# Patient Record
Sex: FEMALE | Race: WHITE | NOT HISPANIC OR LATINO | URBAN - METROPOLITAN AREA
[De-identification: names, ages, dates, MRNs, and addresses within clinical notes are randomized per-mention and may not be internally consistent; named-entity substitution may affect disease eponyms.]

---

## 2017-01-31 ENCOUNTER — EMERGENCY (EMERGENCY)
Facility: HOSPITAL | Age: 57
LOS: 1 days | Discharge: HOME | End: 2017-01-31
Attending: EMERGENCY MEDICINE | Admitting: EMERGENCY MEDICINE
Payer: MEDICAID

## 2017-01-31 VITALS
TEMPERATURE: 98 F | DIASTOLIC BLOOD PRESSURE: 78 MMHG | SYSTOLIC BLOOD PRESSURE: 107 MMHG | OXYGEN SATURATION: 99 % | RESPIRATION RATE: 18 BRPM | HEART RATE: 80 BPM

## 2017-01-31 VITALS
TEMPERATURE: 98 F | HEART RATE: 71 BPM | DIASTOLIC BLOOD PRESSURE: 80 MMHG | SYSTOLIC BLOOD PRESSURE: 130 MMHG | OXYGEN SATURATION: 98 % | RESPIRATION RATE: 12 BRPM
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
## 2017-01-31 DIAGNOSIS — T43.501A POISONING BY UNSPECIFIED ANTIPSYCHOTICS AND NEUROLEPTICS, ACCIDENTAL (UNINTENTIONAL), INITIAL ENCOUNTER: ICD-10-CM
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
## 2017-01-31 LAB
ALBUMIN SERPL ELPH-MCNC: 3.5 G/DL — SIGNIFICANT CHANGE UP (ref 3.3–5)
ALP SERPL-CCNC: 110 U/L — SIGNIFICANT CHANGE UP (ref 40–120)
ALT FLD-CCNC: 31 U/L — SIGNIFICANT CHANGE UP (ref 12–78)
ANION GAP SERPL CALC-SCNC: 7 MMOL/L — SIGNIFICANT CHANGE UP (ref 5–17)
APAP SERPL-MCNC: <2 UG/ML — LOW (ref 10–30)
AST SERPL-CCNC: 13 U/L — LOW (ref 15–37)
BASOPHILS # BLD AUTO: 0.1 K/UL — SIGNIFICANT CHANGE UP (ref 0–0.2)
BASOPHILS NFR BLD AUTO: 1.1 % — SIGNIFICANT CHANGE UP (ref 0–2)
BILIRUB SERPL-MCNC: 0.1 MG/DL — LOW (ref 0.2–1.2)
BUN SERPL-MCNC: 17 MG/DL — SIGNIFICANT CHANGE UP (ref 7–23)
CALCIUM SERPL-MCNC: 8.6 MG/DL — SIGNIFICANT CHANGE UP (ref 8.5–10.1)
CHLORIDE SERPL-SCNC: 105 MMOL/L — SIGNIFICANT CHANGE UP (ref 96–108)
CO2 SERPL-SCNC: 28 MMOL/L — SIGNIFICANT CHANGE UP (ref 22–31)
CREAT SERPL-MCNC: 0.92 MG/DL — SIGNIFICANT CHANGE UP (ref 0.5–1.3)
EOSINOPHIL # BLD AUTO: 0.3 K/UL — SIGNIFICANT CHANGE UP (ref 0–0.5)
EOSINOPHIL NFR BLD AUTO: 3.2 % — SIGNIFICANT CHANGE UP (ref 0–6)
ETHANOL SERPL-MCNC: <10 MG/DL — SIGNIFICANT CHANGE UP (ref 0–10)
GLUCOSE SERPL-MCNC: 89 MG/DL — SIGNIFICANT CHANGE UP (ref 70–99)
HCT VFR BLD CALC: 33.9 % — LOW (ref 34.5–45)
HGB BLD-MCNC: 10.9 G/DL — LOW (ref 11.5–15.5)
LYMPHOCYTES # BLD AUTO: 3.3 K/UL — SIGNIFICANT CHANGE UP (ref 1–3.3)
LYMPHOCYTES # BLD AUTO: 38.5 % — SIGNIFICANT CHANGE UP (ref 13–44)
MCHC RBC-ENTMCNC: 25.6 PG — LOW (ref 27–34)
MCHC RBC-ENTMCNC: 32.1 GM/DL — SIGNIFICANT CHANGE UP (ref 32–36)
MCV RBC AUTO: 79.8 FL — LOW (ref 80–100)
MONOCYTES # BLD AUTO: 0.5 K/UL — SIGNIFICANT CHANGE UP (ref 0–0.9)
MONOCYTES NFR BLD AUTO: 6 % — SIGNIFICANT CHANGE UP (ref 1–9)
NEUTROPHILS # BLD AUTO: 4.4 K/UL — SIGNIFICANT CHANGE UP (ref 1.8–7.4)
NEUTROPHILS NFR BLD AUTO: 51.2 % — SIGNIFICANT CHANGE UP (ref 43–77)
PCP SPEC-MCNC: SIGNIFICANT CHANGE UP
PLATELET # BLD AUTO: 257 K/UL — SIGNIFICANT CHANGE UP (ref 150–400)
POTASSIUM SERPL-MCNC: 3.7 MMOL/L — SIGNIFICANT CHANGE UP (ref 3.5–5.3)
POTASSIUM SERPL-SCNC: 3.7 MMOL/L — SIGNIFICANT CHANGE UP (ref 3.5–5.3)
PROT SERPL-MCNC: 6.9 G/DL — SIGNIFICANT CHANGE UP (ref 6–8.3)
RBC # BLD: 4.24 M/UL — SIGNIFICANT CHANGE UP (ref 3.8–5.2)
RBC # FLD: 14.2 % — SIGNIFICANT CHANGE UP (ref 10.3–14.5)
SALICYLATES SERPL-MCNC: 1.7 MG/DL — LOW (ref 2.8–20)
SODIUM SERPL-SCNC: 140 MMOL/L — SIGNIFICANT CHANGE UP (ref 135–145)
WBC # BLD: 8.5 K/UL — SIGNIFICANT CHANGE UP (ref 3.8–10.5)
WBC # FLD AUTO: 8.5 K/UL — SIGNIFICANT CHANGE UP (ref 3.8–10.5)

## 2017-01-31 PROCEDURE — 80053 COMPREHEN METABOLIC PANEL: CPT

## 2017-01-31 PROCEDURE — 70450 CT HEAD/BRAIN W/O DYE: CPT

## 2017-01-31 PROCEDURE — 85027 COMPLETE CBC AUTOMATED: CPT

## 2017-01-31 PROCEDURE — 36000 PLACE NEEDLE IN VEIN: CPT

## 2017-01-31 PROCEDURE — 99284 EMERGENCY DEPT VISIT MOD MDM: CPT

## 2017-01-31 PROCEDURE — 80307 DRUG TEST PRSMV CHEM ANLYZR: CPT

## 2017-01-31 PROCEDURE — 70450 CT HEAD/BRAIN W/O DYE: CPT | Mod: 26

## 2017-01-31 PROCEDURE — 93005 ELECTROCARDIOGRAM TRACING: CPT

## 2017-01-31 NOTE — ED PROVIDER NOTE - NOTES
recommendation: repeat EKG in 6 hours and 6 hours monitoring.  mental status at baseline then dc.  monitor for: somnolence, tachycardia, QTc prolongation

## 2017-01-31 NOTE — ED PROVIDER NOTE - PROGRESS NOTE DETAILS
spoke with Lana SORTO at [facility], pt awake ate a meal with no signs of lethargy at this time.  Will discharge back.

## 2017-01-31 NOTE — ED PROVIDER NOTE - MEDICAL DECISION MAKING DETAILS
pt from [facility] for depression with Seroquel ingestion 300mg. PCC, psych clearance, monitoring, EKG

## 2017-01-31 NOTE — ED ADULT NURSE REASSESSMENT NOTE - NS ED NURSE REASSESS COMMENT FT1
More awake ,aox4,asking for food,seen and reevaluated by Dr. Pickett and stated that shes not suicidal and only took 3 pills.

## 2017-01-31 NOTE — ED PROVIDER NOTE - PSYCHIATRIC, MLM
oriented to person, place, time/situation. normal mood and affect. no apparent risk to self or others.

## 2017-01-31 NOTE — ED PROVIDER NOTE - CHPI ED SYMPTOMS NEG
no hallucinations/no agitation/no confusion/no disorientation/no suicidal/no change in level of consciousness

## 2017-01-31 NOTE — ED PROVIDER NOTE - OBJECTIVE STATEMENT
55 y/o F from [facility] for depression sent in for lethargy/somnolensce after being found with a bottle of Seroquel.  Pt admits to taking three 100mg tablets for sleep.  Pt denies any suicidal ideations.

## 2017-02-07 ENCOUNTER — EMERGENCY (EMERGENCY)
Facility: HOSPITAL | Age: 57
LOS: 1 days | End: 2017-02-07
Payer: MEDICAID

## 2017-02-07 PROCEDURE — 99284 EMERGENCY DEPT VISIT MOD MDM: CPT

## 2017-02-07 PROCEDURE — 70450 CT HEAD/BRAIN W/O DYE: CPT | Mod: 26

## 2017-03-30 NOTE — HP
COWS





- Scale


Resting Pulse: 1= FL 


Sweatin=Flushed/Facial Moisture


Restless Observation: 3= Extraneous Movement


Pupil Size: 2= Moderately Dilated


Bone or Joint Aches: 2= Severe Diffuse Aches


Runny Nose/ Eye Tearin= Runny Nose/Eyes


GI Upset > 30mins: 3= Vomiting/Diarrhea


Tremor Observation: 2= Slight Tremor Visible


Yawning Observation: 2= >3x During Session


Anxiety or Irritability: 2=Irritable/Anxious


Goose Flesh Skin: 0=Smooth Skin


COWS Score: 21





CIWA Score





- CIWA Score


Nausea/Vomiting: 3


Muscle Tremors: 3


Anxiety: 3


Agitation: 3


Paroxysmal Sweats: 2


Orientation: 0-Oriented


Tacttile Disturbances: 2-Mild Itch/Numbness/Burn


Auditory Disturbances: 2-Mild Harshness/Frighten


Visual Disturbances: 2-Mild Sensitivity


Headache: 2-Mild


CIWA-Ar Total Score: 22





Admission ROS [facility]





- HPI


Chief Complaint: 


I NEED HELP TO STOP USING HEROIN,ALCOHOL,COCAINE,MARIJUANA


Allergies/Adverse Reactions: 


 Allergies











Allergy/AdvReac Type Severity Reaction Status Date / Time


 


Penicillins Allergy Severe Hives Verified 17 12:14











History of Present Illness: 


THIS 56 YEARS OLD FEMALE WITH HEROIN,ALCOHOL DEPENDENCE,XANAX,COCAINE MARIJUANA 

DEPENDENCE,WITHDRAWAL SYMPTOM,


LAST DETOX [facility] 2016


FELL 1 MONTH AGO


DEPRESSION,PTSD


HEPATITIS C


SEIZURE LAST 6 MONTHS


HYPERCHOLESTEROLEMIA


SCOLIOSIS


HISTORY OF DISLOCATION OF LEFT ANKLE


LONGEST PERIOD OF SOBRIETY 11 YEARS





Exam Limitations: No Limitations





- Ebola screening


Have you traveled outside of the country in the last 21 days: No


Have you had contact with anyone from an Ebola affected area: No


Have you been sick,other than usual withdrawal symptoms: No


Do you have a fever: No





- Review of Systems


Constitutional: Diaphoresis, Loss of Appetite, Malaise, Night Sweats, Changes 

in sleep, Unexplained wgt Loss


EENT: reports: Tearing, Nose Congestion


Respiratory: reports: No Symptoms reported


Cardiac: reports: Palpitations


GI: reports: Abdominal Distended, Diarrhea, Nausea, Poor Appetite, Abdominal 

cramping


: reports: No Symptoms Reported


Musculoskeletal: reports: Back Pain, Joint Pain, Muscle Pain, Joint Stiffness


Integumentary: reports: Dryness


Neuro: reports: Headache, Tremors


Endocrine: reports: No Symptoms Reported


Hematology: reports: No Symptoms Reported


Psychiatric: reports: No Sypmtoms Reported, Judgement Intact, Mood/Affect 

Appropiate, other (DEPRESSION,PTSD)





Patient History





- Patient Medical History


Hx Anemia: No


Hx Asthma: No


Hx Chronic Obstructive Pulmonary Disease (COPD): No


Hx Cancer: No


Hx Cardiac Disorders: No


Hx Hypertension: No


Hx Hypercholesterolemia: No


Hx Pacemaker: No


HX Cerebrovascular Accident: No


Hx Seizures: Yes (drug related seizures last 6 months ago.)


Hx Dementia: No


Hx Diabetes: No


Hx Gastrointestinal Disorders: No


Hx Liver Disease: No


Hx Genitourinary Disorders: No


Hx Sexually Transmitted Disorders: No


Hx Renal Disease (ESRD): No


Hx Thyroid Disease: No


Hx Human Immunodeficiency Virus (HIV): No (LAST 2016 NEGATIVE)


Hx Hepatitis C: Yes (UNDER C[facility] OF SPECIALIST)


Hx Depression: Yes


Hx Suicide Attempt: Yes (tried to overdose in )


Hx Bipolar Disorder: No


Hx Schizophrenia: No


Other Medical History: NO SUICIDAL,NO HOMICIDAL





- Patient Surgical History


Past Surgical History: Yes


Hx  Section: Yes (x2)


Other Surgical History: removal of benign R ovarian cyst 





- PPD History


Previous Implant?: Yes


Documented Results: Negative w/o proof


Implanted On Prior R Admission?: No


PPD to be Administered?: Yes





- Reproductive History


Patient is a Female of Child Bearing Age (11 -55 yrs old): Yes


Patient Pregnant: No





- Smoking Cessation


Smoking history: Current every day smoker


Have you smoked in the past 12 months: Yes


Aproximately how many cigarettes per day: 5


Hx Chewing Tobacco Use: No


Initiated information on smoking cessation: Yes


'Breaking Loose' booklet given: 17





- Substance & Tx. History


Hx Alcohol Use: Yes


Hx Substance Use: Yes


Substance Use Type: Alcohol, Cocaine, Heroin, Marijuana


Hx Substance Use Treatment: Yes ([facility] )





- Substances Abused


  ** Heroin


Route: Inhalation


Frequency: Daily


Amount used: 4-5 bags


Age of first use: 31


Date of Last Use: 17





  ** Alcohol


Route: Oral


Frequency: Daily


Amount used: 1 pint vodka


Age of first use: 16


Date of Last Use: 17





  ** Cocaine


Route: Inhalation


Frequency: 1-3 times last 30 days


Amount used: less than a gram


Age of first use: 20


Date of Last Use: 17





  ** Marijuana/Hashish


Route: Smoking


Frequency: Daily


Amount used: $5


Age of first use: 16


Date of Last Use: 17





Family Disease History





- Family Disease History


Family Disease History: Other: Father (ALCOHOL,), Mother (ALCOHOL)





Admission Physical Exam [facility]





- Vital Signs


Vital Signs: 


 Vital Signs - 24 hr











  17





  10:19


 


Temperature 96.8 F L


 


Pulse Rate 91 H


 


Respiratory 18





Rate 


 


Blood Pressure 130/89














- Physical


General Appearance: Yes: Moderate Distress, Tremorous, Irritable, Sweating, 

Anxious


HEENTM: Yes: Normal ENT Inspection, JAISON, Pharynx Normal


Respiratory: Yes: Lungs Clear, Normal Breath Sounds, No Respiratory Distress


Neck: Yes: Within Normal Limits, Supple, Trachea in good position


Breast: Yes: Breast Exam Deferred


Cardiology: Yes: Within Normal Limits, Regular Rhythm, Regular Rate, S1, S2


Abdominal: Yes: Within Normal Limits, Normal Bowel Sounds, Flat, Soft, Surgical 

Scar


Genitourinary: Yes: Within Normal Limits


Back: Yes: Muscle Spasm


Musculoskeletal: Yes: full range of Motion, Back pain, Joint Stiffness, Muscle 

Pain


Extremities: Yes: Within Normal Limits, Normal Inspection, Normal Range of 

Motion, Tremors


Neurological: Yes: CNs II-XII NML intact, Fully Oriented, Alert, Motor Strength 

5/5


Integumentary: Yes: Dry


Lymphatic: Yes: Within Normal Limits





- Diagnostic


(1) Opioid dependence with withdrawal


Current Visit: Yes   Status: Acute





(2) Alcohol dependence with uncomplicated withdrawal


Current Visit: Yes   Status: Acute





(3) Uncomplicated sedative, hypnotic or anxiolytic withdrawal


Current Visit: Yes   Status: Acute





(4) Cocaine dependence


Current Visit: Yes   Status: Acute   





(5) Cannabis dependence


Current Visit: Yes   Status: Acute





(6) Nicotine dependence


Current Visit: Yes   Status: Acute   





(7) Scoliosis


Current Visit: Yes   Status: Acute   





(8) Hypercholesterolemia


Current Visit: Yes   Status: Acute





(9) Ovarian cyst


Current Visit: Yes   Status: Acute   





(10) Dislocation of left ankle joint


Current Visit: Yes   Status: Acute   





(11) Depression


Current Visit: Yes   Status: Acute   





(12) PTSD (post-traumatic stress disorder)


Current Visit: Yes   Status: Acute








Cleared for Admission [facility]





- Detox or Rehab


[facility] Level of Care: Medically Managed


Detox Regimen/Protocol: Methadone/Valium





[facility] Breath Alcohol Content


Breath Alcohol Content: 0





Urine Pregancy Test





- Result


Urine Pregnancy Test Results: Negative- NO Line Present





Urine Drug Screen





- Results


Drug Screen Negative: No


Urine Drug Screen Results: THC-Marijuana, AUGUSTUS-Cocaine, OPI-Opiates, BAR-

Barbiturates, BZO-Benzodiazepines, MTD-Methadone, TCA-Tricyclic Antidepress, OXY

-Oxycodone

## 2017-03-31 NOTE — CONSULT
[facility] Psychiatric Consult





- Data


Date of interview: 03/31/17


Admission source: [facility]


Identifying data: First admission to [facility] for this 57 y/o  female 

seeking detox treatment for alcohol,cocaine,opioid,xanax and cannabis 

dependence.Patient is single,a mother of two,homeless,unemployed and supported 

on food stamps.


Substance Abuse History: - Smoking Cessation.  Smoking history: Current every 

day smoker.  Have you smoked in the past 12 months: Yes.  Aproximately how many 

cigarettes per day: 5.  Hx Chewing Tobacco Use: No.  Initiated information on 

smoking cessation: Yes.  'Breaking Loose' booklet given: 03/30/17.  - Substance 

& Tx. History.  Hx Alcohol Use: Yes.  Hx Substance Use: Yes.  Substance Use Type

: Alcohol, Cocaine, Heroin, Marijuana.  Hx Substance Use Treatment: Yes ([facility] 2016).  - Substances Abused.  ** Heroin.  Route: Inhalation.  Frequency: 

Daily.  Amount used: 4-5 bags.  Age of first use: 31.  Date of Last Use: 03/28/ 17.  ** Alcohol.  Route: Oral.  Frequency: Daily.  Amount used: 1 pint vodka.  

Age of first use: 16.  Date of Last Use: 03/29/17.  ** Cocaine.  Route: 

Inhalation.  Frequency: 1-3 times last 30 days.  Amount used: less than a gram.

  Age of first use: 20.  Date of Last Use: 03/29/17.  ** Marijuana/Hashish.  

Route: Smoking.  Frequency: Daily.  Amount used: $5.  Age of first use: 16.  

Date of Last Use: 03/29/17.  Confirmed by the patient.


Medical History: Scoliosis,withdrawal-related seizures in the past,hepatitis C,

hypercholesterolemia and a history of dislocation of left ankle.


Psychiatric History: Patient admits to one psychiatric hospitalization at [facility].Precipitant : recent death of son.Diagnosed with PTSD and 

MDD.Precribed prozac 40 mg/day + seroquel (dose not recalled).Ms Dejesus 

reports psychiatric follow up by a private psychiatrist in [facility].She 

aknowledges a suicide attempt via overdose " with pills " (reason for admission 

to [facility]).


Physical/Sexual Abuse/Trauma History: No reported history of sexual 

abuse.Traumatized by the death of her son.


Additional Comment: Urine Drug Screen Results: THC-Marijuana, AUGUSTUS-Cocaine, OPI-

Opiates, BAR-Barbiturates, BZO-Benzodiazepines, MTD-Methadone, TCA-Tricyclic 

Antidepressant, OXY-Oxycodone. Noted.





Mental Status Exam





- Mental Status Exam


Alert and Oriented to: Time, Place, Person


Cognitive Function: Good


Patient Appearance: Well Groomed


Mood: Sad, Withdrawn, Anxious, Apprehensive


Affect: Mood Congruent


Patient Behavior: Fatigued, Appropriate, Cooperative


Speech Pattern: Clear


Voice Loudness: Normal


Thought Process: Goal Oriented


Thought Disorder: Not Present


Hallucinations: Denies


Suicidal Ideation: Denies


Homicidal Ideation: Denies


Insight/Judgement: Poor


Sleep: Poorly (patient declines to resume seroquel due to occurrence of 

abnormal involuntary leg movements), Difficulty falling asleep


Appetite: Good


Muscle strength/Tone: Normal


Gait/Station: Normal





Psychiatric Findings





- Problem List (Axis 1, 2,3)


(1) Alcohol dependence with uncomplicated withdrawal


Current Visit: Yes   Status: Acute





(2) Cannabis dependence


Current Visit: Yes   Status: Acute





(3) Cocaine dependence


Current Visit: Yes   Status: Acute   





(4) Opioid dependence with withdrawal


Current Visit: Yes   Status: Acute





(5) Uncomplicated sedative, hypnotic or anxiolytic withdrawal


Current Visit: Yes   Status: Acute





(6) Nicotine dependence


Current Visit: Yes   Status: Acute   





(7) Substance induced mood disorder


Current Visit: Yes   Status: Acute





(8) PTSD (post-traumatic stress disorder)


Current Visit: Yes   Status: Chronic





(9) Depressive disorder


Current Visit: Yes   Status: Chronic





(10) Dislocation of left ankle joint


Current Visit: Yes   Status: Chronic   





(11) Hypercholesterolemia


Current Visit: Yes   Status: Chronic





(12) Ovarian cyst


Current Visit: Yes   Status: Chronic   





(13) Scoliosis


Current Visit: Yes   Status: Chronic   





(14) Insomnia


Current Visit: Yes   Status: Acute   








- Initial Treatment Plan


Initial Treatment Plan: Psychoeducation.Detoxification.Patient declines to 

resume prozac and seroquel in this hospital course.Not receptive to 

encouragement for reconsideration.Consented  (verbally) for zolpidem ONLY.Made 

aware of the risk of parasomnias.Observation.

## 2017-03-31 NOTE — EKG
Test Reason : 

Blood Pressure : ***/*** mmHG

Vent. Rate : 098 BPM     Atrial Rate : 098 BPM

   P-R Int : 162 ms          QRS Dur : 078 ms

    QT Int : 384 ms       P-R-T Axes : 074 068 055 degrees

   QTc Int : 490 ms

 

NORMAL SINUS RHYTHM

PROLONGED QT

ABNORMAL ECG

NO PREVIOUS ECGS AVAILABLE

Confirmed by MARIA R BOWERS MD ([id number]) on 3/31/2017 11:55:57 AM

 

Referred By:             Confirmed By:MARIA R BOWERS MD

## 2017-03-31 NOTE — PN
[facility] CIWA





- CIWA Score


Nausea/Vomitin-No Nausea/No Vomiting


Muscle Tremors: 3


Anxiety: 3


Agitation: 3


Paroxysmal Sweats: 3


Orientation: 0-Oriented


Tacttile Disturbances: 0-None


Auditory Disturbances: 0-None


Visual Disturbances: 0-None


Headache: 1-Very Mild


CIWA-Ar Total Score: 13





[facility] COWS





- Scale


Resting Pulse: 1= OK 


Sweatin=Flushed/Facial Moisture


Restless Observation: 1= Difficult to Sit Still


Pupil Size: 0= Normal to Room Light


Bone or Joint Aches: 2= Severe Diffuse Aches


Runny Nose/ Eye Tearin= Nasal Congestion


GI Upset > 30mins: 1= Stomach Cramp


Tremor Observation of Outstretched Hands: 2= Slight Tremor Visible


Yawning Observation: 2= >3x During Session


Anxiety or Irritability: 2=Irritable/Anxious


Goose Flesh Skin: 3=Piloerection


COWS Score: 17





[facility] Progress Note (SOAP)


Subjective: 


dry skin


sweats


agitation


anxiety


body aches


interrupted sleep


Objective: 





17 10:31


 Vital Signs











Temperature  97.2 F L  17 06:00


 


Pulse Rate  89   17 06:00


 


Respiratory Rate  16   17 06:00


 


Blood Pressure  96/72   17 06:00


 


O2 Sat by Pulse Oximetry (%)      








 Laboratory Tests











  17





  13:00 06:00


 


WBC   7.1


 


RBC   4.20


 


Hgb   11.2


 


Hct   34.4


 


MCV   81.9


 


MCHC   32.6


 


RDW   20.0 H


 


Plt Count   265


 


MPV   8.7


 


Urine Color  Ltyellow 


 


Urine Appearance  Clear 


 


Urine pH  6.0 


 


Ur Specific Gravity  1.018 


 


Urine Protein  Negative 


 


Urine Glucose (UA)  Negative 


 


Urine Ketones  Negative 


 


Urine Blood  Negative 


 


Urine Nitrite  Negative 


 


Urine Bilirubin  Negative 


 


Urine Urobilinogen  Negative 


 


Ur Leukocyte Esterase  3+ H 


 


Urine RBC  2 


 


Urine WBC  16 


 


Ur Epithelial Cells  Rare 


 


Urine Mucus  Rare 








labs pending


awake/alert


ambulating


no acute distress


Assessment: 





17 10:31


withdrawal sx


Plan: 


continue detox


increase fluids


lac hydrin bid


aveeno soap


motrin 800mg tid

## 2017-04-01 NOTE — PN
[facility] CIWA





- CIWA Score


Nausea/Vomiting: 3


Muscle Tremors: 3


Anxiety: 3


Agitation: 2


Paroxysmal Sweats: 1-Minimal Palms Moist


Orientation: 0-Oriented


Tacttile Disturbances: 1-Very Mild Itch/Numbness


Auditory Disturbances: 1-Very Mild


Visual Disturbances: 1-Very Mild Sensitivity


Headache: 2-Mild


CIWA-Ar Total Score: 17





[facility] COWS





- Scale


Resting Pulse: 1= OK 


Sweatin= Chills/Flushing


Restless Observation: 3= Extraneous Movement


Pupil Size: 1= Pupils >than Normal


Bone or Joint Aches: 2= Severe Diffuse Aches


Runny Nose/ Eye Tearin= Nasal Congestion


GI Upset > 30mins: 3= Vomiting/Diarrhea


Tremor Observation of Outstretched Hands: 2= Slight Tremor Visible


Yawning Observation: 1= 1-2x During Session


Anxiety or Irritability: 2=Irritable/Anxious


Goose Flesh Skin: 0=Smooth Skin


COWS Score: 17





[facility] Progress Note (SOAP)


Subjective: 


ALERT,IRRITABLE,ANXIOUS,INTERRUPTED SLEEP,TREMOR,PAIN IN THE BODY AND BACK


Objective: 





17 13:34


 Vital Signs











Temperature  96.9 F L  17 10:01


 


Pulse Rate  100 H  17 10:01


 


Respiratory Rate  16   17 10:01


 


Blood Pressure  118/72   17 10:01


 


O2 Sat by Pulse Oximetry (%)      








EKGEKG NSR


PROLONG QT


 Laboratory Last Values











WBC  7.1 K/mm3 (4.0-10.0)   17  06:00    


 


RBC  4.20 M/mm3 (3.60-5.2)   17  06:00    


 


Hgb  11.2 GM/dL (10.7-15.3)   17  06:00    


 


Hct  34.4 % (32.4-45.2)   17  06:00    


 


MCV  81.9 fl (80-96)   17  06:00    


 


MCHC  32.6 g/dl (32.0-36.0)   17  06:00    


 


RDW  20.0 % (11.6-15.6)  H  17  06:00    


 


Plt Count  265 K/MM3 (134-434)   17  06:00    


 


MPV  8.7 fl (7.5-11.1)   17  06:00    


 


Sodium  144 mmol/L (136-145)   17  06:00    


 


Potassium  3.8 mmol/L (3.5-5.1)   17  06:00    


 


Chloride  108 mmol/L ()  H  17  06:00    


 


Carbon Dioxide  25 mmol/L (21-32)   17  06:00    


 


Anion Gap  11  (8-16)   17  06:00    


 


BUN  17 mg/dL (7-18)   17  06:00    


 


Creatinine  1.0 mg/dL (0.55-1.02)   17  06:00    


 


Creat Clearance w eGFR  57.78  (>60)   17  06:00    


 


Random Glucose  114 mg/dL ()  H  17  06:00    


 


Calcium  8.3 mg/dL (8.5-10.1)  L  17  06:00    


 


Total Bilirubin  0.2 mg/dL (0.2-1.0)   17  06:00    


 


AST  41 U/L (15-37)  H  17  06:00    


 


ALT  101 U/L (12-78)  H  17  06:00    


 


Alkaline Phosphatase  180 U/L ()  H  17  06:00    


 


Total Protein  6.5 g/dl (6.4-8.2)   17  06:00    


 


Albumin  3.4 g/dl (3.4-5.0)   17  06:00    


 


Urine Color  Ltyellow   17  13:00    


 


Urine Appearance  Clear   17  13:00    


 


Urine pH  6.0  (5.0-8.0)   17  13:00    


 


Ur Specific Gravity  1.018  (1.001-1.035)   17  13:00    


 


Urine Protein  Negative  (NEGATIVE)   17  13:00    


 


Urine Glucose (UA)  Negative  (NEGATIVE)   17  13:00    


 


Urine Ketones  Negative  (NEGATIVE)   17  13:00    


 


Urine Blood  Negative  (NEGATIVE)   17  13:00    


 


Urine Nitrite  Negative  (NEGATIVE)   17  13:00    


 


Urine Bilirubin  Negative  (NEGATIVE)   17  13:00    


 


Urine Urobilinogen  Negative E.U./dl (0.2-1.0)   17  13:00    


 


Ur Leukocyte Esterase  3+  (NEGATIVE)  H  17  13:00    


 


Urine RBC  2 /hpf (0-3)   17  13:00    


 


Urine WBC  16 /hpf (3-5)   17  13:00    


 


Ur Epithelial Cells  Rare /hpf (FEW)   17  13:00    


 


Urine Mucus  Rare   17  13:00    


 


RPR Titer  Nonreactive  (NONREACTIVE)   17  06:00    











Assessment: 





17 13:35


WITHDRAWAL SYMPTOM


Plan: 


CONTINUE DETOX,D/C TYLENOL,REPEAT UA

## 2017-04-02 NOTE — PN
[facility] Progress Note


Note: 


PATIENT HAS WITHDRAWAL SYMPTOM,TREMOR,ANXIOUS,VALIUM 10 MGS PO ORDERED,

EXPLAINED NO MORE VALIUM AFTER THIS ON PRN,


CONTINUE VALIUM DETOX

## 2017-04-02 NOTE — PN
[facility] Progress Note (SOAP)


Subjective: 


ALERT,IRRITABLE,ANXIOUS,INTERRUPTED SLEEP,PAIN IN TH BODY,ANXIOUS


Objective: 





04/02/17 13:12


 Vital Signs











Temperature  99.6 F   04/02/17 10:53


 


Pulse Rate  79   04/02/17 10:53


 


Respiratory Rate  16   04/02/17 10:53


 


Blood Pressure  116/69   04/02/17 10:53


 


O2 Sat by Pulse Oximetry (%)      











Assessment: 





04/02/17 13:12


WITHDRAWAL SYMPTOM


Plan: 


CONTINUE DETOX

## 2017-04-03 NOTE — PN
[facility] Progress Note (SOAP)


Subjective: 


anxious


sweats


right hip pain


Objective: 


04/03/17 10:44


 Vital Signs











Temperature  97.7 F   04/03/17 06:00


 


Pulse Rate  84   04/03/17 06:00


 


Respiratory Rate  16   04/03/17 06:00


 


Blood Pressure  149/75   04/03/17 06:00


 


O2 Sat by Pulse Oximetry (%)      








awake/alert


ambulating


no acute distress


black/blue bruising noted around eyes


pt states it was a fall she experienced a month ago and now the black/blue 

noted periorbital area. pt states she had gone to ED and CT scan done and all 

negative. 





Assessment: 





04/03/17 10:47


withdrawal sx


Plan: 


continue detox


increase fluids


lidocaine patch


d/c in am

## 2017-04-04 NOTE — HP
[facility] MD Rehab Assess/Revision





- Admission History


Admitted to Rehab from: [address]





- Vital signs


Vital Signs: 


 Vital Signs (72 hours)











  04/04/17





  14:40


 


Temperature 97.8 F


 


Pulse Rate 98 H


 


Respiratory 16





Rate 


 


Blood Pressure 104/73




















- Findings


Detox History & Physical reviewed: Yes


Concur with findings: Yes

## 2017-04-04 NOTE — DS
[facility] Detox Discharge Summary


Admission Date: 


03/30/17





Discharge Date: 04/04/17





- History


Present History: Alcohol Dependence, Cannabis Dependence, Cocaine Dependence, 

Opioid Dependence, Sedative Dependence





- Physical Exam Results


Vital Signs: 


 Vital Signs











Temperature  98.5 F   04/04/17 06:33


 


Pulse Rate  75   04/04/17 06:33


 


Respiratory Rate  18   04/04/17 06:33


 


Blood Pressure  122/74   04/04/17 06:33


 


O2 Sat by Pulse Oximetry (%)      














- Treatment


Hospital Course: Detox Protocol Followed, Detoxed Safely, Responded well, 

Discharged Condition Good





- Medication


Discharge Medications: 


Ambulatory Orders





Fluoxetine HCl [Prozac -] 40 mg PO DAILY 03/30/17 


Zolpidem Tartrate [Ambien] 10 mg PO HS 03/30/17 











- Diagnosis


(1) Alcohol dependence with uncomplicated withdrawal


Current Visit: Yes   Status: Chronic





(2) Cannabis dependence


Current Visit: Yes   Status: Chronic





(3) Cocaine dependence


Current Visit: Yes   Status: Acute   Qualifiers: 


     Substance use status: uncomplicated        Qualified Code(s): F14.20 - 

Cocaine dependence, uncomplicated  





(4) Depression


Current Visit: Yes   Status: Chronic   Qualifiers: 


     Depression Type: unspecified        Qualified Code(s): F32.9 - Major 

depressive disorder, single episode, unspecified  





(5) Nicotine dependence


Current Visit: Yes   Status: Chronic   Qualifiers: 


     Nicotine product type: cigarettes     Substance use status: uncomplicated 

       Qualified Code(s): F17.210 - Nicotine dependence, cigarettes, 

uncomplicated  





(6) Opioid dependence with withdrawal


Current Visit: Yes   Status: Chronic





(7) Uncomplicated sedative, hypnotic or anxiolytic withdrawal


Current Visit: Yes   Status: Chronic





(8) Hypercholesterolemia


Current Visit: Yes   Status: Chronic








- AMA


Did Patient Leave Against Medical Advice: No
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
## 2020-07-26 ENCOUNTER — HOSPITAL ENCOUNTER (INPATIENT)
Dept: HOSPITAL 74 - YASAS | Age: 60
LOS: 5 days | Discharge: HOME | DRG: 773 | End: 2020-07-31
Attending: ALLERGY & IMMUNOLOGY | Admitting: ALLERGY & IMMUNOLOGY
Payer: COMMERCIAL

## 2020-07-26 VITALS — BODY MASS INDEX: 19.7 KG/M2

## 2020-07-26 DIAGNOSIS — B18.2: ICD-10-CM

## 2020-07-26 DIAGNOSIS — F33.1: ICD-10-CM

## 2020-07-26 DIAGNOSIS — Z86.69: ICD-10-CM

## 2020-07-26 DIAGNOSIS — Z91.041: ICD-10-CM

## 2020-07-26 DIAGNOSIS — F13.230: ICD-10-CM

## 2020-07-26 DIAGNOSIS — F11.23: Primary | ICD-10-CM
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
## 2020-07-26 DIAGNOSIS — F12.20: ICD-10-CM

## 2020-07-26 DIAGNOSIS — F14.20: ICD-10-CM

## 2020-07-26 DIAGNOSIS — F19.280: ICD-10-CM
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
## 2020-07-26 DIAGNOSIS — F10.230: ICD-10-CM

## 2020-07-26 DIAGNOSIS — F17.210: ICD-10-CM
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
## 2020-07-26 DIAGNOSIS — F19.24: ICD-10-CM

## 2020-07-26 DIAGNOSIS — R26.89: ICD-10-CM

## 2020-07-26 DIAGNOSIS — T23.092A: ICD-10-CM

## 2020-07-26 DIAGNOSIS — E78.5: ICD-10-CM

## 2020-07-26 DIAGNOSIS — M41.44: ICD-10-CM

## 2020-07-26 DIAGNOSIS — F43.10: ICD-10-CM

## 2020-07-26 DIAGNOSIS — F19.282: ICD-10-CM

## 2020-07-26 DIAGNOSIS — G47.00: ICD-10-CM

## 2020-07-26 DIAGNOSIS — Z88.8: ICD-10-CM

## 2020-07-26 DIAGNOSIS — T31.0: ICD-10-CM

## 2020-07-26 DIAGNOSIS — I69.393: ICD-10-CM

## 2020-07-26 DIAGNOSIS — Z88.0: ICD-10-CM

## 2020-07-26 DIAGNOSIS — Z91.5: ICD-10-CM

## 2020-07-26 PROCEDURE — U0003 INFECTIOUS AGENT DETECTION BY NUCLEIC ACID (DNA OR RNA); SEVERE ACUTE RESPIRATORY SYNDROME CORONAVIRUS 2 (SARS-COV-2) (CORONAVIRUS DISEASE [COVID-19]), AMPLIFIED PROBE TECHNIQUE, MAKING USE OF HIGH THROUGHPUT TECHNOLOGIES AS DESCRIBED BY CMS-2020-01-R: HCPCS

## 2020-07-26 PROCEDURE — HZ2ZZZZ DETOXIFICATION SERVICES FOR SUBSTANCE ABUSE TREATMENT: ICD-10-PCS | Performed by: ALLERGY & IMMUNOLOGY

## 2020-07-26 RX ADMIN — ACETAMINOPHEN PRN MG: 325 TABLET ORAL at 18:42

## 2020-07-26 RX ADMIN — Medication SCH MG: at 21:38

## 2020-07-26 RX ADMIN — QUETIAPINE FUMARATE SCH MG: 100 TABLET ORAL at 21:40

## 2020-07-26 NOTE — EKG
Test Reason : 

Blood Pressure : ***/*** mmHG

Vent. Rate : 061 BPM     Atrial Rate : 061 BPM

   P-R Int : 190 ms          QRS Dur : 094 ms

    QT Int : 458 ms       P-R-T Axes : [phone] degrees

   QTc Int : 461 ms

 

NORMAL SINUS RHYTHM

NORMAL ECG

WHEN COMPARED WITH ECG OF 30-MAR-2017 14:01,

VENT. RATE HAS DECREASED BY  37 BPM

Confirmed by MD Roro, Rad ([id number]) on 7/26/2020 5:15:51 PM

 

Referred By:             Confirmed By:Rad Read MD

## 2020-07-26 NOTE — HP
COWS





- Scale


Resting Pulse: 1= [place] 


Sweatin= Chills/Flushing


Restless Observation: 1= Difficult to Sit Still


Pupil Size: 1= Pupils >than Normal


Bone or Joint Aches: 2= Severe Diffuse Aches


Runny Nose/ Eye Tearin= Runny Nose/Eyes


GI Upset > 30mins: 1= Stomach Cramp


Tremor Observation: 2= Slight Tremor Visible


Yawning Observation: 2= >3x During Session


Anxiety or Irritability: 1=Feels Anxious/Irritable


Goose Flesh Skin: 3=Piloerection


COWS Score: 17





CIWA Score





- Admission Criteria


[facility]AS Guidelines: Admission for Medically Managed Detox: 


Requires at least one of the followin. CIWA greater than 12


2. Seizures within the past 24 hours


3. Delirium tremens within the past 24 hours


4. Hallucinations within the past 24 hours


5. Acute intervention needed for co  occurring medical disorder


6. Acute intervention needed for co  occurring psychiatric disorder


7. Severe withdrawal that cannot be handled at a lower level of care (continued


    vomiting, continued diarrhea, abnormal vital signs) requiring intravenous


    medication and/or fluids


8. Pregnancy








Admitting History and Physical





- Past Medical History


...LMP: 14





- Smoking History


Smoking history: Current every day smoker


Have you smoked in the past 12 months: Yes


Aproximately how many cigarettes per day: 5





- Alcohol/Substance Use


Hx Alcohol Use: Yes (reports started drinking heavy since 32 yo)





Admission [facility]


Chief Complaint: 





I want to get sober again and stay that way 


Allergies/Adverse Reactions: 


                                    Allergies











Allergy/AdvReac Type Severity Reaction Status Date / Time


 


Penicillins Allergy Severe Hives Verified 20 11:34


 


prochlorperazine Allergy  Hives Verified 20 11:36





[From Compazine]     


 


contrast dye Allergy  Hives Uncoded 20 11:36











History of Present Illness: 


Patient is a 59 years-old woman who presents for detox from heroin. Patient was 

on suboxaone but reports she has stopped taking same. Her last prescription is 

as below. Patient is requesting detox from xanax which does not show in her 

urine. She understands she will only be treated for heroin withdrawal. She 

reports she was sober for many years until 2 years ago. Her last detox and rehab

was in 2017 in this facility.


Patient Name: Adelina Carter Date: 1960


Address: [address] [place] [id number]: Female


Rx Written   Rx Dispensed   Drug   Quantity   Days Supply   Prescriber Name


2020   buprenorphine-naloxone 8-2 mg sl film   90   30   

Zuleyka Marie


Payment Method Insurance


Dispenser [facility]


2020   lorazepam 2 mg tablet   90   30   Deon [place] NP


Payment Method Insurance


Dispenser [facility]


2020   zolpidem tartrate 10 mg tablet   30   30   Deon [place] NP


Payment Method Insurance


Dispenser [facility]


2020   lorazepam 2 mg tablet   90   30   Deon [place] NP


Payment Method Insurance


Dispenser [facility]


2020   zolpidem tartrate 10 mg tablet   30   30   Deon [place] NP


Exam Limitations: No Limitations





- Ebola screening


Have you traveled outside of the country in the last 21 days: No


Have you had contact with anyone from an Ebola affected area: No


Have you been sick,other than usual withdrawal symptoms: No


Do you have a fever: No





- Review of Systems


Constitutional: Chills, Loss of Appetite, Malaise, Changes in sleep, Weakness, 

Unintentional Wgt. Loss


EENT: reports: Blurred Vision, Nose Congestion, Other (dentures)


Respiratory: reports: No Symptoms reported


Cardiac: reports: No Symptoms Reported


GI: reports: Constipated, Nausea, Poor Appetite, Abdominal cramping


: reports: No Symptoms Reported


Musculoskeletal: reports: Back Pain, Joint Pain, Muscle Pain, Muscle Weakness


Integumentary: reports: No Symptoms Reported


Neuro: reports: Numbness, Tingling, Tremors, Weakness


Endocrine: reports: No Symptoms Reported


Hematology: reports: Anemia


Psychiatric: reports: Anxious, Depressed


Other Systems: Reviewed and Negative





Patient History





- Patient Medical History


Hx Anemia: No


Hx Asthma: No


Hx Chronic Obstructive Pulmonary Disease (COPD): No


Hx Cancer: No


Hx Cardiac Disorders: No


Hx Hypertension: No


Hx Hypercholesterolemia: No


Hx Pacemaker: No


HX Cerebrovascular Accident: No


Hx Seizures: Yes (6 years ago)


Hx Dementia: No


Hx Diabetes: No


Hx Gastrointestinal Disorders: No


Hx Liver Disease: No


Hx Genitourinary Disorders: No


Hx Sexually Transmitted Disorders: No


Hx Renal Disease (ESRD): No


Hx Thyroid Disease: No


Hx Human Immunodeficiency Virus (HIV): No


Hx Hepatitis C: Yes


Hx Depression: Yes


Hx Suicide Attempt: Yes (7 years ago)


Hx Bipolar Disorder: No


Hx Schizophrenia: No





- Patient Surgical History


Past Surgical History: Yes


Hx  Section: Yes (x 2)


Other Surgical History: removal of benign R ovarian cyst ,,


Anesthesia Reaction: No





- PPD History


Previous Implant?: Yes


Documented Results: Negative w/o proof


Implanted On Prior [facility] Admission?: Yes


Date: 17


Results: omm


PPD to be Administered?: Yes





- Reproductive History


Patient is a Female of Child Bearing Age (11 -55 yrs old): No


Last Menstrual Period: 14





- Smoking Cessation


Smoking history: Current every day smoker


Have you smoked in the past 12 months: Yes


Aproximately how many cigarettes per day: 5


Hx Chewing Tobacco Use: No


Initiated information on smoking cessation: Yes


'Breaking Loose' booklet given: 20





- Substances abused


  ** Heroin


Substance route: Inhalation


Frequency: Daily


Amount used: 10 bags


Age of first use: 31


Date of last use: 20





Admission Physical Exam S





- Physical


General Appearance: Yes: No Apparent Distress


HEENTM: Yes: Hearing grossly Normal, Normocephalic, Normal Voice, JAISON, Other 

(full set of upper and lower dentures)


Respiratory: Yes: Chest Non-Tender, Lungs Clear, Normal Breath Sounds, No 

Respiratory Distress, No Accessory Muscle Use


Neck: Yes: No masses,lesions,Nodules, Supple


Breast: Yes: Breast Exam Deferred


Cardiology: Yes: Regular Rhythm, Regular Rate, S1, S2


Abdominal: Yes: Normal Bowel Sounds, Non Tender, Soft


Genitourinary: Yes: Hesitency


Back: Yes: Other (mild scoliosis)


Musculoskeletal: Yes: Back pain, Muscle Pain, Muscle weakness


Extremities: Yes: Tremors, Other (unsteady gait r/t previous CVA)


Neurological: Yes: Fully Oriented, Alert, Normal Mood/Affect, Normal Response, 

Numbness, Other (left leg weakness, walks with shuffling gait)


Integumentary: Yes: Cold


Lymphatic: Yes: Within Normal Limits





- Diagnostic


(1) Nicotine dependence


Current Visit: Yes   Status: Chronic   


Qualifiers: 


   Nicotine product type: cigarettes   Substance use status: uncomplicated   

Qualified Code(s): F17.210 - Nicotine dependence, cigarettes, uncomplicated   





(2) Opioid dependence with withdrawal


Current Visit: Yes   Status: Acute   





(3) Scoliosis


Current Visit: Yes   Status: Chronic   


Qualifiers: 


   Scoliosis type: neuromuscular   Spinal region: thoracic   Qualified Code(s): 

M41.44 - Neuromuscular scoliosis, thoracic region   





(4) CVA, old, ataxia


Current Visit: Yes   Status: Chronic   





(5) Shuffling gait


Current Visit: Yes   Status: Chronic   





Cleared for Admission [facility]





- Detox or Rehab


[facility] Level of Care: Medically Managed


Detox Regimen/Protocol: Methadone


Claeared for Rehab Admission: No





Inpatient Rehab Admission





- Rehab Decision to Admit


Inpatient rehab admission?: No
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
## 2020-07-27 LAB
ALBUMIN SERPL-MCNC: 2.9 G/DL (ref 3.4–5)
ALP SERPL-CCNC: 46 U/L (ref 45–117)
ALT SERPL-CCNC: 24 U/L (ref 13–61)
ANION GAP SERPL CALC-SCNC: 4 MMOL/L (ref 8–16)
AST SERPL-CCNC: 17 U/L (ref 15–37)
BILIRUB SERPL-MCNC: 0.9 MG/DL (ref 0.2–1)
BUN SERPL-MCNC: 8 MG/DL (ref 7–18)
CALCIUM SERPL-MCNC: 8.6 MG/DL (ref 8.5–10.1)
CHLORIDE SERPL-SCNC: 113 MMOL/L (ref 98–107)
CO2 SERPL-SCNC: 27 MMOL/L (ref 21–32)
CREAT SERPL-MCNC: 0.5 MG/DL (ref 0.55–1.3)
DEPRECATED RDW RBC AUTO: 15.1 % (ref 11.6–15.6)
GLUCOSE SERPL-MCNC: 82 MG/DL (ref 74–106)
HCT VFR BLD CALC: 32.4 % (ref 32.4–45.2)
HGB BLD-MCNC: 10.8 GM/DL (ref 10.7–15.3)
MCH RBC QN AUTO: 30.5 PG (ref 25.7–33.7)
MCHC RBC AUTO-ENTMCNC: 33.3 G/DL (ref 32–36)
MCV RBC: 91.6 FL (ref 80–96)
PLATELET # BLD AUTO: 182 K/MM3 (ref 134–434)
PMV BLD: 9.8 FL (ref 7.5–11.1)
POTASSIUM SERPLBLD-SCNC: 2.9 MMOL/L (ref 3.5–5.1)
PROT SERPL-MCNC: 5.2 G/DL (ref 6.4–8.2)
RBC # BLD AUTO: 3.54 M/MM3 (ref 3.6–5.2)
SODIUM SERPL-SCNC: 144 MMOL/L (ref 136–145)
WBC # BLD AUTO: 3.4 K/MM3 (ref 4–10)

## 2020-07-27 RX ADMIN — POTASSIUM CHLORIDE SCH MEQ: 20 SOLUTION ORAL at 20:59

## 2020-07-27 RX ADMIN — POTASSIUM CHLORIDE SCH MEQ: 20 SOLUTION ORAL at 17:21

## 2020-07-27 RX ADMIN — NICOTINE SCH MG: 7 PATCH TRANSDERMAL at 11:30

## 2020-07-27 RX ADMIN — POTASSIUM CHLORIDE SCH MEQ: 20 SOLUTION ORAL at 13:09

## 2020-07-27 RX ADMIN — Medication SCH MG: at 21:54

## 2020-07-27 RX ADMIN — Medication SCH TAB: at 11:29

## 2020-07-27 RX ADMIN — QUETIAPINE FUMARATE SCH MG: 100 TABLET ORAL at 21:54

## 2020-07-27 NOTE — PN
[facility]HS COWS





- Scale


Resting Pulse: 0= [place] 80 or Below


Sweatin= Chills/Flushing


Restless Observation: 1= Difficult to Sit Still


Pupil Size: 0= Normal to Room Light


Bone or Joint Aches: 2= Severe Diffuse Aches


Runny Nose/ Eye Tearin= Nasal Congestion


GI Upset > 30mins: 0= None


Tremor Observation of Outstretched Hands: 1= Tremor Felt, Not Seen


Yawning Observation: 0= None


Anxiety or Irritability: 1=Feels Anxious/Irritable


Goose Flesh Skin: 0=Smooth Skin


COWS Score: 7





BHS Progress Note (SOAP)


Subjective: 





sweats


mild shakes


interrupted sleep


Objective: 





20 11:36


                                   Vital Signs











Temperature  97.3 F L  20 08:52


 


Pulse Rate  73   20 08:52


 


Respiratory Rate  18   20 08:52


 


Blood Pressure  103/68   20 08:52


 


O2 Sat by Pulse Oximetry (%)  97   20 05:34








                                Laboratory Tests











  20





  14:15


 


COVID-19 (MAO)  Not detected











rest of labs pending


aaox3


ambulating


no acute distress





Assessment: 





20 11:36


withdrawals


Plan: 





continue detox


pending labs


ensure plus bid for lunch and dinner

## 2020-07-28 LAB
ALBUMIN SERPL-MCNC: 3.1 G/DL (ref 3.4–5)
ALP SERPL-CCNC: 52 U/L (ref 45–117)
ALT SERPL-CCNC: 25 U/L (ref 13–61)
ANION GAP SERPL CALC-SCNC: 4 MMOL/L (ref 8–16)
AST SERPL-CCNC: 17 U/L (ref 15–37)
BASOPHILS # BLD: 0.6 % (ref 0–2)
BILIRUB SERPL-MCNC: 0.6 MG/DL (ref 0.2–1)
BUN SERPL-MCNC: 7.8 MG/DL (ref 7–18)
CALCIUM SERPL-MCNC: 9 MG/DL (ref 8.5–10.1)
CHLORIDE SERPL-SCNC: 107 MMOL/L (ref 98–107)
CO2 SERPL-SCNC: 29 MMOL/L (ref 21–32)
CREAT SERPL-MCNC: 0.5 MG/DL (ref 0.55–1.3)
DEPRECATED RDW RBC AUTO: 15 % (ref 11.6–15.6)
EOSINOPHIL # BLD: 6.6 % (ref 0–4.5)
GLUCOSE SERPL-MCNC: 81 MG/DL (ref 74–106)
HCT VFR BLD CALC: 36 % (ref 32.4–45.2)
HGB BLD-MCNC: 11.9 GM/DL (ref 10.7–15.3)
LYMPHOCYTES # BLD: 34 % (ref 8–40)
MCH RBC QN AUTO: 30.4 PG (ref 25.7–33.7)
MCHC RBC AUTO-ENTMCNC: 33.1 G/DL (ref 32–36)
MCV RBC: 91.8 FL (ref 80–96)
MONOCYTES # BLD AUTO: 8.9 % (ref 3.8–10.2)
NEUTROPHILS # BLD: 49.9 % (ref 42.8–82.8)
PLATELET # BLD AUTO: 188 K/MM3 (ref 134–434)
PMV BLD: 9.2 FL (ref 7.5–11.1)
POTASSIUM SERPLBLD-SCNC: 4.2 MMOL/L (ref 3.5–5.1)
PROT SERPL-MCNC: 5.6 G/DL (ref 6.4–8.2)
RBC # BLD AUTO: 3.92 M/MM3 (ref 3.6–5.2)
SODIUM SERPL-SCNC: 140 MMOL/L (ref 136–145)
WBC # BLD AUTO: 4.3 K/MM3 (ref 4–10)

## 2020-07-28 RX ADMIN — QUETIAPINE FUMARATE SCH MG: 100 TABLET ORAL at 22:10

## 2020-07-28 RX ADMIN — Medication SCH MG: at 22:10

## 2020-07-28 RX ADMIN — ACETAMINOPHEN PRN MG: 325 TABLET ORAL at 17:19

## 2020-07-28 RX ADMIN — NICOTINE SCH: 7 PATCH TRANSDERMAL at 10:09

## 2020-07-28 RX ADMIN — ACETAMINOPHEN PRN MG: 325 TABLET ORAL at 10:11

## 2020-07-28 RX ADMIN — Medication SCH TAB: at 10:09

## 2020-07-28 NOTE — PN
BHS COWS





- Scale


Resting Pulse: 0= [place] 80 or Below


Sweatin= Chills/Flushing


Restless Observation: 1= Difficult to Sit Still


Pupil Size: 0= Normal to Room Light


Bone or Joint Aches: 2= Severe Diffuse Aches


Runny Nose/ Eye Tearin= None


GI Upset > 30mins: 0= None


Tremor Observation of Outstretched Hands: 1= Tremor Felt, Not Seen


Yawning Observation: 1= 1-2x During Session


Anxiety or Irritability: 1=Feels Anxious/Irritable


Goose Flesh Skin: 0=Smooth Skin


COWS Score: 7





BHS Progress Note (SOAP)


Subjective: 





body aches


headache


sweats


nausea


constipation


Objective: 





20 09:55


                                   Vital Signs











Temperature  97.3 F L  20 05:36


 


Pulse Rate  57 L  20 05:36


 


Respiratory Rate  18   20 05:36


 


Blood Pressure  117/57 L  20 05:36


 


O2 Sat by Pulse Oximetry (%)  97   20 05:36








                                Laboratory Tests











  20





  14:15 08:00 08:00


 


WBC   


 


RBC   


 


Hgb   


 


Hct   


 


MCV   


 


MCH   


 


MCHC   


 


RDW   


 


Plt Count   


 


MPV   


 


Sodium   


 


Potassium   


 


Chloride   


 


Carbon Dioxide   


 


Anion Gap   


 


BUN   


 


Creatinine   


 


Est GFR (CKD-EPI)AfAm   


 


Est GFR (CKD-EPI)NonAf   


 


Random Glucose   


 


Calcium   


 


Total Bilirubin   


 


AST   


 


ALT   


 


Alkaline Phosphatase   


 


Total Protein   


 


Albumin   


 


Syphilis Serology   Non-reactive 


 


COVID-19 (MAO)  Not detected  


 


HIV Ag/Ab Combo Qual    Negative














  20





  08:00 08:00


 


WBC  3.4 L 


 


RBC  3.54 L 


 


Hgb  10.8 


 


Hct  32.4 


 


MCV  91.6 


 


MCH  30.5  D 


 


MCHC  33.3 


 


RDW  15.1  D 


 


Plt Count  182  D 


 


MPV  9.8  D 


 


Sodium   144


 


Potassium   2.9 L*


 


Chloride   113 H


 


Carbon Dioxide   27


 


Anion Gap   4 L


 


BUN   8.0


 


Creatinine   0.5 L


 


Est GFR (CKD-EPI)AfAm   124.52


 


Est GFR (CKD-EPI)NonAf   107.44


 


Random Glucose   82


 


Calcium   8.6


 


Total Bilirubin   0.9


 


AST   17


 


ALT   24


 


Alkaline Phosphatase   46


 


Total Protein   5.2 L


 


Albumin   2.9 L


 


Syphilis Serology  


 


COVID-19 (MAO)  


 


HIV Ag/Ab Combo Qual  








repeated CMP pending


aaox3


ambulating


no acute distress


Assessment: 





20 09:55


withdrawals


Plan: 





continue detox


pending repeated labs


tylenol/motrin prn 


citroma prn


tigan prn

## 2020-07-29 RX ADMIN — NICOTINE SCH: 7 PATCH TRANSDERMAL at 10:24

## 2020-07-29 RX ADMIN — Medication SCH MG: at 21:41

## 2020-07-29 RX ADMIN — QUETIAPINE FUMARATE SCH MG: 100 TABLET ORAL at 21:41

## 2020-07-29 RX ADMIN — ACETAMINOPHEN PRN MG: 325 TABLET ORAL at 19:40

## 2020-07-29 RX ADMIN — Medication SCH TAB: at 10:23

## 2020-07-29 NOTE — PN
BHS COWS





- Scale


Resting Pulse: 0= [place] 80 or Below


Sweatin= No chills or Flushing


Restless Observation: 0= Sits Still


Pupil Size: 0= Normal to Room Light


Bone or Joint Aches: 2= Severe Diffuse Aches


Runny Nose/ Eye Tearin= None


GI Upset > 30mins: 0= None


Tremor Observation of Outstretched Hands: 1= Tremor Felt, Not Seen


Yawning Observation: 0= None


Anxiety or Irritability: 2=Irritable/Anxious


Goose Flesh Skin: 0=Smooth Skin


COWS Score: 5





BHS Progress Note (SOAP)


Subjective: 





body aches


sweats


Objective: 





20 10:23


                                   Vital Signs











Temperature  97.5 F L  20 08:52


 


Pulse Rate  76   20 08:52


 


Respiratory Rate  16   20 08:52


 


Blood Pressure  139/78   20 08:52


 


O2 Sat by Pulse Oximetry (%)  96   20 08:52








                                Laboratory Tests











  20





  14:15 08:00 08:00


 


WBC   


 


RBC   


 


Hgb   


 


Hct   


 


MCV   


 


MCH   


 


MCHC   


 


RDW   


 


Plt Count   


 


MPV   


 


Absolute Neuts (auto)   


 


Neutrophils %   


 


Lymphocytes %   


 


Monocytes %   


 


Eosinophils %   


 


Basophils %   


 


Nucleated RBC %   


 


Sodium   


 


Potassium   


 


Chloride   


 


Carbon Dioxide   


 


Anion Gap   


 


BUN   


 


Creatinine   


 


Est GFR (CKD-EPI)AfAm   


 


Est GFR (CKD-EPI)NonAf   


 


Random Glucose   


 


Calcium   


 


Total Bilirubin   


 


AST   


 


ALT   


 


Alkaline Phosphatase   


 


Total Protein   


 


Albumin   


 


Syphilis Serology   Non-reactive 


 


COVID-19 (MAO)  Not detected  


 


HIV Ag/Ab Combo Qual    Negative














  20





  08:00 08:00 08:00


 


WBC  3.4 L   4.3


 


RBC  3.54 L   3.92


 


Hgb  10.8   11.9


 


Hct  32.4   36.0


 


MCV  91.6   91.8


 


MCH  30.5  D   30.4


 


MCHC  33.3   33.1


 


RDW  15.1  D   15.0


 


Plt Count  182  D   188


 


MPV  9.8  D   9.2


 


Absolute Neuts (auto)    2.1


 


Neutrophils %    49.9


 


Lymphocytes %    34.0


 


Monocytes %    8.9


 


Eosinophils %    6.6 H


 


Basophils %    0.6


 


Nucleated RBC %    0


 


Sodium   144 


 


Potassium   2.9 L* 


 


Chloride   113 H 


 


Carbon Dioxide   27 


 


Anion Gap   4 L 


 


BUN   8.0 


 


Creatinine   0.5 L 


 


Est GFR (CKD-EPI)AfAm   124.52 


 


Est GFR (CKD-EPI)NonAf   107.44 


 


Random Glucose   82 


 


Calcium   8.6 


 


Total Bilirubin   0.9 


 


AST   17 


 


ALT   24 


 


Alkaline Phosphatase   46 


 


Total Protein   5.2 L 


 


Albumin   2.9 L 


 


Syphilis Serology   


 


COVID-19 (MAO)   


 


HIV Ag/Ab Combo Qual   














  20





  09:50


 


WBC 


 


RBC 


 


Hgb 


 


Hct 


 


MCV 


 


MCH 


 


MCHC 


 


RDW 


 


Plt Count 


 


MPV 


 


Absolute Neuts (auto) 


 


Neutrophils % 


 


Lymphocytes % 


 


Monocytes % 


 


Eosinophils % 


 


Basophils % 


 


Nucleated RBC % 


 


Sodium  140


 


Potassium  4.2


 


Chloride  107


 


Carbon Dioxide  29


 


Anion Gap  4 L


 


BUN  7.8


 


Creatinine  0.5 L


 


Est GFR (CKD-EPI)AfAm  124.52


 


Est GFR (CKD-EPI)NonAf  107.44


 


Random Glucose  81


 


Calcium  9.0


 


Total Bilirubin  0.6


 


AST  17


 


ALT  25


 


Alkaline Phosphatase  52


 


Total Protein  5.6 L


 


Albumin  3.1 L


 


Syphilis Serology 


 


COVID-19 (MAO) 


 


HIV Ag/Ab Combo Qual 








repeated labs show tremendous improvement


aaox3


ambulating


no acute distress


Assessment: 





20 10:23


withdrawals


Plan: 





increase fluids


continue detox

## 2020-07-30 RX ADMIN — NICOTINE SCH: 7 PATCH TRANSDERMAL at 11:15

## 2020-07-30 RX ADMIN — Medication SCH TAB: at 11:13

## 2020-07-30 RX ADMIN — Medication SCH MG: at 22:05

## 2020-07-30 RX ADMIN — QUETIAPINE FUMARATE SCH MG: 100 TABLET ORAL at 22:05

## 2020-07-30 RX ADMIN — ACETAMINOPHEN PRN MG: 325 TABLET ORAL at 06:23

## 2020-07-30 RX ADMIN — ACETAMINOPHEN PRN MG: 325 TABLET ORAL at 18:22

## 2020-07-30 NOTE — PN
BHS COWS





- Scale


Resting Pulse: 0= [place] 80 or Below


Sweatin= Chills/Flushing


Restless Observation: 1= Difficult to Sit Still


Pupil Size: 0= Normal to Room Light


Bone or Joint Aches: 1= Mild Discomfort


Runny Nose/ Eye Tearin= None


GI Upset > 30mins: 0= None


Tremor Observation of Outstretched Hands: 1= Tremor Felt, Not Seen


Yawning Observation: 0= None


Anxiety or Irritability: 1=Feels Anxious/Irritable


Goose Flesh Skin: 0=Smooth Skin


COWS Score: 5





BHS Progress Note (SOAP)


Subjective: 





sweats


little anxiety


otherwise feeling better


Objective: 





20 11:11


                                   Vital Signs











Temperature  97.2 F L  20 09:05


 


Pulse Rate  70   20 09:05


 


Respiratory Rate  19   20 09:05


 


Blood Pressure  151/63   20 09:05


 


O2 Sat by Pulse Oximetry (%)  99   20 09:05








aaox3


ambulating


no acute distress


Assessment: 





20 11:12


withdrawals


Plan: 





continue detox


d/c in am

## 2020-07-31 VITALS — TEMPERATURE: 96.8 F | HEART RATE: 79 BPM | SYSTOLIC BLOOD PRESSURE: 117 MMHG | DIASTOLIC BLOOD PRESSURE: 75 MMHG

## 2020-07-31 RX ADMIN — ACETAMINOPHEN PRN MG: 325 TABLET ORAL at 06:03
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
## 2020-07-31 NOTE — DS
[facility] Detox Discharge Summary


Admission Date: 


07/26/20





Discharge Date: 07/31/20





- History


Present History: Alcohol Dependence, Cannabis Dependence, Cocaine Dependence, 

Opioid Dependence, Sedative Dependence





- Physical Exam Results


Vital Signs: 


                                   Vital Signs











Temperature  97.1 F L  07/31/20 05:38


 


Pulse Rate  57 L  07/31/20 05:38


 


Respiratory Rate  16   07/31/20 05:38


 


Blood Pressure  132/77   07/31/20 05:38


 


O2 Sat by Pulse Oximetry (%)  100   07/31/20 05:38











Pertinent Admission Physical Exam Findings: 





                                   Vital Signs











Temperature  97.1 F L  07/31/20 05:38


 


Pulse Rate  57 L  07/31/20 05:38


 


Respiratory Rate  16   07/31/20 05:38


 


Blood Pressure  132/77   07/31/20 05:38


 


O2 Sat by Pulse Oximetry (%)  100   07/31/20 05:38








                                Laboratory Tests











  07/26/20 07/27/20 07/27/20





  14:15 08:00 08:00


 


WBC   


 


RBC   


 


Hgb   


 


Hct   


 


MCV   


 


MCH   


 


MCHC   


 


RDW   


 


Plt Count   


 


MPV   


 


Absolute Neuts (auto)   


 


Neutrophils %   


 


Lymphocytes %   


 


Monocytes %   


 


Eosinophils %   


 


Basophils %   


 


Nucleated RBC %   


 


Sodium   


 


Potassium   


 


Chloride   


 


Carbon Dioxide   


 


Anion Gap   


 


BUN   


 


Creatinine   


 


Est GFR (CKD-EPI)AfAm   


 


Est GFR (CKD-EPI)NonAf   


 


Random Glucose   


 


Calcium   


 


Total Bilirubin   


 


AST   


 


ALT   


 


Alkaline Phosphatase   


 


Total Protein   


 


Albumin   


 


Syphilis Serology   Non-reactive 


 


COVID-19 (MAO)  Not detected  


 


HIV Ag/Ab Combo Qual    Negative














  07/27/20 07/27/20 07/28/20





  08:00 08:00 08:00


 


WBC  3.4 L   4.3


 


RBC  3.54 L   3.92


 


Hgb  10.8   11.9


 


Hct  32.4   36.0


 


MCV  91.6   91.8


 


MCH  30.5  D   30.4


 


MCHC  33.3   33.1


 


RDW  15.1  D   15.0


 


Plt Count  182  D   188


 


MPV  9.8  D   9.2


 


Absolute Neuts (auto)    2.1


 


Neutrophils %    49.9


 


Lymphocytes %    34.0


 


Monocytes %    8.9


 


Eosinophils %    6.6 H


 


Basophils %    0.6


 


Nucleated RBC %    0


 


Sodium   144 


 


Potassium   2.9 L* 


 


Chloride   113 H 


 


Carbon Dioxide   27 


 


Anion Gap   4 L 


 


BUN   8.0 


 


Creatinine   0.5 L 


 


Est GFR (CKD-EPI)AfAm   124.52 


 


Est GFR (CKD-EPI)NonAf   107.44 


 


Random Glucose   82 


 


Calcium   8.6 


 


Total Bilirubin   0.9 


 


AST   17 


 


ALT   24 


 


Alkaline Phosphatase   46 


 


Total Protein   5.2 L 


 


Albumin   2.9 L 


 


Syphilis Serology   


 


COVID-19 (MAO)   


 


HIV Ag/Ab Combo Qual   














  07/28/20





  09:50


 


WBC 


 


RBC 


 


Hgb 


 


Hct 


 


MCV 


 


MCH 


 


MCHC 


 


RDW 


 


Plt Count 


 


MPV 


 


Absolute Neuts (auto) 


 


Neutrophils % 


 


Lymphocytes % 


 


Monocytes % 


 


Eosinophils % 


 


Basophils % 


 


Nucleated RBC % 


 


Sodium  140


 


Potassium  4.2


 


Chloride  107


 


Carbon Dioxide  29


 


Anion Gap  4 L


 


BUN  7.8


 


Creatinine  0.5 L


 


Est GFR (CKD-EPI)AfAm  124.52


 


Est GFR (CKD-EPI)NonAf  107.44


 


Random Glucose  81


 


Calcium  9.0


 


Total Bilirubin  0.6


 


AST  17


 


ALT  25


 


Alkaline Phosphatase  52


 


Total Protein  5.6 L


 


Albumin  3.1 L


 


Syphilis Serology 


 


COVID-19 (MAO) 


 


HIV Ag/Ab Combo Qual 








aaox3


ambulating


no acute distress


lungs CTA





- Treatment


Hospital Course: Detox Protocol Followed, Detoxed Safely, Responded well, 

Discharged Condition Good, Rehab Referral Accepted





- Medication


Discharge Medications: 


Ambulatory Orders





Zolpidem Tartrate [Ambien] 10 mg PO HS 03/30/17 


Quetiapine Fumarate [Seroquel] 200 mg PO HS 04/04/17 











- Diagnosis


(1) Opioid dependence with withdrawal


Current Visit: Yes   Status: Chronic   





(2) Substance induced mood disorder


Current Visit: Yes   Status: Acute   





(3) Substance-induced anxiety disorder


Current Visit: Yes   Status: Acute   





(4) Substance-induced sleep disorder


Current Visit: Yes   Status: Acute   





(5) CVA, old, ataxia


Current Visit: Yes   Status: Chronic   





(6) Dyslipidemia


Current Visit: Yes   Status: Chronic   





(7) Hepatitis C


Current Visit: Yes   Status: Chronic   


Qualifiers: 


   Viral hepatitis chronicity: chronic   Hepatic coma status: without hepatic 

coma   Qualified Code(s): B18.2 - Chronic viral hepatitis C   





(8) MDD (major depressive disorder), recurrent episode, moderate


Current Visit: Yes   Status: Chronic   





(9) Nicotine dependence


Current Visit: Yes   Status: Chronic   


Qualifiers: 


   Nicotine product type: cigarettes   Substance use status: uncomplicated   

Qualified Code(s): F17.210 - Nicotine dependence, cigarettes, uncomplicated   





(10) Scoliosis


Current Visit: Yes   Status: Chronic   


Qualifiers: 


   Scoliosis type: neuromuscular   Spinal region: thoracic   Qualified Code(s): 

M41.44 - Neuromuscular scoliosis, thoracic region   





(11) Shuffling gait


Current Visit: Yes   Status: Chronic   





(12) Insomnia


Current Visit: No   Status: Acute   





(13) Uncomplicated sedative, hypnotic or anxiolytic withdrawal


Current Visit: Yes   Status: Acute   





(14) Alcohol dependence with uncomplicated withdrawal


Current Visit: Yes   Status: Chronic   





(15) Cannabis dependence


Current Visit: Yes   Status: Chronic   





(16) Cocaine dependence


Current Visit: Yes   Status: Chronic   


Qualifiers: 


   Substance use status: uncomplicated 





(17) Depression


Current Visit: No   Status: Chronic   


Qualifiers: 


   Depression Type: unspecified   Qualified Code(s): F32.9 - Major depressive 

disorder, single episode, unspecified   





(18) Depressive disorder


Current Visit: No   Status: Chronic   





(19) Opioid dependence


Current Visit: Yes   Status: Chronic   


Qualifiers: 


   Substance use status: uncomplicated   Qualified Code(s): F11.20 - Opioid 

dependence, uncomplicated   





(20) PTSD (post-traumatic stress disorder)


Current Visit: No   Status: Chronic   





(21) Substance induced mood disorder


Current Visit: No   Status: Chronic   





(22) Ovarian cyst


Current Visit: No   Status: Resolved   





- AMA


Did Patient Leave Against Medical Advice: No

## 2022-12-19 VITALS
TEMPERATURE: 98 F | HEIGHT: 63 IN | OXYGEN SATURATION: 100 % | HEART RATE: 93 BPM | DIASTOLIC BLOOD PRESSURE: 71 MMHG | RESPIRATION RATE: 16 BRPM | SYSTOLIC BLOOD PRESSURE: 110 MMHG | WEIGHT: 110.01 LBS

## 2022-12-19 PROCEDURE — 99285 EMERGENCY DEPT VISIT HI MDM: CPT | Mod: 25

## 2022-12-19 PROCEDURE — 73552 X-RAY EXAM OF FEMUR 2/>: CPT | Mod: 26,RT,LT

## 2022-12-19 PROCEDURE — 73060 X-RAY EXAM OF HUMERUS: CPT | Mod: 26,RT

## 2022-12-19 PROCEDURE — 73560 X-RAY EXAM OF KNEE 1 OR 2: CPT | Mod: 26,RT,LT

## 2022-12-19 PROCEDURE — 73090 X-RAY EXAM OF FOREARM: CPT | Mod: 26,RT

## 2022-12-19 PROCEDURE — 73030 X-RAY EXAM OF SHOULDER: CPT | Mod: 26,RT

## 2022-12-19 NOTE — ED ADULT TRIAGE NOTE - CHIEF COMPLAINT QUOTE
per EMS pt was found on floor; with head pain, shoulder pain (bilateral)with hematoma to forehead , unknown LOC; denies blood thinner; no neck, back pain

## 2022-12-20 ENCOUNTER — INPATIENT (INPATIENT)
Facility: HOSPITAL | Age: 62
LOS: 21 days | Discharge: NURSING, REHAB OR LONG-TERM CARE | DRG: 23 | End: 2023-01-11
Attending: PSYCHIATRY & NEUROLOGY
Payer: COMMERCIAL

## 2022-12-20 DIAGNOSIS — I69.351 HEMIPLEGIA AND HEMIPARESIS FOLLOWING CEREBRAL INFARCTION AFFECTING RIGHT DOMINANT SIDE: ICD-10-CM

## 2022-12-20 DIAGNOSIS — I10 ESSENTIAL (PRIMARY) HYPERTENSION: ICD-10-CM

## 2022-12-20 DIAGNOSIS — F41.9 ANXIETY DISORDER, UNSPECIFIED: ICD-10-CM

## 2022-12-20 DIAGNOSIS — Z91.041 RADIOGRAPHIC DYE ALLERGY STATUS: ICD-10-CM

## 2022-12-20 DIAGNOSIS — D50.9 IRON DEFICIENCY ANEMIA, UNSPECIFIED: ICD-10-CM

## 2022-12-20 DIAGNOSIS — I65.22 OCCLUSION AND STENOSIS OF LEFT CAROTID ARTERY: ICD-10-CM
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
## 2022-12-20 DIAGNOSIS — D72.829 ELEVATED WHITE BLOOD CELL COUNT, UNSPECIFIED: ICD-10-CM

## 2022-12-20 DIAGNOSIS — R29.6 REPEATED FALLS: ICD-10-CM

## 2022-12-20 DIAGNOSIS — G40.909 EPILEPSY, UNSPECIFIED, NOT INTRACTABLE, WITHOUT STATUS EPILEPTICUS: ICD-10-CM

## 2022-12-20 DIAGNOSIS — Z91.81 HISTORY OF FALLING: ICD-10-CM

## 2022-12-20 DIAGNOSIS — G92.8 OTHER TOXIC ENCEPHALOPATHY: ICD-10-CM

## 2022-12-20 DIAGNOSIS — G43.909 MIGRAINE, UNSPECIFIED, NOT INTRACTABLE, WITHOUT STATUS MIGRAINOSUS: ICD-10-CM

## 2022-12-20 DIAGNOSIS — Z88.0 ALLERGY STATUS TO PENICILLIN: ICD-10-CM

## 2022-12-20 DIAGNOSIS — F32.A DEPRESSION, UNSPECIFIED: ICD-10-CM

## 2022-12-20 DIAGNOSIS — F13.20 SEDATIVE, HYPNOTIC OR ANXIOLYTIC DEPENDENCE, UNCOMPLICATED: ICD-10-CM

## 2022-12-20 DIAGNOSIS — Z88.8 ALLERGY STATUS TO OTHER DRUGS, MEDICAMENTS AND BIOLOGICAL SUBSTANCES: ICD-10-CM

## 2022-12-20 DIAGNOSIS — R73.03 PREDIABETES: ICD-10-CM

## 2022-12-20 DIAGNOSIS — S00.83XA CONTUSION OF OTHER PART OF HEAD, INITIAL ENCOUNTER: ICD-10-CM

## 2022-12-20 DIAGNOSIS — R29.704 NIHSS SCORE 4: ICD-10-CM

## 2022-12-20 DIAGNOSIS — Z87.898 PERSONAL HISTORY OF OTHER SPECIFIED CONDITIONS: ICD-10-CM

## 2022-12-20 DIAGNOSIS — E78.5 HYPERLIPIDEMIA, UNSPECIFIED: ICD-10-CM

## 2022-12-20 DIAGNOSIS — F41.1 GENERALIZED ANXIETY DISORDER: ICD-10-CM
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
## 2022-12-20 DIAGNOSIS — Z88.5 ALLERGY STATUS TO NARCOTIC AGENT: ICD-10-CM

## 2022-12-20 DIAGNOSIS — I63.523 CEREBRAL INFARCTION DUE TO UNSPECIFIED OCCLUSION OR STENOSIS OF BILATERAL ANTERIOR CEREBRAL ARTERIES: ICD-10-CM

## 2022-12-20 DIAGNOSIS — F31.9 BIPOLAR DISORDER, UNSPECIFIED: ICD-10-CM

## 2022-12-20 DIAGNOSIS — D63.8 ANEMIA IN OTHER CHRONIC DISEASES CLASSIFIED ELSEWHERE: ICD-10-CM

## 2022-12-20 DIAGNOSIS — R55 SYNCOPE AND COLLAPSE: ICD-10-CM

## 2022-12-20 DIAGNOSIS — F43.10 POST-TRAUMATIC STRESS DISORDER, UNSPECIFIED: ICD-10-CM

## 2022-12-20 DIAGNOSIS — T42.4X1A POISONING BY BENZODIAZEPINES, ACCIDENTAL (UNINTENTIONAL), INITIAL ENCOUNTER: ICD-10-CM
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
## 2022-12-20 DIAGNOSIS — R26.89 OTHER ABNORMALITIES OF GAIT AND MOBILITY: ICD-10-CM

## 2022-12-20 DIAGNOSIS — Z79.899 OTHER LONG TERM (CURRENT) DRUG THERAPY: ICD-10-CM

## 2022-12-20 DIAGNOSIS — F51.04 PSYCHOPHYSIOLOGIC INSOMNIA: ICD-10-CM

## 2022-12-20 DIAGNOSIS — Z86.73 PERSONAL HISTORY OF TRANSIENT ISCHEMIC ATTACK (TIA), AND CEREBRAL INFARCTION WITHOUT RESIDUAL DEFICITS: ICD-10-CM

## 2022-12-20 DIAGNOSIS — Z71.89 OTHER SPECIFIED COUNSELING: ICD-10-CM

## 2022-12-20 LAB
ALBUMIN SERPL ELPH-MCNC: 3.5 G/DL — SIGNIFICANT CHANGE UP (ref 3.3–5)
ALBUMIN SERPL ELPH-MCNC: 4.2 G/DL — SIGNIFICANT CHANGE UP (ref 3.3–5)
ALP SERPL-CCNC: 105 U/L — SIGNIFICANT CHANGE UP (ref 40–120)
ALP SERPL-CCNC: 89 U/L — SIGNIFICANT CHANGE UP (ref 40–120)
ALT FLD-CCNC: 28 U/L — SIGNIFICANT CHANGE UP (ref 10–45)
ALT FLD-CCNC: 34 U/L — SIGNIFICANT CHANGE UP (ref 10–45)
ANION GAP SERPL CALC-SCNC: 12 MMOL/L — SIGNIFICANT CHANGE UP (ref 5–17)
ANION GAP SERPL CALC-SCNC: 9 MMOL/L — SIGNIFICANT CHANGE UP (ref 5–17)
APTT BLD: 25.1 SEC — LOW (ref 27.5–35.5)
AST SERPL-CCNC: 33 U/L — SIGNIFICANT CHANGE UP (ref 10–40)
AST SERPL-CCNC: 38 U/L — SIGNIFICANT CHANGE UP (ref 10–40)
BASOPHILS # BLD AUTO: 0.04 K/UL — SIGNIFICANT CHANGE UP (ref 0–0.2)
BASOPHILS # BLD AUTO: 0.05 K/UL — SIGNIFICANT CHANGE UP (ref 0–0.2)
BASOPHILS NFR BLD AUTO: 0.3 % — SIGNIFICANT CHANGE UP (ref 0–2)
BASOPHILS NFR BLD AUTO: 0.7 % — SIGNIFICANT CHANGE UP (ref 0–2)
BILIRUB SERPL-MCNC: 0.3 MG/DL — SIGNIFICANT CHANGE UP (ref 0.2–1.2)
BILIRUB SERPL-MCNC: 0.3 MG/DL — SIGNIFICANT CHANGE UP (ref 0.2–1.2)
BLD GP AB SCN SERPL QL: NEGATIVE — SIGNIFICANT CHANGE UP
BLD GP AB SCN SERPL QL: NEGATIVE — SIGNIFICANT CHANGE UP
BUN SERPL-MCNC: 10 MG/DL — SIGNIFICANT CHANGE UP (ref 7–23)
BUN SERPL-MCNC: 12 MG/DL — SIGNIFICANT CHANGE UP (ref 7–23)
CALCIUM SERPL-MCNC: 9.3 MG/DL — SIGNIFICANT CHANGE UP (ref 8.4–10.5)
CALCIUM SERPL-MCNC: 9.6 MG/DL — SIGNIFICANT CHANGE UP (ref 8.4–10.5)
CHLORIDE SERPL-SCNC: 105 MMOL/L — SIGNIFICANT CHANGE UP (ref 96–108)
CHLORIDE SERPL-SCNC: 110 MMOL/L — HIGH (ref 96–108)
CO2 SERPL-SCNC: 21 MMOL/L — LOW (ref 22–31)
CO2 SERPL-SCNC: 27 MMOL/L — SIGNIFICANT CHANGE UP (ref 22–31)
CREAT SERPL-MCNC: 0.66 MG/DL — SIGNIFICANT CHANGE UP (ref 0.5–1.3)
CREAT SERPL-MCNC: 0.67 MG/DL — SIGNIFICANT CHANGE UP (ref 0.5–1.3)
EGFR: 100 ML/MIN/1.73M2 — SIGNIFICANT CHANGE UP
EGFR: 100 ML/MIN/1.73M2 — SIGNIFICANT CHANGE UP
EOSINOPHIL # BLD AUTO: 0.01 K/UL — SIGNIFICANT CHANGE UP (ref 0–0.5)
EOSINOPHIL # BLD AUTO: 0.06 K/UL — SIGNIFICANT CHANGE UP (ref 0–0.5)
EOSINOPHIL NFR BLD AUTO: 0.1 % — SIGNIFICANT CHANGE UP (ref 0–6)
EOSINOPHIL NFR BLD AUTO: 0.9 % — SIGNIFICANT CHANGE UP (ref 0–6)
ETHANOL SERPL-MCNC: <10 MG/DL — SIGNIFICANT CHANGE UP (ref 0–10)
FERRITIN SERPL-MCNC: 16 NG/ML — SIGNIFICANT CHANGE UP (ref 15–150)
GLUCOSE SERPL-MCNC: 101 MG/DL — HIGH (ref 70–99)
GLUCOSE SERPL-MCNC: 102 MG/DL — HIGH (ref 70–99)
HCT VFR BLD CALC: 25.4 % — LOW (ref 34.5–45)
HCT VFR BLD CALC: 27.2 % — LOW (ref 34.5–45)
HCV AB S/CO SERPL IA: 57.01 S/CO — HIGH
HCV AB SERPL-IMP: REACTIVE
HGB BLD-MCNC: 7.1 G/DL — LOW (ref 11.5–15.5)
HGB BLD-MCNC: 8 G/DL — LOW (ref 11.5–15.5)
IMM GRANULOCYTES NFR BLD AUTO: 0.3 % — SIGNIFICANT CHANGE UP (ref 0–0.9)
IMM GRANULOCYTES NFR BLD AUTO: 0.4 % — SIGNIFICANT CHANGE UP (ref 0–0.9)
INR BLD: 1.1 — SIGNIFICANT CHANGE UP (ref 0.88–1.16)
IRON SATN MFR SERPL: 10 UG/DL — LOW (ref 30–160)
IRON SATN MFR SERPL: 3 % — LOW (ref 14–50)
LYMPHOCYTES # BLD AUTO: 1.74 K/UL — SIGNIFICANT CHANGE UP (ref 1–3.3)
LYMPHOCYTES # BLD AUTO: 17.9 % — SIGNIFICANT CHANGE UP (ref 13–44)
LYMPHOCYTES # BLD AUTO: 2.31 K/UL — SIGNIFICANT CHANGE UP (ref 1–3.3)
LYMPHOCYTES # BLD AUTO: 26 % — SIGNIFICANT CHANGE UP (ref 13–44)
MAGNESIUM SERPL-MCNC: 1.8 MG/DL — SIGNIFICANT CHANGE UP (ref 1.6–2.6)
MCHC RBC-ENTMCNC: 20.2 PG — LOW (ref 27–34)
MCHC RBC-ENTMCNC: 20.3 PG — LOW (ref 27–34)
MCHC RBC-ENTMCNC: 28 GM/DL — LOW (ref 32–36)
MCHC RBC-ENTMCNC: 29.4 GM/DL — LOW (ref 32–36)
MCV RBC AUTO: 69 FL — LOW (ref 80–100)
MCV RBC AUTO: 72.4 FL — LOW (ref 80–100)
MONOCYTES # BLD AUTO: 0.43 K/UL — SIGNIFICANT CHANGE UP (ref 0–0.9)
MONOCYTES # BLD AUTO: 0.96 K/UL — HIGH (ref 0–0.9)
MONOCYTES NFR BLD AUTO: 6.4 % — SIGNIFICANT CHANGE UP (ref 2–14)
MONOCYTES NFR BLD AUTO: 7.4 % — SIGNIFICANT CHANGE UP (ref 2–14)
NEUTROPHILS # BLD AUTO: 4.39 K/UL — SIGNIFICANT CHANGE UP (ref 1.8–7.4)
NEUTROPHILS # BLD AUTO: 9.54 K/UL — HIGH (ref 1.8–7.4)
NEUTROPHILS NFR BLD AUTO: 65.7 % — SIGNIFICANT CHANGE UP (ref 43–77)
NEUTROPHILS NFR BLD AUTO: 73.9 % — SIGNIFICANT CHANGE UP (ref 43–77)
NRBC # BLD: 0 /100 WBCS — SIGNIFICANT CHANGE UP (ref 0–0)
NRBC # BLD: 0 /100 WBCS — SIGNIFICANT CHANGE UP (ref 0–0)
NT-PROBNP SERPL-SCNC: 108 PG/ML — SIGNIFICANT CHANGE UP (ref 0–300)
PHOSPHATE SERPL-MCNC: 2.8 MG/DL — SIGNIFICANT CHANGE UP (ref 2.5–4.5)
PLATELET # BLD AUTO: 504 K/UL — HIGH (ref 150–400)
PLATELET # BLD AUTO: 567 K/UL — HIGH (ref 150–400)
POTASSIUM SERPL-MCNC: 3 MMOL/L — LOW (ref 3.5–5.3)
POTASSIUM SERPL-MCNC: 5 MMOL/L — SIGNIFICANT CHANGE UP (ref 3.5–5.3)
POTASSIUM SERPL-SCNC: 3 MMOL/L — LOW (ref 3.5–5.3)
POTASSIUM SERPL-SCNC: 5 MMOL/L — SIGNIFICANT CHANGE UP (ref 3.5–5.3)
PROT SERPL-MCNC: 6.2 G/DL — SIGNIFICANT CHANGE UP (ref 6–8.3)
PROT SERPL-MCNC: 7.6 G/DL — SIGNIFICANT CHANGE UP (ref 6–8.3)
PROTHROM AB SERPL-ACNC: 13.1 SEC — SIGNIFICANT CHANGE UP (ref 10.5–13.4)
RBC # BLD: 3.51 M/UL — LOW (ref 3.8–5.2)
RBC # BLD: 3.51 M/UL — LOW (ref 3.8–5.2)
RBC # BLD: 3.94 M/UL — SIGNIFICANT CHANGE UP (ref 3.8–5.2)
RBC # FLD: 17.2 % — HIGH (ref 10.3–14.5)
RBC # FLD: 17.3 % — HIGH (ref 10.3–14.5)
RETICS #: 94.4 K/UL — SIGNIFICANT CHANGE UP (ref 25–125)
RETICS/RBC NFR: 2.7 % — HIGH (ref 0.5–2.5)
RH IG SCN BLD-IMP: POSITIVE — SIGNIFICANT CHANGE UP
RH IG SCN BLD-IMP: POSITIVE — SIGNIFICANT CHANGE UP
SARS-COV-2 RNA SPEC QL NAA+PROBE: NEGATIVE — SIGNIFICANT CHANGE UP
SODIUM SERPL-SCNC: 140 MMOL/L — SIGNIFICANT CHANGE UP (ref 135–145)
SODIUM SERPL-SCNC: 144 MMOL/L — SIGNIFICANT CHANGE UP (ref 135–145)
TIBC SERPL-MCNC: 302 UG/DL — SIGNIFICANT CHANGE UP (ref 220–430)
TRANSFERRIN SERPL-MCNC: 269 MG/DL — SIGNIFICANT CHANGE UP (ref 200–360)
TSH SERPL-MCNC: 0.8 UIU/ML — SIGNIFICANT CHANGE UP (ref 0.27–4.2)
UIBC SERPL-MCNC: 292 UG/DL — SIGNIFICANT CHANGE UP (ref 110–370)
WBC # BLD: 12.91 K/UL — HIGH (ref 3.8–10.5)
WBC # BLD: 6.69 K/UL — SIGNIFICANT CHANGE UP (ref 3.8–10.5)
WBC # FLD AUTO: 12.91 K/UL — HIGH (ref 3.8–10.5)
WBC # FLD AUTO: 6.69 K/UL — SIGNIFICANT CHANGE UP (ref 3.8–10.5)

## 2022-12-20 PROCEDURE — 73090 X-RAY EXAM OF FOREARM: CPT | Mod: 26,RT

## 2022-12-20 PROCEDURE — 99223 1ST HOSP IP/OBS HIGH 75: CPT | Mod: GC

## 2022-12-20 PROCEDURE — 73060 X-RAY EXAM OF HUMERUS: CPT | Mod: 26,RT

## 2022-12-20 PROCEDURE — 72128 CT CHEST SPINE W/O DYE: CPT | Mod: 26,MA

## 2022-12-20 PROCEDURE — 71045 X-RAY EXAM CHEST 1 VIEW: CPT | Mod: 26

## 2022-12-20 PROCEDURE — 73552 X-RAY EXAM OF FEMUR 2/>: CPT | Mod: 26,50

## 2022-12-20 PROCEDURE — 99223 1ST HOSP IP/OBS HIGH 75: CPT

## 2022-12-20 PROCEDURE — 93306 TTE W/DOPPLER COMPLETE: CPT | Mod: 26

## 2022-12-20 PROCEDURE — 73560 X-RAY EXAM OF KNEE 1 OR 2: CPT | Mod: 26,50

## 2022-12-20 PROCEDURE — 73030 X-RAY EXAM OF SHOULDER: CPT | Mod: 26,RT

## 2022-12-20 PROCEDURE — 70450 CT HEAD/BRAIN W/O DYE: CPT | Mod: 26,MA

## 2022-12-20 PROCEDURE — 72125 CT NECK SPINE W/O DYE: CPT | Mod: 26,MA

## 2022-12-20 RX ORDER — HYDROXYZINE HCL 10 MG
1 TABLET ORAL
Qty: 0 | Refills: 0 | DISCHARGE

## 2022-12-20 RX ORDER — POTASSIUM CHLORIDE 20 MEQ
40 PACKET (EA) ORAL ONCE
Refills: 0 | Status: COMPLETED | OUTPATIENT
Start: 2022-12-20 | End: 2022-12-20

## 2022-12-20 RX ORDER — ATORVASTATIN CALCIUM 80 MG/1
80 TABLET, FILM COATED ORAL AT BEDTIME
Refills: 0 | Status: DISCONTINUED | OUTPATIENT
Start: 2022-12-20 | End: 2023-01-11

## 2022-12-20 RX ORDER — DIPHENHYDRAMINE HCL 50 MG
1 CAPSULE ORAL
Qty: 0 | Refills: 0 | DISCHARGE

## 2022-12-20 RX ORDER — CHOLECALCIFEROL (VITAMIN D3) 125 MCG
3 CAPSULE ORAL
Qty: 0 | Refills: 0 | DISCHARGE

## 2022-12-20 RX ORDER — FERROUS GLUCONATE 100 %
1 POWDER (GRAM) MISCELLANEOUS
Qty: 0 | Refills: 0 | DISCHARGE

## 2022-12-20 RX ORDER — ENOXAPARIN SODIUM 100 MG/ML
40 INJECTION SUBCUTANEOUS EVERY 24 HOURS
Refills: 0 | Status: DISCONTINUED | OUTPATIENT
Start: 2022-12-20 | End: 2022-12-23

## 2022-12-20 RX ORDER — FOLIC ACID 0.8 MG
1 TABLET ORAL
Qty: 0 | Refills: 0 | DISCHARGE

## 2022-12-20 RX ORDER — FOLIC ACID 0.8 MG
1 TABLET ORAL DAILY
Refills: 0 | Status: DISCONTINUED | OUTPATIENT
Start: 2022-12-20 | End: 2023-01-11

## 2022-12-20 RX ORDER — ACETAMINOPHEN 500 MG
750 TABLET ORAL ONCE
Refills: 0 | Status: COMPLETED | OUTPATIENT
Start: 2022-12-20 | End: 2022-12-20

## 2022-12-20 RX ORDER — POTASSIUM CHLORIDE 20 MEQ
40 PACKET (EA) ORAL ONCE
Refills: 0 | Status: DISCONTINUED | OUTPATIENT
Start: 2022-12-20 | End: 2022-12-20

## 2022-12-20 RX ORDER — ASPIRIN/CALCIUM CARB/MAGNESIUM 324 MG
81 TABLET ORAL AT BEDTIME
Refills: 0 | Status: DISCONTINUED | OUTPATIENT
Start: 2022-12-20 | End: 2022-12-21

## 2022-12-20 RX ORDER — QUETIAPINE FUMARATE 200 MG/1
1 TABLET, FILM COATED ORAL
Qty: 0 | Refills: 0 | DISCHARGE

## 2022-12-20 RX ADMIN — Medication 1 CAPSULE(S): at 16:42

## 2022-12-20 RX ADMIN — Medication 81 MILLIGRAM(S): at 22:50

## 2022-12-20 RX ADMIN — Medication 40 MILLIEQUIVALENT(S): at 05:48

## 2022-12-20 RX ADMIN — Medication 40 MILLIEQUIVALENT(S): at 08:37

## 2022-12-20 RX ADMIN — Medication 1 TABLET(S): at 16:42

## 2022-12-20 RX ADMIN — ENOXAPARIN SODIUM 40 MILLIGRAM(S): 100 INJECTION SUBCUTANEOUS at 08:37

## 2022-12-20 RX ADMIN — ATORVASTATIN CALCIUM 80 MILLIGRAM(S): 80 TABLET, FILM COATED ORAL at 22:50

## 2022-12-20 RX ADMIN — Medication 1 CAPSULE(S): at 17:30

## 2022-12-20 RX ADMIN — Medication 40 MILLIEQUIVALENT(S): at 03:02

## 2022-12-20 RX ADMIN — Medication 300 MILLIGRAM(S): at 02:38

## 2022-12-20 RX ADMIN — Medication 1 MILLIGRAM(S): at 16:42

## 2022-12-20 NOTE — PATIENT PROFILE ADULT - FUNCTIONAL ASSESSMENT - BASIC MOBILITY 6.
1-calculated by average/Not able to assess (calculate score using [facility] averaging method)

## 2022-12-20 NOTE — BH CONSULTATION LIAISON ASSESSMENT NOTE - DIFFERENTIAL
Neurocognitive Disorder, likely dementia s/p CVAs  Benzodiazepine use disorder, r/o withdrawal associated delirium  Opiate Use disorder in reported remission  ?of Alcohol Use disorder  Unspecified anxiety  Unspecified depression Neurocognitive Disorder, likely dementia s/p CVAs  Benzodiazepine use disorder, r/o withdrawal associated delirium  Opiate Use disorder in reported remission  ?of Alcohol Use disorder  Unspecified anxiety  Unspecified depression, r/o MDD, r/o depression 2/2 general medical illness, r/o bipolar spectrum

## 2022-12-20 NOTE — SWALLOW BEDSIDE ASSESSMENT ADULT - SWALLOW EVAL: DIAGNOSIS
Called Pt informed  orders are in the system .
Dr Laura Egan please advise , last Px was 06/02/2017.
Labs ordered
Pt made an appt with Dr. Roxanne Da Silva on 8-17-18 and would like to get her blood work prior to appt. Please, call pt when the order is in the system.
Fnxl oropharyngeal swallow with no overt signs of aspiration on clinical exam.

## 2022-12-20 NOTE — SWALLOW BEDSIDE ASSESSMENT ADULT - SWALLOW EVAL: ORAL MUSCULATURE
Landers and FlexiSeal.    Surface: scott HILLARY MAITE Guadarrama, RN, [employer]  Certified Wound, Ostomy, Continence Nurse  office [phone]  Available via [address] [place] [address]
generally intact

## 2022-12-20 NOTE — H&P ADULT - PROBLEM SELECTOR PLAN 2
Juliann melana, hematochezia  - f/u reticulocyte count  - f/u iron studies  - c/w home MV + folic acid

## 2022-12-20 NOTE — ED PROVIDER NOTE - CLINICAL SUMMARY MEDICAL DECISION MAKING FREE TEXT BOX
frequent falls w head injury, neck and  mid back pain w R upper and lower ext pain, plan for labs, imaging, UA, reassess. Pt unable to ambulate on own, unclear assistance at home, will admit for safe dispo.

## 2022-12-20 NOTE — CONSULT NOTE ADULT - SUBJECTIVE AND OBJECTIVE BOX
Neurology Stroke Consult Note    Chief Complaint: Frequent falls  Last known well time/Time of onset of symptoms: unclear, pt poor Historian.    HPI: 62F w/ PMH HTN(not on any meds per pt), Migraine, Sz disorder, depression- anxiety, prior stroke Hx in 2018 presented to [facility] ER for frequent falls and was admitted to medicine for further work up. Per pt was in kitchen attempting to cook, lost balance, fell with head strike w/ LOC denies any other injuries, R forehead bruise +, called EMS and was brought to [facility]. Also admits to be dizzy and had B/L finger numbness immediately post fall which resolved in couple of minutes. Pt lives alone by herself, states she takes pain meds (unclear of the name), admits to taking ambien, valium (prescribed by her psychiatrist) several times a week. Per pt, has been having intermittent HA/not feeling well and unable to walk 2/2 gait instability X atleast 1.5years. Has frequent falls and admits to falling everyday. States she does not use walker/ cane to walk, she is awaiting her W/C. Also noted to be forgetful. CTH done in ER w/Subacute/chronic bilateral MICAH territory infarcts.    Regarding her Stroke Hx : per pt had 3 strokes in 2018 with residual R sided weakness, had stroke care @ [facility], unsure of mechanism of stroke, was started only on ASA which was stopped X 1year ago.  Also states she is not on any BP meds anymore as her PCP D/C'd it since there was no need for it.        PAST MEDICAL & SURGICAL HISTORY:  Seizures      Migraine      Hip pain, left      Depression      Anxiety      Eating disorder      Bipolar illness      PTSD (post-traumatic stress disorder)      S/P  section          FAMILY HISTORY:  No pertinent family history in first degree relatives        SOCIAL HISTORY:  Denies smoking, drinking, or drug use.  Lives alone with HHA X 4 hrs a day.    ROS:  Constitutional: No fever, weight loss or fatigue  Eyes: No eye pain, visual disturbances, or discharge  ENMT:  No difficulty hearing, tinnitus, vertigo;  No sinus or throat pain  Neck: No pain or stiffness  Respiratory: No cough, wheezing, chills or hemoptysis  Cardiovascular: No chest pain, palpitations, shortness of breath, dizziness or leg swelling  Gastrointestinal: No abdominal pain. No nausea, vomiting or hematemesis; No diarrhea or constipation.   Genitourinary: No dysuria, frequency, hematuria or incontinence  Neurological: As per HPI      MEDICATIONS  (STANDING):  acetaminophen 300 mG/butalbital 50 mG/ caffeine 40 mG 1 Capsule(s) Oral once  enoxaparin Injectable 40 milliGRAM(s) SubCutaneous every 24 hours  folic acid 1 milliGRAM(s) Oral daily  multivitamin 1 Tablet(s) Oral daily    MEDICATIONS  (PRN):      Allergies    Compazine (Unknown)  contrast media (iodine-based) (Unknown)  iv contrast (Unknown)  penicillin (Unknown)  penicillins (Other)  Toradol (Unknown)    Intolerances        Vital Signs Last 24 Hrs  T(C): 36.8 (20 Dec 2022 12:18), Max: 37 (20 Dec 2022 08:38)  T(F): 98.2 (20 Dec 2022 12:18), Max: 98.6 (20 Dec 2022 08:38)  HR: 87 (20 Dec 2022 12:18) (62 - 96)  BP: 135/83 (20 Dec 2022 12:18) (110/71 - 135/83)  BP(mean): --  RR: 20 (20 Dec 2022 12:18) (16 - 20)  SpO2: 100% (20 Dec 2022 12:18) (97% - 100%)    Parameters below as of 20 Dec 2022 12:18  Patient On (Oxygen Delivery Method): room air        Physical exam:  General: No acute distress, awake and alert  Cardiovascular: Regular rate and rhythm, no murmurs, rubs, or gallops. No bruits  Pulmonary: Anterior breath sounds clear bilaterally, no crackles or wheezing. No use of accessory muscles  Extremities: Radial and DP pulses +2, no edema    Neurologic:  -Mental status: Awake, alert, oriented to person, place, and time (year w/ choices). Speech is fluent with intact naming, repetition, and comprehension, no dysarthria. Follows commands. Attention/concentration intact. Fund of knowledge appropriate. Forgetful.    -Cranial nerves:   II: Visual fields are full to confrontation.  III, IV, VI: Extraocular movements are intact without nystagmus. Pupils equally round and reactive to light.  V:  Facial sensation V1-V3 equal and intact   VII: Face is symmetric with normal eye closure and smile  VIII: Hearing is bilaterally intact to finger rub  IX, X: Uvula is midline and soft palate rises symmetrically  XI: Head turning and shoulder shrug are intact.  XII: Tongue protrudes midline  Motor: Normal bulk and tone. RUE : antigravity, but drifts and hits the bed by the count of 10, RLE : Briefly antigravity, hit the bed and unable to hold for count of 5. LUE/ LLE : 5/5.   Sensation: Intact to light touch bilaterally. No neglect or extinction on double simultaneous testing.  Coordination: No dysmetria on finger-to-nose out of proportion to weakness.   Reflexes: Downgoing toes bilaterally   Gait: Deferred.    NIHSS: 4      LABS:                        8.0    12.91 )-----------( 567      ( 20 Dec 2022 01:37 )             27.2     12-20    140  |  110<H>  |  10  ----------------------------<  101<H>  5.0   |  21<L>  |  0.66    Ca    9.3      20 Dec 2022 14:00  Mg     2.0     12-20    TPro  6.2  /  Alb  3.5  /  TBili  0.3  /  DBili  x   /  AST  33  /  ALT  28  /  AlkPhos  89  12-20    PT/INR - ( 20 Dec 2022 01:37 )   PT: 13.1 sec;   INR: 1.10          PTT - ( 20 Dec 2022 01:37 )  PTT:25.1 sec      RADIOLOGY & ADDITIONAL TESTS:    CT Head No Cont (. @ 00:46)   IMPRESSION:  1.  No acute intracranial hemorrhage or calvarial fracture.  2.  Subacute/chronic bilateral MICAH territory infarcts.

## 2022-12-20 NOTE — SWALLOW BEDSIDE ASSESSMENT ADULT - CONSISTENCIES ADMINISTERED
tsp thin x1; 3oz thin via indep cup sips, 1 bite dry cracker. Pt refused add'l solids/thin liquid/regular solid

## 2022-12-20 NOTE — BH CONSULTATION LIAISON ASSESSMENT NOTE - RISK ASSESSMENT
Pt is assessed at low acute risk for suicide and violence. Pt is assessed at low acute risk for suicide and violence. While some depressive sx and thoughts about death are present, there is no active suicidality of homicidality, no intent/plan/attempts or self-harm behaviors. Pt has multiple additional protective factors, is future oriented, treatment seeking, motivated to maintain a relationship with her grandchildren. Chronic risk factors include h/o a psychiatric dx and substance abuse, advanced age with medical comorbidities and cognitive decline  Risk mitigated by treatment of acute medical issues, psychiatric evaluation with emotional support in hospital, substance counseling, referral to outpt treatment.

## 2022-12-20 NOTE — BH CONSULTATION LIAISON ASSESSMENT NOTE - SUMMARY
RECOMMENDATIONS  -no need for 1:1 observation for suicidality or indication for psychiatric admission  -CIWA monitoring for benzodiazepine withdrawal   -obtain urine toxicology  -discourage continued use of benzodiazepines or hypnotics given substance use disorder with ongoing alprazolam abuse, neurocognitive disorder, and likely contribution to fall risk  -consider mirtazapine 7.5mg po QHS PRN for insomnia  -evaluation for potential additional contributors to neurocognitive decline (check TSH, B12, folate, RPR)  -delirium precautions  -encourage communication with outpt prescribers of benzodiazepines and hypnotics re: recommendation to discontinue use  -involve next of kin (HCP if designated) in medical decision-making and discharge planning   -encourage engagement in outpt psychiatric care - pt reports weekly psychotherapy  -no psychiatric barrier to discharge. Please contact with questions 61yo woman, domiciled alone, retired actor, with a self-reported history of chronic anxiety, insomnia, new reported depression and cognitive deficits s/p CVAs (2019), benzodiazepine use disorder, ?alcohol use disorder (per prior chart), ?opiate use disorder in remission, migraines, ?childhood seizure d/o and PNES, who presents with short-term memory deficits and illogical thinking, ?confabulation, suggestive of a neurocognitive disorder likely related to CVAs and possibly exacerbated by chronic benzodiazepine and hypnotic use, as well as likely other substance use disorders with unclear recent use. Some depressive symptoms are also present but difficult to evaluate given pt's cognitive deficits and difficulty with self-report; no active suicidality, no obvious psychosis or clear hx suggestive of erich. Ongoing illicit benzodiazepine use in addition to prescribed use (as per ISTOP) is likely and pt is at risk for withdrawal, with documented past admissions for alcohol/benzodiazepine withdrawal. Pt is currently minimally receptive to substance counseling but would encourage continued education and referral to outpt dual diagnosis treatment. No acute safety concerns that would warrant inpatient level psychiatric care.    RECOMMENDATIONS  -no need for 1:1 observation for suicidality or indication for psychiatric admission  -[facility] monitoring for benzodiazepine withdrawal   -obtain urine toxicology  -discourage continued use of benzodiazepines or hypnotics given substance use disorder with ongoing alprazolam abuse, neurocognitive disorder, and likely contribution to fall risk  -consider mirtazapine 7.5mg po QHS PRN for insomnia  -consider thiamine supplementation  -evaluation for potential additional contributors to neurocognitive decline (check TSH, B12, folate, RPR)  -delirium precautions  -encourage communication with outpt prescribers of benzodiazepines and hypnotics re: recommendation to discontinue use  -could benefit from SSRI but declines use - recommend additional evaluation as outpt  -involve next of kin (HCP if designated) in medical decision-making and discharge planning   -encourage engagement in outpt psychiatric care - pt reports weekly psychotherapy  -no psychiatric barrier to discharge. Please contact with questions

## 2022-12-20 NOTE — H&P ADULT - PROBLEM SELECTOR PLAN 5
States to be "addicted to valium" utilizing 1 10mg tablet once every other day. Low risk for withdrawal given lack of history and CIWA 0 on admission.   - consider restarting valium

## 2022-12-20 NOTE — BH CONSULTATION LIAISON ASSESSMENT NOTE - PAST PSYCHOTROPIC MEDICATION
Pt recalls past use of "all" antidepressants with poor effect  Per chart, prior mention of Seroquel, Zoloft, Klonopin, Suboxone, methadone, as well as Ambien, Xanax

## 2022-12-20 NOTE — BH CONSULTATION LIAISON ASSESSMENT NOTE - NSBHCHARTREVIEWINVESTIGATE_PSY_A_CORE FT
CT HEAD 12/20/22  FINDINGS:  VENTRICLES AND SULCI: Interval increase in the size of the ventricles   bilaterally secondary to parenchymal volume loss. No hydrocephalus.  EXTRA-AXIAL: No extra-axial fluid collection is present.  INTRA-AXIAL: Loss of the gray-white differentiation in the left parietal   lobe with mild mass effect, likely subacute/chronic infarction. Focal   hyperdensity within this region (series 3 image 27 and series 6 image 40)   probably represents focal cortical laminar necrosis. Additional regions   of encephalomalacia/gliosis without mass effect involving the bilateral   frontal lobes and right parietal lobe in the MICAH distribution are most   consistent with subacute/chronic infarctions. Chronic lacunar infarction   involving the left caudate nucleus. No midline shift. All of these   findings are new since 2017.  VISUALIZED ORBITS: Within normal limits.  VISUALIZED SINUSES: No air-fluid levels.  VISUALIZED MASTOIDS: Well aerated.  CALVARIUM: No fracture.  MISCELLANEOUS:  Right supraorbital soft tissue swelling.    IMPRESSION:  1.  No acute intracranial hemorrhage or calvarial fracture.  2.  Subacute/chronic bilateral MICAH territory infarcts.

## 2022-12-20 NOTE — PATIENT PROFILE ADULT - FALL HARM RISK - HARM RISK INTERVENTIONS

## 2022-12-20 NOTE — PATIENT PROFILE ADULT - DO YOU LACK THE NECESSARY SUPPORT TO HELP YOU COPE WITH LIFE CHALLENGES?
Patient Education     Sinusitis (Antibiotic Treatment)    The sinuses are air-filled spaces within the bones of the face. They connect to the inside of the nose. Sinusitis is an inflammation of the tissue that lines the sinuses. Sinusitis can occur during a cold. It can also happen due to allergies to pollens and other particles in the air. Sinusitis can cause symptoms of sinus congestion and a feeling of fullness. A sinus infection causes fever, headache, and facial pain. There is often green or yellow fluid draining from the nose or into the back of the throat (post-nasal drip). You have been given antibiotics to treat this condition.  Home care  · Take the full course of antibiotics as instructed. Do not stop taking them, even when you feel better.  · Drink plenty of water, hot tea, and other liquids. This may help thin nasal mucus. It also may help your sinuses drain fluids.  · Heat may help soothe painful areas of your face. Use a towel soaked in hot water. Or,  the shower and direct the warm spray onto your face. Using a vaporizer along with a menthol rub at night may also help soothe symptoms.   · An expectorant with guaifenesin may help thin nasal mucus and help your sinuses drain fluids.  · You can use an over-the-counter decongestant, unless a similar medicine was prescribed to you. Nasal sprays work the fastest. Use one that contains phenylephrine or oxymetazoline. First blow your nose gently. Then use the spray. Do not use these medicines more often than directed on the label. If you do, your symptoms may get worse. You may also take pills that contain pseudoephedrine. Don’t use products that combine multiple medicines. This is because side effects may be increased. Read labels. You can also ask the pharmacist for help. (People with high blood pressure should not use decongestants. They can raise blood pressure.)  · Over-the-counter antihistamines may help if allergies contributed to your  sinusitis.    · Do not use nasal rinses or irrigation during an acute sinus infection, unless your healthcare provider tells you to. Rinsing may spread the infection to other areas in your sinuses.  · Use acetaminophen or ibuprofen to control pain, unless another pain medicine was prescribed to you. If you have chronic liver or kidney disease or ever had a stomach ulcer, talk with your healthcare provider before using these medicines. (Aspirin should never be taken by anyone under age 18 who is ill with a fever. It may cause severe liver damage.)  · Don't smoke. This can make symptoms worse.  Follow-up care  Follow up with your healthcare provider or our staff if you are better in 1 week.  When to seek medical advice  Call your healthcare provider if any of these occur:  · Facial pain or headache that gets worse  · Stiff neck  · Unusual drowsiness or confusion  · Swelling of your forehead or eyelids  · Vision problems, such as blurred or double vision  · Fever of 100.4ºF (38ºC) or higher, or as directed by your healthcare provider  · Seizure  · Breathing problems  · Symptoms don't go away in 10 days  Prevention  Here are steps you can take to help prevent an infection:  · Keep good hand washing habits.  · Don’t have close contact with people who have sore throats, colds, or other upper respiratory infections.  · Don’t smoke, and stay away from secondhand smoke.  · Stay up to date with of your vaccines.  Date Last Reviewed: 11/1/2017  © 9129-2730 [employer]. [address] [place] [address], [place] [address]. All rights reserved. This information is not intended as a substitute for professional medical care. Always follow your healthcare professional's instructions.            no

## 2022-12-20 NOTE — ED PROVIDER NOTE - OBJECTIVE STATEMENT
63 yo migraine, seizure d/o, depression-anxiety w complaints of fall, pt poor historian, does not remember how she fell. 63 yo migraine, seizure d/o, depression-anxiety w complaints of fall, pt racquel historian, does not remember how she fell.states has been falling more often, once per day, pt racquel historian. states aide is not with her all the time, this time she fell as was trying to get up from wheelchair on own since aide was not there and then fell.

## 2022-12-20 NOTE — BH CONSULTATION LIAISON ASSESSMENT NOTE - NSBHSAALC_PSY_A_CORE FT
Pt denies any current/past alcohol use. Chart review indicates past excessive alcohol use, past treatment for alcohol withdrawal in 2013, 2015

## 2022-12-20 NOTE — SWALLOW BEDSIDE ASSESSMENT ADULT - SLP PERTINENT HISTORY OF CURRENT PROBLEM
61 yo W w PMHx of CVA (intact language, although Pt is unreliable, changing hx within one conversation), migraine, seizures, adm s/p fall
4 = No assist / stand by assistance

## 2022-12-20 NOTE — SWALLOW BEDSIDE ASSESSMENT ADULT - PHARYNGEAL PHASE
Hyolaryngeal excursion palpated during swallow trigger & appears brisk & timely. No cough, throat clear or change in voice quality across trials .
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
## 2022-12-20 NOTE — BH CONSULTATION LIAISON ASSESSMENT NOTE - NSBHSACONSEQUENCE_PSY_A_CORE FT
Pt reports past rehab attendance for heroin abuse, past hospitalizations for benzodiazepine withdrawal. Pt reports past outpt psychiatric care, now discontinued.

## 2022-12-20 NOTE — PHYSICAL THERAPY INITIAL EVALUATION ADULT - ADDITIONAL COMMENTS
Pt states she's been using wheel chairs, 2 electronic both broken, now uses a manual wheel chair which she has difficulty with. Lives in an elevator building, states she has HHA's however unclear on hours

## 2022-12-20 NOTE — BH CONSULTATION LIAISON ASSESSMENT NOTE - NSBHCONSULTFOLLOWAFTERCARE_PSY_A_CORE FT
F/u with established outpatient therapist.  Encourage re-engaging in outpt psychiatric/dual diagnosis treatment- can provide referral information:  •	[facility]  o	[email or web address]  o	Comprehensive addiction treatment services, including psychopharm, psychotherapy, drug testing, as well as eating disorder treatment, and specialized treatment for LGBTQ, Restorationism Jew populations  o	Walk Ins Mon-Fri 9am-2pm.   o	Two locations  ?	[address] [address] [address] [phone]  ?	[address] [id number] [phone]  •	[facility]  o	[email or web address]  o	Comprehensive addiction treatment services, including psychopharm, psychotherapy, opioid treatment program  o	[address] [place] [address] [phone]

## 2022-12-20 NOTE — CHART NOTE - NSCHARTNOTEFT_GEN_A_CORE
Patient Name: Irena Elliott Date: 1960    Address: [address] [place] [id number]: Female    Rx Written	Rx Dispensed	Drug	Quantity	Days Supply	Prescriber Name	Prescriber Nancie #	Payment Method	Dispenser  10/26/2022	11/02/2022	lorazepam 2 mg tablet	60	30	Alie Byers	[id number]	Insurance	[facility]  09/15/2022	09/15/2022	zolpidem tartrate 10 mg tablet	30	30	Isabela Munoz NP [id number]	Insurance	[facility]  09/15/2022	09/15/2022	diazepam 10 mg tablet	60	30	Isabela Munoz NP [id number]	Insurance	[facility]  07/19/2022	07/19/2022	diazepam 10 mg tablet	60	30	Isabela Munoz NP [id number]	Insurance	[facility]  07/19/2022	07/19/2022	zolpidem tartrate 10 mg tablet	30	30	Isabela Munoz NP [id number]	Insurance	[facility]  06/14/2022	06/14/2022	zolpidem tartrate 10 mg tablet	30	30	Isabela Munoz NP	[id number]	Insurance	[facility]  06/14/2022	06/14/2022	diazepam 10 mg tablet	60	30	Isabela Munoz NP	[id number]	Insurance	[facility]  05/16/2022	05/16/2022	zolpidem tartrate 10 mg tablet	30	30	Isabela Munoz NP	[id number]	Insurance	[facility]  05/16/2022	05/16/2022	diazepam 5 mg tablet	90	30	Isabela Munoz NP	[id number]	Insurance	[facility]  04/06/2022	04/06/2022	diazepam 5 mg tablet	90	30	Isabela Munoz NP	[id number]	Insurance	[facility]  04/06/2022	04/06/2022	zolpidem tartrate 10 mg tablet	30	30	Isablea Munoz NP	[id number]	Insurance	[facility]  03/08/2022	03/08/2022	diazepam 5 mg tablet	90	30	Isabela Munoz NP	[id number]	Insurance	[facility]  03/08/2022	03/08/2022	zolpidem tartrate 10 mg tablet	30	30	Isabela Munoz NP	[id number]	Insurance	[facility]  02/04/2022	02/04/2022	zolpidem tartrate 10 mg tablet	30	30	Isabela Munoz NP	[id number]	Insurance	[facility]  02/04/2022	02/04/2022	diazepam 5 mg tablet	90	30	Isabela Munoz NP	[id number]	Insurance	[facility]  12/29/2021	12/29/2021	zolpidem tartrate 10 mg tablet	30	30	Isabela Munoz NP	[id number]	Insurance	[facility]  12/29/2021	12/29/2021	diazepam 5 mg tablet	90	30	Isabela Munoz NP	[id number]	Insurance	[facility]

## 2022-12-20 NOTE — BH CONSULTATION LIAISON ASSESSMENT NOTE - OTHER PAST PSYCHIATRIC HISTORY (INCLUDE DETAILS REGARDING ONSET, COURSE OF ILLNESS, INPATIENT/OUTPATIENT TREATMENT)
Pt reports past diagnoses of depression, anxiety, and insomnia. Pt reports past failed trials of "all" antidepressants, states she did not like how they made her feel. Pt denies any past psychiatric admissions or suicide attempts. Pt reports prior treatment with psychiatrists, last three months ago, terminated treatment because the psychiatrist was "an idiot" and "didn't get it". Reports ongoing weekly psychotherapy by phone, unable to recall name of therapist.

## 2022-12-20 NOTE — CHART NOTE - NSCHARTNOTEFT_GEN_A_CORE
At ~ Spoke to pt's mother and HCP, Hanane Dietz ([phone]), who provided collateral information. Per mother, pt's functional capacity has decreased significantly following multiple CVAs 2-3 years ago, reports that pt is unable to care for herself at home, notes that she falls almost daily and is unable to make it to the bathroom, cannot cook or clean for herself, and has great difficulty moving around her apartment; prior to these CVAs, pt was reportedly in good health, fully adherent to her medications, functionally independent, and required no assistance with her ADLs. Mother states pt refused all physical therapy following her CVAs and threw out the motorized wheelchair she was given for unknown reasons, causing her insurance to deny repeat attempts to provide her a wheelchair. Pt has HHA care 4 hours a day, which mother believes is not sufficient, as pt does not know which medications she takes or when and is unable to dispense them by herself. Pt's mother does not know pt's full medication list, but was able to report that she is supposed to be taking antihypertensives, but self-discontinued them an unknown amount of time ago and misplaced her blood pressure monitor. She is worried that pt may be misusing her prescription benzodiazepines, which were prescribed by a psychiatrist for depressive symptoms after the death of her son 7 years ago and which has worsened since her CVAs. Pt has another son, Nirmal, from whom she is estranged; mother reports he has not spoken to pt since her other son's death. Mother herself says she has not seen pt in at least 2 years as she lives in [place] and pt lives in the [place], but that she hears from pt by phone regularly and will call [facility] or [facility] to check on her if she doesn't hear from pt in some time, as pt intermittently stops communicating with her and dismisses HHA. Mother believes pt needs physical therapy and a wheelchair for mobility, but cannot guarantee pt will agree to medical recommendations. Spoke to pt's mother and HCP, Hanane Dietz ([phone]), who provided collateral information. Per mother, pt's functional capacity has decreased significantly following multiple CVAs 2-3 years ago, reports that pt is unable to care for herself at home, notes that she falls almost daily and is unable to make it to the bathroom, cannot cook or clean for herself, and has great difficulty moving around her apartment; prior to these CVAs, pt was reportedly in good health, fully adherent to her medications, functionally independent, and was able to perform all ADLs. Mother states pt refused all physical therapy following her CVAs and threw out the motorized wheelchair she was given for unknown reasons, causing her insurance to deny repeat attempts to provide her a wheelchair. Pt has HHA care 4 hours a day, which mother believes is not sufficient, as pt does not know which medications she takes or when and is unable to dispense them by herself. Pt's mother does not know pt's full medication list, but was able to report that she is supposed to be taking antihypertensives, but self-discontinued them an unknown amount of time ago and misplaced her blood pressure monitor. She is worried that pt may be misusing her prescription benzodiazepines, which were prescribed by a psychiatrist for depressive symptoms after the death of her son 7 years ago and which has worsened since her CVAs. Pt has another son, Nirmal, from whom she is estranged; mother reports he has not spoken to pt since her other son's death. Mother herself says she has not seen pt in at least 2 years as she lives in [place] and pt lives in the [place], but that she hears from pt by phone regularly and will call [facility] or [facility] to check on her if she doesn't hear from pt in some time, as pt intermittently stops communicating with her and dismisses HHA. Mother believes pt needs physical therapy and a wheelchair for mobility, but cannot guarantee pt will agree to medical recommendations.

## 2022-12-20 NOTE — H&P ADULT - PROBLEM SELECTOR PLAN 3
likely reactive in nature s/p fall. No clearly defined source as patient denies urinary symptoms, afebrile, CXR without pathology.   - f/u UA  - f/u BCx  - f/u AM CBC w/ diff  - monitor for fever

## 2022-12-20 NOTE — BH CONSULTATION LIAISON ASSESSMENT NOTE - NSBHCHARTREVIEWVS_PSY_A_CORE FT
Vital Signs Last 24 Hrs  T(C): 36.8 (20 Dec 2022 12:18), Max: 37 (20 Dec 2022 08:38)  T(F): 98.2 (20 Dec 2022 12:18), Max: 98.6 (20 Dec 2022 08:38)  HR: 87 (20 Dec 2022 12:18) (62 - 96)  BP: 135/83 (20 Dec 2022 12:18) (110/71 - 135/83)  BP(mean): --  RR: 20 (20 Dec 2022 12:18) (16 - 20)  SpO2: 100% (20 Dec 2022 12:18) (97% - 100%)    Parameters below as of 20 Dec 2022 12:18  Patient On (Oxygen Delivery Method): room air

## 2022-12-20 NOTE — H&P ADULT - PROBLEM SELECTOR PLAN 6
previously utilizing losartan 50qd, clinidine 0.1 TID, amlodipine 10qd. Reports to have self discontinued these medications as they were causing dizziness. Normotensive on admission.   - c/t monitor BP

## 2022-12-20 NOTE — H&P ADULT - ATTENDING COMMENTS
Patient was seen and examined with the resident team today.  I agree with Dr. Dempsey's assessment and plan with the following exceptions/additions:     Briefly, this is a 63yo woman with a PMH of essential HTN, CVA c/b R-sided deficits, benzodiazepine abuse, mild recurrent MDD, BERNICE, migraines and ?seizure disorder who presented with toxic encephalopathy and recurrent falls.    #Toxic encephalopathy - collateral suggest she's been abusing her benzodiazepines; please collect Utox    #Recurrent falls - likely 2/2 ingestion but also residuals from her stroke; PT eval recommending TASNEEM; please check orthostatics   #DVT PPx - Lovenox  #Dispo - TASNEEM, need to verify insurance status     Joanna Koehler  [phone]

## 2022-12-20 NOTE — H&P ADULT - PROBLEM SELECTOR PLAN 8
Likely s/p CVA. Patient does not recall medications, unclear if utilizing any other medications other than valium.

## 2022-12-20 NOTE — H&P ADULT - PROBLEM SELECTOR PLAN 9
States to have been admitted to acute rehab s/p CVA in fall of 2022  - c/w home atorvastatin    #Need for ppx:  F: none  E: K >4, Mg >2  N: regular diet  GI ppx: none  DVT ppx: lovenox

## 2022-12-20 NOTE — H&P ADULT - PROBLEM SELECTOR PLAN 7
advanced care planning discussed. Patient wishes to make mother HCP in event of inability to communicate wishes. Mother is 80 and per patient in sound mental capacity. Notes that if mother is unable to perform responsibilities as HCP would like son Nirmal to be an alternate. Reviewed with patient, wishes regarding life saving measures, prefers to be made DNR/DNI, is aware of the alternate outcome.

## 2022-12-20 NOTE — H&P ADULT - NSHPPHYSICALEXAM_GEN_ALL_CORE
.  VITAL SIGNS:  T(C): 36.7 (12-19-22 @ 22:51), Max: 36.7 (12-19-22 @ 22:51)  T(F): 98 (12-19-22 @ 22:51), Max: 98 (12-19-22 @ 22:51)  HR: 93 (12-19-22 @ 22:51) (93 - 93)  BP: 110/71 (12-19-22 @ 22:51) (110/71 - 110/71)  BP(mean): --  RR: 16 (12-19-22 @ 22:51) (16 - 16)  SpO2: 100% (12-19-22 @ 22:51) (100% - 100%)  Wt(kg): --    PHYSICAL EXAM:    Constitutional: WDWN resting comfortably in bed; NAD  Head: NC/AT  Eyes: PERRL, EOMI, anicteric sclera  ENT: no nasal discharge; uvula midline, no oropharyngeal erythema or exudates; MMM  Neck: supple; no JVD or thyromegaly  Respiratory: CTA B/L; no W/R/R, no retractions  Cardiac: +S1/S2; RRR; no M/R/G; PMI non-displaced  Gastrointestinal: abdomen soft, NT/ND; no rebound or guarding; +BSx4  Back: spine midline, no bony tenderness or step-offs; no CVAT B/L  Extremities: WWP, no clubbing or cyanosis; no peripheral edema  Musculoskeletal: NROM x4; no joint swelling, tenderness or erythema  Vascular: 2+ radial, femoral, DP/PT pulses B/L  Dermatologic: skin warm, dry and intact; no rashes, wounds, or scars  Lymphatic: no submandibular or cervical LAD  Neurologic: AAOx3; CNII-XII grossly intact; no focal deficits  Psychiatric: affect and characteristics of appearance, verbalizations, behaviors are appropriate .  VITAL SIGNS:  T(C): 36.7 (12-19-22 @ 22:51), Max: 36.7 (12-19-22 @ 22:51)  T(F): 98 (12-19-22 @ 22:51), Max: 98 (12-19-22 @ 22:51)  HR: 93 (12-19-22 @ 22:51) (93 - 93)  BP: 110/71 (12-19-22 @ 22:51) (110/71 - 110/71)  BP(mean): --  RR: 16 (12-19-22 @ 22:51) (16 - 16)  SpO2: 100% (12-19-22 @ 22:51) (100% - 100%)  Wt(kg): --    PHYSICAL EXAM:    Constitutional: WDWN resting comfortably in bed; NAD  Head: NC/AT  Eyes: PERRL, EOMI, anicteric sclera  ENT: no nasal discharge; uvula midline, no oropharyngeal erythema or exudates; MMM  Neck: supple; no JVD or thyromegaly  Respiratory: CTA B/L; no W/R/R, no retractions  Cardiac: +S1/S2; RRR; no M/R/G; PMI non-displaced  Gastrointestinal: abdomen soft, NT/ND; no rebound or guarding; +BSx4  Back: spine midline, no bony tenderness or step-offs; no CVAT B/L  Extremities: WWP, no clubbing or cyanosis; no peripheral edema  Musculoskeletal: NROM x4; no joint swelling, tenderness or erythema  Vascular: 2+ radial, femoral, DP/PT pulses B/L  Dermatologic: skin warm, dry and intact; no rashes, wounds, or scars  Lymphatic: no submandibular or cervical LAD  Neurologic: AAOx3; CNII-XII grossly intact; LLE weakness, 3/5 muscle strength testing  Psychiatric: affect and characteristics of appearance, verbalizations, behaviors are appropriate

## 2022-12-20 NOTE — BH CONSULTATION LIAISON ASSESSMENT NOTE - NSICDXPASTMEDICALHX_GEN_ALL_CORE_FT
PAST MEDICAL HISTORY:  Anxiety     Bipolar illness     Depression     Eating disorder     Hip pain, left     Migraine     PTSD (post-traumatic stress disorder)     Seizures

## 2022-12-20 NOTE — ED ADULT NURSE NOTE - OBJECTIVE STATEMENT
pt. presents with c/o "pain everywhere" s/p fall. as per report, pt. was found on the floor by ems. pt. can not provide details of the incident, not sure about LOC.  fresh abrasions noted to Rt ribs abd breast, Rt forehead, old pt. presents with c/o "pain everywhere" s/p fall. as per report, pt. was found on the floor by ems. pt. can not provide details of the incident, not sure about LOC.  fresh abrasions noted to Rt ribs abd breast, Rt forehead, old abrasions and bruises noted to legs, pt. c/o pain to Rt forearm. pt. states she uses wheelchair to move around.

## 2022-12-20 NOTE — BH CONSULTATION LIAISON ASSESSMENT NOTE - NSBHCHARTREVIEWLAB_PSY_A_CORE FT
8.0    12.91 )-----------( 567      ( 20 Dec 2022 01:37 )             27.2     12-20    144  |  105  |  12  ----------------------------<  102<H>  3.0<L>   |  27  |  0.67    Ca    9.6      20 Dec 2022 01:37  Mg     2.0     12-20    TPro  7.6  /  Alb  4.2  /  TBili  0.3  /  DBili  x   /  AST  38  /  ALT  34  /  AlkPhos  105  12-20

## 2022-12-20 NOTE — CONSULT NOTE ADULT - ASSESSMENT
Assessment and Plan  62F w/ PMH HTN(not on any meds per pt), Migraine, Sz disorder, depression- anxiety, prior stroke Hx in 2018 presented to [facility] ER for frequent falls to [facility] ER, admitted to Medicine for further work up, Stroke Neuro was consulted for frequent falls. Pt poor Vs unreliable historian. CTH w/ subacute / Chronic B/L MICAH infarcts. Unclear chronicity of R sided weakness as pt states she does not use walker and is pending Wheel chair.       Recommendations :     - Would recommend MRI Brain to better prognosticate as pt is unreliable historian, unclear chronicity of R sided weakness.  - Rest of the care per primary team.    To be d/w attending.       Assessment and Plan  62F w/ PMH HTN(not on any meds per pt), Migraine, Sz disorder, depression- anxiety, prior stroke Hx in 2018 presented to [facility] ER for frequent falls to [facility] ER, admitted to Medicine for further work up, Stroke Neuro was consulted for frequent falls. Pt poor Vs unreliable historian. CTH w/ subacute / Chronic B/L MICAH infarcts. Unclear chronicity of R sided weakness as pt states she does not use walker and is pending Wheel chair.       Recommendations :     - Would recommend MRI Brain to better prognosticate since pt is unreliable historian, unclear chronicity of R sided weakness.  - Utox as the symptoms may be related to polypharmacy (pt noted to be on ? pain med, ambien, valium).  - Rest of the care per primary team.    To be d/w attending.       Assessment and Plan  62F w/ PMH HTN(not on any meds per pt), Migraine, Sz disorder, depression- anxiety, prior stroke Hx in 2018 presented to [facility] for frequent falls to [facility], admitted to Medicine for further work up, Stroke Neuro was consulted for frequent falls. Pt poor Vs unreliable historian. CTH w/ subacute / Chronic B/L MICAH infarcts. Unclear chronicity of R sided weakness as pt states she does not use walker and is pending Wheel chair.       Recommendations :     - Would recommend MRA H/N non con to better prognosticate since pt is unreliable historian, unclear chronicity of R sided weakness.  - Utox as the symptoms may be related to polypharmacy (pt noted to be on ? pain med, ambien, valium).  - Would recommend ASA 81mg po daily and Atorvastatin 80mg PO daily given Hx of prior strokes  - Would recommend to obtain TSH, Lipid panel, HbA1C with AM labs.  - Rest of the care per primary team.    The above mentioned plan was D/W Dr. Mccartney.       Assessment and Plan  62F w/ PMH HTN(not on any meds per pt), Migraine, Sz disorder, depression- anxiety, prior stroke Hx in 2018 presented to [facility] ER for frequent falls to [facility] ER, admitted to Medicine for further work up, Stroke Neuro was consulted for frequent falls. Pt poor Vs unreliable historian. CTH w/ subacute / Chronic B/L MICAH infarcts. Unclear chronicity of R sided weakness as pt states she does not use walker and is pending Wheel chair.       Recommendations :     - Would recommend MRA H/N non con to better prognosticate since pt is unreliable historian, unclear chronicity of R sided weakness.  - Utox as the symptoms may be related to polypharmacy (pt noted to be on ? pain med, ambien, valium).  - Would recommend ASA 81mg po daily and Atorvastatin 80mg PO daily given Hx of prior strokes  - Would recommend to obtain TSH, Lipid panel, HbA1C with AM labs.  - Would recommend S&S eval as pt failed bedside dysphagia screen.  - Rest of the care per primary team.    The above mentioned plan was D/W Dr. Mccartney.       Assessment and Plan  62F w/ PMH HTN(not on any meds per pt), Migraine, Sz disorder, depression- anxiety, prior stroke Hx in 2018 presented to [facility] ER for frequent falls to [facility], admitted to Medicine for further work up, Stroke Neuro was consulted for frequent falls. Pt poor Vs unreliable historian. CTH w/ subacute / Chronic B/L MICAH infarcts. Unclear chronicity of R sided weakness as pt states she does not use walker and is pending Wheel chair.       Recommendations :   - Will need more collateral from her PCP on her Meds.  - Would recommend MRA H/N non con to better prognosticate since pt is unreliable historian, unclear chronicity of R sided weakness.  - Utox as the symptoms may be related to polypharmacy (pt noted to be on ? pain med, ambien, valium).  - Would recommend ASA 81mg po daily and Atorvastatin 80mg PO daily given Hx of prior strokes  - Would recommend to obtain TSH, Lipid panel, HbA1C with AM labs.  - Would recommend S&S eval as pt failed bedside dysphagia screen.  - Rest of the care per primary team.    The above mentioned plan was D/W Dr. Mccartney.

## 2022-12-20 NOTE — BH CONSULTATION LIAISON ASSESSMENT NOTE - NSBHSABEN_PSY_A_CORE FT
Pt reports current alprazolam abuse (obtains on street) and lorazepam use (prescribed), denies use of prescribed diazepam (as seen on [facility]). Pt reports past withdrawal sx including seizures from alprazolam abuse.

## 2022-12-20 NOTE — BH CONSULTATION LIAISON ASSESSMENT NOTE - DETAILS
Pt reports emotionally numbing with ?SSRIs reports thoughts about death as alternative to current way of life. denies ever experience of any active thoughts of suicide/intent/plan/attempts. Cites love of life, grandchildren as main protective factors fatigue, hunger

## 2022-12-20 NOTE — H&P ADULT - PROBLEM SELECTOR PLAN 1
states that fall occurred while home, when syncopized, with LOC with head trauma. Pt reports no alcohol use, however utilizes valium regularly. Notes dizziness with changes in head position. Possibly 2/2 deconditioning v seizure s/p CVA (less likely given lack of post ictal state) v cardiogenic (unclear as EKG pending), v orthostatic in nature. CTH negative for hemorrhage, remaining imaging negative.   - f/u orthostatics  - f/u TSH  - f/u TTE  - f/u EKG  - f/u orthostatics  - f/u vEEG i/s/o questionable seizure history and likely medication nonadherence  - f/u PT recs  - start meclizine given likely peripheral etiology of dizziness (occurs with changes in head position)

## 2022-12-20 NOTE — SWALLOW BEDSIDE ASSESSMENT ADULT - COMMENTS
Pt was seen this a.m. in [facility] by me and cleared for regular diet (she did have one episode of coughing after the first cup sip of water, then drank 3oz thin with no add'l signs of aspiration), then swallow consult was d/c'd. Pt then transferred to regional floor and swallow consult was reordered.

## 2022-12-20 NOTE — PHYSICAL THERAPY INITIAL EVALUATION ADULT - PERTINENT HX OF CURRENT PROBLEM, REHAB EVAL
62F w/ PMH HTN, migraine, seizure d/o, depression-anxiety, per patient multiiple CTA in September w/ complaints of fall. Patient states that fall occurred while home, when syncopized, with LOC with head trauma. Pt reports no alcohol use, never smoker, received HHA 4 hours daily (every day). Has two adult children (one of whome is  and one living parent). Patient is a poor historian, does not recall exact events leading to fall. Patient also notes the need to ambulate via wheelchair in home. States to have been admitted to acute rehab s/p CVA in fall of . States to be "addicted to valium" utilizing 1 10mg tablet once every other day. States health aide is not with her all the time. Patient notes no continued need for antihypertensives (on multiple previously) as they caused dizziness. Notes that dizziness has not resolved and notes room spinning with changes in head position.

## 2022-12-20 NOTE — H&P ADULT - NSHPLABSRESULTS_GEN_ALL_CORE
8.0    12.91 )-----------( 567      ( 20 Dec 2022 01:37 )             27.2       12-20    144  |  105  |  12  ----------------------------<  102<H>  3.0<L>   |  27  |  0.67    Ca    9.6      20 Dec 2022 01:37  Mg     2.0     12-20    TPro  7.6  /  Alb  4.2  /  TBili  0.3  /  DBili  x   /  AST  38  /  ALT  34  /  AlkPhos  105  12-20                  PT/INR - ( 20 Dec 2022 01:37 )   PT: 13.1 sec;   INR: 1.10          PTT - ( 20 Dec 2022 01:37 )  PTT:25.1 sec    Lactate Trend            CAPILLARY BLOOD GLUCOSE

## 2022-12-20 NOTE — BH CONSULTATION LIAISON ASSESSMENT NOTE - CURRENT MEDICATION
MEDICATIONS  (STANDING):  enoxaparin Injectable 40 milliGRAM(s) SubCutaneous every 24 hours  folic acid 1 milliGRAM(s) Oral daily  multivitamin 1 Tablet(s) Oral daily    MEDICATIONS  (PRN):

## 2022-12-20 NOTE — H&P ADULT - HISTORY OF PRESENT ILLNESS
62F w/ PMH HTN, migraine, seizure d/o, depression-anxiety, per patient multiiple CTA in September w/ complaints of fall. Patient states that fall occurred while home, when syncopized, with LOC with head trauma. Pt reports no alcohol use, never smoker, received HHA 4 hours daily (every day). Has two adult children (one of whome is  and one living parent). Patient is a poor historian, does not recall exact events leading to fall. Patient also notes the need to ambulate via wheelchair in home. States to have been admitted to acute rehab s/p CVA in fall of . States to be "addicted to valium" utilizing 1 10mg tablet once every other day. States health aide is not with her all the time. Patient notes no continued need for anithypertensives (on multiple previously) as they caused dizziness. Notes that dizziness has not resolved and notes room spinning with changes in head position.     Initial vital signs: T: 98 F, HR: 93, BP: 110/71, R: 16, SpO2: 100% on RA    Labs: significant for WBC 12.91, Hgb 8, MCV 69, Plt 567, aPTT 25.1, K 3 s/p 40 meq PO in ED, Cr 0.67, remainder of CMP WNL, CECILIA <10,     CXR: No acute pulmonary pathology. Dextroscholiosis noted.     CT C-spine: Ankylosis of the C4 and C5 vertebrae is noted. There is moderate multilevel degenerative disc disease. No acute osseous abnormality of the cervical spine.    CTH: No acute intracranial hemorrhage or calvarial fracture. Subacute/chronic bilateral MICAH territory infarcts.    CT T-spine: No acute fracture. Patchy bilateral pulmonary nodular opacities, likely infectious/inflammatory. Moderate hiatal hernia.    EKG:     Medications: tylenol 750mg PO x1, KCl 40meq x1    Consults: none  62F w/ PMH HTN, migraine, seizure d/o, depression-anxiety, per patient multiiple CTA in September w/ complaints of fall. Patient states that fall occurred while home, when syncopized, with LOC with head trauma. Pt reports no alcohol use, never smoker, received HHA 4 hours daily (every day). Has two adult children (one of whome is  and one living parent). Patient is a poor historian, does not recall exact events leading to fall. Patient also notes the need to ambulate via wheelchair in home. States to have been admitted to acute rehab s/p CVA in fall of . States to be "addicted to valium" utilizing 1 10mg tablet once every other day. States health aide is not with her all the time. Patient notes no continued need for antihypertensives (on multiple previously) as they caused dizziness. Notes that dizziness has not resolved and notes room spinning with changes in head position.     Initial vital signs: T: 98 F, HR: 93, BP: 110/71, R: 16, SpO2: 100% on RA    Labs: significant for WBC 12.91, Hgb 8, MCV 69, Plt 567, aPTT 25.1, K 3 s/p 40 meq PO in ED, Cr 0.67, remainder of CMP WNL, CECILIA <10,     CXR: No acute pulmonary pathology. Dextroscholiosis noted.     CT C-spine: Ankylosis of the C4 and C5 vertebrae is noted. There is moderate multilevel degenerative disc disease. No acute osseous abnormality of the cervical spine.    CTH: No acute intracranial hemorrhage or calvarial fracture. Subacute/chronic bilateral MICAH territory infarcts.    CT T-spine: No acute fracture. Patchy bilateral pulmonary nodular opacities, likely infectious/inflammatory. Moderate hiatal hernia.    EKG:     Medications: tylenol 750mg PO x1, KCl 40meq x1    Consults: none

## 2022-12-20 NOTE — ED ADULT NURSE NOTE - BREATHING
[address] [place] [address]  [address] [address]  Phone: [phone]  Fax: [address]    Pt Name: Brett Zhao  MRN: [id number]  Alfreditogf[employer] 1969  Date of evaluation: 11/25/2020  Provider: Maxim Del Angel PA-C     CHIEF COMPLAINT       Chief Complaint   Patient presents with    Headache    Shortness of Breath     symptoms started 11/10/2020           HISTORY OF PRESENT ILLNESS  (Location/Symptom, Timing/Onset, Context/Setting, Quality, Duration, Modifying Factors, Severity.)   Brett Zhao is a 54 y.o. White [1] female who presents to the office for evaluation of      Headache    This is a new problem. The current episode started 1 to 4 weeks ago. The problem has been gradually improving. The quality of the pain is described as aching. The pain is mild. Associated symptoms include coughing. Associated symptoms comments: Sob  . Shortness of Breath   Associated symptoms include headaches. Nursing Notes were reviewed. REVIEW OF SYSTEMS    (2-9 systems for level 4, 10 or more for level 5)     Review of Systems   Constitutional: Negative. HENT: Positive for postnasal drip. Respiratory: Positive for cough and shortness of breath. Gastrointestinal: Negative. Genitourinary: Negative. Musculoskeletal: Negative. Skin: Negative. Neurological: Positive for headaches. Except as noted above the remainder of the review of systems was reviewed andnegative. PAST MEDICAL HISTORY   History reviewed. Past Medical History:   Diagnosis Date    Lumbar back pain with radiculopathy affecting left lower extremity 1/14/2019         SURGICAL HISTORY     History reviewed. Past Surgical History:   Procedure Laterality Date    MOUTH SURGERY      WISDOM TOOTH EXTRACTION           CURRENT MEDICATIONS       No current outpatient medications on file. No current facility-administered medications for this visit. ALLERGIES     Patient has no known allergies. FAMILY HISTORY     No family history on file. No family status information on file. SOCIAL HISTORY      reports that she has never smoked. She has never used smokeless tobacco. She reports current alcohol use. She reports that she does not use drugs. PHYSICAL EXAM    (up to 7 for level 4, 8 or more for level 5)     Vitals:    11/25/20 0813   BP: 101/65   Pulse: 65   Resp: 16   Temp: 98.3 °F (36.8 °C)   TempSrc: Temporal   SpO2: 98%   Weight: 165 lb (74.8 kg)   Height: 5' 7\" (1.702 m)         Physical Exam  Vitals signs and nursing note reviewed. Constitutional:       Appearance: Normal appearance. HENT:      Head: Normocephalic and atraumatic. Right Ear: External ear normal.      Left Ear: External ear normal.      Nose: Nose normal.      Mouth/Throat:      Mouth: Mucous membranes are moist.   Eyes:      Extraocular Movements: Extraocular movements intact. Conjunctiva/sclera: Conjunctivae normal.      Pupils: Pupils are equal, round, and reactive to light. Cardiovascular:      Rate and Rhythm: Normal rate and regular rhythm. Pulmonary:      Effort: Pulmonary effort is normal.   Abdominal:      Palpations: Abdomen is soft. Skin:     General: Skin is warm and dry. Neurological:      Mental Status: She is alert and oriented to person, place, and time. DIFFERENTIAL DIAGNOSIS:         Stevie Cleveland reviewed the disposition diagnosis with the patient and or their family/guardian. I have answered their questions and given discharge instructions. They voiced understanding of these instructions and did not have anyfurther questions or complaints.       PROCEDURES:  Orders Placed This Encounter   Procedures    COVID-19 Ambulatory     Standing Status:   Future     Standing Expiration Date:   11/25/2021     Scheduling Instructions:      Saline media preferred given current shortage of viral transport media but both acceptable Order Specific Question:   Is this test for diagnosis or screening? Answer:   Diagnosis of ill patient     Order Specific Question:   Symptomatic for COVID-19 as defined by CDC? Answer:   Yes     Order Specific Question:   Date of Symptom Onset     Answer:   11/23/2020     Order Specific Question:   Hospitalized for COVID-19? Answer:   No     Order Specific Question:   Admitted to ICU for COVID-19? Answer:   No     Order Specific Question:   Employed in healthcare setting? Answer:   Unknown     Order Specific Question:   Resident in a congregate (group) care setting? Answer:   Unknown     Order Specific Question:   Pregnant? Answer:   Unknown     Order Specific Question:   Previously tested for COVID-19? Answer:   Unknown       No results found for this visit on 11/25/20. FINALIMPRESSION      Visit Diagnoses and Associated Orders     Nonintractable headache, unspecified chronicity pattern, unspecified headache type    -  Primary    COVID-19 Ambulatory [[id number] Custom]   - Future Order         SOB (shortness of breath)                     PLAN     Return if symptoms worsen or fail to improve. DISCHARGEMEDICATIONS:  No orders of the defined types were placed in this encounter. Plan:  Specimen sent for a culture. Possible treatment alteration based on the results. Steps to help prevent the spread of COVID-19 if you are sick  SOURCE - https://shah-[place].info/. html     Stay home except to get medical care   ; Stay home: People who are mildly ill with COVID-19 are able to isolate at home during their illness.  You should restrict activities outside your home, except for getting medical care.   ; Avoid public areas: Do not go to work, school, or public areas.   ; Avoid public transportation: Avoid using public transportation, ride-sharing, or taxis.  ; Separate yourself from other people and animals in your home   ; Stay away from others: As much as possible, you should stay in a specific room and away from other people in your home. Also, you should use a separate bathroom, if available.   ; Limit contact with pets & animals: You should restrict contact with pets and other animals while you are sick with COVID-19, just like you would around other people. Although there have not been reports of pets or other animals becoming sick with COVID-19, it is still recommended that people sick with COVID-19 limit contact with animals until more information is known about the virus. ; When possible, have another member of your household care for your animals while you are sick. If you are sick with COVID-19, avoid contact with your pet, including petting, snuggling, being kissed or licked, and sharing food. If you must care for your pet or be around animals while you are sick, wash your hands before and after you interact with pets and wear a facemask. See COVID-19 and Animals for more information. Other considerations   The ill person should eat/be fed in their room if possible. Non-disposable  items used should be handled with gloves and washed with hot water or in a . Clean hands after handling used  items.  If possible, dedicate a lined trash can for the ill person. Use gloves when removing garbage bags, handling, and disposing of trash. Wash hands after handling or disposing of trash.  Consider consulting with your local health department about trash disposal guidance if available. Information for Household Members and Caregivers of Someone who is Sick   Call ahead before visiting your doctor   Call ahead: If you have a medical appointment, call the healthcare provider and tell them that you have or may have COVID-19. This will help the healthcare provider's office take steps to keep other people from getting infected or exposed. Wear a facemask if you are sick   ;  If you are sick: You should wear a facemask when you are around other people (e.g., sharing a room or vehicle) or pets and before you enter a healthcare provider's office. ; If you are caring for others: If the person who is sick is not able to wear a facemask (for example, because it causes trouble breathing), then people who live with the person who is sick should not stay in the same room with them, or they should wear a facemask if they enter a room with the person who is sick. Cover your coughs and sneezes   ; Cover: Cover your mouth and nose with a tissue when you cough or sneeze.   ; Dispose: Throw used tissues in a lined trash can.   ; Wash hands: Immediately wash your hands with soap and water for at least 20 seconds or, if soap and water are not available, clean your hands with an alcohol-based hand  that contains at least 60% alcohol. Clean your hands often   ; Wash hands: Wash your hands often with soap and water for at least 20 seconds, especially after blowing your nose, coughing, or sneezing; going to the bathroom; and before eating or preparing food.   ; Hand : If soap and water are not readily available, use an alcohol-based hand  with at least 60% alcohol, covering all surfaces of your hands and rubbing them together until they feel dry.   ; Soap and water: Soap and water are the best option if hands are visibly dirty.   ; Avoid touching: Avoid touching your eyes, nose, and mouth with unwashed hands. Handwashing Tips   ; Wet your hands with clean, running water (warm or cold), turn off the tap, and apply soap.  ; Lather your hands by rubbing them together with the soap. Lather the backs of your hands, between your fingers, and under your nails. ; Scrub your hands for at least 20 seconds. Need a timer? Hum the Floyd from beginning to end twice.  ; Rinse your hands well under clean, running water.  ; Dry your hands using a clean towel or air dry them.   Avoid sharing personal household items   ; Do not share: You should not share dishes, drinking glasses, cups, eating utensils, towels, or bedding with other people or pets in your home.   ; Wash thoroughly after use: After using these items, they should be washed thoroughly with soap and water. Clean all high-touch surfaces everyday   ; Clean and disinfect: Practice routine cleaning of high touch surfaces.  ; High touch surfaces include counters, tabletops, doorknobs, bathroom fixtures, toilets, phones, keyboards, tablets, and bedside tables.  ; Disinfect areas with bodily fluids: Also, clean any surfaces that may have blood, stool, or body fluids on them.   ; Household : Use a household cleaning spray or wipe, according to the label instructions. Labels contain instructions for safe and effective use of the cleaning product including precautions you should take when applying the product, such as wearing gloves and making sure you have good ventilation during use of the product. Monitor your symptoms   Seek medical attention: Seek prompt medical attention if your illness is worsening     (e.g., difficulty breathing).   ; Call your doctor: Before seeking care, call your healthcare provider and tell them that you have, or are being evaluated for, COVID-19.   ; Wear a facemask when sick: Put on a facemask before you enter the facility. These steps will help the healthcare provider's office to keep other people in the office or waiting room from getting infected or exposed. ; Alert health department: Ask your healthcare provider to call the local or state health department.  Persons who are placed under active monitoring or facilitated self-monitoring should follow instructions provided by their local health department or occupational health professionals, as appropriate.  ; Call 911 if you have a medical emergency: If you have a medical emergency and need to call 911, notify the dispatch personnel that you have, or are being evaluated for COVID-19. If possible, put on a facemask before emergency medical services arrive. Patient instructed to return to the office if symptoms worsen, return, or have any other concerns. Patient understands and is agreeable.          Kathy Flores PA-C 11/25/2020 8:31 AM spontaneous

## 2022-12-20 NOTE — BH CONSULTATION LIAISON ASSESSMENT NOTE - DESCRIPTION
Pt reports to live alone in the [place], retired, previously worked as an actor (retired in  per pt, declined to disclose any particulars of work history). Has adult child living in [place] with two grandchildren (ages 3 and 8) and mother living in [place]. Has a  son (declined to share any details).

## 2022-12-20 NOTE — BH CONSULTATION LIAISON ASSESSMENT NOTE - OTHER
stable posture sitting up in bed impaired re: substance use (benzodiazepines and hypnotics) and potential role in recent falls thin impaired, delayed recall 1/3 items in five minutes, notably inconsistent report of recent events and personal history coherent for brief periods, often becoming illogical/inconsistent in history, with impaired reasoning. reactively labile, tearful when discussing stressors impaired grossly reality based, some confused content, no voiced delusions or paranoia, no SI or HI

## 2022-12-21 ENCOUNTER — TRANSCRIPTION ENCOUNTER (OUTPATIENT)
Age: 62
End: 2022-12-21

## 2022-12-21 DIAGNOSIS — G47.00 INSOMNIA, UNSPECIFIED: ICD-10-CM

## 2022-12-21 DIAGNOSIS — R41.9 UNSPECIFIED SYMPTOMS AND SIGNS INVOLVING COGNITIVE FUNCTIONS AND AWARENESS: ICD-10-CM

## 2022-12-21 DIAGNOSIS — Z29.9 ENCOUNTER FOR PROPHYLACTIC MEASURES, UNSPECIFIED: ICD-10-CM

## 2022-12-21 LAB
A1C WITH ESTIMATED AVERAGE GLUCOSE RESULT: 5.8 % — HIGH (ref 4–5.6)
ALBUMIN SERPL ELPH-MCNC: 3.3 G/DL — SIGNIFICANT CHANGE UP (ref 3.3–5)
ALBUMIN SERPL ELPH-MCNC: 3.5 G/DL — SIGNIFICANT CHANGE UP (ref 3.3–5)
ALP SERPL-CCNC: 86 U/L — SIGNIFICANT CHANGE UP (ref 40–120)
ALP SERPL-CCNC: 97 U/L — SIGNIFICANT CHANGE UP (ref 40–120)
ALT FLD-CCNC: 23 U/L — SIGNIFICANT CHANGE UP (ref 10–45)
ALT FLD-CCNC: 24 U/L — SIGNIFICANT CHANGE UP (ref 10–45)
AMPHET UR-MCNC: NEGATIVE — SIGNIFICANT CHANGE UP
ANION GAP SERPL CALC-SCNC: 8 MMOL/L — SIGNIFICANT CHANGE UP (ref 5–17)
APPEARANCE UR: CLEAR — SIGNIFICANT CHANGE UP
AST SERPL-CCNC: 18 U/L — SIGNIFICANT CHANGE UP (ref 10–40)
AST SERPL-CCNC: 20 U/L — SIGNIFICANT CHANGE UP (ref 10–40)
BARBITURATES UR SCN-MCNC: POSITIVE
BENZODIAZ UR-MCNC: POSITIVE
BILIRUB DIRECT SERPL-MCNC: <0.2 MG/DL — SIGNIFICANT CHANGE UP (ref 0–0.3)
BILIRUB INDIRECT FLD-MCNC: SIGNIFICANT CHANGE UP MG/DL (ref 0.2–1)
BILIRUB SERPL-MCNC: 0.2 MG/DL — SIGNIFICANT CHANGE UP (ref 0.2–1.2)
BILIRUB SERPL-MCNC: 0.2 MG/DL — SIGNIFICANT CHANGE UP (ref 0.2–1.2)
BILIRUB UR-MCNC: NEGATIVE — SIGNIFICANT CHANGE UP
BLD GP AB SCN SERPL QL: NEGATIVE — SIGNIFICANT CHANGE UP
BUN SERPL-MCNC: 8 MG/DL — SIGNIFICANT CHANGE UP (ref 7–23)
CALCIUM SERPL-MCNC: 8.8 MG/DL — SIGNIFICANT CHANGE UP (ref 8.4–10.5)
CHLORIDE SERPL-SCNC: 108 MMOL/L — SIGNIFICANT CHANGE UP (ref 96–108)
CHOLEST SERPL-MCNC: 181 MG/DL — SIGNIFICANT CHANGE UP
CO2 SERPL-SCNC: 23 MMOL/L — SIGNIFICANT CHANGE UP (ref 22–31)
COCAINE METAB.OTHER UR-MCNC: NEGATIVE — SIGNIFICANT CHANGE UP
COLOR SPEC: YELLOW — SIGNIFICANT CHANGE UP
CREAT SERPL-MCNC: 0.62 MG/DL — SIGNIFICANT CHANGE UP (ref 0.5–1.3)
DIFF PNL FLD: NEGATIVE — SIGNIFICANT CHANGE UP
EGFR: 102 ML/MIN/1.73M2 — SIGNIFICANT CHANGE UP
ESTIMATED AVERAGE GLUCOSE: 120 MG/DL — HIGH (ref 68–114)
GLUCOSE SERPL-MCNC: 113 MG/DL — HIGH (ref 70–99)
GLUCOSE UR QL: NEGATIVE — SIGNIFICANT CHANGE UP
HCT VFR BLD CALC: 24.6 % — LOW (ref 34.5–45)
HCT VFR BLD CALC: 26.6 % — LOW (ref 34.5–45)
HDLC SERPL-MCNC: 30 MG/DL — LOW
HGB BLD-MCNC: 6.9 G/DL — CRITICAL LOW (ref 11.5–15.5)
HGB BLD-MCNC: 7.6 G/DL — LOW (ref 11.5–15.5)
KETONES UR-MCNC: NEGATIVE — SIGNIFICANT CHANGE UP
LEUKOCYTE ESTERASE UR-ACNC: NEGATIVE — SIGNIFICANT CHANGE UP
LIPID PNL WITH DIRECT LDL SERPL: 126 MG/DL — HIGH
MAGNESIUM SERPL-MCNC: 1.7 MG/DL — SIGNIFICANT CHANGE UP (ref 1.6–2.6)
MCHC RBC-ENTMCNC: 20 PG — LOW (ref 27–34)
MCHC RBC-ENTMCNC: 20.4 PG — LOW (ref 27–34)
MCHC RBC-ENTMCNC: 28 GM/DL — LOW (ref 32–36)
MCHC RBC-ENTMCNC: 28.6 GM/DL — LOW (ref 32–36)
MCV RBC AUTO: 71.3 FL — LOW (ref 80–100)
MCV RBC AUTO: 71.5 FL — LOW (ref 80–100)
METHADONE UR-MCNC: NEGATIVE — SIGNIFICANT CHANGE UP
NITRITE UR-MCNC: NEGATIVE — SIGNIFICANT CHANGE UP
NON HDL CHOLESTEROL: 151 MG/DL — HIGH
NRBC # BLD: 0 /100 WBCS — SIGNIFICANT CHANGE UP (ref 0–0)
NRBC # BLD: 0 /100 WBCS — SIGNIFICANT CHANGE UP (ref 0–0)
OPIATES UR-MCNC: NEGATIVE — SIGNIFICANT CHANGE UP
PCP SPEC-MCNC: SIGNIFICANT CHANGE UP
PCP UR-MCNC: NEGATIVE — SIGNIFICANT CHANGE UP
PH UR: 6 — SIGNIFICANT CHANGE UP (ref 5–8)
PHOSPHATE SERPL-MCNC: 3.2 MG/DL — SIGNIFICANT CHANGE UP (ref 2.5–4.5)
PLATELET # BLD AUTO: 540 K/UL — HIGH (ref 150–400)
PLATELET # BLD AUTO: 545 K/UL — HIGH (ref 150–400)
POTASSIUM SERPL-MCNC: 4 MMOL/L — SIGNIFICANT CHANGE UP (ref 3.5–5.3)
POTASSIUM SERPL-SCNC: 4 MMOL/L — SIGNIFICANT CHANGE UP (ref 3.5–5.3)
PROT SERPL-MCNC: 6.2 G/DL — SIGNIFICANT CHANGE UP (ref 6–8.3)
PROT SERPL-MCNC: 6.5 G/DL — SIGNIFICANT CHANGE UP (ref 6–8.3)
PROT UR-MCNC: NEGATIVE MG/DL — SIGNIFICANT CHANGE UP
RBC # BLD: 3.45 M/UL — LOW (ref 3.8–5.2)
RBC # BLD: 3.72 M/UL — LOW (ref 3.8–5.2)
RBC # FLD: 17.3 % — HIGH (ref 10.3–14.5)
RBC # FLD: 17.7 % — HIGH (ref 10.3–14.5)
RH IG SCN BLD-IMP: POSITIVE — SIGNIFICANT CHANGE UP
SODIUM SERPL-SCNC: 139 MMOL/L — SIGNIFICANT CHANGE UP (ref 135–145)
SP GR SPEC: 1.01 — SIGNIFICANT CHANGE UP (ref 1–1.03)
THC UR QL: NEGATIVE — SIGNIFICANT CHANGE UP
TRIGL SERPL-MCNC: 127 MG/DL — SIGNIFICANT CHANGE UP
TSH SERPL-MCNC: 0.64 UIU/ML — SIGNIFICANT CHANGE UP (ref 0.27–4.2)
UROBILINOGEN FLD QL: 0.2 E.U./DL — SIGNIFICANT CHANGE UP
WBC # BLD: 10.03 K/UL — SIGNIFICANT CHANGE UP (ref 3.8–10.5)
WBC # BLD: 11.08 K/UL — HIGH (ref 3.8–10.5)
WBC # FLD AUTO: 10.03 K/UL — SIGNIFICANT CHANGE UP (ref 3.8–10.5)
WBC # FLD AUTO: 11.08 K/UL — HIGH (ref 3.8–10.5)

## 2022-12-21 PROCEDURE — 99221 1ST HOSP IP/OBS SF/LOW 40: CPT

## 2022-12-21 PROCEDURE — 70544 MR ANGIOGRAPHY HEAD W/O DYE: CPT | Mod: 26

## 2022-12-21 PROCEDURE — 99233 SBSQ HOSP IP/OBS HIGH 50: CPT | Mod: GC

## 2022-12-21 PROCEDURE — 99232 SBSQ HOSP IP/OBS MODERATE 35: CPT

## 2022-12-21 PROCEDURE — 70547 MR ANGIOGRAPHY NECK W/O DYE: CPT | Mod: 26

## 2022-12-21 RX ORDER — ACETAMINOPHEN 500 MG
750 TABLET ORAL ONCE
Refills: 0 | Status: COMPLETED | OUTPATIENT
Start: 2022-12-21 | End: 2022-12-21

## 2022-12-21 RX ORDER — ACETAMINOPHEN 500 MG
750 TABLET ORAL ONCE
Refills: 0 | Status: DISCONTINUED | OUTPATIENT
Start: 2022-12-21 | End: 2022-12-21

## 2022-12-21 RX ORDER — MIRTAZAPINE 45 MG/1
7.5 TABLET, ORALLY DISINTEGRATING ORAL ONCE
Refills: 0 | Status: COMPLETED | OUTPATIENT
Start: 2022-12-21 | End: 2022-12-21

## 2022-12-21 RX ORDER — THIAMINE MONONITRATE (VIT B1) 100 MG
500 TABLET ORAL EVERY 24 HOURS
Refills: 0 | Status: COMPLETED | OUTPATIENT
Start: 2022-12-21 | End: 2022-12-25

## 2022-12-21 RX ORDER — IRON SUCROSE 20 MG/ML
300 INJECTION, SOLUTION INTRAVENOUS EVERY 24 HOURS
Refills: 0 | Status: DISCONTINUED | OUTPATIENT
Start: 2022-12-21 | End: 2022-12-28

## 2022-12-21 RX ORDER — SODIUM CHLORIDE 9 MG/ML
1000 INJECTION INTRAMUSCULAR; INTRAVENOUS; SUBCUTANEOUS
Refills: 0 | Status: DISCONTINUED | OUTPATIENT
Start: 2022-12-21 | End: 2022-12-22

## 2022-12-21 RX ORDER — LANOLIN ALCOHOL/MO/W.PET/CERES
5 CREAM (GRAM) TOPICAL AT BEDTIME
Refills: 0 | Status: DISCONTINUED | OUTPATIENT
Start: 2022-12-21 | End: 2023-01-11

## 2022-12-21 RX ORDER — MAGNESIUM SULFATE 500 MG/ML
2 VIAL (ML) INJECTION ONCE
Refills: 0 | Status: COMPLETED | OUTPATIENT
Start: 2022-12-21 | End: 2022-12-21

## 2022-12-21 RX ADMIN — Medication 25 GRAM(S): at 09:36

## 2022-12-21 RX ADMIN — ENOXAPARIN SODIUM 40 MILLIGRAM(S): 100 INJECTION SUBCUTANEOUS at 09:36

## 2022-12-21 RX ADMIN — MIRTAZAPINE 7.5 MILLIGRAM(S): 45 TABLET, ORALLY DISINTEGRATING ORAL at 04:24

## 2022-12-21 RX ADMIN — Medication 5 MILLIGRAM(S): at 01:51

## 2022-12-21 RX ADMIN — Medication 105 MILLIGRAM(S): at 13:10

## 2022-12-21 RX ADMIN — ATORVASTATIN CALCIUM 80 MILLIGRAM(S): 80 TABLET, FILM COATED ORAL at 21:55

## 2022-12-21 RX ADMIN — IRON SUCROSE 176.67 MILLIGRAM(S): 20 INJECTION, SOLUTION INTRAVENOUS at 16:57

## 2022-12-21 RX ADMIN — Medication 1 TABLET(S): at 13:10

## 2022-12-21 RX ADMIN — Medication 5 MILLIGRAM(S): at 21:55

## 2022-12-21 RX ADMIN — Medication 1 MILLIGRAM(S): at 13:10

## 2022-12-21 NOTE — PROGRESS NOTE ADULT - ASSESSMENT
62F w/ PMH HTN, migraine, seizure d/o, depression-anxiety, per patient multiiple CTA in September w/ complaints of fall. 61 y/o F with h/o HTN, migraines, PNES, anxiety/depression, polysubstance abuse, CVA x3 with residual R-sided weakness and likely neurocognitive dysfunction presents s/p fall at home. Admitted for decreased functional status at home and currently pending [facility] placement.

## 2022-12-21 NOTE — DISCHARGE NOTE PROVIDER - PROVIDER TOKENS
PROVIDER:[TOKEN:[[id number]],FOLLOWUP:[1 week]] PROVIDER:[TOKEN:[[id number]],SCHEDULEDAPPT:[05/08/2023],SCHEDULEDAPPTTIME:[04:00 PM]] PROVIDER:[TOKEN:[[id number]],SCHEDULEDAPPT:[05/08/2023],SCHEDULEDAPPTTIME:[04:00 PM]],PROVIDER:[TOKEN:[[id number]]] PROVIDER:[TOKEN:[[id number]],SCHEDULEDAPPT:[05/08/2023],SCHEDULEDAPPTTIME:[04:00 PM]],PROVIDER:[TOKEN:[[id number]]],FREE:[LAST:[[place]],FIRST:[Edward],PHONE:[phone]],FAX:[(   )    -],ADDRESS:[[address] [place] [address]],FOLLOWUP:[2 weeks],ESTABLISHEDPATIENT:[T]]

## 2022-12-21 NOTE — PROGRESS NOTE ADULT - SUBJECTIVE AND OBJECTIVE BOX
CC: Patient is a 62y old  Female who presents with a chief complaint of mechanical fall (20 Dec 2022 16:04)      INTERVAL EVENTS: SYED    SUBJECTIVE / INTERVAL HPI: Patient seen and examined at bedside.     ROS: negative unless otherwise stated above.    VITAL SIGNS:  Vital Signs Last 24 Hrs  T(C): 36.8 (21 Dec 2022 04:52), Max: 37 (20 Dec 2022 08:38)  T(F): 98.3 (21 Dec 2022 04:52), Max: 98.6 (20 Dec 2022 08:38)  HR: 88 (21 Dec 2022 04:52) (62 - 90)  BP: 151/66 (21 Dec 2022 04:52) (119/76 - 151/66)  BP(mean): --  RR: 18 (21 Dec 2022 04:52) (17 - 20)  SpO2: 99% (21 Dec 2022 04:52) (97% - 100%)    Parameters below as of 20 Dec 2022 21:31  Patient On (Oxygen Delivery Method): room air            PHYSICAL EXAM:    General: NAD  HEENT: MMM  Neck: supple  Cardiovascular: +S1/S2; RRR  Respiratory: CTA B/L; no W/R/R  Gastrointestinal: soft, NT/ND  Extremities: WWP; no edema, clubbing or cyanosis  Vascular: 2+ radial, DP/PT pulses B/L  Neurological: AAOx3; no focal deficits    MEDICATIONS:  MEDICATIONS  (STANDING):  aspirin enteric coated 81 milliGRAM(s) Oral at bedtime  atorvastatin 80 milliGRAM(s) Oral at bedtime  enoxaparin Injectable 40 milliGRAM(s) SubCutaneous every 24 hours  folic acid 1 milliGRAM(s) Oral daily  melatonin 5 milliGRAM(s) Oral at bedtime  multivitamin 1 Tablet(s) Oral daily    MEDICATIONS  (PRN):      ALLERGIES:  Allergies    Compazine (Unknown)  contrast media (iodine-based) (Unknown)  iv contrast (Unknown)  penicillin (Unknown)  penicillins (Other)  Toradol (Unknown)    Intolerances        LABS:                        7.1    6.69  )-----------( 504      ( 20 Dec 2022 16:28 )             25.4     12-20    140  |  110<H>  |  10  ----------------------------<  101<H>  5.0   |  21<L>  |  0.66    Ca    9.3      20 Dec 2022 14:00  Phos  2.8     12-20  Mg     1.8     12-20    TPro  6.2  /  Alb  3.5  /  TBili  0.3  /  DBili  x   /  AST  33  /  ALT  28  /  AlkPhos  89  12-20    PT/INR - ( 20 Dec 2022 01:37 )   PT: 13.1 sec;   INR: 1.10          PTT - ( 20 Dec 2022 01:37 )  PTT:25.1 sec    CAPILLARY BLOOD GLUCOSE          RADIOLOGY & ADDITIONAL TESTS: Reviewed. CC: Patient is a 62y old  Female who presents with a chief complaint of mechanical fall (20 Dec 2022 16:04)    INTERVAL EVENTS: See event note. Around 0300 this morning, pt complaining of severe acute R knee pain, no findings on exam. Refused Tylenol for analgesia, requested narcotics, asking for "something strong like Dilaudid." Pt instead offered dose of Remeron for insomnia, which she accepted.     SUBJECTIVE / INTERVAL HPI: Patient seen and examined at bedside. Currently somnolent, but with no acute complaints other than mild headache and being tired. Unable to elicit further details 2/2 somnolence.      ROS: Negative unless otherwise stated above.    VITAL SIGNS:  Vital Signs Last 24 Hrs  T(C): 36.8 (21 Dec 2022 04:52), Max: 37 (20 Dec 2022 08:38)  T(F): 98.3 (21 Dec 2022 04:52), Max: 98.6 (20 Dec 2022 08:38)  HR: 88 (21 Dec 2022 04:52) (62 - 90)  BP: 151/66 (21 Dec 2022 04:52) (119/76 - 151/66)  BP(mean): --  RR: 18 (21 Dec 2022 04:52) (17 - 20)  SpO2: 99% (21 Dec 2022 04:52) (97% - 100%)    Parameters below as of 20 Dec 2022 21:31  Patient On (Oxygen Delivery Method): room air    PHYSICAL EXAM:  General: NAD, well appearing  HEENT: NCAT, EOMI, PERRLA, sclerae anicteric, MMM  Neck: supple, no JVD  Cardiovascular: RRR, normal S1/S2, no murmurs, rubs, or gallops  Pulmonary: CTAB, no wheezes, rales, or rhonchi  Abdominal: soft, nontender, nondistended, normoactive bowel sounds  Extremities: no peripheral edema, no calf tenderness  Skin: warm and dry, good color, no rashes  Neuro: A&Ox3, CN II-XII intact, strength 5/5 to LUE/LLE, strength 3/5 to RUE/RLE, sensation intact to light touch, finger-to-nose and heel-to-shin intact on L side, dysmetria appreciated on R side, unable to assess gait 2/2 weakness  Psych: normal mood and affect     MEDICATIONS:  MEDICATIONS  (STANDING):  aspirin enteric coated 81 milliGRAM(s) Oral at bedtime  atorvastatin 80 milliGRAM(s) Oral at bedtime  enoxaparin Injectable 40 milliGRAM(s) SubCutaneous every 24 hours  folic acid 1 milliGRAM(s) Oral daily  melatonin 5 milliGRAM(s) Oral at bedtime  multivitamin 1 Tablet(s) Oral daily    ALLERGIES:  Compazine (Unknown)  contrast media (iodine-based) (Unknown)  iv contrast (Unknown)  penicillin (Unknown)  penicillins (Other)  Toradol (Unknown)    LABS:             7.1    6.69  )-----------( 504      ( 20 Dec 2022 16:28 )             25.4     12-20  140  |  110<H>  |  10  ----------------------------<  101<H>  5.0   |  21<L>  |  0.66    Ca    9.3      20 Dec 2022 14:00  Phos  2.8     12-20  Mg     1.8     12-20    TPro  6.2  /  Alb  3.5  /  TBili  0.3  /  DBili  x   /  AST  33  /  ALT  28  /  AlkPhos  89  12-20    PT/INR - ( 20 Dec 2022 01:37 )   PT: 13.1 sec;   INR: 1.10     PTT - ( 20 Dec 2022 01:37 )  PTT:25.1 sec    RADIOLOGY & ADDITIONAL TESTS: Reviewed. CC: Patient is a 62y old  Female who presents with a chief complaint of mechanical fall (20 Dec 2022 16:04)    INTERVAL EVENTS: See event note. Around 0330 this morning, pt complaining of severe acute R knee pain, no findings on exam. Refused Tylenol for analgesia, requested narcotics, asking for "something strong like Dilaudid." Pt instead offered dose of Remeron for insomnia, which she accepted.     SUBJECTIVE / INTERVAL HPI: Patient seen and examined at bedside. Currently somnolent, but with no acute complaints other than mild headache and being tired. Unable to elicit further details 2/2 somnolence.      ROS: Negative unless otherwise stated above.    VITAL SIGNS:  Vital Signs Last 24 Hrs  T(C): 36.8 (21 Dec 2022 04:52), Max: 37 (20 Dec 2022 08:38)  T(F): 98.3 (21 Dec 2022 04:52), Max: 98.6 (20 Dec 2022 08:38)  HR: 88 (21 Dec 2022 04:52) (62 - 90)  BP: 151/66 (21 Dec 2022 04:52) (119/76 - 151/66)  BP(mean): --  RR: 18 (21 Dec 2022 04:52) (17 - 20)  SpO2: 99% (21 Dec 2022 04:52) (97% - 100%)    Parameters below as of 20 Dec 2022 21:31  Patient On (Oxygen Delivery Method): room air    PHYSICAL EXAM:  General: NAD, well appearing  HEENT: NCAT, EOMI, PERRLA, sclerae anicteric, MMM  Neck: supple, no JVD  Cardiovascular: RRR, normal S1/S2, no murmurs, rubs, or gallops  Pulmonary: CTAB, no wheezes, rales, or rhonchi  Abdominal: soft, nontender, nondistended, normoactive bowel sounds  Extremities: no peripheral edema, no calf tenderness  Skin: warm and dry, good color, no rashes  Neuro: A&Ox3, CN II-XII intact, strength 5/5 to LUE/LLE, strength 3/5 to RUE/RLE, sensation intact to light touch, finger-to-nose and heel-to-shin intact on L side, dysmetria appreciated on R side, unable to assess gait 2/2 weakness  Psych: normal mood and affect     MEDICATIONS:  MEDICATIONS  (STANDING):  aspirin enteric coated 81 milliGRAM(s) Oral at bedtime  atorvastatin 80 milliGRAM(s) Oral at bedtime  enoxaparin Injectable 40 milliGRAM(s) SubCutaneous every 24 hours  folic acid 1 milliGRAM(s) Oral daily  melatonin 5 milliGRAM(s) Oral at bedtime  multivitamin 1 Tablet(s) Oral daily    ALLERGIES:  Compazine (Unknown)  contrast media (iodine-based) (Unknown)  iv contrast (Unknown)  penicillin (Unknown)  penicillins (Other)  Toradol (Unknown)    LABS:             7.1    6.69  )-----------( 504      ( 20 Dec 2022 16:28 )             25.4     12-20  140  |  110<H>  |  10  ----------------------------<  101<H>  5.0   |  21<L>  |  0.66    Ca    9.3      20 Dec 2022 14:00  Phos  2.8     12-20  Mg     1.8     12-20    TPro  6.2  /  Alb  3.5  /  TBili  0.3  /  DBili  x   /  AST  33  /  ALT  28  /  AlkPhos  89  12-20    PT/INR - ( 20 Dec 2022 01:37 )   PT: 13.1 sec;   INR: 1.10     PTT - ( 20 Dec 2022 01:37 )  PTT:25.1 sec    RADIOLOGY & ADDITIONAL TESTS: Reviewed.

## 2022-12-21 NOTE — DISCHARGE NOTE PROVIDER - HOSPITAL COURSE
#Discharge: do not delete    63 y/o F with h/o HTN, migraines, PNES, anxiety/depression, polysubstance abuse, CVA x3 with residual R-sided weakness and likely neurocognitive dysfunction presents s/p fall at home. Admitted for decreased functional status at home and currently pending [facility] placement.    Hospital course (by problem):   Problem/Plan - 1:  ·  Problem: Syncope.   ·  Plan: Syncope vs. mechanical fall likely i/s/o deconditioning and reduced functional status s/p CVA x3. Pt denies EtOH use, but endorses chronic use of benzodiazepines, u-tox positive for benzodiazepines and barbiturates (although barbiturates likely 2/2 Fioricet). EKG and TTE without evidence of arrhythmia or structural heart disease that could cause cardiogenic syncope. Orthostatic vitals negative. Seen by PT, recommend discharge to [facility].   -Consult SW for [facility] placement.     Problem/Plan - 2:  ·  Problem: Neurocognitive disorder.   ·  Plan: Pt with reduced functional capacity and inability to perform ADLs, onset after CVAs 3 years ago per collateral obtained from mother. Likely poststroke neurocognitive dysfunction exacerbated by chronic benzodiazepine/hypnotic use as pt reportedly receives Xanax from someone in her apartment building and receives prescriptions for Valium, Ativan, and Ambien from a family medicine NP in the [place]. TSH wnl, will evaluate for other reversible causes of neurocognitive dysfunction.  -Follow up folate, B12, RPR  -Follow up with PCP regarding benzo discontinuation  -Monitor CIWA score  -Will give thiamine 500 mg qd per psych recommendations  -Remeron 7.5 mg qhs prn for insomnia.     Problem/Plan - 3:  ·  Problem: Microcytic anemia.   ·  Plan: Pt with microcytic anemia, Hgb 7, MCV 71.3. Iron studies notable for Fe 10 and 3% saturation, TIBC and ferritin wnl. Anemia likely i/s/o iron deficiency.  -Begin iron infusion  -Trend CBC.     Problem/Plan - 4:  ·  Problem: Leukocytosis.   ·  Plan: Likely reactive in nature s/p fall. No clearly defined source as patient denies urinary symptoms, afebrile, CXR without pathology. UA negative, BCx no growth to date.  -Trend CBC  -Monitor for fever.     Problem/Plan - 5:  ·  Problem: Migraine.   ·  Plan: Pt reports using Fioricet 300/50/40 mg at home.  -Tylenol prn for pain.     Problem/Plan - 6:  ·  Problem: Anxiety.   ·  Plan: Reports significant history of anxiety/depression, per psych note has been trialed on multiple SSRIs and antipsychotics including Zoloft and Seroquel, but has self-discontinued them as she "did not like the way they made her feel." Pt not amenable to restarting SSRI at this time. Reports she receives weekly psychotherapy over the phone.  -Per psych, no psychiatric barriers to discharge at this time  -Encourage continued outpatient psychotherapy.     Problem/Plan - 7:  ·  Problem: HTN (hypertension).   ·  Plan: Previously utilizing losartan 50 mg BID and clonidine 0.1 mg TID. Reports to have self discontinued these medications as they were causing dizziness. BP elevated to 151/66 today.  -Restart losartan 25 mg BID  -Monitor BP.     Problem/Plan - 8:  ·  Problem: Goals of care, counseling/discussion.   ·  Plan: Advanced care planning discussed. Patient wishes to make mother HCP in event of inability to communicate wishes. Mother is 80 and per patient in sound mental capacity. Notes that if mother is unable to perform responsibilities as HCP, would like son Nirmal to be an alternate. Reviewed with patient, wishes regarding life saving measures, prefers to be made DNR/DNI, is aware of the alternate outcome.     Problem/Plan - 9:  ·  Problem: History of seizures.   ·  Plan: Pt with documented history of PNES, as well as seizures i/s/o EtOH and benzodiazepine withdrawal. Prescribed Trileptal 150 mg BID in the past and has used Librium for EtOH withdrawal.  -Monitor CIWA score.     Problem/Plan - 10:  ·  Problem: Severe benzodiazepine use disorder.   ·  Plan; Pt with h/o polysubstance abuse, remote h/o heroin abuse formerly on methadone, currently with benzodiazepine/hypnotic misuse. U-tox positive for benzodiazepines. Pt with prescriptions for Ativan, Ambien, and Valium, also reportedly receives Xanax from someone in her apartment building.  -Monitor CIWA score  -Coordinate benzodiazepine discontinuation with pt's PCP.      Patient was discharged to: (home/[facility]/acute rehab/hospice, etc, and with what services – home health PT/RN? Home O2?)    New medications:   Changes to old medications:  Medications that were stopped:    Items to follow up as outpatient:    Physical exam at the time of discharge:  General: NAD, well appearing  HEENT: NCAT, EOMI, PERRLA, sclerae anicteric, MMM  Neck: supple, no JVD  Cardiovascular: RRR, normal S1/S2, no murmurs, rubs, or gallops  Pulmonary: CTAB, no wheezes, rales, or rhonchi  Abdominal: soft, nontender, nondistended, normoactive bowel sounds  Extremities: no peripheral edema, no calf tenderness  Skin: warm and dry, good color, no rashes  Neuro: A&Ox3, CN II-XII intact, strength 5/5 to LUE/LLE, strength 3/5 to RUE/RLE, sensation intact to light touch, finger-to-nose and heel-to-shin intact on L side, dysmetria appreciated on R side, unable to assess gait 2/2 weakness  Psych: normal mood and affect        #Discharge: do not delete    61 y/o F with h/o HTN, migraines, PNES, anxiety/depression, polysubstance abuse, CVA x3 with residual R-sided weakness and likely neurocognitive dysfunction presents s/p fall at home. Admitted for decreased functional status at home and currently pending [facility] placement.    Hospital course (by problem):   Problem/Plan - 1:  ·  Problem: Syncope.   ·  Plan: Syncope vs. mechanical fall likely i/s/o deconditioning and reduced functional status s/p CVA x3. Pt denies EtOH use, but endorses chronic use of benzodiazepines, u-tox positive for benzodiazepines and barbiturates (although barbiturates likely 2/2 Fioricet). EKG and TTE without evidence of arrhythmia or structural heart disease that could cause cardiogenic syncope. Orthostatic vitals negative. Seen by PT, recommend discharge to [facility].   Plan:  - Follow up with [facility]      Problem/Plan - 2:  ·  Problem: Neurocognitive disorder.   ·  Plan: Pt with reduced functional capacity and inability to perform ADLs, onset after CVAs 3 years ago per collateral obtained from mother. Likely poststroke neurocognitive dysfunction exacerbated by chronic benzodiazepine/hypnotic use as pt reportedly receives Xanax from someone in her apartment building and receives prescriptions for Valium, Ativan, and Ambien from a family medicine NP in the [place]. TSH wnl, will evaluate for other reversible causes of neurocognitive dysfunction.  -Follow up with PCP regarding benzo discontinuation  -Remeron 7.5 mg qhs prn for insomnia.     Problem/Plan - 3:  ·  Problem: Microcytic anemia.   ·  Plan: Pt with microcytic anemia, Hgb 7, MCV 71.3. Iron studies notable for Fe 10 and 3% saturation, TIBC and ferritin wnl. Anemia likely i/s/o iron deficiency.  - Continue with iron as outpatient      Problem/Plan - 4:  ·  Problem: Leukocytosis.   ·  Plan: Likely reactive in nature s/p fall. No clearly defined source as patient denies urinary symptoms, afebrile, CXR without pathology. UA negative, BCx no growth to date.  -Trend CBC  -Monitor for fever.     Problem/Plan - 5:  ·  Problem: Migraine.   ·  Plan: Pt reports using Fioricet 300/50/40 mg at home.  -Tylenol prn for pain.     Problem/Plan - 6:  ·  Problem: Anxiety.   ·  Plan: Reports significant history of anxiety/depression, per psych note has been trialed on multiple SSRIs and antipsychotics including Zoloft and Seroquel, but has self-discontinued them as she "did not like the way they made her feel." Pt not amenable to restarting SSRI at this time. Reports she receives weekly psychotherapy over the phone.  -Per psych, no psychiatric barriers to discharge at this time  -Encourage continued outpatient psychotherapy.     Problem/Plan - 7:  ·  Problem: HTN (hypertension).   ·  Plan: Previously utilizing losartan 50 mg BID and clonidine 0.1 mg TID. Reports to have self discontinued these medications as they were causing dizziness. BP elevated to 151/66 today.  -Restart losartan 25 mg BID  -Monitor BP.     Problem/Plan - 8:  ·  Problem: Goals of care, counseling/discussion.   ·  Plan: Advanced care planning discussed. Patient wishes to make mother HCP in event of inability to communicate wishes. Mother is 80 and per patient in sound mental capacity. Notes that if mother is unable to perform responsibilities as HCP, would like son Nirmal to be an alternate. Reviewed with patient, wishes regarding life saving measures, prefers to be made DNR/DNI, is aware of the alternate outcome.     Problem/Plan - 9:  ·  Problem: History of seizures.   ·  Plan: Pt with documented history of PNES, as well as seizures i/s/o EtOH and benzodiazepine withdrawal. Prescribed Trileptal 150 mg BID in the past and has used Librium for EtOH withdrawal.  -Monitor CIWA score.     Problem/Plan - 10:  ·  Problem: Severe benzodiazepine use disorder.   ·  Plan; Pt with h/o polysubstance abuse, remote h/o heroin abuse formerly on methadone, currently with benzodiazepine/hypnotic misuse. U-tox positive for benzodiazepines. Pt with prescriptions for Ativan, Ambien, and Valium, also reportedly receives Xanax from someone in her apartment building.  -Monitor CIWA score  -Coordinate benzodiazepine discontinuation with pt's PCP.      Patient was discharged to: [facility]     New medications:   Changes to old medications:  Medications that were stopped:    Items to follow up as outpatient:    Physical exam at the time of discharge:  General: NAD, well appearing  HEENT: NCAT, EOMI, PERRLA, sclerae anicteric, MMM  Neck: supple, no JVD  Cardiovascular: RRR, normal S1/S2, no murmurs, rubs, or gallops  Pulmonary: CTAB, no wheezes, rales, or rhonchi  Abdominal: soft, nontender, nondistended, normoactive bowel sounds  Extremities: no peripheral edema, no calf tenderness  Skin: warm and dry, good color, no rashes  Neuro: A&Ox3, CN II-XII intact, strength 5/5 to LUE/LLE, strength 3/5 to RUE/RLE, sensation intact to light touch, finger-to-nose and heel-to-shin intact on L side, dysmetria appreciated on R side, unable to assess gait 2/2 weakness  Psych: normal mood and affect        #Discharge: do not delete    Pt is a 63 yo F w/ PMH HTN, migraines, seizure disorder, depression/anxiety, prior stroke in 2018, who presented to ED on 12/20 for frequent falls. CTH done in ED showed subacute/chronic bilateral MICAH territory infarcts. Pt has R sided weakness which she states is her baseline since her prior strokes, and noted worsening weakness for at least 1 year. MRA head/neck also showed acute/subacute infarction in L MICAH territory and L parietal region with mild local mass effect, and bilateral A2 HGS/occlusion and L ICA focal lack of flow-related signal within the proximal L ICA 2/2 HGS vs occlusion.       Problem List/Main Diagnoses (system-based):  1. CVA:   - MRA H/N: Acute/subacute infarction L MICAH territory and L parieto-temporal region w/ local mass effect. B/L A2 segment HGS/occlusion and L proximal ICA HGS vs occlusion.   - CT head results: Subacute/chronic b/l MICAH territory infarcts   - B/L carotid US significant for 50-69% stenosis mid L ICA per NASCET peak systolic velocity measurements due to noncalcified atheromatous plaque.   - MRA neck w/ contrast:   - Pt started on ASA 81 mg daily, Plavix 75 mg daily, atorvastatin 80 mg daily    2. Anxiety and depression: Self-reported history of chronic anxiety, insomnia, new-reported depression, benzodiazepine use disorder, ?alcohol use disorder (per prior chart), ?opiate use disorder in remission, impaired ability to logically reason. Demonstrates very poor insight into the reason for her ongoing hospitalization, with no understanding of diagnosis of new stroke, recommended additional testing, and possible interventions, and no appreciation of the reported significant risks of leaving the hospital AMA. Additionally, pt remained highly focused on receiving benzodiazepines for vague anxiety symptoms and insomnia, with poor insight into her benzodiazepine use disorder (with illicit in addition to prescribed use asn an outpatient).  - Per psych, no acute safety concerns that would warrant inpatient level psychiatric care   - Pt lacked capacity to leave hospital AMA or refuse any acutely indicated testing or treatment   - Received mirtazapine 7.5 mg PO QHS PRN for insomnia, hydroxyzine 25 mg PRN for acute anxiety, and ativan 1 mg TID PRN for anxiety.    3. Iron deficiency anemia: Seen by GI for downtrending H/H with no overt signs of GIB, plan to follow-up as outpatient, deemed anemia is likely from non-compliance of home iron for HANH. Also s/p 1u PRBC this admission. Continued on ferrous sulfate 325 mg M/W/F.    4. HTN: Not on any home medications for BP. Remained normotensive while inpatient.    5. HLD: . Started on atorvastatin 80 mg QHS.    Inpatient treatment course:         Patient was discharged to: (home/[facility]/acute rehab/hospice, etc, and with what services – home health PT/RN? Home O2?)         New medications:    Changes to old medications:    Medications that were stopped:         Items to follow up as outpatient:         Physical exam at the time of discharge: #Discharge: do not delete    Pt is a 61 yo F w/ PMH HTN, migraines, seizure disorder, depression/anxiety, prior stroke in 2018, who presented to ED on 12/20 for frequent falls. CTH done in ED showed subacute/chronic bilateral MICAH territory infarcts. Pt has R sided weakness which she states is her baseline since her prior strokes, and noted worsening weakness for at least 1 year. MRA head/neck also showed acute/subacute infarction in L MICAH territory and L parietal region with mild local mass effect, and bilateral A2 HGS/occlusion and L ICA focal lack of flow-related signal within the proximal L ICA 2/2 HGS vs occlusion. Started on DAPT and high intensity statin after Hgb stable. MRA neck w/ contrast to further characterize ICA for any surgical intervention. S/p angiogram and ICA stent w. NSGY, stepped down from NSICU to F.  On RMF, LE numbness and tingling improved.      Problem List/Main Diagnoses (system-based):  #CVA (cerebrovascular accident).   MRA H/N: Acute/ Subacute infarction L MICAH Territory and L parieto temporal region w/local mass effect. B/L A2 segment HGS/ Occlusion and L Proximal ICA HGS Vs Occlusion. HCT Results: Subacute/ chronic B/L MICAH territory infarcts. B/l carotid ultrasound significant for 50-69% stenosis mid LEFT internal carotid artery per NASCET peak systolic velocity measurements due to noncalcified atheromatous plaque. MRA neck w/ contrast showed focal severe stenosis of the proximal L ICA. S/p angiogram and ICA stent w/ NSGY    - continue Asprin 81mg and Plavix 75mg daily, atorvastatin 80mg daily   - continue Mirtazapine 7.5mg daily, Trazadone 100mg daily  - continue iron supplement/folic acid/multivitamin   - PPI   - bowel regimen.    #Anxiety and depression.   Self-reported history of chronic anxiety, insomnia, new reported depression, benzodiazepine use disorder, ?alcohol use disorder (per prior chart), ?opiate use disorder in remission, impaired ability to logically reason. Demonstrates very poor insight into the reason for her ongoing hospitalization, with no understanding of diagnosis of new stroke, recommended additional testing and possible interventions, and no appreciation of the reported significant risks of leaving the hospital against medical advice. Additionally, pt remains highly focused on receiving benzodiazepines for vague anxiety sx and insomnia, with poor insight into her benzodiazepine use disorder (with illicit in addition to prescribed use as outpt).   - per psych, no acute safety concerns that would warrant inpatient level psychiatric care.  - pt currently LACKS capacity to leave the hospital AMA or refuse any acutely indicated testing or treatment.  - mirtazapine 7.5mg po QHS PRN for insomnia   - additionally offering trazodone for insomnia and anxiety  - hydroxyzine PRN for acute anxiety - pt cites adverse reaction to gabapentin    #Iron deficiency anemia.   Hgb stable 10.5, s/p 1u pRBC. Also s/p 3 days iron sucrose. Denies history of heavy bleeding, poor wound healing, easy bruising, hemarthrosis.  - c/w ferrous sulfate 325mg M/W/F  - outpatient colonoscopy with GI   - outpatient f/up with heme/onc (Dr. Maribeth Reddy) 1-2 weeks from discharge     #HTN (hypertension).   Not on any home BP meds per pt  - TTE : No wall motion abnormalities/ No valvular Dz.  - EKG Results: Isolated V5 AYANA upon admission, with no reciprocal changes, no c/o CP, no c/f ischemia no c/o cp/sob.    #HLD (hyperlipidemia).     - c/w atorvastatin 80mg qhs.    Patient was discharged to: acute rehab      New medications: aspirin, plavix, atorvastatin, mirtazapine, trazadone, hydroxyzine     Changes to old medications: Ativan 1 mg q8hrs PRN    Medications that were stopped: None    Items to follow up as outpatient: PCP, Psychiatry, Neurology       Physical exam at the time of discharge:    General: No acute distress, awake and alert  Eyes: Anicteric sclerae, moist conjunctivae, see below for CNs  Neck: trachea midline, FROM, supple, no thyromegaly or lymphadenopathy  Cardiovascular: Regular rate and rhythm, no murmurs, rubs, or gallops. No carotid bruits.   Pulmonary: Anterior breath sounds clear bilaterally, no crackles or wheezing. No use of accessory muscles  GI: Abdomen soft, non-distended, non-tender  Extremities: Radial and DP pulses +2, no edema #Discharge: do not delete    Pt is a 61 yo F w/ PMH HTN, migraines, seizure disorder, depression/anxiety, prior stroke in 2018, who presented to ED on 12/20 for frequent falls. CTH done in ED showed subacute/chronic bilateral MICAH territory infarcts. Pt has R sided weakness which she states is her baseline since her prior strokes, and noted worsening weakness for at least 1 year. MRA head/neck also showed acute/subacute infarction in L MICAH territory and L parietal region with mild local mass effect, and bilateral A2 HGS/occlusion and L ICA focal lack of flow-related signal within the proximal L ICA 2/2 HGS vs occlusion. Started on DAPT and high intensity statin after Hgb stable. MRA neck w/ contrast to further characterize ICA for any surgical intervention. S/p angiogram and ICA stent w. NSGY, stepped down from NSICU to F.  On RMF, LE numbness and tingling improved.      Problem List/Main Diagnoses (system-based):    #CVA (cerebrovascular accident).   MRA H/N: Acute/ Subacute infarction L MICAH Territory and L parieto temporal region w/local mass effect. B/L A2 segment HGS/ Occlusion and L Proximal ICA HGS Vs Occlusion. HCT Results: Subacute/ chronic B/L MICAH territory infarcts. B/l carotid ultrasound significant for 50-69% stenosis mid LEFT internal carotid artery per NASCET peak systolic velocity measurements due to noncalcified atheromatous plaque. MRA neck w/ contrast showed focal severe stenosis of the proximal L ICA. S/p angiogram and ICA stent w/ NSGY    - continue Asprin 81mg and Plavix 75mg daily, atorvastatin 80mg daily   - continue Mirtazapine 7.5mg daily, Trazadone 100mg daily  - continue iron supplement/folic acid/multivitamin   - PPI   - bowel regimen.    #Anxiety and depression.   Self-reported history of chronic anxiety, insomnia, new reported depression, benzodiazepine use disorder, ?alcohol use disorder (per prior chart), ?opiate use disorder in remission, impaired ability to logically reason. Demonstrates very poor insight into the reason for her ongoing hospitalization, with no understanding of diagnosis of new stroke, recommended additional testing and possible interventions, and no appreciation of the reported significant risks of leaving the hospital against medical advice. Additionally, pt remains highly focused on receiving benzodiazepines for vague anxiety sx and insomnia, with poor insight into her benzodiazepine use disorder (with illicit in addition to prescribed use as outpt).   - per psych, no acute safety concerns that would warrant inpatient level psychiatric care.  - pt currently LACKS capacity to leave the hospital AMA or refuse any acutely indicated testing or treatment.  - mirtazapine 7.5mg po QHS PRN for insomnia   - additionally offering trazodone for insomnia and anxiety  - hydroxyzine PRN for acute anxiety - pt cites adverse reaction to gabapentin    #Iron deficiency anemia.   Hgb stable 10.5, s/p 1u pRBC. Also s/p 3 days iron sucrose. Denies history of heavy bleeding, poor wound healing, easy bruising, hemarthrosis.  - c/w ferrous sulfate 325mg M/W/F  - outpatient colonoscopy with GI   - outpatient f/up with heme/onc (Dr. Maribeth Reddy) 1-2 weeks from discharge     #HTN (hypertension).   Not on any home BP meds per pt  - TTE : No wall motion abnormalities/ No valvular Dz.  - EKG Results: Isolated V5 AYANA upon admission, with no reciprocal changes, no c/o CP, no c/f ischemia no c/o cp/sob.    #HLD (hyperlipidemia).     - c/w atorvastatin 80mg qhs.    Patient was discharged to: acute rehab      New medications: aspirin, plavix, atorvastatin, mirtazapine, trazadone, hydroxyzine     Changes to old medications: Ativan 1 mg q8hrs PRN    Medications that were stopped: None    Items to follow up as outpatient: PCP, Psychiatry, Neurology       Physical exam at the time of discharge:    General: No acute distress, awake and alert  Eyes: Anicteric sclerae, moist conjunctivae, see below for CNs  Neck: trachea midline, FROM, supple, no thyromegaly or lymphadenopathy  Cardiovascular: Regular rate and rhythm, no murmurs, rubs, or gallops. No carotid bruits.   Pulmonary: Anterior breath sounds clear bilaterally, no crackles or wheezing. No use of accessory muscles  GI: Abdomen soft, non-distended, non-tender  Extremities: Radial and DP pulses +2, no edema #Discharge: do not delete    Pt is a 63 yo F w/ PMH HTN, migraines, seizure disorder, depression/anxiety, prior stroke in 2018, who presented to ED on 12/20 for frequent falls. CTH done in ED showed subacute/chronic bilateral MICAH territory infarcts. Pt has R sided weakness which she states is her baseline since her prior strokes, and noted worsening weakness for at least 1 year. MRA head/neck also showed acute/subacute infarction in L MICAH territory and L parietal region with mild local mass effect, and bilateral A2 HGS/occlusion and L ICA focal lack of flow-related signal within the proximal L ICA 2/2 HGS vs occlusion. Started on DAPT and high intensity statin after Hgb stable. MRA neck w/ contrast to further characterize ICA for any surgical intervention. S/p angiogram and ICA stent w. NSGY, stepped down from NSICU to [facility].  On [facility], LE numbness and tingling improved on baclofen.     Problem List/Main Diagnoses (system-based):    #CVA (cerebrovascular accident).   MRA H/N: Acute/ Subacute infarction L MICAH Territory and L parieto temporal region w/local mass effect. B/L A2 segment HGS/ Occlusion and L Proximal ICA HGS Vs Occlusion. HCT Results: Subacute/ chronic B/L MICAH territory infarcts. B/l carotid ultrasound significant for 50-69% stenosis mid LEFT internal carotid artery per NASCET peak systolic velocity measurements due to noncalcified atheromatous plaque. MRA neck w/ contrast showed focal severe stenosis of the proximal L ICA. S/p angiogram and ICA stent w/ NSGY    - continue Asprin 81mg and Plavix 75mg daily, atorvastatin 80mg daily   - continue Mirtazapine 7.5mg daily, Trazadone 100mg daily  - continue iron supplement/folic acid/multivitamin   - PPI   - bowel regimen.    #Anxiety and depression.   Self-reported history of chronic anxiety, insomnia, new reported depression, benzodiazepine use disorder, ?alcohol use disorder (per prior chart), ?opiate use disorder in remission, impaired ability to logically reason. Demonstrates very poor insight into the reason for her ongoing hospitalization, with no understanding of diagnosis of new stroke, recommended additional testing and possible interventions, and no appreciation of the reported significant risks of leaving the hospital against medical advice. Additionally, pt remains highly focused on receiving benzodiazepines for vague anxiety sx and insomnia, with poor insight into her benzodiazepine use disorder (with illicit in addition to prescribed use as outpt).   - per psych, no acute safety concerns that would warrant inpatient level psychiatric care.  - pt currently LACKS capacity to leave the hospital AMA or refuse any acutely indicated testing or treatment.  - mirtazapine 7.5mg po QHS PRN for insomnia   - additionally offering trazodone for insomnia and anxiety  - hydroxyzine PRN for acute anxiety - pt cites adverse reaction to gabapentin    #Iron deficiency anemia.   Hgb stable 10.5, s/p 1u pRBC. Also s/p 3 days iron sucrose. Denies history of heavy bleeding, poor wound healing, easy bruising, hemarthrosis.  - c/w ferrous sulfate 325mg M/W/F  - outpatient colonoscopy with GI   - outpatient f/up with heme/onc (Dr. Maribeth Reddy) 1-2 weeks from discharge     #HTN (hypertension).   Not on any home BP meds per pt  - TTE : No wall motion abnormalities/ No valvular Dz.  - EKG Results: Isolated V5 AYANA upon admission, with no reciprocal changes, no c/o CP, no c/f ischemia no c/o cp/sob.    #HLD (hyperlipidemia).     - c/w atorvastatin 80mg qhs.    Patient was discharged to: acute rehab      New medications: aspirin, plavix, atorvastatin, mirtazapine, trazadone, hydroxyzine     Changes to old medications: Ativan 1 mg q8hrs PRN    Medications that were stopped: None    Items to follow up as outpatient: PCP, Psychiatry, Neurology       Physical exam at the time of discharge:    Neurologic:  -Mental status: Awake, alert, oriented to person, place, and time (year w/ choices). Speech is fluent with intact naming, repetition, and comprehension, no dysarthria. Follows commands. Attention/concentration intact. Forgetful.  -Cranial nerves:   II: Visual fields are full to confrontation.  III, IV, VI: Extraocular movements are intact without nystagmus. Pupils equally round and reactive to light.  V:  Facial sensation V1-V3 equal and intact   VII: Face is symmetric with normal eye closure and smile  VIII: Hearing is bilaterally intact   XII: Tongue protrudes midline  Motor: Normal bulk and tone. RUE 5/5. RLE 3/5. Unable to voluntarily extend right leg. LUE/ LLE : 5/5.   Sensation: Intact to light touch bilaterally. No neglect or extinction on double simultaneous testing.  Coordination: No dysmetria on finger-to-nose out of proportion to weakness.   Gait: Deferred.    NIH 2

## 2022-12-21 NOTE — PROGRESS NOTE ADULT - PROBLEM SELECTOR PLAN 3
likely reactive in nature s/p fall. No clearly defined source as patient denies urinary symptoms, afebrile, CXR without pathology.   - f/u UA  - f/u BCx  - f/u AM CBC w/ diff  - monitor for fever. Pt with microcytic anemia, Hgb 7, MCV 71.3. Iron studies notable for Fe 10 and 3% saturation, TIBC and ferritin wnl. Anemia likely i/s/o iron deficiency.  -Will give iron sucrose 300 mg qd  -Trend CBC Pt with microcytic anemia, Hgb 7, MCV 71.3. Iron studies notable for Fe 10 and 3% saturation, TIBC and ferritin wnl. Anemia likely i/s/o iron deficiency.  -Begin iron infusion  -Trend CBC

## 2022-12-21 NOTE — PROGRESS NOTE ADULT - SUBJECTIVE AND OBJECTIVE BOX
Neurology Stroke Progress Note    INTERVAL HPI/OVERNIGHT EVENTS:  Patient seen and examined @ bedside. sleeping, yet easily arousable, follows commands, states she is not feeling well, No new complaints / deficits. Pending MRA H/N when stable. Noted to be with downtrending Hb, ordered for 1unit PRBC Tx per primary team. ASA held for now i/s/o downtrending Hb.    MEDICATIONS  (STANDING):  atorvastatin 80 milliGRAM(s) Oral at bedtime  enoxaparin Injectable 40 milliGRAM(s) SubCutaneous every 24 hours  folic acid 1 milliGRAM(s) Oral daily  iron sucrose IVPB 300 milliGRAM(s) IV Intermittent every 24 hours  melatonin 5 milliGRAM(s) Oral at bedtime  multivitamin 1 Tablet(s) Oral daily  sodium chloride 0.9%. 1000 milliLiter(s) (100 mL/Hr) IV Continuous <Continuous>  thiamine IVPB 500 milliGRAM(s) IV Intermittent every 24 hours    MEDICATIONS  (PRN):      Allergies    Compazine (Unknown)  contrast media (iodine-based) (Unknown)  iv contrast (Unknown)  penicillin (Unknown)  penicillins (Other)  Toradol (Unknown)    Intolerances        Vital Signs Last 24 Hrs  T(C): 36.8 (21 Dec 2022 11:14), Max: 36.9 (20 Dec 2022 15:35)  T(F): 98.2 (21 Dec 2022 11:14), Max: 98.4 (20 Dec 2022 15:35)  HR: 81 (21 Dec 2022 11:14) (81 - 90)  BP: 146/88 (21 Dec 2022 11:14) (119/76 - 151/66)  BP(mean): --  RR: 18 (21 Dec 2022 11:14) (18 - 18)  SpO2: 96% (21 Dec 2022 11:14) (96% - 99%)    Parameters below as of 21 Dec 2022 11:14  Patient On (Oxygen Delivery Method): room air    Physical exam:  General: No acute distress, awake and alert  Cardiovascular: Regular rate and rhythm, no murmurs, rubs, or gallops. No bruits  Pulmonary: Anterior breath sounds clear bilaterally, no crackles or wheezing. No use of accessory muscles  Extremities: Radial and DP pulses +2, no edema    Neurologic:  -Mental status: Awake, alert, oriented to person, place, and time (year w/ choices). Speech is fluent with intact naming, repetition, and comprehension, no dysarthria. Follows commands. Attention/concentration intact. Fund of knowledge appropriate. Forgetful.    -Cranial nerves:   II: Visual fields are full to confrontation.  III, IV, VI: Extraocular movements are intact without nystagmus. Pupils equally round and reactive to light.  V:  Facial sensation V1-V3 equal and intact   VII: Face is symmetric with normal eye closure and smile  VIII: Hearing is bilaterally intact to finger rub  IX, X: Uvula is midline and soft palate rises symmetrically  XI: Head turning and shoulder shrug are intact.  XII: Tongue protrudes midline  Motor: Normal bulk and tone. RUE : antigravity, but drifts and hits the bed by the count of 10, RLE : Briefly antigravity, hit the bed and unable to hold for count of 5. LUE/ LLE : 5/5.   Sensation: Intact to light touch bilaterally. No neglect or extinction on double simultaneous testing.  Coordination: No dysmetria on finger-to-nose out of proportion to weakness.   Reflexes: Downgoing toes bilaterally   Gait: Deferred.            LABS:                        7.6    10.03 )-----------( 545      ( 21 Dec 2022 10:00 )             26.6     12-    139  |  108  |  8   ----------------------------<  113<H>  4.0   |  23  |  0.62    Ca    8.8      21 Dec 2022 07:12  Phos  3.2     12-  Mg     1.7     12-    TPro  6.5  /  Alb  3.5  /  TBili  0.2  /  DBili  <0.2  /  AST  18  /  ALT  23  /  AlkPhos  97  12-21    PT/INR - ( 20 Dec 2022 01:37 )   PT: 13.1 sec;   INR: 1.10          PTT - ( 20 Dec 2022 01:37 )  PTT:25.1 sec  Urinalysis Basic - ( 21 Dec 2022 07:46 )    Color: Yellow / Appearance: Clear / S.010 / pH: x  Gluc: x / Ketone: NEGATIVE  / Bili: Negative / Urobili: 0.2 E.U./dL   Blood: x / Protein: NEGATIVE mg/dL / Nitrite: NEGATIVE   Leuk Esterase: NEGATIVE / RBC: x / WBC x   Sq Epi: x / Non Sq Epi: x / Bacteria: x        RADIOLOGY & ADDITIONAL TESTS:

## 2022-12-21 NOTE — PROGRESS NOTE ADULT - PROBLEM SELECTOR PLAN 5
States to be "addicted to valium" utilizing 1 10mg tablet once every other day. Low risk for withdrawal given lack of history and CIWA 0 on admission.   - consider restarting valium. Pt reports using Fioricet 300/50/40 mg at home.  -Tylenol prn for pain

## 2022-12-21 NOTE — CHART NOTE - NSCHARTNOTEFT_GEN_A_CORE
Called to beside as patient was complaining of pain of R knee. Refused to move it due to pain. Knee non erythematous, non swollen, subjectively tender to palpation of patella. No visible wounds. Patient claimed this pain started acutely. Offered patient Tylenol. Patient said that "doesn't work for me". Asked patient  if she had tried it. Patient endorsed she did. Asked patient what worked for her. Patient said "narcotics". Asked patient what usually worked for her. Patient said "I don't know, a strong one. Like dilaudid".     Reviewed patient's chart, patient has had hospitalizations related to alcohol and benzodiazepine withdrawal. In 2015, pt was admitted to [facility] for alcohol and benzodiazepine withdrawal, with documented alcohol use of 1 pint/day and Xanax 5-7 2mg pills/day; also documented to be on methadone at that time.     Per  consultation, offered patient Mirtazapine to help her sleep. Patient accepted.

## 2022-12-21 NOTE — DISCHARGE NOTE PROVIDER - NSDCCPCAREPLAN_GEN_ALL_CORE_FT
PRINCIPAL DISCHARGE DIAGNOSIS  Diagnosis: Frequent falls  Assessment and Plan of Treatment: You have frequent falls. This is likely multifactorial. Your history of strokes, low blood levels, and usage of medications known to make patients fall asleep (sedating medications) such as benzodiazepines. Please follow up with your primary care physician for further management of your symptoms and follow the instructions given to you at short acute rehab.      SECONDARY DISCHARGE DIAGNOSES  Diagnosis: Syncope  Assessment and Plan of Treatment: Syncope is when you pass out or faint for a short time. It is caused by a sudden decrease in blood flow to the brain. This can happen for many reasons. It can sometimes happen when seeing blood, getting a shot (injection), or having pain or strong emotions. Most causes of fainting are not dangerous, but in some cases it can be a sign of a serious medical problem. Follow these instructions at home:  Watch for any changes in your symptoms. Take these actions to stay safe and help with your symptoms. Knowing when you may be about to faint which include signs such as feeling dizzy or light-headed. It may feel like the room is spinning. Feeling weak, or feeling like you may vomit (nauseous). Seeing spots or seeing all white or all black, having cold, clammy skin, and feeling warm and sweaty. If you start to feel like you might faint, sit or lie down right away. If sitting, lower your head down between your legs. If lying down, raise (elevate) your feet above the level of your heart. Breathe deeply and steadily. Wait until all of the symptoms are gone, and have someone stay with you until you feel better. Take over-the-counter and prescription medicines only as told by your doctor.     PRINCIPAL DISCHARGE DIAGNOSIS  Diagnosis: CVA (cerebrovascular accident)  Assessment and Plan of Treatment: You were found to have a blockage in one of the blood vessels in your brain that required intervention by our neurosrgery team. You were evaluated with a study that checks the flow of blood in your brain, and a stent was placed in the blocked blood vessel. You will need to remain on medications that prevent blood clots from forming in your stent. You will also need to follow up with our neurology team as an outpatient.      SECONDARY DISCHARGE DIAGNOSES  Diagnosis: Frequent falls  Assessment and Plan of Treatment: You have frequent falls. This is likely multifactorial. Your history of strokes, low blood levels, and usage of medications known to make patients fall asleep (sedating medications) such as benzodiazepines. Please follow up with your primary care physician for further management of your symptoms and follow the instructions given to you at short acute rehab.    Diagnosis: Syncope  Assessment and Plan of Treatment: Syncope is when you pass out or faint for a short time. It is caused by a sudden decrease in blood flow to the brain. This can happen for many reasons. It can sometimes happen when seeing blood, getting a shot (injection), or having pain or strong emotions. Most causes of fainting are not dangerous, but in some cases it can be a sign of a serious medical problem. Follow these instructions at home:  Watch for any changes in your symptoms. Take these actions to stay safe and help with your symptoms. Knowing when you may be about to faint which include signs such as feeling dizzy or light-headed. It may feel like the room is spinning. Feeling weak, or feeling like you may vomit (nauseous). Seeing spots or seeing all white or all black, having cold, clammy skin, and feeling warm and sweaty. If you start to feel like you might faint, sit or lie down right away. If sitting, lower your head down between your legs. If lying down, raise (elevate) your feet above the level of your heart. Breathe deeply and steadily. Wait until all of the symptoms are gone, and have someone stay with you until you feel better. Take over-the-counter and prescription medicines only as told by your doctor.     PRINCIPAL DISCHARGE DIAGNOSIS  Diagnosis: CVA (cerebrovascular accident)  Assessment and Plan of Treatment: During this hospital admission, you had an ischemic stroke. During an ischemic stroke, blood stops flowing to part of your brain because of a blockage in the blood vessel. This can damage areas in the brain that control other parts of the body.  Please take your aspirin and plavix for blood thinning and Atorvastatin for cholesterol medication/blood vessel protection as prescribed to prevent further strokes. Do not skip doses and do not run low on your medication. If you run low on your medication, please contact your doctor.  You will follow up outpatient with the stroke Nurse Practitioner as scheduled below.  Doing your regular tasks may be difficult after you've had a stroke, but you can learn new ways to manage your daily activities. In fact, doing daily activities may help you to regain muscle strength. Be patient, give yourself time to adjust, and appreciate the progress you make. For example, when showering or bathing, test the water temperature with a hand or foot that was not affected by the stroke, use grab bars, a shower seat, a hand-held showerhead, etc. It is normal to feel fatigue after a stroke, while some days may be worse than others, you will continue to improve.  Call 911 right away if you have any of the following symptoms of another stroke:  B: Balance: Sudden: Dizziness, loss of balance, or a sense of falling, difficulty with coordinating movement  E: Eyes: Sudden double vision or trouble seeing in one or both eyes  F: Face: Sudden uneven face  A: Arms (Legs): Sudden weakness, tingling, or loss of feeling on one side of your face or body  S: Speech: Sudden trouble talking or slurred speech, sudden difficulty understanding others  T: Time: Please call 911 right away and go to the emergency room  •Sudden, severe headache  •Blackouts or seizures        SECONDARY DISCHARGE DIAGNOSES  Diagnosis: Syncope  Assessment and Plan of Treatment: Syncope is when you pass out or faint for a short time. It is caused by a sudden decrease in blood flow to the brain. This can happen for many reasons. It can sometimes happen when seeing blood, getting a shot (injection), or having pain or strong emotions. Most causes of fainting are not dangerous, but in some cases it can be a sign of a serious medical problem. Follow these instructions at home:  Watch for any changes in your symptoms. Take these actions to stay safe and help with your symptoms. Knowing when you may be about to faint which include signs such as feeling dizzy or light-headed. It may feel like the room is spinning. Feeling weak, or feeling like you may vomit (nauseous). Seeing spots or seeing all white or all black, having cold, clammy skin, and feeling warm and sweaty. If you start to feel like you might faint, sit or lie down right away. If sitting, lower your head down between your legs. If lying down, raise (elevate) your feet above the level of your heart. Breathe deeply and steadily. Wait until all of the symptoms are gone, and have someone stay with you until you feel better. Take over-the-counter and prescription medicines only as told by your doctor.    Diagnosis: Frequent falls  Assessment and Plan of Treatment: You have frequent falls. This is likely multifactorial. Your history of strokes, low blood levels, and usage of medications known to make patients fall asleep (sedating medications) such as benzodiazepines. Please follow up with your primary care physician for further management of your symptoms and follow the instructions given to you at short acute rehab.

## 2022-12-21 NOTE — PROGRESS NOTE ADULT - PROBLEM SELECTOR PLAN 1
states that fall occurred while home, when syncopized, with LOC with head trauma. Pt reports no alcohol use, however utilizes valium regularly. Notes dizziness with changes in head position. Possibly 2/2 deconditioning v seizure s/p CVA (less likely given lack of post ictal state) v cardiogenic (unclear as EKG pending), v orthostatic in nature. CTH negative for hemorrhage, remaining imaging negative.   - f/u orthostatics  - f/u TSH  - f/u TTE  - f/u EKG  - f/u orthostatics  - f/u vEEG i/s/o questionable seizure history and likely medication nonadherence  - f/u PT recs  - start meclizine given likely peripheral etiology of dizziness (occurs with changes in head Syncope vs. mechanical fall likely i/s/o deconditioning and reduced functional status s/p CVA x3. Pt denies EtOH use, but endorses chronic use of benzodiazepines, u-tox positive for benzodiazepines and barbiturates (although barbiturates likely 2/2 Fioricet). EKG and TTE without evidence of arrhythmia or structural heart disease that could cause cardiogenic syncope. Orthostatic vitals negative. Seen by PT, recommend discharge to [facility].   -Consult SW for TASNEEM placement

## 2022-12-21 NOTE — DISCHARGE NOTE PROVIDER - CARE PROVIDER_API CALL
Bonifacio Bland)  Neurology; Vascular Neurology  [address], [address] [place] [address]  Phone: [phone]  Fax: [phone]  Follow Up Time: 1 week   Bonifacio Bland)  Neurology; Vascular Neurology  [address], [address] [place] [address]  Phone: [phone]  Fax: [phone]  Scheduled Appointment: 05/08/2023 04:00 PM   Bonifacio Bland)  Neurology; Vascular Neurology  [address], [address] [place] [address]  Phone: [phone]  Fax: [phone]  Scheduled Appointment: 05/08/2023 04:00 PM    Joe Morrison; MPH; MS)  Neurosurgery Surgery  [address] [place] [address], [address]  [place] [address]  Phone: [phone]  Fax: [phone]  Follow Up Time:    Bonifacio Bland)  Neurology; Vascular Neurology  [address], [address] [place] [address]  Phone: [phone]  Fax: [phone]  Scheduled Appointment: 05/08/2023 04:00 PM    Joe Morrison; MPH; MS)  Neurosurgery Surgery  [address] [place] [address], [address]  [place] [address]  Phone: [phone]  Fax: [phone]  Follow Up Time:     Guanakito Faria  [address] [place] [address]  Phone: [phone]  Fax: (   )    -  Established Patient  Follow Up Time: 2 weeks

## 2022-12-21 NOTE — DISCHARGE NOTE PROVIDER - NSDCMRMEDTOKEN_GEN_ALL_CORE_FT
Benadryl 25 mg oral capsule: 1 cap(s) orally once a day, As Needed  cloNIDine 0.1 mg oral tablet: 1 tab(s) orally once a day  diazePAM 10 mg oral tablet: 1 tab(s) orally 2 times a day  ferrous gluconate 324 mg (38 mg elemental iron) oral tablet: 1 tab(s) orally once a day  folic acid 1 mg oral tablet: 1 tab(s) orally once a day  hydrOXYzine pamoate 25 mg oral capsule: 1 tab(s) orally once a day  LORazepam 2 mg oral tablet: 1 tab(s) orally 2 times a day  losartan 50 mg oral tablet: 1 tab(s) orally once a day  OXcarbazepine 150 mg oral tablet: 1 tab(s) orally 2 times a day  SEROquel 100 mg oral tablet: 1 tab(s) orally once a day  Vitamin D3 250 mcg (10,000 intl units) oral capsule: 3 cap(s) orally once a week   aspirin 81 mg oral tablet, chewable: 1 tab(s) orally once a day  atorvastatin 80 mg oral tablet: 1 tab(s) orally once a day (at bedtime)  Benadryl 25 mg oral capsule: 1 cap(s) orally once a day, As Needed  cloNIDine 0.1 mg oral tablet: 1 tab(s) orally once a day  clopidogrel 75 mg oral tablet: 1 tab(s) orally once a day  ferrous gluconate 324 mg (38 mg elemental iron) oral tablet: 1 tab(s) orally once a day  folic acid 1 mg oral tablet: 1 tab(s) orally once a day  hydrOXYzine pamoate 25 mg oral capsule: 1 tab(s) orally once a day  LORazepam 1 mg oral tablet: 1 tab(s) orally 3 times a day, As Needed  losartan 50 mg oral tablet: 1 tab(s) orally once a day  mirtazapine 7.5 mg oral tablet: 1 tab(s) orally once a day (at bedtime)  Multiple Vitamins oral tablet: 1 tab(s) orally once a day  pantoprazole 40 mg oral delayed release tablet: 1 tab(s) orally once a day (before a meal)  polyethylene glycol 3350 oral powder for reconstitution: 17 gram(s) orally 2 times a day  senna leaf extract oral tablet: 2 tab(s) orally once a day (at bedtime)  SEROquel 100 mg oral tablet: 1 tab(s) orally once a day  traZODone 100 mg oral tablet: 1 tab(s) orally once a day (at bedtime)  traZODone 50 mg oral tablet: 1 tab(s) orally every 24 hours  Vitamin D3 250 mcg (10,000 intl units) oral capsule: 3 cap(s) orally once a week   aspirin 81 mg oral tablet, chewable: 1 tab(s) orally once a day  atorvastatin 80 mg oral tablet: 1 tab(s) orally once a day (at bedtime)  baclofen 5 mg oral tablet: 1 tab(s) orally every 8 hours  Benadryl 25 mg oral capsule: 1 cap(s) orally once a day, As Needed  cloNIDine 0.1 mg oral tablet: 1 tab(s) orally once a day  clopidogrel 75 mg oral tablet: 1 tab(s) orally once a day  ferrous gluconate 324 mg (38 mg elemental iron) oral tablet: 1 tab(s) orally once a day  folic acid 1 mg oral tablet: 1 tab(s) orally once a day  hydrOXYzine pamoate 25 mg oral capsule: 1 tab(s) orally once a day  LORazepam 1 mg oral tablet: 1 tab(s) orally 3 times a day, As Needed  losartan 50 mg oral tablet: 1 tab(s) orally once a day  mirtazapine 7.5 mg oral tablet: 1 tab(s) orally once a day (at bedtime)  Multiple Vitamins oral tablet: 1 tab(s) orally once a day  pantoprazole 40 mg oral delayed release tablet: 1 tab(s) orally once a day (before a meal)  polyethylene glycol 3350 oral powder for reconstitution: 17 gram(s) orally 2 times a day  senna leaf extract oral tablet: 2 tab(s) orally once a day (at bedtime)  SEROquel 100 mg oral tablet: 1 tab(s) orally once a day  traZODone 100 mg oral tablet: 1 tab(s) orally once a day (at bedtime)  traZODone 50 mg oral tablet: 1 tab(s) orally every 24 hours  Vitamin D3 250 mcg (10,000 intl units) oral capsule: 3 cap(s) orally once a week   aspirin 81 mg oral tablet, chewable: 1 tab(s) orally once a day  atorvastatin 80 mg oral tablet: 1 tab(s) orally once a day (at bedtime)  baclofen 5 mg oral tablet: 1 tab(s) orally every 8 hours  Benadryl 25 mg oral capsule: 1 cap(s) orally once a day, As Needed  clopidogrel 75 mg oral tablet: 1 tab(s) orally once a day  ferrous gluconate 324 mg (38 mg elemental iron) oral tablet: 1 tab(s) orally once a day  folic acid 1 mg oral tablet: 1 tab(s) orally once a day  hydrOXYzine pamoate 25 mg oral capsule: 1 tab(s) orally once a day  LORazepam 1 mg oral tablet: 1 tab(s) orally 3 times a day, As Needed  mirtazapine 7.5 mg oral tablet: 1 tab(s) orally once a day (at bedtime)  Multiple Vitamins oral tablet: 1 tab(s) orally once a day  pantoprazole 40 mg oral delayed release tablet: 1 tab(s) orally once a day (before a meal)  polyethylene glycol 3350 oral powder for reconstitution: 17 gram(s) orally 2 times a day  senna leaf extract oral tablet: 2 tab(s) orally once a day (at bedtime)  SEROquel 100 mg oral tablet: 1 tab(s) orally once a day  traZODone 100 mg oral tablet: 1 tab(s) orally once a day (at bedtime)  traZODone 50 mg oral tablet: 1 tab(s) orally every 24 hours  Vitamin D3 250 mcg (10,000 intl units) oral capsule: 3 cap(s) orally once a week

## 2022-12-21 NOTE — PROGRESS NOTE ADULT - PROBLEM SELECTOR PLAN 11
F: Tolerating PO, no IVF  E: Replete K < 4, Mg < 2  N: DASH diet  VTE PPx: Lovenox 40 mg SQ qd  GI: Not needed  C: Not discussed F: 1 L NS  E: Replete K < 4, Mg < 2  N: DASH diet  VTE PPx: Lovenox 40 mg SQ qd  GI: Not needed  C: Not discussed F: 1 L NS  E: Replete K < 4, Mg < 2  N: DASH diet  VTE PPx: Lovenox 40 mg SQ qd  GI: Not needed  C: DNR/DNI

## 2022-12-21 NOTE — DISCHARGE NOTE PROVIDER - CARE PROVIDERS DIRECT ADDRESSES
,[email or web address],DirectAddress_Unknown ,[email or web address],DirectAddress_Unknown,DirectAddress_Unknown

## 2022-12-21 NOTE — PROGRESS NOTE ADULT - PROBLEM SELECTOR PLAN 8
Likely s/p CVA. Patient does not recall medications, unclear if utilizing any other medications other than valium. Advanced care planning discussed. Patient wishes to make mother HCP in event of inability to communicate wishes. Mother is 80 and per patient in sound mental capacity. Notes that if mother is unable to perform responsibilities as HCP, would like son Nirmal to be an alternate. Reviewed with patient, wishes regarding life saving measures, prefers to be made DNR/DNI, is aware of the alternate outcome.

## 2022-12-21 NOTE — BH CONSULTATION LIAISON PROGRESS NOTE - OTHER
stable posture lying back in bed thin no abnormal movements observed while asleep asleep no psychomotor agitation

## 2022-12-21 NOTE — PROGRESS NOTE ADULT - PROBLEM SELECTOR PLAN 4
patient reports using fioricet at home, unknown dose, per surescripts utilizes [facility]. Likely reactive in nature s/p fall. No clearly defined source as patient denies urinary symptoms, afebrile, CXR without pathology. UA negative, BCx no growth to date.  -Trend CBC  -Monitor for fever

## 2022-12-21 NOTE — BH CONSULTATION LIAISON PROGRESS NOTE - NSBHASSESSMENTFT_PSY_ALL_CORE
61yo woman, domiciled alone, retired actor, with a self-reported history of chronic anxiety, insomnia, new reported depression and cognitive deficits s/p CVAs (2019), benzodiazepine use disorder, ?alcohol use disorder (per prior chart), ?opiate use disorder in remission, migraines, ?childhood seizure d/o and PNES, who presents with lethargy this morning possibly related to mirtazapine administration for insomnia overnight, unable to meaningfully participate in psychiatric assessment. No interval concern for benzodiazepine/alcohol withdrawal. On initial assessment, pt presented with short-term memory deficits and illogical thinking, ?confabulation, suggestive of a neurocognitive disorder likely related to CVAs and possibly exacerbated by chronic benzodiazepine and hypnotic use, as well as likely other substance use disorders with unclear recent use. Some depressive symptoms were also present but difficult to evaluate given pt's cognitive deficits and difficulty with self-report; no active suicidality, no obvious psychosis or clear hx suggestive of erich. Ongoing illicit benzodiazepine use in addition to prescribed use (as per ISTOP) is likely and pt remains at risk for withdrawal, with documented past admissions for alcohol/benzodiazepine withdrawal. Pt may also be using medications other than as prescribed at home, noting lengthy med list in EMS report. Pt has been minimally receptive to substance counseling but would encourage continued education and referral to outpt dual diagnosis treatment. No acute safety concerns that would warrant inpatient level psychiatric care.    RECOMMENDATIONS  -no need for 1:1 observation for suicidality or indication for psychiatric admission  -CIWA monitoring for benzodiazepine withdrawal   -discourage continued use of benzodiazepines or hypnotics given substance use disorder with ongoing alprazolam abuse, neurocognitive disorder, and likely contribution to fall risk  -consider mirtazapine 7.5mg po QHS PRN for insomnia - would administered earlier in night if able due to sedation this morning  -consider gabapentin 100mg TID PRN for acute anxiety  -consider thiamine supplementation  -evaluation for potential additional contributors to neurocognitive decline (TSH WNL, check  B12, folate, RPR). Consider HIV testing (EMS reports indicates PMH)  -delirium precautions  -encourage communication with outpt prescribers of benzodiazepines and hypnotics re: recommendation to discontinue use  -could benefit from SSRI but declines use - recommend additional evaluation as outpt  -involve next of kin (HCP if designated) in medical decision-making and discharge planning, with evaluation for additional assistance at home due to concern for mis-use of medications.  -encourage engagement in outpt psychiatric care - pt reports weekly psychotherapy  -no psychiatric barrier to discharge. Please contact with questions

## 2022-12-21 NOTE — PROGRESS NOTE ADULT - PROBLEM SELECTOR PLAN 7
advanced care planning discussed. Patient wishes to make mother HCP in event of inability to communicate wishes. Mother is 80 and per patient in sound mental capacity. Notes that if mother is unable to perform responsibilities as HCP would like son Nirmal to be an alternate. Reviewed with patient, wishes regarding life saving measures, prefers to be made DNR/DNI, is aware of the alternate outcome. Previously utilizing losartan 50 mg BID and clonidine 0.1 mg TID. Reports to have self discontinued these medications as they were causing dizziness. BP elevated to 151/66 today.  -Restart losartan 25 mg BID  -Monitor BP

## 2022-12-21 NOTE — PROGRESS NOTE ADULT - PROBLEM SELECTOR PLAN 9
F: none  E: K >4, Mg >2  N: regular diet  GI ppx: none  DVT ppx: lovenox. Pt with documented history of PNES, as well as seizures i/s/o EtOH and benzodiazepine withdrawal. Prescribed Trileptal 150 mg BID in the past and has used Librium for EtOH withdrawal.  -Monitor CIWA score

## 2022-12-21 NOTE — PROGRESS NOTE ADULT - PROBLEM SELECTOR PLAN 6
previously utilizing losartan 50qd, clinidine 0.1 TID, amlodipine 10qd. Reports to have self discontinued these medications as they were causing dizziness. Normotensive on admission.   - c/t monitor BP. Reports significant history of anxiety/depression, per psych note Reports significant history of anxiety/depression, per psych note has been trialed on multiple SSRIs and antipsychotics including Zoloft and Seroquel, but has self-discontinued them as she "did not like the way they made her feel." Pt not amenable to restarting SSRI at this time. Reports she receives weekly psychotherapy over the phone.  -Per psych, no psychiatric barriers to discharge at this time  -Encourage continued outpatient psychotherapy

## 2022-12-21 NOTE — DISCHARGE NOTE PROVIDER - NSDCFUADDAPPT_GEN_ALL_CORE_FT
Please bring your Insurance card, Photo ID and Discharge paperwork to the following appointment:    (1) Please follow up your Neurology Provider, Dr. Bonifacio Bland at [address] [place] [address], 8th [place] [address] on 05/08/2023 at 4:00pm.    Please note that the office of Dr. Bland will contact you for an earlier appointment once available.    Appointment was scheduled by Ms. SHASHA Healy, Referral Coordinator.   Please bring your Insurance card, Photo ID and Discharge paperwork to the following appointment:    (1) Please follow up your Neurology Provider, Dr. Bonifacio Bland at [address] [place] [address], 8th [place] [address] on 05/08/2023 at 4:00pm.    Please note that the office of Dr. Bland will contact you for an earlier appointment once available.    Appointment was scheduled by Ms. SHASHA Healy, Referral Coordinator.    Please see your PCP within 1-2 weeks of discharge

## 2022-12-21 NOTE — PROGRESS NOTE ADULT - ASSESSMENT
Assessment and Recommendation :     Assessment   62F w/ PMH HTN(not on any meds per pt), Migraine, Sz disorder, depression- anxiety, prior stroke Hx in 2018 presented to [facility] ER for frequent falls to [facility] ER, admitted to Medicine for further work up, Stroke Neuro was consulted for frequent falls. Pt poor Vs unreliable historian. CTH w/ subacute / Chronic B/L MICAH infarcts. Unclear chronicity of R sided weakness as pt states she does not use walker and is pending Wheel chair. Stroke team consulted for frequent falls.       Recommendations :     - Its ok to hold ASA i/s/o downtrending Hb and Bld transfusion, can restart when able to when it is safe and Hb stabilises.  - Pending MRA H/N to R/O any acute strokes i/s/o unreliable history, unclear chronicity and severity of R sided weakness, frequent falls.  - Will need more collateral from her PCP on her Meds.  - Utox + for BDZ and barbiturates.  - LABS :     HbA1C :  5.8    TSH : 0.638    LDL : 126  - Rest of the care per primary team pending MRA H/N.

## 2022-12-21 NOTE — PROGRESS NOTE ADULT - ATTENDING COMMENTS
Patient was seen and examined with the resident team today.  I agree with Dr. Dempsey's assessment and plan with the following exceptions/additions:     Briefly, this is a 61yo woman with a PMH of essential HTN, CVA c/b R-sided deficits, benzodiazepine abuse, mild recurrent MDD, BERNICE, migraines and PNES who presented with toxic encephalopathy and recurrent falls.  Collateral from her mother, as well as Psych, suggests that she has an extensive hx of substance abuse along with psychiatric co-morbidities.     #Toxic encephalopathy - collateral suggest she's been abusing her benzodiazepines, confirmed with Utox; improving today, started on thiamine   #Recurrent falls - likely 2/2 ingestion but also residuals from her stroke; PT eval recommending TASNEEM  #Hx of CVA - on statin and ASA; can try to obtain MRA as an inpatient but should not hold up discharge as we have an alternative etiology to her recurrent falls    #HANH - can start IV iron while waiting for placement   #HCV Ab+ - f/u HCV RNA   #DVT PPx - Lovenox  #Dispo - TASNE[facility], need to verify insurance status     Joanna Koehler  [phone]

## 2022-12-21 NOTE — PROGRESS NOTE ADULT - PROBLEM SELECTOR PLAN 2
Graemeies melana, hematochezia  - f/u reticulocyte count  - f/u iron studies  - c/w home MV + folic acid. Pt with reduced functional capacity and inability to perform ADLs, onset after CVAs 3 years ago per collateral obtained from mother. Likely poststroke neurocognitive dysfunction exacerbated by chronic benzodiazepine/hypnotic use as pt reportedly receives Xanax from someone in her apartment building and receives prescriptions for Valium, Ativan, and Ambien from a family medicine NP in the [place]. TSH wnl, will evaluate for other reversible causes of neurocognitive dysfunction.  -Follow up folate, B12, RPR  -Follow up with PCP regarding benzo discontinuation  -Monitor CIWA score  -Will give thiamine 500 mg qd per psych recommendations  -Remeron 7.5 mg qhs prn for insomnia

## 2022-12-22 PROCEDURE — 99233 SBSQ HOSP IP/OBS HIGH 50: CPT

## 2022-12-22 PROCEDURE — 99233 SBSQ HOSP IP/OBS HIGH 50: CPT | Mod: GC

## 2022-12-22 PROCEDURE — 99221 1ST HOSP IP/OBS SF/LOW 40: CPT

## 2022-12-22 RX ORDER — ACETAMINOPHEN 500 MG
1000 TABLET ORAL ONCE
Refills: 0 | Status: DISCONTINUED | OUTPATIENT
Start: 2022-12-22 | End: 2022-12-22

## 2022-12-22 RX ORDER — LANOLIN ALCOHOL/MO/W.PET/CERES
5 CREAM (GRAM) TOPICAL ONCE
Refills: 0 | Status: COMPLETED | OUTPATIENT
Start: 2022-12-22 | End: 2022-12-22

## 2022-12-22 RX ORDER — ASPIRIN/CALCIUM CARB/MAGNESIUM 324 MG
81 TABLET ORAL DAILY
Refills: 0 | Status: DISCONTINUED | OUTPATIENT
Start: 2022-12-22 | End: 2023-01-11

## 2022-12-22 RX ORDER — CLONAZEPAM 1 MG
0.5 TABLET ORAL ONCE
Refills: 0 | Status: DISCONTINUED | OUTPATIENT
Start: 2022-12-22 | End: 2022-12-22

## 2022-12-22 RX ORDER — MIRTAZAPINE 45 MG/1
7.5 TABLET, ORALLY DISINTEGRATING ORAL ONCE
Refills: 0 | Status: COMPLETED | OUTPATIENT
Start: 2022-12-22 | End: 2022-12-22

## 2022-12-22 RX ORDER — ACETAMINOPHEN 500 MG
750 TABLET ORAL ONCE
Refills: 0 | Status: DISCONTINUED | OUTPATIENT
Start: 2022-12-22 | End: 2022-12-23

## 2022-12-22 RX ORDER — SODIUM CHLORIDE 9 MG/ML
1000 INJECTION INTRAMUSCULAR; INTRAVENOUS; SUBCUTANEOUS ONCE
Refills: 0 | Status: COMPLETED | OUTPATIENT
Start: 2022-12-22 | End: 2022-12-22

## 2022-12-22 RX ADMIN — Medication 300 MILLIGRAM(S): at 02:04

## 2022-12-22 RX ADMIN — ATORVASTATIN CALCIUM 80 MILLIGRAM(S): 80 TABLET, FILM COATED ORAL at 23:00

## 2022-12-22 RX ADMIN — Medication 750 MILLIGRAM(S): at 02:19

## 2022-12-22 RX ADMIN — IRON SUCROSE 176.67 MILLIGRAM(S): 20 INJECTION, SOLUTION INTRAVENOUS at 13:43

## 2022-12-22 RX ADMIN — Medication 1 TABLET(S): at 13:43

## 2022-12-22 RX ADMIN — SODIUM CHLORIDE 250 MILLILITER(S): 9 INJECTION INTRAMUSCULAR; INTRAVENOUS; SUBCUTANEOUS at 22:30

## 2022-12-22 RX ADMIN — Medication 105 MILLIGRAM(S): at 13:43

## 2022-12-22 RX ADMIN — Medication 0.5 MILLIGRAM(S): at 23:00

## 2022-12-22 RX ADMIN — ENOXAPARIN SODIUM 40 MILLIGRAM(S): 100 INJECTION SUBCUTANEOUS at 06:32

## 2022-12-22 RX ADMIN — Medication 1 MILLIGRAM(S): at 13:43

## 2022-12-22 RX ADMIN — Medication 81 MILLIGRAM(S): at 13:42

## 2022-12-22 RX ADMIN — Medication 5 MILLIGRAM(S): at 23:00

## 2022-12-22 NOTE — PROGRESS NOTE ADULT - PROBLEM SELECTOR PLAN 10
Pt with h/o polysubstance abuse, remote h/o heroin abuse formerly on methadone, currently with benzodiazepine/hypnotic misuse. U-tox positive for benzodiazepines. Pt with prescriptions for Ativan, Ambien, and Valium, also reportedly receives Xanax from someone in her apartment building.  -Monitor CIWA score  -Coordinate benzodiazepine discontinuation with pt's PCP
Pt with h/o polysubstance abuse, remote h/o heroin abuse formerly on methadone, currently with benzodiazepine/hypnotic misuse. U-tox positive for benzodiazepines. Pt with prescriptions for Ativan, Ambien, and Valium, also reportedly receives Xanax from someone in her apartment building.  -Monitor CIWA score  -Coordinate benzodiazepine discontinuation with pt's PCP

## 2022-12-22 NOTE — CONSULT NOTE ADULT - SUBJECTIVE AND OBJECTIVE BOX
HISTORY OF PRESENT ILLNESS: *taken per primary team*   62F w/ PMH HTN(not on any meds per pt), Migraine, Sz disorder, depression- anxiety, prior stroke Hx in 2018 presented to [facility] ER for frequent falls and was admitted to medicine for further work up. Per pt was in kitchen attempting to cook, lost balance, fell with head strike w/ LOC denies any other injuries, R forehead bruise +, called EMS and was brought to [facility]. Also admits to be dizzy and had B/L finger numbness immediately post fall which resolved in couple of minutes. Pt lives alone by herself, states she takes pain meds (unclear of the name), admits to taking ambien, valium (prescribed by her psychiatrist) several times a week. Per pt, has been having intermittent HA/not feeling well and unable to walk 2/2 gait instability X atleast 1.5years. Has frequent falls and admits to falling everyday. States she does not use walker/ cane to walk, she is awaiting her W/C. Also noted to be forgetful. CTH done in ER w/Subacute/chronic bilateral MICAH territory infarcts.  Regarding her Stroke Hx : per pt had 3 strokes in 2018 with residual R sided weakness, had stroke care @ [facility], unsure of mechanism of stroke, was started only on ASA which was stopped X 1year ago. Also states she is not on any BP meds anymore as her PCP D/C'd it since there was no need for it.    MRA w/ findings of bilateral A2 stenosis/occlusion and left A1 stenosis versus hypoplasia, left proximal ICA w/ stenosis versus occlusion.     PAST MEDICAL & SURGICAL HISTORY:  Seizures      Migraine      Hip pain, left      Depression      Anxiety      Eating disorder      Bipolar illness      PTSD (post-traumatic stress disorder)      S/P  section        FAMILY HISTORY:  No pertinent family history in first degree relatives        SOCIAL HISTORY:  Tobacco Use:  EtOH use:   Substance:    Allergies    Compazine (Unknown)  contrast media (iodine-based) (Unknown)  iv contrast (Unknown)  penicillin (Unknown)  penicillins (Other)  Toradol (Unknown)    Intolerances        REVIEW OF SYSTEMS:  General:	no recent illnesses, no recent wt gain/loss, no chills  Skin/Breast:  no rash, lumps, new moles, erythema, tenderness  Ophthalmologic:  no change in vision, diplopia, pain, redness, tearing, dry eyes	  ENMT:	no hearing loss, tinnitus, ear pain, vertigo, nasal congestion, epistaxis, sore throat  Respiratory and Thorax: no coughing, wheezing, recent URI, shortness of breath	  Cardiovascular: no chest pain, DIMAS, leg swelling, irregular rhythm   Gastrointestinal:	no abd pain, nausea, vomiting, diarrhea, constipation, bloody stool, heartburn  Genitourinary: no frequency, dysuria, hematuria  Musculoskeletal:	no joint pain, no joint swelling, no tenderness  Neurological:	 see HPI  Psychiatric:	no confusion, no anxiousness, no depression   Hematology/Lymphatics:	no brusing, easy bleeding, LAD  Endocrine:  	no excess urination/thirst, heat/cold intolerance  Allergic/Immunologic:  no urticaria, sneezing, recurrent infections      MEDICATIONS:  Antibiotics:    Neuro:  acetaminophen   IVPB .. 1000 milliGRAM(s) IV Intermittent once PRN  melatonin 5 milliGRAM(s) Oral at bedtime    Anticoagulation:  aspirin  chewable 81 milliGRAM(s) Oral daily  enoxaparin Injectable 40 milliGRAM(s) SubCutaneous every 24 hours    OTHER:  atorvastatin 80 milliGRAM(s) Oral at bedtime    IVF:  folic acid 1 milliGRAM(s) Oral daily  iron sucrose IVPB 300 milliGRAM(s) IV Intermittent every 24 hours  multivitamin 1 Tablet(s) Oral daily  sodium chloride 0.9%. 1000 milliLiter(s) IV Continuous <Continuous>  thiamine IVPB 500 milliGRAM(s) IV Intermittent every 24 hours      Vital Signs Last 24 Hrs  T(C): 36.6 (22 Dec 2022 09:57), Max: 36.9 (21 Dec 2022 16:05)  T(F): 97.9 (22 Dec 2022 09:57), Max: 98.5 (21 Dec 2022 21:10)  HR: 96 (22 Dec 2022 09:57) (86 - 105)  BP: 124/74 (22 Dec 2022 09:57) (107/68 - 144/77)  BP(mean): --  RR: 18 (22 Dec 2022 09:57) (17 - 18)  SpO2: 98% (22 Dec 2022 09:57) (97% - 98%)    Parameters below as of 22 Dec 2022 09:57  Patient On (Oxygen Delivery Method): room air        PHYSICAL EXAM:  GEN: laying in bed, appears well, NAD  NEURO: AOx3. FC, OE spont, speech intact, face symmetric. CNII-XII intact. PERRL, EOMI. RUE pronator drift. RLE 0/5, RUE 4/5 otherwise 5/5 strength throughout.  CV: RRR +S1/S2  PULM: CTAB  GI: NT/ND, +BS  EXT: ext warm, dry, nontender    LABS:                        7.6    10.03 )-----------( 545      ( 21 Dec 2022 10:00 )             26.6         139  |  108  |  8   ----------------------------<  113<H>  4.0   |  23  |  0.62    Ca    8.8      21 Dec 2022 07:12  Phos  3.2       Mg     1.7         TPro  6.5  /  Alb  3.5  /  TBili  0.2  /  DBili  <0.2  /  AST  18  /  ALT  23  /  AlkPhos  97        Urinalysis Basic - ( 21 Dec 2022 07:46 )    Color: Yellow / Appearance: Clear / S.010 / pH: x  Gluc: x / Ketone: NEGATIVE  / Bili: Negative / Urobili: 0.2 E.U./dL   Blood: x / Protein: NEGATIVE mg/dL / Nitrite: NEGATIVE   Leuk Esterase: NEGATIVE / RBC: x / WBC x   Sq Epi: x / Non Sq Epi: x / Bacteria: x      CULTURES:  Culture Results:   No growth at 1 day. ( @ 09:00)  Culture Results:   No growth at 1 day. ( @ 08:55)      RADIOLOGY & ADDITIONAL STUDIES:  < from: MR Angio Neck No Cont (22 @ 21:31) >    IMPRESSION:  1.  Acute/subacute infarction in the left MICAH territory as well as the   left parietal temporal region. Mild local mass effect without herniation.    2.  MRA brain -lack of flow related signal within the mid to distal   bilateral A2 segments consistent with high-grade stenosis/occlusion.   Attenuated flow-related signal in the left A1 segment which may be due to   stenosis versus hypoplasia.    3.  MRA neck: Focal lack of flow related signal within the proximal left   internal carotid artery likely due to high-grade stenosis versus   occlusion. This can be further evaluated with dedicated CTA head and neck.    There is a change from the preliminary interpretation which was discussed   by Dr. Berg with Dr. Dempsey on 2022 8:39 AM with read back.    --- End of Report ---          < end of copied text >  < from: MR Angio Head No Cont (22 @ 21:30) >  IMPRESSION:  1.  Acute/subacute infarction in the left MICAH territory as well as the   left parietal temporal region. Mild local mass effect without herniation.    2.  MRA brain -lack of flow related signal within the mid to distal   bilateral A2 segments consistent with high-grade stenosis/occlusion.   Attenuated flow-related signal in the left A1 segment which may be due to   stenosis versus hypoplasia.    3.  MRA neck: Focal lack of flow related signal within the proximal left   internal carotid artery likely due to high-grade stenosis versus   occlusion. This can be further evaluated with dedicated CTA head and neck.    There is a change from the preliminary interpretation which was discussed   by Dr. Berg with Dr. Dempsey on 2022 8:39 AM with read back.    --- End of Report ---            < end of copied text >    Assessment:  62F w/ PMH HTN(not on any meds per pt), Migraine, Sz disorder, depression- anxiety, prior stroke Hx in 2018 presented to [facility] ER for frequent falls [facility]. Pt poor Vs unreliable historian. Neurosurgery consulted for MRA w/ findings of bilateral A2 stenosis/occlusion and left A1 stenosis versus hypoplasia, left proximal ICA w/ stenosis versus occlusion.     Plan:  - Rec obtaining CTA head and neck w/ and w/o   - Rec obtaining MRA [facility]  - Pain management per primary team   - Neurosurgery will continue to follow, please call [phone] to reach consult team     D/w Dr. Mesa

## 2022-12-22 NOTE — PROGRESS NOTE ADULT - PROBLEM SELECTOR PLAN 3
Pt with microcytic anemia, Hgb 7, MCV 71.3. Iron studies notable for Fe 10 and 3% saturation, TIBC and ferritin wnl. Anemia likely i/s/o iron deficiency.  -Begin iron infusion  -Trend CBC

## 2022-12-22 NOTE — PROGRESS NOTE ADULT - PROBLEM SELECTOR PLAN 11
F: 1 L NS  E: Replete K < 4, Mg < 2  N: DASH diet  VTE PPx: Lovenox 40 mg SQ qd  GI: Not needed  C: DNR/DNI

## 2022-12-22 NOTE — PROGRESS NOTE ADULT - PROBLEM SELECTOR PLAN 1
Syncope vs. mechanical fall likely i/s/o deconditioning and reduced functional status s/p CVA x3. Pt denies EtOH use, but endorses chronic use of benzodiazepines, u-tox positive for benzodiazepines and barbiturates (although barbiturates likely 2/2 Fioricet). EKG and TTE without evidence of arrhythmia or structural heart disease that could cause cardiogenic syncope. Orthostatic vitals negative. Seen by PT, recommend discharge to [facility].   -Consult SW for TASNEEM placement Syncope vs. mechanical fall likely i/s/o deconditioning and reduced functional status s/p CVA x3. Pt denies EtOH use, but endorses chronic use of benzodiazepines, u-tox positive for benzodiazepines and barbiturates (although barbiturates likely 2/2 Fioricet). EKG and TTE without evidence of arrhythmia or structural heart disease that could cause cardiogenic syncope. Orthostatic vitals negative. Seen by PT, recommend discharge to [facility]. No infectious etiology determined (neg UA, blood cxs NGTD, afebrile, normal CXR).   Plan:   -Consult SW for TASNEEM placement

## 2022-12-22 NOTE — PROGRESS NOTE ADULT - ASSESSMENT
Assessment and Recommendation :     Assessment   62F w/ PMH HTN(not on any meds per pt), Migraine, Sz disorder, depression- anxiety, prior stroke Hx in 2018 presented to [facility] ER for frequent falls to [facility] ER, admitted to Medicine for further work up, Stroke Neuro was consulted for frequent falls. Pt poor Vs unreliable historian. CTH w/ subacute / Chronic B/L MICAH infarcts. Unclear chronicity of R sided weakness as pt states she does not use walker and is pending Wheel chair. Stroke team consulted for frequent falls.       Recommendations :     - Its ok to hold ASA i/s/o downtrending Hb and Bld transfusion, can restart when able to when it is safe and Hb stabilises.  - Pending MRA H/N to R/O any acute strokes i/s/o unreliable history, unclear chronicity and severity of R sided weakness, frequent falls.  - Will need more collateral from her PCP on her Meds.  - Utox + for BDZ and barbiturates.  - LABS :     HbA1C :  5.8    TSH : 0.638    LDL : 126  - Rest of the care per primary team pending MRA H/N.   Assessment and Recommendation :     Assessment   62F w/ PMH HTN(not on any meds per pt), Migraine, Sz disorder, depression- anxiety, prior stroke Hx in 2018 presented to [facility] ER for frequent falls to [facility] ER, admitted to Medicine for further work up, Stroke Neuro was consulted for frequent falls. Pt poor Vs unreliable historian. CTH w/ subacute / Chronic B/L MICAH infarcts. Unclear chronicity of R sided weakness as pt states she does not use walker and is pending Wheel chair. Stroke team consulted for frequent falls, given unclear Hx and chronicity of the deficits, recommended MRA H/N which showed acute/ sub acute infarcts in L MICAH Territory and L parieto temporal region with mass effect without herniation i/s/o B/L A2 HGS/occlusion and Prox L ICA HGS Vs occlusion. Pt transferred to Stroke tele for further work up and close neuro monitoring.      Neuro  #CVA workup  - continue aspirin 81mg daily.  - continue atorvastatin 80mg daily  - q4hr stroke neuro checks and vitals  - obtain MRI Brain without contrast  - Stroke Code HCT Results:   - Stroke Code CTA Results:  - Stroke education    Cards  #HTN  - permissive hypertension, Goal -180  - hold home blood pressure medication for now  - obtain TTE with bubble  - Stroke Code EKG Results:    #HLD  - high dose statin as above in CVA  - LDL results:     Pulm  - call provider if SPO2 < 94%    GI  #Nutrition/Fluids/Electrolytes   - replete K<4 and Mg <2  - Diet:  - IVF:     Renal  -     Infectious Disease  - Stroke Code CXR results:     Endocrine  #DM  - A1C results:   - ISS    - TSH results:    DVT Prophylaxis  - lovenox sq for DVT prophylaxis   - SCDs for DVT prophylaxis       IDR Goals: Goals reviewed at interdisciplinary rounds with case management, social work, physical therapy, occupational therapy, and speech language pathology.   Please see specific therapy  notes for in depth goals.  Dispo: **********(Acute rehab - can tolerate 3 hours of therapy)     Discussed daily hospital plans and goals with patient and family at bedside. (Called and updated family.)    Discussed with Neurology Attending   Assessment and Recommendation :     Assessment   62F w/ PMH HTN(not on any meds per pt), Migraine, Sz disorder, depression- anxiety, prior stroke Hx in 2018 presented to [facility] ER for frequent falls to [facility] ER, admitted to Medicine for further work up, Stroke Neuro was consulted for frequent falls. Pt poor Vs unreliable historian. CTH w/ subacute / Chronic B/L MICAH infarcts. Unclear chronicity of R sided weakness as pt states she does not use walker and is pending Wheel chair. Stroke team consulted for frequent falls, given unclear Hx and chronicity of the deficits, recommended MRA H/N which showed acute/ sub acute infarcts in L MICAH Territory and L parieto temporal region with mass effect without herniation i/s/o B/L A2 HGS/occlusion and Prox L ICA HGS Vs occlusion. Pt transferred to Stroke tele for further work up and close neuro monitoring.      Neuro  #CVA workup  - continue aspirin 81mg daily, will closely monitor for drop in Hb, if stable will consider adding Plavix to the regimen.  - continue atorvastatin 80mg daily  - q4hr stroke neuro checks and vitals  - MRA H/N :  Acute/ Subacute infarction L MICAH Territory and L parieto temporal region w/local mass effect. B/L A2 segment HGS/ Occlusion and L Proximal ICA HGS Vs Occlusion.  -  HCT Results: Subacute/ chronic B/L MICAH territory infarcts.  - NSGY Cx called for L ICA HGS Vs occlusion- Reccs to obtain CTA H/N and MR Hess.  - Stroke education    Cards  #HTN  - permissive hypertension, Goal -180  - Not on any  home BP meds per pt  - Please avoid Hypotension i/s/o L ICA and B/L A2  HGS.  - TTE : No wall motion abnormalities/ No valvular Dz.  - EKG Results: Isolated V5 AYANA upon admission, with no reciprocal changes, no c/o CP, no c/f ischemia no c/o cp/sob.    #HLD  - high dose statin as above in CVA  - LDL results: 126    Pulm  - call provider if SPO2 < 94%    GI  #Nutrition/Fluids/Electrolytes   - replete K<4 and Mg <2  - Diet: DASH/ TLC  - IVF: None for now.    Renal  - C/T Trend Bun/Crt.    Infectious Disease  - C/T trend Bun/Crt    Endocrine  - A1C results: 5.8    - TSH results: 0.638    DVT Prophylaxis  - lovenox sq for DVT prophylaxis   - SCDs for DVT prophylaxis       IDR Goals: Goals reviewed at interdisciplinary rounds with case management, social work, physical therapy, occupational therapy, and speech language pathology.   Please see specific therapy  notes for in depth goals.  Dispo: pending PT/ OT eval.     Discussed daily hospital plans and goals with patient.    Discussed with Neurology Attending Dr. Mccartney.

## 2022-12-22 NOTE — PROGRESS NOTE ADULT - SUBJECTIVE AND OBJECTIVE BOX
*******************************************TRANSFER ACCEPTANCE NOTES*********************************************              Neurology Stroke Progress Note    INTERVAL HPI/OVERNIGHT EVENTS:  Patient seen and examined @ bedside. sleeping, yet easily arousable, follows commands, states she is not feeling well, No new complaints / deficits. Pending MRA H/N when stable. Noted to be with downtrending Hb, ordered for 1unit PRBC Tx per primary team. ASA held for now i/s/o downtrending Hb.    MEDICATIONS  (STANDING):  atorvastatin 80 milliGRAM(s) Oral at bedtime  enoxaparin Injectable 40 milliGRAM(s) SubCutaneous every 24 hours  folic acid 1 milliGRAM(s) Oral daily  iron sucrose IVPB 300 milliGRAM(s) IV Intermittent every 24 hours  melatonin 5 milliGRAM(s) Oral at bedtime  multivitamin 1 Tablet(s) Oral daily  sodium chloride 0.9%. 1000 milliLiter(s) (100 mL/Hr) IV Continuous <Continuous>  thiamine IVPB 500 milliGRAM(s) IV Intermittent every 24 hours    MEDICATIONS  (PRN):      Allergies    Compazine (Unknown)  contrast media (iodine-based) (Unknown)  iv contrast (Unknown)  penicillin (Unknown)  penicillins (Other)  Toradol (Unknown)    Intolerances        Vital Signs Last 24 Hrs  T(C): 36.8 (21 Dec 2022 11:14), Max: 36.9 (20 Dec 2022 15:35)  T(F): 98.2 (21 Dec 2022 11:14), Max: 98.4 (20 Dec 2022 15:35)  HR: 81 (21 Dec 2022 11:14) (81 - 90)  BP: 146/88 (21 Dec 2022 11:14) (119/76 - 151/66)  BP(mean): --  RR: 18 (21 Dec 2022 11:14) (18 - 18)  SpO2: 96% (21 Dec 2022 11:14) (96% - 99%)    Parameters below as of 21 Dec 2022 11:14  Patient On (Oxygen Delivery Method): room air    Physical exam:  General: No acute distress, awake and alert  Cardiovascular: Regular rate and rhythm, no murmurs, rubs, or gallops. No bruits  Pulmonary: Anterior breath sounds clear bilaterally, no crackles or wheezing. No use of accessory muscles  Extremities: Radial and DP pulses +2, no edema    Neurologic:  -Mental status: Awake, alert, oriented to person, place, and time (year w/ choices). Speech is fluent with intact naming, repetition, and comprehension, no dysarthria. Follows commands. Attention/concentration intact. Fund of knowledge appropriate. Forgetful.    -Cranial nerves:   II: Visual fields are full to confrontation.  III, IV, VI: Extraocular movements are intact without nystagmus. Pupils equally round and reactive to light.  V:  Facial sensation V1-V3 equal and intact   VII: Face is symmetric with normal eye closure and smile  VIII: Hearing is bilaterally intact to finger rub  IX, X: Uvula is midline and soft palate rises symmetrically  XI: Head turning and shoulder shrug are intact.  XII: Tongue protrudes midline  Motor: Normal bulk and tone. RUE : antigravity, but drifts and hits the bed by the count of 10, RLE : Briefly antigravity, hit the bed and unable to hold for count of 5. LUE/ LLE : 5/5.   Sensation: Intact to light touch bilaterally. No neglect or extinction on double simultaneous testing.  Coordination: No dysmetria on finger-to-nose out of proportion to weakness.   Reflexes: Downgoing toes bilaterally   Gait: Deferred.            LABS:                        7.6    10.03 )-----------( 545      ( 21 Dec 2022 10:00 )             26.6     12-    139  |  108  |  8   ----------------------------<  113<H>  4.0   |  23  |  0.62    Ca    8.8      21 Dec 2022 07:12  Phos  3.2     -  Mg     1.7         TPro  6.5  /  Alb  3.5  /  TBili  0.2  /  DBili  <0.2  /  AST  18  /  ALT  23  /  AlkPhos  97  -    PT/INR - ( 20 Dec 2022 01:37 )   PT: 13.1 sec;   INR: 1.10          PTT - ( 20 Dec 2022 01:37 )  PTT:25.1 sec  Urinalysis Basic - ( 21 Dec 2022 07:46 )    Color: Yellow / Appearance: Clear / S.010 / pH: x  Gluc: x / Ketone: NEGATIVE  / Bili: Negative / Urobili: 0.2 E.U./dL   Blood: x / Protein: NEGATIVE mg/dL / Nitrite: NEGATIVE   Leuk Esterase: NEGATIVE / RBC: x / WBC x   Sq Epi: x / Non Sq Epi: x / Bacteria: x        RADIOLOGY & ADDITIONAL TESTS:     *******************************************TRANSFER ACCEPTANCE NOTES*********************************************  Hospital Course :     62F w/ PMH HTN(not on any meds per pt), Migraine, Sz disorder, depression- anxiety, prior stroke Hx in 2018 presented to [facility] ER for frequent falls and was admitted to medicine for further work up. Per pt was in kitchen attempting to cook, lost balance, fell with head strike w/ LOC denies any other injuries, R forehead bruise +, called EMS and was brought to [facility]. Also admits to be dizzy and had B/L finger numbness immediately post fall which resolved in couple of minutes. Pt lives alone by herself, states she takes pain meds (unclear of the name), admits to taking ambien, valium (prescribed by her psychiatrist) several times a week. Per pt, has been having intermittent HA/not feeling well and unable to walk 2/2 gait instability X atleast 1.5years. Has frequent falls and admits to falling everyday. States she does not use walker/ cane to walk, she is awaiting her W/C. Also noted to be forgetful. CTH done in ER w/Subacute/chronic bilateral MICAH territory infarcts. Pt with R sided weakness, states thats her baseline since her prior strokes and noted worsening weakness X atleast 1year. Recommended MRA H/N which showed acute/ subacute infarction in L MICAH territory and L parietal region, with mild local mass effect. B/L A2 HGS/ Occlusion and L ICA focal lack of flow related signal within the proximal L ICA 2/2 HGS VS Occlusion. PT accepted to Stroke telemetry for further work up. Pt is on ASA/ statin at this point, 2/2 downtrending H/H, but no GIB. No BM since this hospitalisation per pt. Has HANH, likely non complaint with her home iron regimen. Iron started by medicine team. Pt c/o generalised body aches and is requesting pain meds, states she takes Benzos @ home for her pain. NeuroSx consulted for ICAD/ L ICA HGS Vs occlusion. Reccs to obtain CTA H/N and MR Hess. Pt allergic to IV contrast/ Iodine based dye. Will clarify with pt about allergy and order CTA H/N. Will watch her on ASA and consider addition of plavix to the regimen. Will hold off on TTE/ KAY until NSGY eval and CTA as her recent infarcts are likely due to her ICAD.     Pt with waxing/ waning mental status and short term memory loss. EKG with isolated V5 AYANA on admission with no reciprocal changes, No CP/SOB.     Regarding her Stroke Hx : per pt had 3 strokes in 2018 with residual R sided weakness, had stroke care @ [facility], unsure of mechanism of stroke, was started only on ASA which was stopped X 1year ago.  Also states she is not on any BP meds anymore as her PCP D/C'd it since there was no need for it.    ****************************************************************************************************************************************************************************    Neurology Stroke Progress Note    INTERVAL HPI/OVERNIGHT EVENTS:  Patient seen and examined @ bedside. sleeping, yet easily arousable, follows commands, states she is not feeling well, No new complaints / deficits. . Noted to be with downtrending Hb, ordered for 1unit PRBC Tx per primary team yesterday but pt refused to sign Blood tx consent. No GIB/ No BM, restarted ASA at this point for acute strokes in MRI which probably is due to her ICAD.  ASA held for now i/s/o downtrending Hb. Pt noted to be sitting in the chair today during rounds, much more alert and co operative.    MEDICATIONS  (STANDING):  aspirin  chewable 81 milliGRAM(s) Oral daily  atorvastatin 80 milliGRAM(s) Oral at bedtime  enoxaparin Injectable 40 milliGRAM(s) SubCutaneous every 24 hours  folic acid 1 milliGRAM(s) Oral daily  iron sucrose IVPB 300 milliGRAM(s) IV Intermittent every 24 hours  melatonin 5 milliGRAM(s) Oral at bedtime  multivitamin 1 Tablet(s) Oral daily  sodium chloride 0.9%. 1000 milliLiter(s) (100 mL/Hr) IV Continuous <Continuous>  thiamine IVPB 500 milliGRAM(s) IV Intermittent every 24 hours    MEDICATIONS  (PRN):      MEDICATIONS  (PRN):      Allergies    Compazine (Unknown)  contrast media (iodine-based) (Unknown)  iv contrast (Unknown)  penicillin (Unknown)  penicillins (Other)  Toradol (Unknown)    Intolerances        Vital Signs Last 24 Hrs  T(C): 36.6 (12-22-22 @ 09:57), Max: 36.9 (12-21-22 @ 16:05)  T(F): 97.9 (12-22-22 @ 09:57), Max: 98.5 (12-21-22 @ 21:10)  HR: 96 (12-22-22 @ 09:57) (86 - 105)  BP: 124/74 (12-22-22 @ 09:57) (107/68 - 144/77)  BP(mean): --  RR: 18 (12-22-22 @ 09:57) (17 - 18)  SpO2: 98% (12-22-22 @ 09:57) (97% - 98%)      Parameters below as of 21 Dec 2022 11:14  Patient On (Oxygen Delivery Method): room air    Physical exam:  General: No acute distress, awake and alert, noted to be sitting in chair today.  Cardiovascular: Regular rate and rhythm.  Pulmonary: Anterior breath sounds clear bilaterally, no crackles or wheezing. No use of accessory muscles  Extremities: Radial and DP pulses +2, no edema    Neurologic:  -Mental status: Awake, alert, oriented to person, place, and time (year w/ choices). Speech is fluent with intact naming, repetition, and comprehension, no dysarthria. Follows commands. Attention/concentration intact. Fund of knowledge appropriate. Forgetful.    -Cranial nerves:   II: Visual fields are full to confrontation.  III, IV, VI: Extraocular movements are intact without nystagmus. Pupils equally round and reactive to light.  V:  Facial sensation V1-V3 equal and intact   VII: Face is symmetric with normal eye closure and smile  VIII: Hearing is bilaterally intact to finger rub  IX, X: Uvula is midline and soft palate rises symmetrically  XI: Head turning and shoulder shrug are intact.  XII: Tongue protrudes midline  Motor: Normal bulk and tone. RUE : antigravity, Pronator drift +, but able to hold it up for 10seconds. RLE : Briefly antigravity, hit the bed and unable to hold for count of 5. LUE/ LLE : 5/5.   Sensation: Intact to light touch bilaterally. No neglect or extinction on double simultaneous testing.  Coordination: No dysmetria on finger-to-nose out of proportion to weakness.   Reflexes: Downgoing toes bilaterally   Gait: Deferred.            LABS:                                   7.6    10.03 )-----------( 545      ( 21 Dec 2022 10:00 )             26.6     12-21    139  |  108  |  8   ----------------------------<  113<H>  4.0   |  23  |  0.62    Ca    8.8      21 Dec 2022 07:12  Phos  3.2     12-21  Mg     1.7     12-21    TPro  6.5  /  Alb  3.5  /  TBili  0.2  /  DBili  <0.2  /  AST  18  /  ALT  23  /  AlkPhos  97  12-21        RADIOLOGY & ADDITIONAL TESTS:    CT Head No Cont (12.20.22 @ 00:46)   IMPRESSION:  1.  No acute intracranial hemorrhage or calvarial fracture.  2.  Subacute/chronic bilateral MICAH territory infarcts.    MR Angio Neck No Cont (12.21.22 @ 21:31)   IMPRESSION:  1.  Acute/subacute infarction in the left MICAH territory as well as the   left parietal temporal region. Mild local mass effect without herniation.    2.  MRA brain -lack of flow related signal within the mid to distal   bilateral A2 segments consistent with high-grade stenosis/occlusion.   Attenuated flow-related signal in the left A1 segment which may be due to   stenosis versus hypoplasia.    3.  MRA neck: Focal lack of flow related signal within the proximal left   internal carotid artery likely due to high-grade stenosis versus   occlusion. This can be further evaluated with dedicated CTA head and neck.

## 2022-12-22 NOTE — PROGRESS NOTE ADULT - PROBLEM SELECTOR PLAN 4
Likely reactive in nature s/p fall. No clearly defined source as patient denies urinary symptoms, afebrile, CXR without pathology. UA negative, BCx no growth to date.  -Trend CBC  -Monitor for fever

## 2022-12-22 NOTE — PROGRESS NOTE ADULT - ATTENDING COMMENTS
Patient was seen and examined with the resident team today.  I agree with Dr. Dempsey's assessment and plan with the following exceptions/additions:     Briefly, this is a 61yo woman with a PMH of essential HTN, CVA c/b R-sided deficits, benzodiazepine abuse, mild recurrent MDD, BERNICE, migraines and PNES who presented with toxic encephalopathy and recurrent falls.  Collateral from her mother, as well as Psych, suggests that she has an extensive hx of substance abuse along with psychiatric co-morbidities.  MRA overnight showing acute/subacute MICAH stroke.  To be transferred to the Stroke Serivce.     #Toxic encephalopathy - collateral suggest she's been abusing her benzodiazepines, confirmed with Utox; improving today, started on thiamine   #Recurrent falls - likely 2/2 ingestion but also from her stroke  #Acute/subacute CVA, hx of CVA's - on statin, can restart ASA; transfer to Stroke Service   #HANH - c/w IV iron x3 day (12/21-12/23)   #HCV Ab+ - f/u HCV RNA   #DVT PPx - Lovenox  #Dispo - [facility], needs auth    Joanna Koehler  [phone]

## 2022-12-22 NOTE — PROGRESS NOTE ADULT - PROBLEM SELECTOR PLAN 2
Pt with reduced functional capacity and inability to perform ADLs, onset after CVAs 3 years ago per collateral obtained from mother. 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 TSH wnl, will evaluate for other reversible causes of neurocognitive dysfunction.  -Follow up folate, B12, RPR  -Follow up with PCP regarding benzo discontinuation  -Monitor CIWA score  -Will give thiamine 500 mg qd per psych recommendations  -Remeron 7.5 mg qhs prn for insomnia

## 2022-12-22 NOTE — PROGRESS NOTE ADULT - PROBLEM SELECTOR PLAN 8
Advanced care planning discussed. Patient wishes to make mother HCP in event of inability to communicate wishes. Mother is 80 and per patient in sound mental capacity. Notes that if mother is unable to perform responsibilities as HCP, would like son Nirmal to be an alternate. Reviewed with patient, wishes regarding life saving measures, prefers to be made DNR/DNI, is aware of the alternate outcome.

## 2022-12-22 NOTE — PROGRESS NOTE ADULT - PROBLEM SELECTOR PLAN 6
Reports significant history of anxiety/depression, per psych note has been trialed on multiple SSRIs and antipsychotics including Zoloft and Seroquel, but has self-discontinued them as she "did not like the way they made her feel." Pt not amenable to restarting SSRI at this time. Reports she receives weekly psychotherapy over the phone.  -Per psych, no psychiatric barriers to discharge at this time  -Encourage continued outpatient psychotherapy

## 2022-12-22 NOTE — PROGRESS NOTE ADULT - ASSESSMENT
63 y/o F with h/o HTN, migraines, PNES, anxiety/depression, polysubstance abuse, CVA x3 with residual R-sided weakness and likely neurocognitive dysfunction presents s/p fall at home. Admitted for decreased functional status at home and currently pending [facility] placement.

## 2022-12-22 NOTE — PROGRESS NOTE ADULT - PROBLEM SELECTOR PLAN 9
Pt with documented history of PNES, as well as seizures i/s/o EtOH and benzodiazepine withdrawal. Prescribed Trileptal 150 mg BID in the past and has used Librium for EtOH withdrawal.  -Monitor CIWA score

## 2022-12-22 NOTE — PROGRESS NOTE ADULT - SUBJECTIVE AND OBJECTIVE BOX
CC: Patient is a 62y old  Female who presents with a chief complaint of mechanical fall (22 Dec 2022 10:57)      INTERVAL EVENTS: SYED    SUBJECTIVE / INTERVAL HPI: Patient seen and examined at bedside.     ROS: negative unless otherwise stated above.    VITAL SIGNS:  Vital Signs Last 24 Hrs  T(C): 36.6 (22 Dec 2022 09:57), Max: 36.9 (21 Dec 2022 16:05)  T(F): 97.9 (22 Dec 2022 09:57), Max: 98.5 (21 Dec 2022 21:10)  HR: 96 (22 Dec 2022 09:57) (86 - 105)  BP: 124/74 (22 Dec 2022 09:57) (107/68 - 144/77)  BP(mean): --  RR: 18 (22 Dec 2022 09:57) (17 - 18)  SpO2: 98% (22 Dec 2022 09:57) (97% - 98%)    Parameters below as of 22 Dec 2022 09:57  Patient On (Oxygen Delivery Method): room air            PHYSICAL EXAM:    General: NAD  HEENT: MMM  Neck: supple  Cardiovascular: +S1/S2; RRR  Respiratory: CTA B/L; no W/R/R  Gastrointestinal: soft, NT/ND  Extremities: WWP; no edema, clubbing or cyanosis  Vascular: 2+ radial, DP/PT pulses B/L  Neurological: AAOx3; no focal deficits    MEDICATIONS:  MEDICATIONS  (STANDING):  aspirin  chewable 81 milliGRAM(s) Oral daily  atorvastatin 80 milliGRAM(s) Oral at bedtime  enoxaparin Injectable 40 milliGRAM(s) SubCutaneous every 24 hours  folic acid 1 milliGRAM(s) Oral daily  iron sucrose IVPB 300 milliGRAM(s) IV Intermittent every 24 hours  melatonin 5 milliGRAM(s) Oral at bedtime  multivitamin 1 Tablet(s) Oral daily  sodium chloride 0.9%. 1000 milliLiter(s) (100 mL/Hr) IV Continuous <Continuous>  thiamine IVPB 500 milliGRAM(s) IV Intermittent every 24 hours    MEDICATIONS  (PRN):      ALLERGIES:  Allergies    Compazine (Unknown)  contrast media (iodine-based) (Unknown)  iv contrast (Unknown)  penicillin (Unknown)  penicillins (Other)  Toradol (Unknown)    Intolerances        LABS:                        7.6    10.03 )-----------( 545      ( 21 Dec 2022 10:00 )             26.6     12-    139  |  108  |  8   ----------------------------<  113<H>  4.0   |  23  |  0.62    Ca    8.8      21 Dec 2022 07:12  Phos  3.2       Mg     1.7         TPro  6.5  /  Alb  3.5  /  TBili  0.2  /  DBili  <0.2  /  AST  18  /  ALT  23  /  AlkPhos  97        Urinalysis Basic - ( 21 Dec 2022 07:46 )    Color: Yellow / Appearance: Clear / S.010 / pH: x  Gluc: x / Ketone: NEGATIVE  / Bili: Negative / Urobili: 0.2 E.U./dL   Blood: x / Protein: NEGATIVE mg/dL / Nitrite: NEGATIVE   Leuk Esterase: NEGATIVE / RBC: x / WBC x   Sq Epi: x / Non Sq Epi: x / Bacteria: x      CAPILLARY BLOOD GLUCOSE          RADIOLOGY & ADDITIONAL TESTS: Reviewed. CC: Patient is a 62y old  Female who presents with a chief complaint of mechanical fall     Transfer Note:   Ms. Weber is a 61 y/o F with h/o HTN, migraines, PNES, anxiety/depression, polysubstance abuse, CVA x3 with residual R-sided weakness and likely neurocognitive dysfunction who presented with a fall at home. Pt was noted to have several, oftentimes conflicting explanations of her fall. Pt was evaluated for syncope 2/2 polypharmacy, new CVA, cardiogenic arrythmia, and/or infection. Pt found to have normal TTE study findings, no evidence of ICH on initial head CT, no source of infection (NGTD on blood cxs, neg UA, neg CXR), and positive UTox for Benzodiazepines. Stroke team consulted for neuro deficits on exam, notably R sided weakness i/s/o unknown baseline. Pt sent for MRI of head/neck showing acute/subacute infarction predominantly within the left MICAH territory as well watershed territory. Additionally, chronic right MICAH territorial infarction and a chronic left frontal lobe infarction noted with no acute hydrocephalus. Aspirin/Statin started, pt to be transferred to Neurotelemetry for further monitoring.        INTERVAL EVENTS: SYED. Pt with recent MRI findings of acute vs subacute MICAH infarction. Stroke team assessed. Will likely need telemetry i/s/o MRI findings. ASA restarted.   SUBJECTIVE / INTERVAL HPI: Patient seen and examined at bedside. Pt requesting "tranquilizer" to put her to sleep. When asked to elaborate, pt says she has not been able to sleep for several nights and would like Ativan to help her sleep. When told ativan would worsen her dizziness, imbalance, pt became acutely frustrated. Empathized w/ pt's concerns, assessed CIWA for benzodiazepine withdrawal, score 1 for anxiety.   ROS: negative unless otherwise stated above.    VITAL SIGNS:  Vital Signs Last 24 Hrs  T(C): 36.6 (22 Dec 2022 09:57), Max: 36.9 (21 Dec 2022 16:05)  T(F): 97.9 (22 Dec 2022 09:57), Max: 98.5 (21 Dec 2022 21:10)  HR: 96 (22 Dec 2022 09:57) (86 - 105)  BP: 124/74 (22 Dec 2022 09:57) (107/68 - 144/77)  BP(mean): --  RR: 18 (22 Dec 2022 09:57) (17 - 18)  SpO2: 98% (22 Dec 2022 09:57) (97% - 98%)    Parameters below as of 22 Dec 2022 09:57  Patient On (Oxygen Delivery Method): room air            PHYSICAL EXAM:    General: NAD  HEENT: MMM  Neck: supple  Cardiovascular: +S1/S2; RRR  Respiratory: CTA B/L; no W/R/R  Gastrointestinal: soft, NT/ND  Extremities: WWP; no edema, clubbing or cyanosis  Vascular: 2+ radial, DP/PT pulses B/L  Neurological: AAOx3; no focal deficits    MEDICATIONS:  MEDICATIONS  (STANDING):  aspirin  chewable 81 milliGRAM(s) Oral daily  atorvastatin 80 milliGRAM(s) Oral at bedtime  enoxaparin Injectable 40 milliGRAM(s) SubCutaneous every 24 hours  folic acid 1 milliGRAM(s) Oral daily  iron sucrose IVPB 300 milliGRAM(s) IV Intermittent every 24 hours  melatonin 5 milliGRAM(s) Oral at bedtime  multivitamin 1 Tablet(s) Oral daily  sodium chloride 0.9%. 1000 milliLiter(s) (100 mL/Hr) IV Continuous <Continuous>  thiamine IVPB 500 milliGRAM(s) IV Intermittent every 24 hours    MEDICATIONS  (PRN):      ALLERGIES:  Allergies    Compazine (Unknown)  contrast media (iodine-based) (Unknown)  iv contrast (Unknown)  penicillin (Unknown)  penicillins (Other)  Toradol (Unknown)    Intolerances        LABS:                        7.6    10.03 )-----------( 545      ( 21 Dec 2022 10:00 )             26.6     12-    139  |  108  |  8   ----------------------------<  113<H>  4.0   |  23  |  0.62    Ca    8.8      21 Dec 2022 07:12  Phos  3.2     12-  Mg     1.7         TPro  6.5  /  Alb  3.5  /  TBili  0.2  /  DBili  <0.2  /  AST  18  /  ALT  23  /  AlkPhos  97  -      Urinalysis Basic - ( 21 Dec 2022 07:46 )    Color: Yellow / Appearance: Clear / S.010 / pH: x  Gluc: x / Ketone: NEGATIVE  / Bili: Negative / Urobili: 0.2 E.U./dL   Blood: x / Protein: NEGATIVE mg/dL / Nitrite: NEGATIVE   Leuk Esterase: NEGATIVE / RBC: x / WBC x   Sq Epi: x / Non Sq Epi: x / Bacteria: x      CAPILLARY BLOOD GLUCOSE          RADIOLOGY & ADDITIONAL TESTS: Reviewed.

## 2022-12-22 NOTE — PROGRESS NOTE ADULT - PROBLEM SELECTOR PLAN 7
Previously utilizing losartan 50 mg BID and clonidine 0.1 mg TID. Reports to have self discontinued these medications as they were causing dizziness. BP elevated to 151/66 today.  -Restart losartan 25 mg BID  -Monitor BP

## 2022-12-23 DIAGNOSIS — D64.9 ANEMIA, UNSPECIFIED: ICD-10-CM

## 2022-12-23 DIAGNOSIS — R76.8 OTHER SPECIFIED ABNORMAL IMMUNOLOGICAL FINDINGS IN SERUM: ICD-10-CM

## 2022-12-23 DIAGNOSIS — I63.9 CEREBRAL INFARCTION, UNSPECIFIED: ICD-10-CM

## 2022-12-23 DIAGNOSIS — R73.03 PREDIABETES: ICD-10-CM

## 2022-12-23 DIAGNOSIS — F41.9 ANXIETY DISORDER, UNSPECIFIED: ICD-10-CM

## 2022-12-23 DIAGNOSIS — R29.6 REPEATED FALLS: ICD-10-CM

## 2022-12-23 LAB
ANION GAP SERPL CALC-SCNC: 8 MMOL/L — SIGNIFICANT CHANGE UP (ref 5–17)
BUN SERPL-MCNC: 8 MG/DL — SIGNIFICANT CHANGE UP (ref 7–23)
CALCIUM SERPL-MCNC: 8.7 MG/DL — SIGNIFICANT CHANGE UP (ref 8.4–10.5)
CHLORIDE SERPL-SCNC: 109 MMOL/L — HIGH (ref 96–108)
CO2 SERPL-SCNC: 26 MMOL/L — SIGNIFICANT CHANGE UP (ref 22–31)
CREAT SERPL-MCNC: 0.67 MG/DL — SIGNIFICANT CHANGE UP (ref 0.5–1.3)
EGFR: 100 ML/MIN/1.73M2 — SIGNIFICANT CHANGE UP
GLUCOSE SERPL-MCNC: 100 MG/DL — HIGH (ref 70–99)
HAPTOGLOB SERPL-MCNC: 231 MG/DL — HIGH (ref 34–200)
HCT VFR BLD CALC: 24.8 % — LOW (ref 34.5–45)
HGB BLD-MCNC: 6.9 G/DL — CRITICAL LOW (ref 11.5–15.5)
LDH SERPL L TO P-CCNC: 303 U/L — HIGH (ref 50–242)
MAGNESIUM SERPL-MCNC: 1.6 MG/DL — SIGNIFICANT CHANGE UP (ref 1.6–2.6)
MCHC RBC-ENTMCNC: 20.1 PG — LOW (ref 27–34)
MCHC RBC-ENTMCNC: 27.8 GM/DL — LOW (ref 32–36)
MCV RBC AUTO: 72.1 FL — LOW (ref 80–100)
NRBC # BLD: 0 /100 WBCS — SIGNIFICANT CHANGE UP (ref 0–0)
PHOSPHATE SERPL-MCNC: 3.6 MG/DL — SIGNIFICANT CHANGE UP (ref 2.5–4.5)
PLATELET # BLD AUTO: 517 K/UL — HIGH (ref 150–400)
POTASSIUM SERPL-MCNC: 3.6 MMOL/L — SIGNIFICANT CHANGE UP (ref 3.5–5.3)
POTASSIUM SERPL-SCNC: 3.6 MMOL/L — SIGNIFICANT CHANGE UP (ref 3.5–5.3)
RBC # BLD: 3.44 M/UL — LOW (ref 3.8–5.2)
RBC # FLD: 17.6 % — HIGH (ref 10.3–14.5)
RETICS #: 110.9 K/UL — SIGNIFICANT CHANGE UP (ref 25–125)
RETICS/RBC NFR: 3.3 % — HIGH (ref 0.5–2.5)
SODIUM SERPL-SCNC: 143 MMOL/L — SIGNIFICANT CHANGE UP (ref 135–145)
WBC # BLD: 8.06 K/UL — SIGNIFICANT CHANGE UP (ref 3.8–10.5)
WBC # FLD AUTO: 8.06 K/UL — SIGNIFICANT CHANGE UP (ref 3.8–10.5)

## 2022-12-23 PROCEDURE — 99221 1ST HOSP IP/OBS SF/LOW 40: CPT

## 2022-12-23 PROCEDURE — 99233 SBSQ HOSP IP/OBS HIGH 50: CPT

## 2022-12-23 PROCEDURE — 76937 US GUIDE VASCULAR ACCESS: CPT | Mod: 26

## 2022-12-23 PROCEDURE — 36000 PLACE NEEDLE IN VEIN: CPT

## 2022-12-23 RX ORDER — HYDROXYZINE HCL 10 MG
25 TABLET ORAL THREE TIMES A DAY
Refills: 0 | Status: DISCONTINUED | OUTPATIENT
Start: 2022-12-23 | End: 2023-01-11

## 2022-12-23 RX ORDER — HEPARIN SODIUM 5000 [USP'U]/ML
5000 INJECTION INTRAVENOUS; SUBCUTANEOUS EVERY 8 HOURS
Refills: 0 | Status: DISCONTINUED | OUTPATIENT
Start: 2022-12-24 | End: 2022-12-27

## 2022-12-23 RX ORDER — ACETAMINOPHEN 500 MG
750 TABLET ORAL ONCE
Refills: 0 | Status: COMPLETED | OUTPATIENT
Start: 2022-12-23 | End: 2022-12-23

## 2022-12-23 RX ORDER — MIRTAZAPINE 45 MG/1
7.5 TABLET, ORALLY DISINTEGRATING ORAL AT BEDTIME
Refills: 0 | Status: DISCONTINUED | OUTPATIENT
Start: 2022-12-23 | End: 2022-12-24

## 2022-12-23 RX ORDER — PANTOPRAZOLE SODIUM 20 MG/1
40 TABLET, DELAYED RELEASE ORAL
Refills: 0 | Status: DISCONTINUED | OUTPATIENT
Start: 2022-12-23 | End: 2023-01-11

## 2022-12-23 RX ADMIN — ENOXAPARIN SODIUM 40 MILLIGRAM(S): 100 INJECTION SUBCUTANEOUS at 06:41

## 2022-12-23 RX ADMIN — Medication 105 MILLIGRAM(S): at 10:32

## 2022-12-23 RX ADMIN — MIRTAZAPINE 7.5 MILLIGRAM(S): 45 TABLET, ORALLY DISINTEGRATING ORAL at 21:27

## 2022-12-23 RX ADMIN — Medication 300 MILLIGRAM(S): at 21:23

## 2022-12-23 RX ADMIN — Medication 81 MILLIGRAM(S): at 13:22

## 2022-12-23 RX ADMIN — ATORVASTATIN CALCIUM 80 MILLIGRAM(S): 80 TABLET, FILM COATED ORAL at 21:28

## 2022-12-23 RX ADMIN — Medication 750 MILLIGRAM(S): at 21:45

## 2022-12-23 RX ADMIN — Medication 25 MILLIGRAM(S): at 19:23

## 2022-12-23 RX ADMIN — Medication 5 MILLIGRAM(S): at 21:29

## 2022-12-23 NOTE — PROCEDURE NOTE - NSICDXPROCEDURE_GEN_ALL_CORE_FT
PROCEDURES:  Insertion of intravenous catheter with ultrasound guidance 23-Dec-2022 10:06:59  Bob Pinzon

## 2022-12-23 NOTE — PROGRESS NOTE ADULT - PROBLEM SELECTOR PLAN 2
iron deficiency (ferritin 16, 3% sat); unclear etiology; Pt. reports occasional "dark" BMs, but not melena or BRBPR; no e/o hemolysis; VSS; monitor CBC, goal Hb > 7, transfuse if needed; cont. PPI, IV iron; f/u GI recs, may need Heme input as well

## 2022-12-23 NOTE — BH CONSULTATION LIAISON PROGRESS NOTE - NSBHCONSULTRECOMMENDOTHER_PSY_A_CORE FT
additional counseling about benzodiazepine use when better able to participate
additional counseling about benzodiazepine use when better able to participate

## 2022-12-23 NOTE — CONSULT NOTE ADULT - ASSESSMENT
63 yo F, PMHx HTN, migraine, seizure d/o, MDD/BERNICE, polysubstance use disorder (benzo/etoh in setting of son passing away)   pw falls, found to have CVA's ;    GI consulted for anemia    #Microcytic Anemia, likely mixed HANH and AoCD / BM suppression in s/o EtOH use  #CVA's, with goal for permissive htn    Recommendations:  in absence of overt GI bleeding, and newly dx'd CVA with goal of permissive HTN, defer on inpatient endoscopy  Patient needs EGD / Colonoscopy for Anemia  f/u Hep C PCR , outpatient Hepatology f/u if positive  maintain active type and screen, trend HgB, transfusions per primary  Send B12 / Folate level    Alexx Stewart M.D.  Gastroenterology Fellow  Weekday Pager: [phone]  Weeknights/Weekend Coverage: Please Call the  for contact info

## 2022-12-23 NOTE — CONSULT NOTE ADULT - SUBJECTIVE AND OBJECTIVE BOX
GASTROENTEROLOGY CONSULT NOTE  HPI:  61 yo F, PMHx HTN, migraine, seizure d/o, MDD/BERNICE, polysubstance use disorder (benzo/etoh in setting of son passing away)   pw falls, found to have CVA's ;  Patient is a suboptimal historian ;   Patient denies having prior endoscopy / colonoscopy  Does not see BRBPR, Melena, Hematochezia, or CGE ;     GI consulted for anemia requiring pRBC transfusion    CTH: Subacute/chronic bilateral MICAH territory infarcts.    Allergies    Compazine (Unknown)  contrast media (iodine-based) (Unknown)  iv contrast (Unknown)  penicillin (Unknown)  penicillins (Other)  Toradol (Unknown)    Intolerances      Home Medications:  Benadryl 25 mg oral capsule: 1 cap(s) orally once a day, As Needed (20 Dec 2022 11:38)  cloNIDine 0.1 mg oral tablet: 1 tab(s) orally once a day (20 Dec 2022 11:37)  diazePAM 10 mg oral tablet: 1 tab(s) orally 2 times a day (20 Dec 2022 11:39)  ferrous gluconate 324 mg (38 mg elemental iron) oral tablet: 1 tab(s) orally once a day (20 Dec 2022 11:37)  folic acid 1 mg oral tablet: 1 tab(s) orally once a day (20 Dec 2022 11:35)  hydrOXYzine pamoate 25 mg oral capsule: 1 tab(s) orally once a day (20 Dec 2022 11:38)  LORazepam 2 mg oral tablet: 1 tab(s) orally 2 times a day (20 Dec 2022 11:40)  losartan 50 mg oral tablet: 1 tab(s) orally once a day (20 Dec 2022 11:35)  OXcarbazepine 150 mg oral tablet: 1 tab(s) orally 2 times a day (20 Dec 2022 11:39)  SEROquel 100 mg oral tablet: 1 tab(s) orally once a day (20 Dec 2022 11:37)  Vitamin D3 250 mcg (10,000 intl units) oral capsule: 3 cap(s) orally once a week (20 Dec 2022 11:36)    MEDICATIONS:  MEDICATIONS  (STANDING):  acetaminophen   IVPB .. 750 milliGRAM(s) IV Intermittent once  aspirin  chewable 81 milliGRAM(s) Oral daily  atorvastatin 80 milliGRAM(s) Oral at bedtime  enoxaparin Injectable 40 milliGRAM(s) SubCutaneous every 24 hours  folic acid 1 milliGRAM(s) Oral daily  iron sucrose IVPB 300 milliGRAM(s) IV Intermittent every 24 hours  melatonin 5 milliGRAM(s) Oral at bedtime  multivitamin 1 Tablet(s) Oral daily  thiamine IVPB 500 milliGRAM(s) IV Intermittent every 24 hours    MEDICATIONS  (PRN):    PAST MEDICAL & SURGICAL HISTORY:  Seizures      Migraine      Hip pain, left      Depression      Anxiety      Eating disorder      Bipolar illness      PTSD (post-traumatic stress disorder)      S/P  section        FAMILY HISTORY: htn      SOCIAL HISTORY:  benzo and etoh use     REVIEW OF SYSTEMS:  All other 10 review of systems is negative unless indicated above.    Vital Signs Last 24 Hrs  T(C): 36.9 (23 Dec 2022 09:48), Max: 37.1 (23 Dec 2022 06:45)  T(F): 98.4 (23 Dec 2022 09:48), Max: 98.7 (23 Dec 2022 06:45)  HR: 104 (23 Dec 2022 08:51) (86 - 104)  BP: 123/69 (23 Dec 2022 08:51) (116/57 - 127/67)  BP(mean): 90 (23 Dec 2022 08:51) (79 - 91)  RR: 16 (23 Dec 2022 08:51) (16 - 18)  SpO2: 98% (23 Dec 2022 08:51) (94% - 98%)    Parameters below as of 23 Dec 2022 08:51  Patient On (Oxygen Delivery Method): room air        12- @ 07:01  -  12 @ 07:00  --------------------------------------------------------  IN: 600 mL / OUT: 1300 mL / NET: -700 mL        PHYSICAL EXAM:    General: lying in bed, in no acute distress  HEENT: Neck supple, mmm, no jvd  Lungs: Normal respiratory effort, no intercostal retractions  Cardiovascular: regular rate  Abdomen: Soft, non-tender non-distended; No rebound or guarding  Extremities: wwp, no cce  Skin: Warm and dry. No obvious rash  Rectal: Normal tone, brown stool, no external hemorrhoids    LABS:                        6.9    8.06  )-----------( 517      ( 23 Dec 2022 06:09 )             24.8     12-23    143  |  109<H>  |  8   ----------------------------<  100<H>  3.6   |  26  |  0.67    Ca    8.7      23 Dec 2022 06:09  Phos  3.6       Mg     1.6                   RADIOLOGY & ADDITIONAL STUDIES:     Reviewed

## 2022-12-23 NOTE — PROGRESS NOTE ADULT - ASSESSMENT
Assessment and Recommendation :     Assessment   62F w/ PMH HTN(not on any meds per pt), Migraine, Sz disorder, depression- anxiety, prior stroke Hx in 2018 presented to [facility] ER for frequent falls to [facility] ER, admitted to Medicine for further work up, Stroke Neuro was consulted for frequent falls. Pt poor Vs unreliable historian. CTH w/ subacute / Chronic B/L MICAH infarcts. Unclear chronicity of R sided weakness as pt states she does not use walker and is pending Wheel chair. Stroke team consulted for frequent falls, given unclear Hx and chronicity of the deficits, recommended MRA H/N which showed acute/ sub acute infarcts in L MICAH Territory and L parieto temporal region with mass effect without herniation i/s/o B/L A2 HGS/occlusion and Prox L ICA HGS Vs occlusion. Pt transferred to Stroke tele for further work up and close neuro monitoring.      Neuro  #CVA workup  - continue aspirin 81mg daily, will closely monitor for drop in Hb, if stable will consider adding Plavix to the regimen.  - continue atorvastatin 80mg daily  - q4hr stroke neuro checks and vitals  - MRA H/N :  Acute/ Subacute infarction L MICAH Territory and L parieto temporal region w/local mass effect. B/L A2 segment HGS/ Occlusion and L Proximal ICA HGS Vs Occlusion.  -  HCT Results: Subacute/ chronic B/L MICAH territory infarcts.  - NSGY Cx called for L ICA HGS Vs occlusion- Reccs to obtain CTA H/N and MR Hess, both pending  - Stroke education    Cards  #HTN  - permissive hypertension, Goal -180  - Not on any  home BP meds per pt  - Please avoid Hypotension i/s/o L ICA and B/L A2  HGS.  - TTE : No wall motion abnormalities/ No valvular Dz.  - EKG Results: Isolated V5 AYANA upon admission, with no reciprocal changes, no c/o CP, no c/f ischemia no c/o cp/sob.    #HLD  - high dose statin as above in CVA  - LDL results: 126    Heme  #HANH  MCV and iron labs demonstrate severe iron deficiency likely source is GI  - retic count elevated, hemolysis labs (LDH and haptoglobin) negative  - c/w iron 300mg x3days  - patient never had a colonoscopy, endorses only brown stool   - GI consulted, rec EGD/colonoscopy outpatient. Hold off for now i/s/o recent stroke  - Heme consulted, f/u recs  - active T&S, transfuse for Hb<7    Pulm  - call provider if SPO2 < 94%    GI  #Nutrition/Fluids/Electrolytes   - replete K<4 and Mg <2  - Diet: DASH/ TLC  - IVF: None for now.    Renal  - C/T Trend Bun/Crt.    Infectious Disease  - C/T trend Bun/Crt    Endocrine  - A1C results: 5.8    - TSH results: 0.638    DVT Prophylaxis  - lovenox sq for DVT prophylaxis   - SCDs for DVT prophylaxis       IDR Goals: Goals reviewed at interdisciplinary rounds with case management, social work, physical therapy, occupational therapy, and speech language pathology.   Please see specific therapy  notes for in depth goals.  Dispo: pending PT/ OT eval.     Discussed daily hospital plans and goals with patient.    Discussed with Neurology Attending Dr. Mccartney.

## 2022-12-23 NOTE — PROGRESS NOTE ADULT - PROBLEM SELECTOR PLAN 3
Psych following; I-STOP revewied; avoiding benzos, in setting of abuse hx, per Psych recs; can trial hydroxyzine PRN for acute anxiety (Pt. reports adverse reaction to gabapentin); cont. supportive care

## 2022-12-23 NOTE — BH CONSULTATION LIAISON PROGRESS NOTE - NSBHADMITCOUNSELOTHER_PSY_A_CORE FT
discussion of recommended treatment by primary team for CVA, r/b/a to treatment, risks of leaving AMA  discussion of benzodiazepine use and alternative for management of anxiety, side effects of use

## 2022-12-23 NOTE — CONSULT NOTE ADULT - ATTENDING COMMENTS
GI consulted for anemia    #Microcytic Anemia, likely mixed HANH and AoCD / BM suppression in s/o EtOH use  #CVA's, with goal for permissive htn    Recommendations:  in absence of overt GI bleeding, and newly dx'd CVA with goal of permissive HTN, defer on inpatient endoscopy  Patient needs EGD / Colonoscopy for Anemia  f/u Hep C PCR , outpatient Hepatology f/u if positive  maintain active type and screen, trend HgB, transfusions per primary  Send B12 / Folate level
HEMATOLOGY ATTENDING NOTE    In brief, patient is a 62 year old woman with h/o HTN, migraine, seizure d/o, depression-anxiety, reports fall, admitted for a CVA. Hematology consulted for microcytic anemia in setting of iron deficiency and thrombocytosis. Labs w/o evidence of hemolysis, iron studies and ferritin c/w iron deficiency and thrombocytosis likely reactive to the low iron. Recommend supportive transfusions to keep hgb >7, GI for luminal evaluation to assess for a source of blood loss. Replete iron stores with IV iron sucrose.   Hematology to follow up.

## 2022-12-23 NOTE — CONSULT NOTE ADULT - ASSESSMENT
62F w/ PMH HTN, migraine, seizure d/o, depression-anxiety, per patient multiiple CTA in September w/ complaints of fall, admitted for a CVA. Hematology consulted for anemia.    1.) Anemia, microcytic, 2/2 iron-deficiency  H/H 6.9/24.8 Reticulocyte count 3.3  haptoglobin 231, , T.Bili 0.2  Iron studies: serum iron 10, %saturation 3, ferritin 6  Based on the above labs, patient is severely iron deficient. No signs of hemolysis.  -recommend 300mg x 3days iron sucrose  -recommend GI workup to rule out occult malignancy/malabsorption/occult bleeding  --will review peripheral slide    to be discussed with Dr. Reddy 62F w/ PMH HTN, migraine, seizure d/o, depression-anxiety, per patient multiiple CTA in September w/ complaints of fall, admitted for a CVA. Hematology consulted for anemia.    1.) Anemia, microcytic, 2/2 iron-deficiency  H/H 6.9/24.8 Reticulocyte count 3.3  haptoglobin 231, , T.Bili 0.2  Iron studies: serum iron 10, %saturation 3, ferritin 6  Based on the above labs, patient is severely iron deficient. No signs of hemolysis.  -recommend 300mg x 3days iron sucrose  -Pt has never had a colonoscopy. Would recommend GI workup to rule out occult malignancy/malabsorption/occult bleeding, but this can be done outpatient.  --will review peripheral slide    to be discussed with Dr. Reddy 62F w/ PMH HTN, migraine, seizure d/o, depression-anxiety, per patient multiiple CTA in September w/ complaints of fall, admitted for a CVA. Hematology consulted for anemia.    1.) Anemia, microcytic, 2/2 iron-deficiency  H/H 6.9/24.8 Reticulocyte count 3.3  haptoglobin 231, , T.Bili 0.2  Iron studies: serum iron 10, %saturation 3, ferritin 6  Peripheral blood smear showing anisocytosis, many acanthocytes, rare target cells, very rare schistocytes 0-1/hpf  Based on the above labs, patient is severely iron deficient. No signs of hemolysis.  -recommend 300mg x 3days iron sucrose  -Pt has never had a colonoscopy. Would recommend GI workup to rule out occult malignancy/malabsorption/occult bleeding, but this can be done outpatient.  -Recommend Hematology follow up on discharge.    to be discussed with Dr. Reddy 62F w/ PMH HTN, migraine, seizure d/o, depression-anxiety, per patient multiple CTA in September w/ complaints of fall, admitted for a CVA. Hematology consulted for anemia.    1.) Anemia, microcytic, 2/2 iron-deficiency  H/H 6.9/24.8 Reticulocyte count 3.3  haptoglobin 231, , T.Bili 0.2  platelets slightly elevated, coags wnl  Iron studies: serum iron 10, %saturation 3, ferritin 6  Peripheral blood smear showing anisocytosis, many acanthocytes, rare target cells, very rare schistocytes 0-1/hpf  Based on the above labs, patient is severely iron deficient. No signs of hemolysis.  Denies history of heavy bleeding, poor wound healing, easy bruising, hemarthrosis.  -recommend 300mg x 3days iron sucrose  -Pt has never had a colonoscopy. Would recommend GI workup to rule out occult malignancy/malabsorption/occult bleeding, but this can be done outpatient.  -Recommend Hematology follow up on discharge.  -Maintain active type and screen  -Transfuse for Hb <7    to be discussed with Dr. Reddy 62F w/ PMH HTN, migraine, seizure d/o, depression-anxiety, per patient multiple CTA in September w/ complaints of fall, admitted for a CVA. Hematology consulted for anemia.    1.) Anemia, microcytic, 2/2 iron-deficiency  H/H 6.9/24.8 Reticulocyte count 3.3  haptoglobin 231, , T.Bili 0.2  platelets slightly elevated, coags wnl  Iron studies: serum iron 10, %saturation 3, ferritin 6  Peripheral blood smear showing anisocytosis, many acanthocytes, rare target cells, very rare schistocytes 0-1/hpf  Based on the above labs, patient is severely iron deficient. No signs of hemolysis.  Denies history of heavy bleeding, poor wound healing, easy bruising, hemarthrosis.  -continue 300mg x 3days iron sucrose  -Pt has never had a colonoscopy. Would recommend GI workup to rule out occult malignancy/malabsorption/occult bleeding, but this can be done outpatient.  -Recommend Hematology follow up on discharge.  -Maintain active type and screen  -Transfuse for Hb <7    to be discussed with Dr. Reddy

## 2022-12-23 NOTE — BH CONSULTATION LIAISON PROGRESS NOTE - NSBHCHARTREVIEWLAB_PSY_A_CORE FT
6.9    8.06  )-----------( 517      ( 23 Dec 2022 06:09 )             24.8     12-23    143  |  109<H>  |  8   ----------------------------<  100<H>  3.6   |  26  |  0.67    Ca    8.7      23 Dec 2022 06:09  Phos  3.6     12-23  Mg     1.6     12-23    
                      6.9    11.08 )-----------( 540      ( 21 Dec 2022 07:12 )             24.6     12-21    139  |  108  |  8   ----------------------------<  113<H>  4.0   |  23  |  0.62    Ca    8.8      21 Dec 2022 07:12  Phos  3.2     12-21  Mg     1.7     12-21    TPro  6.2  /  Alb  3.3  /  TBili  0.2  /  DBili  x   /  AST  20  /  ALT  24  /  AlkPhos  86  12-21    TSH WNL  iron level 10    utox +benzodiazepines and barbituates

## 2022-12-23 NOTE — BH CONSULTATION LIAISON PROGRESS NOTE - OTHER
occasionally lability lying diagonally across bed no impulsivity during interview confused content about reason for hospitalization, frequently inconsistent and contradictory in provided hx. vague anxiety, sadness about son's death. no voiced delusions or paranoia. No SI/HI somewhat more coherent than prior, still inconsistent and illogical

## 2022-12-23 NOTE — CONSULT NOTE ADULT - SUBJECTIVE AND OBJECTIVE BOX
Patient is a 62y old  Female who presents with a chief complaint of mechanical fall (22 Dec 2022 12:00)        HPI:  62F w/ PMH HTN, migraine, seizure d/o, depression-anxiety, per patient multiiple CTA in September w/ complaints of fall. Patient states that fall occurred while home, when syncopized, with LOC with head trauma. Pt reports no alcohol use, never smoker, received HHA 4 hours daily (every day). Has two adult children (one of whome is  and one living parent). Patient is a poor historian, does not recall exact events leading to fall. Patient also notes the need to ambulate via wheelchair in home. States to have been admitted to acute rehab s/p CVA in fall of . States to be "addicted to valium" utilizing 1 10mg tablet once every other day. States health aide is not with her all the time. Patient notes no continued need for antihypertensives (on multiple previously) as they caused dizziness. Notes that dizziness has not resolved and notes room spinning with changes in head position.     Initial vital signs: T: 98 F, HR: 93, BP: 110/71, R: 16, SpO2: 100% on RA    Labs: significant for WBC 12.91, Hgb 8, MCV 69, Plt 567, aPTT 25.1, K 3 s/p 40 meq PO in ED, Cr 0.67, remainder of CMP WNL, CECILIA <10,     CXR: No acute pulmonary pathology. Dextroscholiosis noted.     CT C-spine: Ankylosis of the C4 and C5 vertebrae is noted. There is moderate multilevel degenerative disc disease. No acute osseous abnormality of the cervical spine.    CTH: No acute intracranial hemorrhage or calvarial fracture. Subacute/chronic bilateral MICAH territory infarcts.    CT T-spine: No acute fracture. Patchy bilateral pulmonary nodular opacities, likely infectious/inflammatory. Moderate hiatal hernia.    EKG:     Medications: tylenol 750mg PO x1, KCl 40meq x1    Consults: none  (20 Dec 2022 04:52)      PAST MEDICAL & SURGICAL HISTORY:  Seizures      Migraine      Hip pain, left      Depression      Anxiety      Eating disorder      Bipolar illness      PTSD (post-traumatic stress disorder)      S/P  section          MEDICATIONS  (STANDING):  acetaminophen   IVPB .. 750 milliGRAM(s) IV Intermittent once  aspirin  chewable 81 milliGRAM(s) Oral daily  atorvastatin 80 milliGRAM(s) Oral at bedtime  enoxaparin Injectable 40 milliGRAM(s) SubCutaneous every 24 hours  folic acid 1 milliGRAM(s) Oral daily  iron sucrose IVPB 300 milliGRAM(s) IV Intermittent every 24 hours  melatonin 5 milliGRAM(s) Oral at bedtime  multivitamin 1 Tablet(s) Oral daily  thiamine IVPB 500 milliGRAM(s) IV Intermittent every 24 hours    MEDICATIONS  (PRN):        Social History:                   -  Home Living Status :  lives alone in an elevator accessible apartment building            -  Prior Home Care Services :  4 hrs x 7 days     Baseline Functional Level Prior to Admission :             - ADL's/ IADL's :  partial assist            - Ambulatory status prior to admission :   walked with a walker , h/o falls       FAMILY HISTORY:  No pertinent family history in first degree relatives      CBC Full  -  ( 23 Dec 2022 06:09 )  WBC Count : 8.06 K/uL  RBC Count : 3.44 M/uL  Hemoglobin : 6.9 g/dL  Hematocrit : 24.8 %  Platelet Count - Automated : 517 K/uL  Mean Cell Volume : 72.1 fl  Mean Cell Hemoglobin : 20.1 pg  Mean Cell Hemoglobin Concentration : 27.8 gm/dL  Auto Neutrophil # : x  Auto Lymphocyte # : x  Auto Monocyte # : x  Auto Eosinophil # : x  Auto Basophil # : x  Auto Neutrophil % : x  Auto Lymphocyte % : x  Auto Monocyte % : x  Auto Eosinophil % : x  Auto Basophil % : x          143  |  109<H>  |  8   ----------------------------<  100<H>  3.6   |  26  |  0.67    Ca    8.7      23 Dec 2022 06:09  Phos  3.6       Mg     1.6         TPro  6.5  /  Alb  3.5  /  TBili  0.2  /  DBili  <0.2  /  AST  18  /  ALT  23  /  AlkPhos  97              Radiology :     < from: CT Head No Cont (22 @ 00:46) >  ACC: [id number] EXAM:  CT BRAIN                          PROCEDURE DATE:  2022          INTERPRETATION:  PROCEDURE: CT head without intravenous contrast    INDICATION: Trauma    TECHNIQUE: Serial axial images were obtained from the skull base to the   vertex without the use of intravenous contrast. Coronal and sagittal   reconstructions were created.    PRIOR STUDIES: CT head dated 2017    FINDINGS:  VENTRICLES AND SULCI: Interval increase in the size of the ventricles   bilaterally secondary to parenchymal volume loss. No hydrocephalus.  EXTRA-AXIAL: No extra-axial fluid collection is present.  INTRA-AXIAL: Loss of the gray-white differentiation in the left parietal   lobe with mild mass effect, likely subacute/chronic infarction.Focal   hyperdensity within this region (series 3 image 27 and series 6 image 40)   probably represents focal cortical laminar necrosis. Additional regions   of encephalomalacia/gliosis without mass effect involving the bilateral   frontal lobes and right parietal lobe in the MICAH distribution are most   consistent with subacute/chronic infarctions. Chronic lacunar infarction   involving the left caudate nucleus. No midline shift. All of these   findings are new since 2017.  VISUALIZED ORBITS: Within normal limits.  VISUALIZED SINUSES: No air-fluid levels.  VISUALIZED MASTOIDS: Well aerated.  CALVARIUM: No fracture.  MISCELLANEOUS:  Right supraorbital soft tissue swelling.    IMPRESSION:  1.  No acute intracranial hemorrhage or calvarial fracture.  2.  Subacute/chronic bilateral MICAH territory infarcts.        < from: CT Cervical Spine No Cont (22 @ 00:50) >  ACC: [id number] EXAM:  CT CERVICAL SPINE                          PROCEDURE DATE:  2022          INTERPRETATION:  CERVICAL SPINE CT WITHOUT CONTRAST dated 2022   12:50 AM    CLINICAL STATEMENT: Trauma Code.    TECHNIQUE: Contiguous noncontrast transaxial CT images were obtained   through the cervical spine. Reconstructions were then created in the   axial, sagittal, and coronal planes.    COMPARISON: None.    FINDINGS:    Stranding of the normal cervical lordosis is present. No acute fracture   or spondylolisthesis is identified. Ankylosis of the C4 and C5 vertebrae   is noted. There is moderate multilevel degenerative disc disease. No   prevertebral soft tissue swelling is demonstrated.    No large disc herniation or significant central canal stenosis is seen.   There is mild multilevel bony neural foraminal narrowing related to   uncovertebral and facet hypertrophy.    IMPRESSION:    No acute osseous abnormality of the cervical spine.          Vital Signs Last 24 Hrs  T(C): 37.1 (23 Dec 2022 06:45), Max: 37.1 (23 Dec 2022 06:45)  T(F): 98.7 (23 Dec 2022 06:45), Max: 98.7 (23 Dec 2022 06:45)  HR: 86 (23 Dec 2022 06:30) (86 - 100)  BP: 127/65 (23 Dec 2022 06:30) (116/57 - 127/67)  BP(mean): 88 (23 Dec 2022 06:30) (79 - 91)  RR: 17 (23 Dec 2022 06:30) (17 - 18)  SpO2: 98% (23 Dec 2022 06:30) (94% - 98%)    Parameters below as of 23 Dec 2022 06:30  Patient On (Oxygen Delivery Method): room air            REVIEW OF SYSTEMS:  per hpi         Physical Exam:  frail 62 y o woman lying comfortably in semi Skinner's position , awake , alert , no current complaints     Head : normocephalic , atraumatic    Eyes : PERRLA , EOMI , no nystagmus , sclera anicteric    ENT : nasal discharge , uvula midline , no oropharyngeal erythema / exudate    Neck : supple , negative JVD , negative carotid bruits , no thyromegaly    Chest : CTA bilaterally , neg wheeze / rhonchi / rales / crackles / egophany    Cardiovascular: regular rate and rhythm , neg murmurs / rubs / gallops    Abdomen : soft , non distended , non tender to palpation in all 4 quadrants , negative rebound / guarding , normal bowel sounds    Extremities : WWP , neg cyanosis /clubbing / edema      Neurologic Exam:    Alert and oriented to person , place , date/year, speech fluent w/o dysarthria , follows commands , recent and remote memory intact , repetition intact , comprehension intact ,  attention/concentration intact , fund of knowledge appropriate    Cranial Nerves:     II :                         pupils equal , round and reactive to light , visual fields intact   III/ IV/VI :              extraocular movements intact , neg nystagmus , neg ptosis  V :                        facial sensation intact , V1-3 normal  VII :                      face symmetric , no droop , normal eye closure and smile  VIII :                     hearing intact to finger rub bilaterally  IX and X :             no hoarseness , gag intact , palate/ uvula rise symmetrically  XI :                       SCM / trapezius strength intact bilateral  XII :                      no tongue deviation    Motor Exam:          Right UE:               no focal weakness ,  > 3+/5 throughout  , R drift                                Left UE:                 no focal weakness,  > 3+/5 throughout  , no drift         Right LE:                no focal weakness,  > 3+/5 throughout        Left LE:                  no focal weakness,  > 3+/5 throughout            Sensation:         intact to light touch x 4 extremities                         no neglect or extinction on double simultaneous testing                       DTR :                     biceps/brachioradialis : equal                                              patella/ankle : equal                                                                               neg Babinski       Coordination :      Finger to Nose :  neg dysmetria bilaterally     Gait :  not tested        PM&R Impression : s/p mechanical fall , no acute findings on CT brain and MSK XR     1) deconditioned    2) no focal weakness      Recommendations / Plan :     1) Physical / Occupational therapy focusing on therapeutic exercises , equipment evaluation , bed mobility/transfer out of bed evaluation , progressive ambulation with assistive devices prn .    2) Current disposition plan recommendation  :    subacute rehab placement

## 2022-12-23 NOTE — PROGRESS NOTE ADULT - SUBJECTIVE AND OBJECTIVE BOX
Patient is a 62y old  Female who presents with a chief complaint of mechanical fall (23 Dec 2022 13:57)    HPI/Hospital Course:  62F w/ PMH HTN(not on any meds per pt), Migraine, Sz disorder, depression- anxiety, prior stroke Hx in  presented to [facility] ER for frequent falls and was admitted to medicine for further work up. Per pt was in kitchen attempting to cook, lost balance, fell with head strike w/ LOC denies any other injuries, R forehead bruise +, called EMS and was brought to [facility]. Also admits to be dizzy and had B/L finger numbness immediately post fall which resolved in couple of minutes. Pt lives alone by herself, states she takes pain meds (unclear of the name), admits to taking ambien, valium (prescribed by her psychiatrist) several times a week. Per pt, has been having intermittent HA/not feeling well and unable to walk 2/2 gait instability X atleast 1.5years. Has frequent falls and admits to falling everyday. States she does not use walker/ cane to walk, she is awaiting her W/C. Also noted to be forgetful. CTH done in ER w/Subacute/chronic bilateral MICHA territory infarcts. Pt with R sided weakness, states thats her baseline since her prior strokes and noted worsening weakness X atleast 1year. Recommended MRA H/N which showed acute/ subacute infarction in L MICAH territory and L parietal region, with mild local mass effect. B/L A2 HGS/ Occlusion and L ICA focal lack of flow related signal within the proximal L ICA 2/2 HGS VS Occlusion. PT accepted to Stroke telemetry for further work up. Pt is on ASA/ statin at this point, 2/2 downtrending H/H, but no GIB. No BM since this hospitalisation per pt. Has HANH, likely non complaint with her home iron regimen. Iron started by medicine team. Pt c/o generalised body aches and is requesting pain meds, states she takes Benzos @ home for her pain. NeuroSx consulted for ICAD/ L ICA HGS Vs occlusion. Reccs to obtain CTA H/N and MR Hess. Pt allergic to IV contrast/ Iodine based dye. Will clarify with pt about allergy and order CTA H/N. Will watch her on ASA and consider addition of plavix to the regimen. Will hold off on TTE/ KAY until NSGY eval and CTA as her recent infarcts are likely due to her ICAD.     Pt with waxing/ waning mental status and short term memory loss. EKG with isolated V5 AYANA on admission with no reciprocal changes, No CP/SOB.     Regarding her Stroke Hx : per pt had 3 strokes in 2018 with residual R sided weakness, had stroke care @ [facility], unsure of mechanism of stroke, was started only on ASA which was stopped X 1year ago.  Also states she is not on any BP meds anymore as her PCP D/C'd it since there was no need for it.    Review of Systems: 12 point review of systems otherwise negative    PAST MEDICAL & SURGICAL HISTORY:  Seizures      Migraine      Hip pain, left      Depression      Anxiety      Eating disorder      Bipolar illness      PTSD (post-traumatic stress disorder)      S/P  section    Social History:  non-smoker    FAMILY HISTORY:  No h/o CVA in first degree relatives      MEDICATIONS  (STANDING):  acetaminophen   IVPB .. 750 milliGRAM(s) IV Intermittent once  aspirin  chewable 81 milliGRAM(s) Oral daily  atorvastatin 80 milliGRAM(s) Oral at bedtime  folic acid 1 milliGRAM(s) Oral daily  iron sucrose IVPB 300 milliGRAM(s) IV Intermittent every 24 hours  melatonin 5 milliGRAM(s) Oral at bedtime  multivitamin 1 Tablet(s) Oral daily  pantoprazole    Tablet 40 milliGRAM(s) Oral before breakfast  thiamine IVPB 500 milliGRAM(s) IV Intermittent every 24 hours    MEDICATIONS  (PRN):      Allergies    Compazine (Unknown)  contrast media (iodine-based) (Unknown)  iv contrast (Unknown)  penicillin (Unknown)  penicillins (Other)  Toradol (Unknown)    Intolerances          Vital Signs Last 24 Hrs  T(C): 37 (23 Dec 2022 14:07), Max: 37.1 (23 Dec 2022 06:45)  T(F): 98.6 (23 Dec 2022 14:07), Max: 98.7 (23 Dec 2022 06:45)  HR: 96 (23 Dec 2022 12:12) (86 - 104)  BP: 138/70 (23 Dec 2022 12:12) (116/57 - 138/70)  BP(mean): 96 (23 Dec 2022 12:12) (79 - 96)  RR: 17 (23 Dec 2022 12:12) (16 - 18)  SpO2: 95% (23 Dec 2022 12:12) (94% - 98%)    Parameters below as of 23 Dec 2022 12:12  Patient On (Oxygen Delivery Method): room air      CAPILLARY BLOOD GLUCOSE           @ 07:01  -   @ 07:00  --------------------------------------------------------  IN: 600 mL / OUT: 1300 mL / NET: -700 mL        Physical Exam:  (earlier today)  Daily     Daily   General:  comfortable-appearing in NAD, calm and cooperative  HEENT:  MMM  CV:  RRR  Lungs:  CTA B/L  Abdomen:  soft NT ND  Extremities:  no edema B/L LE  Skin:  WWP, no visible sweat  Neuro:  AAOx3, no dysarthria, no tremor    LABS:                        6.9    8.06  )-----------( 517      ( 23 Dec 2022 06:09 )             24.8         143  |  109<H>  |  8   ----------------------------<  100<H>  3.6   |  26  |  0.67    Ca    8.7      23 Dec 2022 06:09  Phos  3.6       Mg     1.6

## 2022-12-23 NOTE — BH CONSULTATION LIAISON PROGRESS NOTE - NSBHCHARTREVIEWINVESTIGATE_PSY_A_CORE FT
MRA Head/Neck 12/21  IMPRESSION:  1.  Acute/subacute infarction in the left MICAH territory as well as the   left parietal temporal region. Mild local mass effect without herniation.    2.  MRA brain -lack of flow related signal within the mid to distal   bilateral A2 segments consistent with high-grade stenosis/occlusion.   Attenuated flow-related signal in the left A1 segment which may be due to   stenosis versus hypoplasia.    3.  MRA neck: Focal lack of flow related signal within the proximal left   internal carotid artery likely due to high-grade stenosis versus   occlusion. This can be further evaluated with dedicated CTA head and neck.  
no ekg seen in emr

## 2022-12-23 NOTE — PROGRESS NOTE ADULT - ATTENDING COMMENTS
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  MRI showed chronic R MICAH stroke as well as acute/subacute on chronic L MICAH and acute/subacute L parietotempraol strokes in the setting of L ICA severe stenosis/occlusion and MICAH severe stenosis/occlusion.  The patient is unable to give details regarding her prior stroke history.  Appreciate neurosurgery input- plan for CTA head/neck, NOVA. Given mechanism is likely related to large vessel disease, will defer KAY. Given prior stroke at young age, will obtain hypercoag studies.     Patient reports longstanding h/o iron-deficiency anemia of unclear source. Will consult GI to r/o GI bleed given her need for repeat transfusions this admission. If unremarkable, will consider hematology workup. Will cautiously continue Aspirin w PPI. If she requires transfusion despite PPI, will need to consider stopping it. Continue Fe infusions. Ideally, patient would also be on Plavix, but given down-trending hemoglobin, will hold for now. Continue statin ().     Will avoid narcotics, benzos as able- CIWA protocol. Will consult [facility] to assess capacity given patient’s multiple threats to leave AMA and to address h/o anxiety/depression, substance use. 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  MRI showed chronic R MICAH stroke as well as acute/subacute on chronic L MICAH and acute/subacute L parietotempraol strokes in the setting of L ICA severe stenosis/occlusion and MICAH severe stenosis/occlusion.  The patient is unable to give details regarding her prior stroke history.  Appreciate neurosurgery input- plan for CTA head/neck, NOVA. Given mechanism is likely related to large vessel disease, will defer KAY. Given prior stroke at young age, will obtain hypercoag studies.     Patient reports longstanding h/o iron-deficiency anemia of unclear source. Will consult GI to r/o GI bleed given her need for repeat transfusions this admission. If unremarkable, will consider hematology workup. Will cautiously continue Aspirin w PPI. If she requires transfusion despite PPI, will need to consider stopping it. Continue Fe infusions. Ideally, patient would also be on Plavix, but given down-trending hemoglobin, will hold for now. Continue statin (). -180 (clinically stable at 's)    Will avoid narcotics, benzos as able- CIWA protocol. Will consult [facility] to assess capacity given patient’s multiple threats to leave AMA and to address h/o anxiety/depression, substance use.

## 2022-12-23 NOTE — PROGRESS NOTE ADULT - SUBJECTIVE AND OBJECTIVE BOX
OVERNIGHT EVENTS: SYED    SUBJECTIVE / INTERVAL HPI: Patient seen and examined at bedside. No new complaints, states that she continues to feel "crappy". Requesting benzos. Tired with ongoing weakness     VITAL SIGNS:  Vital Signs Last 24 Hrs  T(C): 37 (23 Dec 2022 14:07), Max: 37.1 (23 Dec 2022 06:45)  T(F): 98.6 (23 Dec 2022 14:07), Max: 98.7 (23 Dec 2022 06:45)  HR: 88 (23 Dec 2022 15:40) (86 - 104)  BP: 140/74 (23 Dec 2022 15:40) (123/69 - 140/74)  BP(mean): 100 (23 Dec 2022 15:40) (88 - 100)  RR: 14 (23 Dec 2022 15:40) (14 - 17)  SpO2: 98% (23 Dec 2022 15:40) (95% - 98%)    Parameters below as of 23 Dec 2022 15:40  Patient On (Oxygen Delivery Method): room air      I&O's Summary    22 Dec 2022 07:01  -  23 Dec 2022 07:00  --------------------------------------------------------  IN: 600 mL / OUT: 1300 mL / NET: -700 mL        PHYSICAL EXAM:    General: WDWN  HEENT:; PERRL, anicteric sclera; MMM  Cardiovascular: +S1/S2; RRR  Respiratory: CTA B/L; no W/R/R  Gastrointestinal: soft, NT/ND; +BSx4  Extremities: WWP; no edema, clubbing or cyanosis  Vascular: 2+ radial, DP/PT pulses B/L  Neurologic:  -Mental status: Awake, alert, oriented to person, place, and time (year w/ choices). Speech is fluent with intact naming, repetition, and comprehension, no dysarthria. Follows commands. Attention/concentration intact. Fund of knowledge appropriate. Forgetful.    -Cranial nerves:   II: Visual fields are full to confrontation.  III, IV, VI: Extraocular movements are intact without nystagmus. Pupils equally round and reactive to light.  V:  Facial sensation V1-V3 equal and intact   VII: Face is symmetric with normal eye closure and smile  VIII: Hearing is bilaterally intact to finger rub  IX, X: Uvula is midline and soft palate rises symmetrically  XI: Head turning and shoulder shrug are intact.  XII: Tongue protrudes midline  Motor: Normal bulk and tone. RUE : antigravity, Pronator drift +, but able to hold it up for 10seconds. RLE : Briefly antigravity, hit the bed and unable to hold for count of 5. LUE/ LLE : 5/5.   Sensation: Intact to light touch bilaterally. No neglect or extinction on double simultaneous testing.  Coordination: No dysmetria on finger-to-nose out of proportion to weakness.   Reflexes: Downgoing toes bilaterally   Gait: Deferred.    MEDICATIONS:  MEDICATIONS  (STANDING):  acetaminophen   IVPB .. 750 milliGRAM(s) IV Intermittent once  aspirin  chewable 81 milliGRAM(s) Oral daily  atorvastatin 80 milliGRAM(s) Oral at bedtime  folic acid 1 milliGRAM(s) Oral daily  iron sucrose IVPB 300 milliGRAM(s) IV Intermittent every 24 hours  melatonin 5 milliGRAM(s) Oral at bedtime  mirtazapine 7.5 milliGRAM(s) Oral at bedtime  multivitamin 1 Tablet(s) Oral daily  pantoprazole    Tablet 40 milliGRAM(s) Oral before breakfast  thiamine IVPB 500 milliGRAM(s) IV Intermittent every 24 hours    MEDICATIONS  (PRN):  hydrOXYzine hydrochloride 25 milliGRAM(s) Oral three times a day PRN Anxiety      ALLERGIES:  Allergies    Compazine (Unknown)  contrast media (iodine-based) (Unknown)  iv contrast (Unknown)  penicillin (Unknown)  penicillins (Other)  Toradol (Unknown)    Intolerances        LABS:                        6.9    8.06  )-----------( 517      ( 23 Dec 2022 06:09 )             24.8     12-23    143  |  109<H>  |  8   ----------------------------<  100<H>  3.6   |  26  |  0.67    Ca    8.7      23 Dec 2022 06:09  Phos  3.6     12-23  Mg     1.6     12-23          CAPILLARY BLOOD GLUCOSE          RADIOLOGY & ADDITIONAL TESTS: Reviewed.

## 2022-12-23 NOTE — BH CONSULTATION LIAISON PROGRESS NOTE - NSBHASSESSMENTFT_PSY_ALL_CORE
63yo woman, domiciled alone, retired actor, with a self-reported history of chronic anxiety, insomnia, new reported depression and cognitive deficits s/p CVAs (?2018), benzodiazepine use disorder, ?alcohol use disorder (per prior chart), ?opiate use disorder in remission, migraines, ?childhood seizure d/o and PNES, who presents with ongoing neurocognitive deficits, particularly short-term memory deficits and ability to logically reason, in setting of hospitalization for evaluation s/p fall, now found with new CVA. Pt demonstrates very poor insight into the reason for her ongoing hospitalization, with no understanding of diagnosis of new stroke, recommended additional testing and possible interventions, and no appreciation of the reported significant risks of leaving the hospital against medical advice.     Additionally, pt remains highly focused on receiving benzodiazepines for vague anxiety sx and insomnia, with poor insight into her benzodiazepine use disorder (with illicit in addition to prescribed use as outpt) and considerable risks of continued use, including fall risk, risk of additional cognitive impairment, risk in combination with many other medications listed for outpt use. Some depressive sx may also be present, likely chronic related to son's death (pt not forthcoming about this loss or particular sx), without evidence of a major depressive episode, erich, or psychosis. Post-stroke mood and cognitive changes are also likely. Would encourage continued education about benzodiazepine use, coordination with outpt prescribers (family med NP per ISTOP), and referral to outpt dual diagnosis treatment. Pt declines non-benzodiazepines for mood/anxiety sx. No acute safety concerns that would warrant inpatient level psychiatric care.    RECOMMENDATIONS  -pt currently LACKS capacity to leave the hospital AMA or refuse any acutely indicated testing or treatment. encourage involvement of surrogate decision maker - pt cites her mother as desired HCP (unknown if formally designated)  -no need for 1:1 observation for suicidality or indication for psychiatric admission. Supervised room for elopement risk as per primary team discretion  -CIWA monitoring for benzodiazepine withdrawal   -discourage continued use of benzodiazepines or hypnotics given substance use disorder with ongoing alprazolam abuse, neurocognitive disorder, and likely contribution to fall risk  -consider mirtazapine 7.5mg po QHS PRN for insomnia - would administered earlier in night if able due to sedation this morning  -consider hydroxyzine PRN for acute anxiety - pt cites adverse reaction to gabapentin  -agree with thiamine supplementation  -evaluation for potential additional contributors to neurocognitive decline (TSH WNL, check  B12, folate, RPR). Consider HIV testing (EMS reports indicates PMH)  -delirium precautions  -encourage communication with outpt prescribers of benzodiazepines and hypnotics re: recommendation to discontinue use  -could benefit from SSRI but declines use - recommend additional evaluation as outpt  -encourage engagement in outpt psychiatric care - pt reports weekly psychotherapy  -no psychiatric barrier to discharge when medically ready. 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 Pt demonstrates very poor insight into the reason for her ongoing hospitalization, with no understanding of diagnosis of new stroke, recommended additional testing and possible interventions, and no appreciation of the reported significant risks of leaving the hospital against medical advice.     Additionally, pt remains highly focused on receiving benzodiazepines for vague anxiety sx and insomnia, with poor insight into her benzodiazepine use disorder (with illicit in addition to prescribed use as outpt) and considerable risks of continued use, including fall risk, risk of additional cognitive impairment, risk in combination with many other medications listed for outpt use. Some depressive sx may also be present, likely chronic related to son's death (pt not forthcoming about this loss or particular sx), without evidence of a major depressive episode, erich, or psychosis. Post-stroke mood and cognitive changes are also likely. Would encourage continued education about benzodiazepine use, coordination with outpt prescribers (family med NP per ISTOP), and referral to outpt dual diagnosis treatment. Pt declines non-benzodiazepines for mood/anxiety sx. No acute safety concerns that would warrant inpatient level psychiatric care.    RECOMMENDATIONS  -pt currently LACKS capacity to leave the hospital AMA or refuse any acutely indicated testing or treatment. encourage involvement of surrogate decision maker - pt cites her mother as desired HCP (unknown if formally designated)  -no need for 1:1 observation for suicidality or indication for psychiatric admission. Supervised room for elopement risk as per primary team discretion  -CIWA monitoring for benzodiazepine withdrawal   -discourage continued use of benzodiazepines or hypnotics given substance use disorder with ongoing alprazolam abuse, neurocognitive disorder, and likely contribution to fall risk  -consider mirtazapine 7.5mg po QHS PRN for insomnia - would administered earlier in night if able due to sedation this morning  -consider hydroxyzine PRN for acute anxiety - pt cites adverse reaction to gabapentin  -agree with thiamine supplementation  -evaluation for potential additional contributors to neurocognitive decline (TSH WNL, check  B12, folate, RPR). Consider HIV testing (EMS reports indicates PMH)  -delirium precautions  -encourage communication with outpt prescribers of benzodiazepines and hypnotics re: recommendation to discontinue use  -could benefit from SSRI but declines use - recommend additional evaluation as outpt  -encourage engagement in outpt psychiatric care - pt reports weekly psychotherapy  -no psychiatric barrier to discharge when medically ready. Please contact with questions

## 2022-12-23 NOTE — CONSULT NOTE ADULT - SUBJECTIVE AND OBJECTIVE BOX
Hematology Consult Note    HPI:  62F w/ PMH HTN, migraine, seizure d/o, depression-anxiety, per patient multiiple CTA in September w/ complaints of fall. Patient states that fall occurred while home, when syncopized, with LOC with head trauma. Pt reports no alcohol use, never smoker, received HHA 4 hours daily (every day). Has two adult children (one of whome is  and one living parent). Patient is a poor historian, does not recall exact events leading to fall. Patient also notes the need to ambulate via wheelchair in home. States to have been admitted to acute rehab s/p CVA in fall of . States to be "addicted to valium" utilizing 1 10mg tablet once every other day. States health aide is not with her all the time. Patient notes no continued need for antihypertensives (on multiple previously) as they caused dizziness. Notes that dizziness has not resolved and notes room spinning with changes in head position.     Initial vital signs: T: 98 F, HR: 93, BP: 110/71, R: 16, SpO2: 100% on RA    Labs: significant for WBC 12.91, Hgb 8, MCV 69, Plt 567, aPTT 25.1, K 3 s/p 40 meq PO in ED, Cr 0.67, remainder of CMP WNL, CECILIA <10,     CXR: No acute pulmonary pathology. Dextroscholiosis noted.     CT C-spine: Ankylosis of the C4 and C5 vertebrae is noted. There is moderate multilevel degenerative disc disease. No acute osseous abnormality of the cervical spine.    CTH: No acute intracranial hemorrhage or calvarial fracture. Subacute/chronic bilateral MICAH territory infarcts.    CT T-spine: No acute fracture. Patchy bilateral pulmonary nodular opacities, likely infectious/inflammatory. Moderate hiatal hernia.    EKG:     Medications: tylenol 750mg PO x1, KCl 40meq x1    Consults: none  (20 Dec 2022 04:52)      Allergies    Compazine (Unknown)  contrast media (iodine-based) (Unknown)  iv contrast (Unknown)  penicillin (Unknown)  penicillins (Other)  Toradol (Unknown)    Intolerances        MEDICATIONS  (STANDING):  acetaminophen   IVPB .. 750 milliGRAM(s) IV Intermittent once  aspirin  chewable 81 milliGRAM(s) Oral daily  atorvastatin 80 milliGRAM(s) Oral at bedtime  folic acid 1 milliGRAM(s) Oral daily  iron sucrose IVPB 300 milliGRAM(s) IV Intermittent every 24 hours  melatonin 5 milliGRAM(s) Oral at bedtime  mirtazapine 7.5 milliGRAM(s) Oral at bedtime  multivitamin 1 Tablet(s) Oral daily  pantoprazole    Tablet 40 milliGRAM(s) Oral before breakfast  thiamine IVPB 500 milliGRAM(s) IV Intermittent every 24 hours    MEDICATIONS  (PRN):  hydrOXYzine hydrochloride 25 milliGRAM(s) Oral three times a day PRN Anxiety      PAST MEDICAL & SURGICAL HISTORY:  Seizures  Migraine  Hip pain, left  Depression  Anxiety  Eating disorder  Bipolar illness  PTSD (post-traumatic stress disorder)  S/P  section      FAMILY HISTORY:  No pertinent family history in first degree relatives        SOCIAL HISTORY: No EtOH, no tobacco    REVIEW OF SYSTEMS:    CONSTITUTIONAL: No weakness, fevers or chills  EYES/ENT: No visual changes;  No vertigo or throat pain   NECK: No pain or stiffness  RESPIRATORY: No cough, wheezing, hemoptysis; No shortness of breath  CARDIOVASCULAR: No chest pain or palpitations  GASTROINTESTINAL: No abdominal or epigastric pain. No nausea, vomiting, or hematemesis; No diarrhea or constipation. No melena or hematochezia.  GENITOURINARY: No dysuria, frequency or hematuria  NEUROLOGICAL: No numbness or weakness  SKIN: No itching, burning, rashes, or lesions   All other review of systems is negative unless indicated above.        T(F): 98.6 (22 @ 14:07), Max: 98.7 (22 @ 06:45)  HR: 88 (22 @ 15:40)  BP: 140/74 (22 @ 15:40)  RR: 14 (22 @ 15:40)  SpO2: 98% (22 @ 15:40)  Wt(kg): --    GENERAL: NAD  HEAD:  Atraumatic, Normocephalic  EYES: EOMI, PERRLA, conjunctiva and sclera clear  NECK: Supple, No JVD  CHEST/LUNG: Clear to auscultation bilaterally; No wheeze  HEART: Regular rate and rhythm; No murmurs, rubs, or gallops  ABDOMEN: Soft, Nontender, Nondistended; Bowel sounds present  EXTREMITIES:  2+ Peripheral Pulses, No clubbing, cyanosis, or edema  NEUROLOGY: non-focal  SKIN: No rashes or lesions                          6.9    8.06  )-----------( 517      ( 23 Dec 2022 06:09 )             24.8           143  |  109<H>  |  8   ----------------------------<  100<H>  3.6   |  26  |  0.67    Ca    8.7      23 Dec 2022 06:09  Phos  3.6       Mg     1.6             Magnesium, Serum: 1.6 mg/dL ( @ 06:09)  Phosphorus Level, Serum: 3.6 mg/dL ( @ 06:09)  Haptoglobin, Serum: 231 mg/dL ( @ 06:09)  Lactate Dehydrogenase, Serum: 303 U/L ( @ 06:09)       Hematology Consult Note    HPI:  62F w/ PMH HTN, migraine, seizure d/o, depression-anxiety, per patient multiiple CTA in September w/ complaints of fall. Patient states that fall occurred while home, when syncopized, with LOC with head trauma. Pt reports no alcohol use, never smoker, received HHA 4 hours daily (every day). Has two adult children (one of whome is  and one living parent). Patient is a poor historian, does not recall exact events leading to fall. Patient also notes the need to ambulate via wheelchair in home. States to have been admitted to acute rehab s/p CVA in fall of . States to be "addicted to valium" utilizing 1 10mg tablet once every other day. States health aide is not with her all the time. Patient notes no continued need for antihypertensives (on multiple previously) as they caused dizziness. Notes that dizziness has not resolved and notes room spinning with changes in head position.     Initial vital signs: T: 98 F, HR: 93, BP: 110/71, R: 16, SpO2: 100% on RA    Labs: significant for WBC 12.91, Hgb 8, MCV 69, Plt 567, aPTT 25.1, K 3 s/p 40 meq PO in ED, Cr 0.67, remainder of CMP WNL, CECILIA <10,     CXR: No acute pulmonary pathology. Dextroscholiosis noted.     CT C-spine: Ankylosis of the C4 and C5 vertebrae is noted. There is moderate multilevel degenerative disc disease. No acute osseous abnormality of the cervical spine.    CTH: No acute intracranial hemorrhage or calvarial fracture. Subacute/chronic bilateral MICAH territory infarcts.    CT T-spine: No acute fracture. Patchy bilateral pulmonary nodular opacities, likely infectious/inflammatory. Moderate hiatal hernia.    EKG:     Medications: tylenol 750mg PO x1, KCl 40meq x1    Consults: none  (20 Dec 2022 04:52)      Allergies    Compazine (Unknown)  contrast media (iodine-based) (Unknown)  iv contrast (Unknown)  penicillin (Unknown)  penicillins (Other)  Toradol (Unknown)    Intolerances        MEDICATIONS  (STANDING):  acetaminophen   IVPB .. 750 milliGRAM(s) IV Intermittent once  aspirin  chewable 81 milliGRAM(s) Oral daily  atorvastatin 80 milliGRAM(s) Oral at bedtime  folic acid 1 milliGRAM(s) Oral daily  iron sucrose IVPB 300 milliGRAM(s) IV Intermittent every 24 hours  melatonin 5 milliGRAM(s) Oral at bedtime  mirtazapine 7.5 milliGRAM(s) Oral at bedtime  multivitamin 1 Tablet(s) Oral daily  pantoprazole    Tablet 40 milliGRAM(s) Oral before breakfast  thiamine IVPB 500 milliGRAM(s) IV Intermittent every 24 hours    MEDICATIONS  (PRN):  hydrOXYzine hydrochloride 25 milliGRAM(s) Oral three times a day PRN Anxiety      PAST MEDICAL & SURGICAL HISTORY:  Seizures  Migraine  Hip pain, left  Depression  Anxiety  Eating disorder  Bipolar illness  PTSD (post-traumatic stress disorder)  S/P  section      FAMILY HISTORY:  No pertinent family history in first degree relatives        SOCIAL HISTORY: No EtOH, no tobacco    REVIEW OF SYSTEMS:    CONSTITUTIONAL: No weakness, fevers or chills  EYES/ENT: No visual changes;  No vertigo or throat pain   NECK: No pain or stiffness  RESPIRATORY: No cough, wheezing, hemoptysis; No shortness of breath  CARDIOVASCULAR: No chest pain or palpitations  GASTROINTESTINAL: No abdominal or epigastric pain. No nausea, vomiting, or hematemesis; No diarrhea or constipation. No melena or hematochezia.  GENITOURINARY: No dysuria, frequency or hematuria  NEUROLOGICAL: No numbness or weakness  SKIN: No itching, burning, rashes, or lesions   All other review of systems is negative unless indicated above.        T(F): 98.6 (22 @ 14:07), Max: 98.7 (22 @ 06:45)  HR: 88 (22 @ 15:40)  BP: 140/74 (22 @ 15:40)  RR: 14 (22 @ 15:40)  SpO2: 98% (22 @ 15:40)  Wt(kg): --    GENERAL: NAD however with anxious affect  HEAD:  Atraumatic, Normocephalic  EYES: EOMI, PERRLA, conjunctiva and sclera clear  NECK: Supple, No JVD  CHEST/LUNG: Clear to auscultation bilaterally; No wheeze  HEART: Regular rate and rhythm; No murmurs, rubs, or gallops  ABDOMEN: Soft, Nontender, Nondistended; Bowel sounds present  EXTREMITIES:  2+ Peripheral Pulses, No clubbing, cyanosis, or edema  NEUROLOGY: AAOx3;  CN grossly intact, strength L > R  SKIN: No rashes or lesions                          6.9    8.06  )-----------( 517      ( 23 Dec 2022 06:09 )             24.8           143  |  109<H>  |  8   ----------------------------<  100<H>  3.6   |  26  |  0.67    Ca    8.7      23 Dec 2022 06:09  Phos  3.6       Mg     1.6             Magnesium, Serum: 1.6 mg/dL ( @ 06:09)  Phosphorus Level, Serum: 3.6 mg/dL ( @ 06:09)  Haptoglobin, Serum: 231 mg/dL ( @ 06:09)  Lactate Dehydrogenase, Serum: 303 U/L ( @ 06:09)

## 2022-12-23 NOTE — PROGRESS NOTE ADULT - PROBLEM SELECTOR PLAN 1
MRI shows "acute/subacute infarction in the left MICAH territory as well as the left parietal temporal region; mild local mass effect without herniation;" also found to have L ICA severe stenosis/occlusion and MICAH evere stenosis/occlusion on imaging; cont. work-up and mgmt per Neuro and Neurosurgery; PT/OT; on ASA + statin; avoiding Plavix for now, in setting of anemia; plan for CTA head and neck, MR NOVA

## 2022-12-23 NOTE — CONSULT NOTE ADULT - ASSESSMENT
per Neurology    62 y o F w/ PMH HTN(not on any meds per pt), Migraine, Sz disorder, depression- anxiety, prior stroke Hx in 2018 presented to [facility] for frequent falls to [facility], admitted to Medicine for further work up, Stroke Neuro was consulted for frequent falls. Pt poor Vs unreliable historian. CTH w/ subacute / Chronic B/L MICAH infarcts. Unclear chronicity of R sided weakness as pt states she does not use walker and is pending Wheel chair. Stroke team consulted for frequent falls, given unclear Hx and chronicity of the deficits, recommended MRA H/N which showed acute/ sub acute infarcts in L MICAH Territory and L parieto temporal region with mass effect without herniation i/s/o B/L A2 HGS/occlusion and Prox L ICA HGS Vs occlusion. Pt transferred to Stroke tele for further work up and close neuro monitoring.      Neuro  #CVA workup  - continue aspirin 81mg daily, will closely monitor for drop in Hb, if stable will consider adding Plavix to the regimen.  - continue atorvastatin 80mg daily  - q4hr stroke neuro checks and vitals  - MRA H/N :  Acute/ Subacute infarction L MICAH Territory and L parieto temporal region w/local mass effect. B/L A2 segment HGS/ Occlusion and L Proximal ICA HGS Vs Occlusion.  -  HCT Results: Subacute/ chronic B/L MICAH territory infarcts.  - NSGY Cx called for L ICA HGS Vs occlusion- Reccs to obtain CTA H/N and MR Hess.  - Stroke education    Cards  #HTN  - permissive hypertension, Goal -180  - Not on any  home BP meds per pt  - Please avoid Hypotension i/s/o L ICA and B/L A2  HGS.  - TTE : No wall motion abnormalities/ No valvular Dz.  - EKG Results: Isolated V5 AYANA upon admission, with no reciprocal changes, no c/o CP, no c/f ischemia no c/o cp/sob.    #HLD  - high dose statin as above in CVA  - LDL results: 126    Pulm  - call provider if SPO2 < 94%    GI  #Nutrition/Fluids/Electrolytes   - replete K<4 and Mg <2  - Diet: DASH/ TLC  - IVF: None for now.    Renal  - C/T Trend Bun/Crt.    Infectious Disease  - C/T trend Bun/Crt    Endocrine  - A1C results: 5.8    - TSH results: 0.638    DVT Prophylaxis  - lovenox sq for DVT prophylaxis   - SCDs for DVT prophylaxis

## 2022-12-24 LAB
HCV RNA FLD QL NAA+PROBE: SIGNIFICANT CHANGE UP
HCV RNA SPEC QL PROBE+SIG AMP: SIGNIFICANT CHANGE UP

## 2022-12-24 PROCEDURE — 99232 SBSQ HOSP IP/OBS MODERATE 35: CPT

## 2022-12-24 RX ORDER — MIRTAZAPINE 45 MG/1
7.5 TABLET, ORALLY DISINTEGRATING ORAL AT BEDTIME
Refills: 0 | Status: DISCONTINUED | OUTPATIENT
Start: 2022-12-24 | End: 2023-01-11

## 2022-12-24 RX ORDER — ACETAMINOPHEN 500 MG
650 TABLET ORAL EVERY 6 HOURS
Refills: 0 | Status: DISCONTINUED | OUTPATIENT
Start: 2022-12-24 | End: 2023-01-11

## 2022-12-24 RX ADMIN — HEPARIN SODIUM 5000 UNIT(S): 5000 INJECTION INTRAVENOUS; SUBCUTANEOUS at 15:56

## 2022-12-24 RX ADMIN — Medication 105 MILLIGRAM(S): at 09:02

## 2022-12-24 RX ADMIN — Medication 25 MILLIGRAM(S): at 09:02

## 2022-12-24 RX ADMIN — Medication 81 MILLIGRAM(S): at 15:57

## 2022-12-24 RX ADMIN — Medication 1 TABLET(S): at 15:56

## 2022-12-24 RX ADMIN — MIRTAZAPINE 7.5 MILLIGRAM(S): 45 TABLET, ORALLY DISINTEGRATING ORAL at 22:19

## 2022-12-24 RX ADMIN — ATORVASTATIN CALCIUM 80 MILLIGRAM(S): 80 TABLET, FILM COATED ORAL at 22:19

## 2022-12-24 RX ADMIN — Medication 650 MILLIGRAM(S): at 20:02

## 2022-12-24 RX ADMIN — Medication 650 MILLIGRAM(S): at 09:01

## 2022-12-24 RX ADMIN — Medication 25 MILLIGRAM(S): at 22:19

## 2022-12-24 RX ADMIN — Medication 5 MILLIGRAM(S): at 22:19

## 2022-12-24 RX ADMIN — Medication 1 MILLIGRAM(S): at 15:56

## 2022-12-24 NOTE — PROGRESS NOTE ADULT - PROBLEM SELECTOR PLAN 2
iron deficiency (ferritin 16, 3% sat); unclear etiology; Pt. reports occasional "dark" BMs, but not melena or BRBPR; no e/o hemolysis; VSS; monitor CBC, goal Hb > 7, transfuse if needed; cont. PPI, IV iron; f/u GI recs, may need Heme input as well.  We do not have labs from this morning, as patient has been refusing iron deficiency (ferritin 16, 3% sat); unclear etiology; Pt. reports occasional "dark" BMs, but not melena or BRBPR; no e/o hemolysis; VSS; monitor CBC, goal Hb > 7, transfuse if needed; cont. PPI, IV iron; f/u GI recs, may need Heme input as well.

## 2022-12-24 NOTE — PROGRESS NOTE ADULT - SUBJECTIVE AND OBJECTIVE BOX
She is asking to leave, getting a little agitated.      ***Note in progress***    OVERNIGHT EVENTS: NAEO    SUBJECTIVE / INTERVAL HPI: Patient seen and examined at bedside. Patient denying chest pain, SOB, palpitations, cough. Patient denies fever, chills, HA, Dizziness, change in vision/hearing, N/V, abdominal pain, diarrhea, constipation, hematochezia/melena, dysuria, hematuria, new onset weakness/numbness, LE pain and/or swelling.    Remaining ROS negative       PHYSICAL EXAM:    General: WDWN  HEENT: NC/AT; PERRL, anicteric sclera; MMM  Neck: supple  Cardiovascular: +S1/S2, RRR  Respiratory: CTA B/L; no W/R/R  Gastrointestinal: soft, NT/ND; +BSx4  Extremities: WWP; no edema, clubbing or cyanosis  Vascular: 2+ radial, DP/PT pulses B/L  Neurological: AAOx3; no focal deficits  Psychiatric: pleasant mood and affect  Dermatologic: no appreciable wounds or damage to the skin    VITAL SIGNS:  Vital Signs Last 24 Hrs  T(C): 36.6 (24 Dec 2022 13:52), Max: 37 (23 Dec 2022 18:00)  T(F): 97.8 (24 Dec 2022 13:52), Max: 98.6 (23 Dec 2022 18:00)  HR: 118 (24 Dec 2022 05:15) (78 - 118)  BP: 151/79 (24 Dec 2022 08:44) (128/70 - 151/79)  BP(mean): 108 (24 Dec 2022 08:44) (92 - 108)  RR: 16 (24 Dec 2022 04:03) (14 - 16)  SpO2: 93% (24 Dec 2022 04:03) (93% - 98%)    Parameters below as of 24 Dec 2022 04:03  Patient On (Oxygen Delivery Method): room air          MEDICATIONS:  MEDICATIONS  (STANDING):  aspirin  chewable 81 milliGRAM(s) Oral daily  atorvastatin 80 milliGRAM(s) Oral at bedtime  folic acid 1 milliGRAM(s) Oral daily  heparin   Injectable 5000 Unit(s) SubCutaneous every 8 hours  iron sucrose IVPB 300 milliGRAM(s) IV Intermittent every 24 hours  melatonin 5 milliGRAM(s) Oral at bedtime  mirtazapine 7.5 milliGRAM(s) Oral at bedtime  multivitamin 1 Tablet(s) Oral daily  pantoprazole    Tablet 40 milliGRAM(s) Oral before breakfast  thiamine IVPB 500 milliGRAM(s) IV Intermittent every 24 hours    MEDICATIONS  (PRN):  acetaminophen     Tablet .. 650 milliGRAM(s) Oral every 6 hours PRN Mild Pain (1 - 3)  hydrOXYzine hydrochloride 25 milliGRAM(s) Oral three times a day PRN Anxiety      ALLERGIES:  Allergies    Compazine (Unknown)  contrast media (iodine-based) (Unknown)  iv contrast (Unknown)  penicillin (Unknown)  penicillins (Other)  Toradol (Unknown)    Intolerances        LABS:                        6.9    8.06  )-----------( 517      ( 23 Dec 2022 06:09 )             24.8     12-23    143  |  109<H>  |  8   ----------------------------<  100<H>  3.6   |  26  |  0.67    Ca    8.7      23 Dec 2022 06:09  Phos  3.6     12-23  Mg     1.6     12-23          CAPILLARY BLOOD GLUCOSE          RADIOLOGY & ADDITIONAL TESTS: Reviewed. She is asking to leave, getting a little agitated.      Remaining ROS negative       PHYSICAL EXAM:    General: lying in bed, no acute distress  HEENT: NC/AT; anicteric sclera; MMM  Cardiovascular: +S1/S2, RRR, no mrg  Respiratory: CTA B/L; no W/R/R  Gastrointestinal: soft, NT/ND; +BSx4  Extremities: WWP; no edema  Neurological:  persistent R sided weakness, most prominent in RLE, speech is fluent, no facial asymmetry  Psychiatric: pleasant mood and affect  Dermatologic: no appreciable wounds or damage to the skin    VITAL SIGNS:  Vital Signs Last 24 Hrs  T(C): 36.6 (24 Dec 2022 13:52), Max: 37 (23 Dec 2022 18:00)  T(F): 97.8 (24 Dec 2022 13:52), Max: 98.6 (23 Dec 2022 18:00)  HR: 118 (24 Dec 2022 05:15) (78 - 118)  BP: 151/79 (24 Dec 2022 08:44) (128/70 - 151/79)  BP(mean): 108 (24 Dec 2022 08:44) (92 - 108)  RR: 16 (24 Dec 2022 04:03) (14 - 16)  SpO2: 93% (24 Dec 2022 04:03) (93% - 98%)    Parameters below as of 24 Dec 2022 04:03  Patient On (Oxygen Delivery Method): room air          MEDICATIONS:  MEDICATIONS  (STANDING):  aspirin  chewable 81 milliGRAM(s) Oral daily  atorvastatin 80 milliGRAM(s) Oral at bedtime  folic acid 1 milliGRAM(s) Oral daily  heparin   Injectable 5000 Unit(s) SubCutaneous every 8 hours  iron sucrose IVPB 300 milliGRAM(s) IV Intermittent every 24 hours  melatonin 5 milliGRAM(s) Oral at bedtime  mirtazapine 7.5 milliGRAM(s) Oral at bedtime  multivitamin 1 Tablet(s) Oral daily  pantoprazole    Tablet 40 milliGRAM(s) Oral before breakfast  thiamine IVPB 500 milliGRAM(s) IV Intermittent every 24 hours    MEDICATIONS  (PRN):  acetaminophen     Tablet .. 650 milliGRAM(s) Oral every 6 hours PRN Mild Pain (1 - 3)  hydrOXYzine hydrochloride 25 milliGRAM(s) Oral three times a day PRN Anxiety      ALLERGIES:  Allergies    Compazine (Unknown)  contrast media (iodine-based) (Unknown)  iv contrast (Unknown)  penicillin (Unknown)  penicillins (Other)  Toradol (Unknown)    Intolerances        LABS:                        6.9    8.06  )-----------( 517      ( 23 Dec 2022 06:09 )             24.8     12-23    143  |  109<H>  |  8   ----------------------------<  100<H>  3.6   |  26  |  0.67    Ca    8.7      23 Dec 2022 06:09  Phos  3.6     12-23  Mg     1.6     12-23          CAPILLARY BLOOD GLUCOSE          RADIOLOGY & ADDITIONAL TESTS: Reviewed.

## 2022-12-24 NOTE — PROGRESS NOTE ADULT - PROBLEM SELECTOR PLAN 1
MRI shows "acute/subacute infarction in the left MICAH territory as well as the left parietal temporal region; mild local mass effect without herniation;" also found to have L ICA severe stenosis/occlusion and MICAH evere stenosis/occlusion on imaging; cont. work-up and mgmt per Neuro and Neurosurgery; PT/OT; on ASA + statin; avoiding Plavix for now, in setting of anemia; plan for CTA head and neck, MR NOVA MRI shows "acute/subacute infarction in the left MICAH territory as well as the left parietal temporal region; mild local mass effect without herniation;" also found to have L ICA severe stenosis/occlusion and MICAH evere stenosis/occlusion on imaging; cont. work-up and mgmt per Neuro and Neurosurgery; PT/OT; on ASA + statin; avoiding Plavix for now, in setting of anemia;    CTA head and neck, MR NOVA still pending.

## 2022-12-24 NOTE — PROGRESS NOTE ADULT - ASSESSMENT
62F w/ PMH HTN(not on any meds per pt), Migraine, Sz disorder, depression- anxiety, prior stroke Hx in 2018 presented to [facility] ER for frequent falls to [facility] ER, admitted to Medicine for further work up, Stroke Neuro was consulted for frequent falls. Pt poor Vs unreliable historian. CTH w/ subacute / Chronic B/L MICAH infarcts. Unclear chronicity of R sided weakness as pt states she does not use walker and is pending Wheel chair. Stroke team consulted for frequent falls, given unclear Hx and chronicity of the deficits, recommended MRA H/N which showed acute/ sub acute infarcts in L MICAH Territory and L parieto temporal region with mass effect without herniation i/s/o B/L A2 HGS/occlusion and Prox L ICA HGS Vs occlusion. Pt transferred to Stroke tele for further work up and close neuro monitoring.      Neuro  #CVA workup  - continue aspirin 81mg daily, will closely monitor for drop in Hb, if stable will consider adding Plavix to the regimen.  - continue atorvastatin 80mg daily  - q4hr stroke neuro checks and vitals  - MRA H/N :  Acute/ Subacute infarction L MICAH Territory and L parieto temporal region w/local mass effect. B/L A2 segment HGS/ Occlusion and L Proximal ICA HGS Vs Occlusion.  -  HCT Results: Subacute/ chronic B/L MICAH territory infarcts.  - NSGY Cx called for L ICA HGS Vs occlusion- Reccs to obtain CTA H/N and MR Hess, both pending  - Stroke education    Cards  #HTN  - permissive hypertension, Goal -180  - Not on any  home BP meds per pt  - Please avoid Hypotension i/s/o L ICA and B/L A2  HGS.  - TTE : No wall motion abnormalities/ No valvular Dz.  - EKG Results: Isolated V5 AYANA upon admission, with no reciprocal changes, no c/o CP, no c/f ischemia no c/o cp/sob.    #HLD  - high dose statin as above in CVA  - LDL results: 126    Heme  #HANH  MCV and iron labs demonstrate severe iron deficiency likely source is GI  - retic count elevated, hemolysis labs (LDH and haptoglobin) negative  - c/w iron 300mg x3days  - patient never had a colonoscopy, endorses only brown stool   - GI consulted, rec EGD/colonoscopy outpatient. Hold off for now i/s/o recent stroke  - Heme consulted, f/u recs  - active T&S, transfuse for Hb<7    Pulm  - call provider if SPO2 < 94%    GI  #Nutrition/Fluids/Electrolytes   - replete K<4 and Mg <2  - Diet: DASH/ TLC  - IVF: None for now.    Renal  - C/T Trend Bun/Cr.    Infectious Disease  - C/T trend Bun/Cr    Endocrine  - A1C results: 5.8    - TSH results: 0.638    DVT Prophylaxis  - lovenox sq for DVT prophylaxis   - SCDs for DVT prophylaxis       Dispo: pending PT/ OT eval.     Discussed with attending. All recommendations are considered final after attending attestation.       62F w/ PMH HTN(not on any meds per pt), Migraine, Sz disorder, depression- anxiety, prior stroke Hx in 2018 presented to [facility] ER for frequent falls to [facility] ER, admitted to Medicine for further work up, Stroke Neuro was consulted for frequent falls. Pt poor Vs unreliable historian. CTH w/ subacute / Chronic B/L MICAH infarcts. Unclear chronicity of R sided weakness as pt states she does not use walker and is pending Wheel chair. Stroke team consulted for frequent falls, given unclear Hx and chronicity of the deficits, recommended MRA H/N which showed acute/ sub acute infarcts in L MICAH Territory and L parieto temporal region with mass effect without herniation i/s/o B/L A2 HGS/occlusion and Prox L ICA HGS Vs occlusion. Pt transferred to Stroke tele for further work up and close neuro monitoring.      Neuro  #CVA workup  - continue aspirin 81mg daily, will closely monitor for drop in Hb, if stable will consider adding Plavix to the regimen. However, patient refusing labs  - continue atorvastatin 80mg daily  - q4hr stroke neuro checks and vitals  - MRA H/N :  Acute/ Subacute infarction L MICAH Territory and L parieto temporal region w/local mass effect. B/L A2 segment HGS/ Occlusion and L Proximal ICA HGS Vs Occlusion.  -  HCT Results: Subacute/ chronic B/L MICAH territory infarcts.  - NSGY Cx called for L ICA HGS Vs occlusion- Reccs to obtain CTA H/N and MR Hess, both pending  - Stroke education    Cards  #HTN  - permissive hypertension, Goal -180  - Not on any  home BP meds per pt  - Please avoid Hypotension i/s/o L ICA and B/L A2  HGS.  - TTE : No wall motion abnormalities/ No valvular Dz.  - EKG Results: Isolated V5 AYANA upon admission, with no reciprocal changes, no c/o CP, no c/f ischemia no c/o cp/sob.    #HLD  - high dose statin as above in CVA  - LDL results: 126    Heme  #HANH  MCV and iron labs demonstrate severe iron deficiency likely source is GI  - retic count elevated, hemolysis labs (LDH and haptoglobin) negative  - c/w iron 300mg x3days  - patient never had a colonoscopy, endorses only brown stool   - GI consulted, rec EGD/colonoscopy outpatient. Hold off for now i/s/o recent stroke  - Heme consulted, f/u recs  - active T&S, transfuse for Hb<7    Pulm  - call provider if SPO2 < 94%    GI  #Nutrition/Fluids/Electrolytes   - replete K<4 and Mg <2  - Diet: DASH/ TLC  - IVF: None for now.    Renal  - C/T Trend Bun/Cr.    Infectious Disease  - C/T trend Bun/Cr    Endocrine  - A1C results: 5.8    - TSH results: 0.638    DVT Prophylaxis  - lovenox sq for DVT prophylaxis   - SCDs for DVT prophylaxis       Dispo: pending PT/ OT eval.     Discussed with attending. All recommendations are considered final after attending attestation.

## 2022-12-24 NOTE — PROGRESS NOTE ADULT - SUBJECTIVE AND OBJECTIVE BOX
***INCOMPLETE***      SUBJECTIVE:        OBJECTIVE:  Vital Signs Last 24 Hrs  T(C): 36.8 (24 Dec 2022 05:00), Max: 37 (23 Dec 2022 14:07)  T(F): 98.3 (24 Dec 2022 05:00), Max: 98.6 (23 Dec 2022 14:07)  HR: 118 (24 Dec 2022 05:15) (78 - 118)  BP: 141/68 (24 Dec 2022 04:03) (123/69 - 147/70)  BP(mean): 96 (24 Dec 2022 04:03) (90 - 100)  RR: 16 (24 Dec 2022 04:03) (14 - 17)  SpO2: 93% (24 Dec 2022 04:03) (93% - 98%)    Parameters below as of 24 Dec 2022 04:03  Patient On (Oxygen Delivery Method): room air        Physical Exam:  General: WDWN  HEENT:; PERRL, anicteric sclera; MMM  Cardiovascular: +S1/S2; RRR  Respiratory: CTA B/L; no W/R/R  Gastrointestinal: soft, NT/ND; +BSx4  Extremities: WWP; no edema, clubbing or cyanosis  Vascular: 2+ radial, DP/PT pulses B/L  Neurologic:  -Mental status: Awake, alert, oriented to person, place, and time (year w/ choices). Speech is fluent with intact naming, repetition, and comprehension, no dysarthria. Follows commands. Attention/concentration intact. Fund of knowledge appropriate. Forgetful.    -Cranial nerves:   II: Visual fields are full to confrontation.  III, IV, VI: Extraocular movements are intact without nystagmus. Pupils equally round and reactive to light.  V:  Facial sensation V1-V3 equal and intact   VII: Face is symmetric with normal eye closure and smile  VIII: Hearing is bilaterally intact to finger rub  IX, X: Uvula is midline and soft palate rises symmetrically  XI: Head turning and shoulder shrug are intact.  XII: Tongue protrudes midline  Motor: Normal bulk and tone. RUE : antigravity, Pronator drift +, but able to hold it up for 10seconds. RLE : Briefly antigravity, hit the bed and unable to hold for count of 5. LUE/ LLE : 5/5.   Sensation: Intact to light touch bilaterally. No neglect or extinction on double simultaneous testing.  Coordination: No dysmetria on finger-to-nose out of proportion to weakness.   Reflexes: Downgoing toes bilaterally   Gait: Deferred.    Labs:                        6.9    8.06  )-----------( 517      ( 23 Dec 2022 06:09 )             24.8     12-23    143  |  109<H>  |  8   ----------------------------<  100<H>  3.6   |  26  |  0.67    Ca    8.7      23 Dec 2022 06:09  Phos  3.6     12-23  Mg     1.6     12-23        Fingerstick  glucose:     MEDICATIONS  (STANDING):  aspirin  chewable 81 milliGRAM(s) Oral daily  atorvastatin 80 milliGRAM(s) Oral at bedtime  folic acid 1 milliGRAM(s) Oral daily  heparin   Injectable 5000 Unit(s) SubCutaneous every 8 hours  iron sucrose IVPB 300 milliGRAM(s) IV Intermittent every 24 hours  melatonin 5 milliGRAM(s) Oral at bedtime  mirtazapine 7.5 milliGRAM(s) Oral at bedtime  multivitamin 1 Tablet(s) Oral daily  pantoprazole    Tablet 40 milliGRAM(s) Oral before breakfast  thiamine IVPB 500 milliGRAM(s) IV Intermittent every 24 hours    MEDICATIONS  (PRN):  hydrOXYzine hydrochloride 25 milliGRAM(s) Oral three times a day PRN Anxiety      Allergies    Compazine (Unknown)  contrast media (iodine-based) (Unknown)  iv contrast (Unknown)  penicillin (Unknown)  penicillins (Other)  Toradol (Unknown)    Intolerances        RADIOLOGY & ADDITIONAL TESTS: Reviewed.                   SUBJECTIVE:    Patient seen and examined at bedside. No acute complaints. Requesting benzos and demanding to go home. Refusing labs. Denies fever, headache, chest pain, sob, abdominal pain, n/v/d     OBJECTIVE:  Vital Signs Last 24 Hrs  T(C): 36.8 (24 Dec 2022 05:00), Max: 37 (23 Dec 2022 14:07)  T(F): 98.3 (24 Dec 2022 05:00), Max: 98.6 (23 Dec 2022 14:07)  HR: 118 (24 Dec 2022 05:15) (78 - 118)  BP: 141/68 (24 Dec 2022 04:03) (123/69 - 147/70)  BP(mean): 96 (24 Dec 2022 04:03) (90 - 100)  RR: 16 (24 Dec 2022 04:03) (14 - 17)  SpO2: 93% (24 Dec 2022 04:03) (93% - 98%)    Parameters below as of 24 Dec 2022 04:03  Patient On (Oxygen Delivery Method): room air        Physical Exam:  General: WDWN  HEENT:; PERRL, anicteric sclera; MMM  Cardiovascular: +S1/S2; RRR  Respiratory: CTA B/L; no W/R/R  Gastrointestinal: soft, NT/ND; +BSx4  Extremities: WWP; no edema, clubbing or cyanosis  Vascular: 2+ radial, DP/PT pulses B/L  Neurologic:  -Mental status: Awake, alert, oriented to person, place, and time (year w/ choices). Speech is fluent with intact naming, repetition, and comprehension, no dysarthria. Follows commands. Attention/concentration intact. Fund of knowledge appropriate. Forgetful.    -Cranial nerves:   II: Visual fields are full to confrontation.  III, IV, VI: Extraocular movements are intact without nystagmus. Pupils equally round and reactive to light.  V:  Facial sensation V1-V3 equal and intact   VII: Face is symmetric with normal eye closure and smile  VIII: Hearing is bilaterally intact to finger rub  IX, X: Uvula is midline and soft palate rises symmetrically  XI: Head turning and shoulder shrug are intact.  XII: Tongue protrudes midline  Motor: Normal bulk and tone. RUE : antigravity, Pronator drift +, but able to hold it up for 10seconds. RLE : Briefly antigravity, hit the bed and unable to hold for count of 5. LUE/ LLE : 5/5.   Sensation: Intact to light touch bilaterally. No neglect or extinction on double simultaneous testing.  Coordination: No dysmetria on finger-to-nose out of proportion to weakness.   Reflexes: Downgoing toes bilaterally   Gait: Deferred.    Labs:                        6.9    8.06  )-----------( 517      ( 23 Dec 2022 06:09 )             24.8     12-23    143  |  109<H>  |  8   ----------------------------<  100<H>  3.6   |  26  |  0.67    Ca    8.7      23 Dec 2022 06:09  Phos  3.6     12-23  Mg     1.6     12-23        Fingerstick  glucose:     MEDICATIONS  (STANDING):  aspirin  chewable 81 milliGRAM(s) Oral daily  atorvastatin 80 milliGRAM(s) Oral at bedtime  folic acid 1 milliGRAM(s) Oral daily  heparin   Injectable 5000 Unit(s) SubCutaneous every 8 hours  iron sucrose IVPB 300 milliGRAM(s) IV Intermittent every 24 hours  melatonin 5 milliGRAM(s) Oral at bedtime  mirtazapine 7.5 milliGRAM(s) Oral at bedtime  multivitamin 1 Tablet(s) Oral daily  pantoprazole    Tablet 40 milliGRAM(s) Oral before breakfast  thiamine IVPB 500 milliGRAM(s) IV Intermittent every 24 hours    MEDICATIONS  (PRN):  hydrOXYzine hydrochloride 25 milliGRAM(s) Oral three times a day PRN Anxiety      Allergies    Compazine (Unknown)  contrast media (iodine-based) (Unknown)  iv contrast (Unknown)  penicillin (Unknown)  penicillins (Other)  Toradol (Unknown)    Intolerances        RADIOLOGY & ADDITIONAL TESTS: Reviewed.

## 2022-12-24 NOTE — PROGRESS NOTE ADULT - PROBLEM SELECTOR PLAN 4
likely due to CVA, anemia, and benzo use; cont. mgmt as above, PT/OT, trial of IV thiamine PT/OT, trial of IV thiamine
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
## 2022-12-25 LAB
CULTURE RESULTS: SIGNIFICANT CHANGE UP
CULTURE RESULTS: SIGNIFICANT CHANGE UP
SPECIMEN SOURCE: SIGNIFICANT CHANGE UP
SPECIMEN SOURCE: SIGNIFICANT CHANGE UP

## 2022-12-25 PROCEDURE — 99232 SBSQ HOSP IP/OBS MODERATE 35: CPT

## 2022-12-25 PROCEDURE — 99221 1ST HOSP IP/OBS SF/LOW 40: CPT

## 2022-12-25 RX ORDER — OLANZAPINE 15 MG/1
2.5 TABLET, FILM COATED ORAL ONCE
Refills: 0 | Status: COMPLETED | OUTPATIENT
Start: 2022-12-25 | End: 2022-12-25

## 2022-12-25 RX ADMIN — Medication 0.5 MILLIGRAM(S): at 12:03

## 2022-12-25 RX ADMIN — Medication 81 MILLIGRAM(S): at 11:29

## 2022-12-25 RX ADMIN — Medication 1 MILLIGRAM(S): at 11:29

## 2022-12-25 RX ADMIN — Medication 25 MILLIGRAM(S): at 15:34

## 2022-12-25 RX ADMIN — Medication 650 MILLIGRAM(S): at 09:45

## 2022-12-25 RX ADMIN — Medication 650 MILLIGRAM(S): at 15:34

## 2022-12-25 RX ADMIN — OLANZAPINE 2.5 MILLIGRAM(S): 15 TABLET, FILM COATED ORAL at 00:36

## 2022-12-25 RX ADMIN — Medication 0.5 MILLIGRAM(S): at 20:32

## 2022-12-25 RX ADMIN — Medication 105 MILLIGRAM(S): at 08:51

## 2022-12-25 RX ADMIN — Medication 1 TABLET(S): at 11:30

## 2022-12-25 RX ADMIN — Medication 650 MILLIGRAM(S): at 08:55

## 2022-12-25 RX ADMIN — Medication 0.5 MILLIGRAM(S): at 02:42

## 2022-12-25 RX ADMIN — ATORVASTATIN CALCIUM 80 MILLIGRAM(S): 80 TABLET, FILM COATED ORAL at 22:24

## 2022-12-25 RX ADMIN — HEPARIN SODIUM 5000 UNIT(S): 5000 INJECTION INTRAVENOUS; SUBCUTANEOUS at 11:30

## 2022-12-25 RX ADMIN — Medication 5 MILLIGRAM(S): at 22:24

## 2022-12-25 RX ADMIN — MIRTAZAPINE 7.5 MILLIGRAM(S): 45 TABLET, ORALLY DISINTEGRATING ORAL at 22:24

## 2022-12-25 NOTE — OCCUPATIONAL THERAPY INITIAL EVALUATION ADULT - MANUAL MUSCLE TESTING RESULTS, REHAB EVAL
LUE shoulder flexion/extension 4/5, LUE elbow flexion/extension 4/5, LUE  strength 4/5. RUE shoulder flexion/extension 3-/5, RUE elbow flexion/extension 3+/5, R  strength 3+/5. LLE grossly 4+/5 throughout. RLE hip flexion grossly 3/5, RLE knee flexion/extension 3-/5, RLE ankle DF/PF 3-/5.

## 2022-12-25 NOTE — OCCUPATIONAL THERAPY INITIAL EVALUATION ADULT - MODIFIED CLINICAL TEST OF SENSORY INTEGRATION IN BALANCE TEST
Pt performed functional mobility ~10ft x 2 with RW (with 1 3 mins seated rest break) and mod Ax 2 (pt requiring increased assist 2/2 poor motor control, BLE ataxia, decreased postural control with posterior lean, and impulsivity).

## 2022-12-25 NOTE — PROGRESS NOTE ADULT - PROBLEM SELECTOR PLAN 2
iron deficiency (ferritin 16, 3% sat); unclear etiology; Pt. reports occasional "dark" BMs, but not melena or BRBPR; no e/o hemolysis; VSS; monitor CBC, goal Hb > 7, transfuse if needed; cont. PPI, IV iron; f/u GI recs, may need Heme input as well.  We do not have labs from this morning, as patient has been refusing

## 2022-12-25 NOTE — OCCUPATIONAL THERAPY INITIAL EVALUATION ADULT - LEVEL OF INDEPENDENCE: STAND/SIT, REHAB EVAL
required mod-max verbal cues for safety awareness 2/2 impulsivity/moderate assist (50% patients effort)

## 2022-12-25 NOTE — OCCUPATIONAL THERAPY INITIAL EVALUATION ADULT - GENERAL OBSERVATIONS, REHAB EVAL
OT IE Completed. MRS 4. Pt's covering ROSALEE [place] cleared pt for therapy. Pt received semisupine in bed, +primafit, +tele, +heplock, room air, +1:1 observation, NAD, agreeable to OT. PT Adis present. Pt tolerated session fairly. Pt left seated in bedside chair, with 1;1 observation present, all lines in tact, ROSALEE Davenport aware.

## 2022-12-25 NOTE — PROGRESS NOTE ADULT - ASSESSMENT
62F w/ PMH HTN(not on any meds per pt), Migraine, Sz disorder, depression- anxiety, prior stroke Hx in 2018 presented to [facility] ER for frequent falls to [facility] ER, admitted to Medicine for further work up, Stroke Neuro was consulted for frequent falls. Pt poor Vs unreliable historian. CTH w/ subacute / Chronic B/L MICAH infarcts. Unclear chronicity of R sided weakness as pt states she does not use walker and is pending Wheel chair. Stroke team consulted for frequent falls, given unclear Hx and chronicity of the deficits, recommended MRA H/N which showed acute/ sub acute infarcts in L MICAH Territory and L parieto temporal region with mass effect without herniation i/s/o B/L A2 HGS/occlusion and Prox L ICA HGS Vs occlusion. Pt transferred to Stroke tele for further work up and close neuro monitoring.      Neuro  #CVA workup  - continue aspirin 81mg daily, will closely monitor for drop in Hb, if stable will consider adding Plavix to the regimen. However patient continues to refuse labs  - continue atorvastatin 80mg daily  - q4hr stroke neuro checks and vitals  - MRA H/N :  Acute/ Subacute infarction L MICAH Territory and L parieto temporal region w/local mass effect. B/L A2 segment HGS/ Occlusion and L Proximal ICA HGS Vs Occlusion.  -  HCT Results: Subacute/ chronic B/L MICAH territory infarcts.  - NSGY Cx called for L ICA HGS Vs occlusion- Reccs to obtain CTA H/N and MR Hess, both pending  - Stroke education    Cards  #HTN  - permissive hypertension, Goal -180  - Not on any  home BP meds per pt  - Please avoid Hypotension i/s/o L ICA and B/L A2  HGS.  - TTE : No wall motion abnormalities/ No valvular Dz.  - EKG Results: Isolated V5 AYANA upon admission, with no reciprocal changes, no c/o CP, no c/f ischemia no c/o cp/sob.    #HLD  - high dose statin as above in CVA  - LDL results: 126    Heme  #HANH  MCV and iron labs demonstrate severe iron deficiency likely source is GI  - retic count elevated, hemolysis labs (LDH and haptoglobin) negative  - c/w iron 300mg x3days  - patient never had a colonoscopy, endorses only brown stool   - GI consulted, rec EGD/colonoscopy outpatient. Hold off for now i/s/o recent stroke  - Heme consulted, f/u recs  - active T&S, transfuse for Hb<7    Pulm  - call provider if SPO2 < 94%    GI  #Nutrition/Fluids/Electrolytes   - replete K<4 and Mg <2  - Diet: DASH/ TLC  - IVF: None for now.    Renal  - C/T Trend Bun/Cr.    Infectious Disease  - C/T trend Bun/Cr    Endocrine  - A1C results: 5.8    - TSH results: 0.638    DVT Prophylaxis  - lovenox sq for DVT prophylaxis   - SCDs for DVT prophylaxis       Dispo: pending PT/ OT eval.     Discussed with attending. All recommendations are considered final after attending attestation.

## 2022-12-25 NOTE — CHART NOTE - NSCHARTNOTEFT_GEN_A_CORE
Patient ISTOP below:    Confidential Drug Utilization Report  Search Terms: Irena Weber, 1960Search Date: 12/25/2022 11:29:47 AM  The Drug Utilization Report below displays all of the controlled substance prescriptions, if any, that your patient has filled in the last twelve months. The information displayed on this report is compiled from pharmacy submissions to the Department, and accurately reflects the information as submitted by the pharmacies.    This report was requested by: Lisha Alba | Reference #: [id number]    You have not added a NANCIE number. Keeping your NANCIE number(s) up to date on the My NANCIE # page will enable the separation of your prescriptions from others in the search results.    Others' Prescriptions  Patient Name: Irena Elliott Date: 1960  Address: [address] [place] [address] [address] [place], [place] [address]Pqm: Female  Rx Written	Rx Dispensed	Drug	Quantity	Days Supply	Prescriber Name	Prescriber Nancie #	Payment Method	Dispenser  10/26/2022	11/02/2022	lorazepam 2 mg tablet	60	30	Alie Byers	[id number]	Insurance	[facility]  09/15/2022	09/15/2022	zolpidem tartrate 10 mg tablet	30	30	Isabela Munoz NP	[id number]	Insurance	[facility]  09/15/2022	09/15/2022	diazepam 10 mg tablet	60	30	Isabela Munoz NP	[id number]	Insurance	[facility]  07/19/2022	07/19/2022	diazepam 10 mg tablet	60	30	Isabela Munoz NP	[id number]	Insurance	[facility]  07/19/2022	07/19/2022	zolpidem tartrate 10 mg tablet	30	30	Isabela Munoz NP	[id number]	Insurance	[facility]  06/14/2022	06/14/2022	zolpidem tartrate 10 mg tablet	30	30	Isabela Munoz NP	[id number]	Insurance	[facility]  06/14/2022	06/14/2022	diazepam 10 mg tablet	60	30	Isabela Munoz NP	[id number]	Insurance	[facility]  05/16/2022	05/16/2022	zolpidem tartrate 10 mg tablet	30	30	Isabela Munoz NP	[id number]	Insurance	[facility]  05/16/2022	05/16/2022	diazepam 5 mg tablet	90	30	Isabela Munoz NP	[id number]	Insurance	[facility]  04/06/2022	04/06/2022	diazepam 5 mg tablet	90	30	Isabela Munoz NP	[id number]	Insurance	[facility]  04/06/2022	04/06/2022	zolpidem tartrate 10 mg tablet	30	30	Isabela Munoz NP	[id number]	Insurance	[facility]  03/08/2022	03/08/2022	diazepam 5 mg tablet	90	30	Isabela Munoz NP	[id number]	Insurance	[facility]  03/08/2022	03/08/2022	zolpidem tartrate 10 mg tablet	30	30	Isabela Munoz NP	[id number]	Insurance	[facility]  02/04/2022	02/04/2022	zolpidem tartrate 10 mg tablet	30	30	Isabela Munoz NP	[id number]	Insurance	[facility]  02/04/2022	02/04/2022	diazepam 5 mg tablet	90	30	Isabela Munoz NP	[id number]	Insurance	[facility]  12/29/2021	12/29/2021	zolpidem tartrate 10 mg tablet	30	30	Isabela Munoz NP	[id number]	Insurance	[facility]  12/29/2021	12/29/2021	diazepam 5 mg tablet	90	30	Isabela Munoz NP	[id number]	Insurance	[facility]  * - Drugs marked with an asterisk are compound drugs. If the compound drug is made up of more than one controlled substance, then each controlled substance will be a separate row in the table.

## 2022-12-25 NOTE — OCCUPATIONAL THERAPY INITIAL EVALUATION ADULT - NS ASR FOLLOW COMMAND OT EVAL
100% of the time/able to follow single-step instructions follows one step commands 90% of the time. Pt frequently requires repetition of one step commands in order to follow as pt is impulsive with decreased safety awareness./able to follow single-step instructions

## 2022-12-25 NOTE — PROGRESS NOTE ADULT - SUBJECTIVE AND OBJECTIVE BOX
Frustrated, wants to leave.  Agreeable to having new IV put in.     ***Note in progress***    OVERNIGHT EVENTS: NAEO    SUBJECTIVE / INTERVAL HPI: Patient seen and examined at bedside. Patient denying chest pain, SOB, palpitations, cough. Patient denies fever, chills, HA, Dizziness, change in vision/hearing, N/V, abdominal pain, diarrhea, constipation, hematochezia/melena, dysuria, hematuria, new onset weakness/numbness, LE pain and/or swelling.    Remaining ROS negative       PHYSICAL EXAM:    General: WDWN  HEENT: NC/AT; PERRL, anicteric sclera; MMM  Neck: supple  Cardiovascular: +S1/S2, RRR  Respiratory: CTA B/L; no W/R/R  Gastrointestinal: soft, NT/ND; +BSx4  Extremities: WWP; no edema, clubbing or cyanosis  Vascular: 2+ radial, DP/PT pulses B/L  Neurological: AAOx3; no focal deficits  Psychiatric: pleasant mood and affect  Dermatologic: no appreciable wounds or damage to the skin    VITAL SIGNS:  Vital Signs Last 24 Hrs  T(C): 36.7 (25 Dec 2022 10:00), Max: 36.8 (24 Dec 2022 17:59)  T(F): 98 (25 Dec 2022 10:00), Max: 98.3 (24 Dec 2022 17:59)  HR: 90 (25 Dec 2022 08:34) (90 - 90)  BP: 131/76 (25 Dec 2022 08:34) (131/76 - 145/71)  BP(mean): 97 (25 Dec 2022 08:34) (91 - 100)  RR: --  SpO2: --          MEDICATIONS:  MEDICATIONS  (STANDING):  aspirin  chewable 81 milliGRAM(s) Oral daily  atorvastatin 80 milliGRAM(s) Oral at bedtime  folic acid 1 milliGRAM(s) Oral daily  heparin   Injectable 5000 Unit(s) SubCutaneous every 8 hours  iron sucrose IVPB 300 milliGRAM(s) IV Intermittent every 24 hours  melatonin 5 milliGRAM(s) Oral at bedtime  mirtazapine 7.5 milliGRAM(s) Oral at bedtime  multivitamin 1 Tablet(s) Oral daily  pantoprazole    Tablet 40 milliGRAM(s) Oral before breakfast    MEDICATIONS  (PRN):  acetaminophen     Tablet .. 650 milliGRAM(s) Oral every 6 hours PRN Mild Pain (1 - 3)  hydrOXYzine hydrochloride 25 milliGRAM(s) Oral three times a day PRN Anxiety  LORazepam     Tablet 0.5 milliGRAM(s) Oral three times a day PRN Anxiety      ALLERGIES:  Allergies    Compazine (Unknown)  contrast media (iodine-based) (Unknown)  iv contrast (Unknown)  penicillin (Unknown)  penicillins (Other)  Toradol (Unknown)    Intolerances        LABS:              CAPILLARY BLOOD GLUCOSE          RADIOLOGY & ADDITIONAL TESTS: Reviewed. Frustrated, wants to leave.  Agreeable to having new IV put in.       Remaining ROS negative       PHYSICAL EXAM:    General: sitting up in bed, no acute distress  HEENT: NC/AT, anicteric sclera; MMM  Cardiovascular: +S1/S2, RRR, no mrg  Respiratory: CTA B/L; no W/R/R  Gastrointestinal: soft, NT/ND; +BSx4  Extremities: WWP; no edema  Neurological: R sided lower extremity weakness, speech is fluent  Psychiatric: anxious affect  Dermatologic: no appreciable wounds or damage to the skin    VITAL SIGNS:  Vital Signs Last 24 Hrs  T(C): 36.7 (25 Dec 2022 10:00), Max: 36.8 (24 Dec 2022 17:59)  T(F): 98 (25 Dec 2022 10:00), Max: 98.3 (24 Dec 2022 17:59)  HR: 90 (25 Dec 2022 08:34) (90 - 90)  BP: 131/76 (25 Dec 2022 08:34) (131/76 - 145/71)  BP(mean): 97 (25 Dec 2022 08:34) (91 - 100)  RR: --  SpO2: --          MEDICATIONS:  MEDICATIONS  (STANDING):  aspirin  chewable 81 milliGRAM(s) Oral daily  atorvastatin 80 milliGRAM(s) Oral at bedtime  folic acid 1 milliGRAM(s) Oral daily  heparin   Injectable 5000 Unit(s) SubCutaneous every 8 hours  iron sucrose IVPB 300 milliGRAM(s) IV Intermittent every 24 hours  melatonin 5 milliGRAM(s) Oral at bedtime  mirtazapine 7.5 milliGRAM(s) Oral at bedtime  multivitamin 1 Tablet(s) Oral daily  pantoprazole    Tablet 40 milliGRAM(s) Oral before breakfast    MEDICATIONS  (PRN):  acetaminophen     Tablet .. 650 milliGRAM(s) Oral every 6 hours PRN Mild Pain (1 - 3)  hydrOXYzine hydrochloride 25 milliGRAM(s) Oral three times a day PRN Anxiety  LORazepam     Tablet 0.5 milliGRAM(s) Oral three times a day PRN Anxiety      ALLERGIES:  Allergies    Compazine (Unknown)  contrast media (iodine-based) (Unknown)  iv contrast (Unknown)  penicillin (Unknown)  penicillins (Other)  Toradol (Unknown)    Intolerances        LABS:              CAPILLARY BLOOD GLUCOSE          RADIOLOGY & ADDITIONAL TESTS: Reviewed.

## 2022-12-25 NOTE — OCCUPATIONAL THERAPY INITIAL EVALUATION ADULT - ADDITIONAL COMMENTS
Pt lives alone in an elevator access apartment [address]. Pt required assistance for bathing, dressing, and IADLs from HHA. Pt has HHA 5-6 days/wk, 4hrs/day. Pt has a bathtub shower with shower chair. Pt ambulates with a manual w/c (reports she is independent in w/c transfers and self-propelling). Pt reports her RW is broken. Pt is R hand dominant and wears glasses for reading. Pt lives alone in an elevator access [facility]. Pt required assistance for bathing, dressing, and IADLs from HHA. Pt has HHA 5-6 days/wk, 4hrs/day. Pt has a bathtub shower with shower chair. Pt ambulates with a manual w/c (reports she is independent in w/c transfers and self-propelling). Pt reports her RW is broken. Pt is R hand dominant and wears glasses for reading. Pt reports frequent falls at home.

## 2022-12-25 NOTE — OCCUPATIONAL THERAPY INITIAL EVALUATION ADULT - PERTINENT HX OF CURRENT PROBLEM, REHAB EVAL
62F w/ PMH HTN(not on any meds per pt), Migraine, Sz disorder, depression- anxiety, prior stroke Hx in 2018 presented to [facility] ER for frequent falls to [facility] ER, admitted to Medicine for further work up, Stroke Neuro was consulted for frequent falls. Pt poor Vs unreliable historian. CTH w/ subacute / Chronic B/L MICAH infarcts. Unclear chronicity of R sided weakness as pt states she does not use walker and is pending Wheel chair. Stroke team consulted for frequent falls, given unclear Hx and chronicity of the deficits, recommended MRA H/N which showed acute/ sub acute infarcts in [place] and L parieto temporal region with mass effect without herniation i/s/o B/L A2 HGS/occlusion and Prox L ICA HGS Vs occlusion. Pt transferred to Stroke tele for further work up and close neuro monitoring.

## 2022-12-25 NOTE — OCCUPATIONAL THERAPY INITIAL EVALUATION ADULT - RANGE OF MOTION EXAMINATION, UPPER EXTREMITY
RUE shoulder flexion grossly 0-110 degrees/Left UE Active ROM was WNL (within normal limits)/Left UE Passive ROM was WNL (within normal limits)

## 2022-12-25 NOTE — OCCUPATIONAL THERAPY INITIAL EVALUATION ADULT - PERSONAL SAFETY AND JUDGMENT, REHAB EVAL
pt impulsive during session requiring repetition of directions in order to follow one step commands/impaired

## 2022-12-25 NOTE — OCCUPATIONAL THERAPY INITIAL EVALUATION ADULT - RANGE OF MOTION EXAMINATION, LOWER EXTREMITY
RLE with decreased knee flexion/extension AROM and ankle DF/PF AROM 2/2 weakness/Left LE Active ROM was WNL  (within normal limits)/Left LE Passive ROM was WNL (within normal limits)

## 2022-12-25 NOTE — PROGRESS NOTE ADULT - SUBJECTIVE AND OBJECTIVE BOX
SUBJECTIVE:    Patient seen and examined at bedside. Continues to request benzos and demanding to go home. Refusing labs. Denies fever, headache, chest pain, sob, abdominal pain, n/v/d     OBJECTIVE:  Vital Signs Last 24 Hrs  T(C): 36.8 (24 Dec 2022 22:22), Max: 36.8 (24 Dec 2022 10:49)  T(F): 98.2 (24 Dec 2022 22:22), Max: 98.3 (24 Dec 2022 17:59)  HR: --  BP: 145/71 (25 Dec 2022 05:00) (132/77 - 151/79)  BP(mean): 98 (25 Dec 2022 05:00) (91 - 108)  RR: --  SpO2: --        Physical Exam:  Gen: NAD, laying in bed, well appearing, alert, interactive  HEENT: PERRL, anicteric sclera, no JVD, no thyromegaly  Cardio: +S1/S2, RRR, no murmurs  Resp: CTA b/l, no w/r/r  GI: +BS x4, NT/ND  Ext: no peripheral edema, NROM x4  Vasc: 3+ peripheral pulses  Skin: warm, dry, and intact. no rashes, wounds or scars  Neurologic:  -Mental status: Awake, alert, oriented to person, place, and time (year w/ choices). Speech is fluent with intact naming, repetition, and comprehension, no dysarthria. Follows commands. Attention/concentration intact. Fund of knowledge appropriate. Forgetful.  -Cranial nerves:   II: Visual fields are full to confrontation.  III, IV, VI: Extraocular movements are intact without nystagmus. Pupils equally round and reactive to light.  V:  Facial sensation V1-V3 equal and intact   VII: Face is symmetric with normal eye closure and smile  VIII: Hearing is bilaterally intact to finger rub  IX, X: Uvula is midline and soft palate rises symmetrically  XI: Head turning and shoulder shrug are intact.  XII: Tongue protrudes midline  Motor: Normal bulk and tone. RUE : antigravity, Pronator drift +, but able to hold it up for 10seconds. RLE : Briefly antigravity, hit the bed and unable to hold for count of 5. LUE/ LLE : 5/5.   Sensation: Intact to light touch bilaterally. No neglect or extinction on double simultaneous testing.  Coordination: No dysmetria on finger-to-nose out of proportion to weakness.   Reflexes: Downgoing toes bilaterally   Gait: Deferred.    Labs:            Fingerstick  glucose:     MEDICATIONS  (STANDING):  aspirin  chewable 81 milliGRAM(s) Oral daily  atorvastatin 80 milliGRAM(s) Oral at bedtime  folic acid 1 milliGRAM(s) Oral daily  heparin   Injectable 5000 Unit(s) SubCutaneous every 8 hours  iron sucrose IVPB 300 milliGRAM(s) IV Intermittent every 24 hours  melatonin 5 milliGRAM(s) Oral at bedtime  mirtazapine 7.5 milliGRAM(s) Oral at bedtime  multivitamin 1 Tablet(s) Oral daily  pantoprazole    Tablet 40 milliGRAM(s) Oral before breakfast  thiamine IVPB 500 milliGRAM(s) IV Intermittent every 24 hours    MEDICATIONS  (PRN):  acetaminophen     Tablet .. 650 milliGRAM(s) Oral every 6 hours PRN Mild Pain (1 - 3)  hydrOXYzine hydrochloride 25 milliGRAM(s) Oral three times a day PRN Anxiety      Allergies    Compazine (Unknown)  contrast media (iodine-based) (Unknown)  iv contrast (Unknown)  penicillin (Unknown)  penicillins (Other)  Toradol (Unknown)    Intolerances        RADIOLOGY & ADDITIONAL TESTS: Reviewed.
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
## 2022-12-25 NOTE — OCCUPATIONAL THERAPY INITIAL EVALUATION ADULT - NSOTDISCHREC_GEN_A_CORE
Acute Rehab Acute Rehab. Pt remains an excellent candidate as she is very motivated and can tolerate 3 hours of therapy a day.

## 2022-12-25 NOTE — OCCUPATIONAL THERAPY INITIAL EVALUATION ADULT - DIAGNOSIS, OT EVAL
Pt presents with decreased RUE/RLE strength, balance, motor control, coordination, postural control, and functional endurance impacting her ability to independently complete ADLs, functional transfers, and functional mobility.

## 2022-12-25 NOTE — OCCUPATIONAL THERAPY INITIAL EVALUATION ADULT - SENSORY TESTS
CN Testing: V1-V3 sensation in tact; b/l frontalis intact; b/l buccinator intact; smile symmetrical; tongue protrusion at midline w/ b/l lateralization in tact; b/l eyes open/close intact; b/l shoulder elevation intact
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
## 2022-12-26 LAB
ANION GAP SERPL CALC-SCNC: 10 MMOL/L — SIGNIFICANT CHANGE UP (ref 5–17)
BUN SERPL-MCNC: 13 MG/DL — SIGNIFICANT CHANGE UP (ref 7–23)
CALCIUM SERPL-MCNC: 9.2 MG/DL — SIGNIFICANT CHANGE UP (ref 8.4–10.5)
CHLORIDE SERPL-SCNC: 105 MMOL/L — SIGNIFICANT CHANGE UP (ref 96–108)
CO2 SERPL-SCNC: 23 MMOL/L — SIGNIFICANT CHANGE UP (ref 22–31)
CREAT SERPL-MCNC: 0.61 MG/DL — SIGNIFICANT CHANGE UP (ref 0.5–1.3)
EGFR: 102 ML/MIN/1.73M2 — SIGNIFICANT CHANGE UP
GLUCOSE SERPL-MCNC: 110 MG/DL — HIGH (ref 70–99)
HCT VFR BLD CALC: 34.5 % — SIGNIFICANT CHANGE UP (ref 34.5–45)
HGB BLD-MCNC: 10 G/DL — LOW (ref 11.5–15.5)
MAGNESIUM SERPL-MCNC: 1.9 MG/DL — SIGNIFICANT CHANGE UP (ref 1.6–2.6)
MCHC RBC-ENTMCNC: 23 PG — LOW (ref 27–34)
MCHC RBC-ENTMCNC: 29 GM/DL — LOW (ref 32–36)
MCV RBC AUTO: 79.5 FL — LOW (ref 80–100)
NRBC # BLD: 0 /100 WBCS — SIGNIFICANT CHANGE UP (ref 0–0)
PHOSPHATE SERPL-MCNC: 4.1 MG/DL — SIGNIFICANT CHANGE UP (ref 2.5–4.5)
PLATELET # BLD AUTO: 496 K/UL — HIGH (ref 150–400)
POTASSIUM SERPL-MCNC: 3.7 MMOL/L — SIGNIFICANT CHANGE UP (ref 3.5–5.3)
POTASSIUM SERPL-SCNC: 3.7 MMOL/L — SIGNIFICANT CHANGE UP (ref 3.5–5.3)
RBC # BLD: 4.34 M/UL — SIGNIFICANT CHANGE UP (ref 3.8–5.2)
RBC # FLD: 23.2 % — HIGH (ref 10.3–14.5)
SODIUM SERPL-SCNC: 138 MMOL/L — SIGNIFICANT CHANGE UP (ref 135–145)
WBC # BLD: 10.3 K/UL — SIGNIFICANT CHANGE UP (ref 3.8–10.5)
WBC # FLD AUTO: 10.3 K/UL — SIGNIFICANT CHANGE UP (ref 3.8–10.5)

## 2022-12-26 PROCEDURE — 99233 SBSQ HOSP IP/OBS HIGH 50: CPT

## 2022-12-26 PROCEDURE — 99232 SBSQ HOSP IP/OBS MODERATE 35: CPT

## 2022-12-26 RX ORDER — ACETAMINOPHEN 500 MG
750 TABLET ORAL ONCE
Refills: 0 | Status: COMPLETED | OUTPATIENT
Start: 2022-12-26 | End: 2022-12-26

## 2022-12-26 RX ORDER — POLYETHYLENE GLYCOL 3350 17 G/17G
17 POWDER, FOR SOLUTION ORAL ONCE
Refills: 0 | Status: COMPLETED | OUTPATIENT
Start: 2022-12-26 | End: 2022-12-26

## 2022-12-26 RX ORDER — FERROUS SULFATE 325(65) MG
325 TABLET ORAL
Refills: 0 | Status: DISCONTINUED | OUTPATIENT
Start: 2022-12-26 | End: 2023-01-11

## 2022-12-26 RX ADMIN — Medication 5 MILLIGRAM(S): at 22:10

## 2022-12-26 RX ADMIN — Medication 1 MILLIGRAM(S): at 11:52

## 2022-12-26 RX ADMIN — HEPARIN SODIUM 5000 UNIT(S): 5000 INJECTION INTRAVENOUS; SUBCUTANEOUS at 14:00

## 2022-12-26 RX ADMIN — POLYETHYLENE GLYCOL 3350 17 GRAM(S): 17 POWDER, FOR SOLUTION ORAL at 17:35

## 2022-12-26 RX ADMIN — Medication 650 MILLIGRAM(S): at 12:22

## 2022-12-26 RX ADMIN — Medication 81 MILLIGRAM(S): at 11:52

## 2022-12-26 RX ADMIN — PANTOPRAZOLE SODIUM 40 MILLIGRAM(S): 20 TABLET, DELAYED RELEASE ORAL at 06:45

## 2022-12-26 RX ADMIN — Medication 650 MILLIGRAM(S): at 18:13

## 2022-12-26 RX ADMIN — Medication 25 MILLIGRAM(S): at 11:52

## 2022-12-26 RX ADMIN — Medication 325 MILLIGRAM(S): at 17:30

## 2022-12-26 RX ADMIN — Medication 650 MILLIGRAM(S): at 18:43

## 2022-12-26 RX ADMIN — ATORVASTATIN CALCIUM 80 MILLIGRAM(S): 80 TABLET, FILM COATED ORAL at 22:02

## 2022-12-26 RX ADMIN — Medication 1 TABLET(S): at 11:53

## 2022-12-26 RX ADMIN — Medication 650 MILLIGRAM(S): at 11:52

## 2022-12-26 RX ADMIN — Medication 25 MILLIGRAM(S): at 22:01

## 2022-12-26 RX ADMIN — Medication 25 MILLIGRAM(S): at 00:02

## 2022-12-26 RX ADMIN — Medication 1 MILLIGRAM(S): at 14:00

## 2022-12-26 RX ADMIN — Medication 0.5 MILLIGRAM(S): at 06:45

## 2022-12-26 NOTE — PROGRESS NOTE ADULT - ASSESSMENT
62F w/ PMH HTN, migraine, seizure d/o, depression-anxiety, per patient multiple CTA in September w/ complaints of fall, admitted for a CVA. Hematology consulted for anemia.    1.) Anemia, microcytic, 2/2 iron-deficiency  H/H 6.9/24.8 Reticulocyte count 3.3  haptoglobin 231, , T.Bili 0.2  platelets slightly elevated, coags wnl  Iron studies: serum iron 10, %saturation 3, ferritin 6  Peripheral blood smear showing anisocytosis, many acanthocytes, rare target cells, very rare schistocytes 0-1/hpf  Based on the above labs, patient is severely iron deficient. No signs of hemolysis.  Denies history of heavy bleeding, poor wound healing, easy bruising, hemarthrosis.  -continue 300mg x 3days iron sucrose  -Pt has never had a colonoscopy. Would recommend GI workup to rule out occult malignancy/malabsorption/occult bleeding, but this can be done outpatient.  -Recommend Hematology follow up on discharge. If she wishes to follow up with Vivi, please set up an outpatient appointment for her to see Dr. Maribeth Reddy 1-2 weeks from discharge  -Maintain active type and screen  -Transfuse for Hb <7    We will sign off. Please call us back if you have additional questions or concerns.  Patient discussed with Dr. Stone.

## 2022-12-26 NOTE — PROGRESS NOTE ADULT - SUBJECTIVE AND OBJECTIVE BOX
Physical Medicine and Rehabilitation Progress Note :       Patient is a 62y old  Female who presents with a chief complaint of mechanical fall (25 Dec 2022 13:11)      HPI:  62F w/ PMH HTN, migraine, seizure d/o, depression-anxiety, per patient multiiple CTA in September w/ complaints of fall. Patient states that fall occurred while home, when syncopized, with LOC with head trauma. Pt reports no alcohol use, never smoker, received HHA 4 hours daily (every day). Has two adult children (one of whome is  and one living parent). Patient is a poor historian, does not recall exact events leading to fall. Patient also notes the need to ambulate via wheelchair in home. States to have been admitted to acute rehab s/p CVA in fall of . States to be "addicted to valium" utilizing 1 10mg tablet once every other day. States health aide is not with her all the time. Patient notes no continued need for antihypertensives (on multiple previously) as they caused dizziness. Notes that dizziness has not resolved and notes room spinning with changes in head position.     Initial vital signs: T: 98 F, HR: 93, BP: 110/71, R: 16, SpO2: 100% on RA    Labs: significant for WBC 12.91, Hgb 8, MCV 69, Plt 567, aPTT 25.1, K 3 s/p 40 meq PO in ED, Cr 0.67, remainder of CMP WNL, CECILIA <10,     CXR: No acute pulmonary pathology. Dextroscholiosis noted.     CT C-spine: Ankylosis of the C4 and C5 vertebrae is noted. There is moderate multilevel degenerative disc disease. No acute osseous abnormality of the cervical spine.    CTH: No acute intracranial hemorrhage or calvarial fracture. Subacute/chronic bilateral MICAH territory infarcts.    CT T-spine: No acute fracture. Patchy bilateral pulmonary nodular opacities, likely infectious/inflammatory. Moderate hiatal hernia.    EKG:     Medications: tylenol 750mg PO x1, KCl 40meq x1    Consults: none  (20 Dec 2022 04:52)                            10.0   10.30 )-----------( 496      ( 26 Dec 2022 06:12 )             34.5       12-    138  |  105  |  13  ----------------------------<  110<H>  3.7   |  23  |  0.61    Ca    9.2      26 Dec 2022 06:12  Phos  4.1       Mg     1.9           Vital Signs Last 24 Hrs  T(C): 36.4 (26 Dec 2022 10:39), Max: 36.8 (26 Dec 2022 01:17)  T(F): 97.5 (26 Dec 2022 10:39), Max: 98.3 (26 Dec 2022 01:17)  HR: 94 (25 Dec 2022 12:15) (94 - 94)  BP: 126/65 (26 Dec 2022 08:27) (116/73 - 144/84)  BP(mean): 90 (26 Dec 2022 08:27) (88 - 107)  RR: 16 (26 Dec 2022 08:27) (16 - 16)  SpO2: --        MEDICATIONS  (STANDING):  aspirin  chewable 81 milliGRAM(s) Oral daily  atorvastatin 80 milliGRAM(s) Oral at bedtime  folic acid 1 milliGRAM(s) Oral daily  heparin   Injectable 5000 Unit(s) SubCutaneous every 8 hours  iron sucrose IVPB 300 milliGRAM(s) IV Intermittent every 24 hours  melatonin 5 milliGRAM(s) Oral at bedtime  mirtazapine 7.5 milliGRAM(s) Oral at bedtime  multivitamin 1 Tablet(s) Oral daily  pantoprazole    Tablet 40 milliGRAM(s) Oral before breakfast    MEDICATIONS  (PRN):  acetaminophen     Tablet .. 650 milliGRAM(s) Oral every 6 hours PRN Mild Pain (1 - 3)  hydrOXYzine hydrochloride 25 milliGRAM(s) Oral three times a day PRN Anxiety  LORazepam     Tablet 0.5 milliGRAM(s) Oral every 8 hours PRN Anxiety         Physical / Occupational Therapy Functional Status Assessment :   2022     Pain Assessment/Number Scale (0-10) Adult  Presence of Pain: complains of pain/discomfort  Body Location: headache   Pain Rating (0-10): Rest: 2   Pain Rating (0-10): Activity: 0     Cognitive/Neuro      Cognitive/Neuro/Behavioral  Level of Consciousness: alert  Arousal Level: opens eyes spontaneously  Orientation: oriented x 4  Speech: clear;  spontaneous  Mood/Behavior: restless;  impulsive     Language Assistance  Preferred Language to Address Healthcare Preferred Language to Address Healthcare: English    Therapeutic Interventions      Bed Mobility  Bed Mobility Training Rolling/Turning: moderate assist (50% patient effort);  1 person assist;  verbal cues  Bed Mobility Training Scooting: moderate assist (50% patient effort);  verbal cues;  1 person assist  Bed Mobility Training Supine-to-Sit: moderate assist (50% patient effort);  verbal cues;  1 person assist  Bed Mobility Training Limitations: impaired balance;  decreased strength;  impaired coordination;  pain;  cognitive, decreased safety awareness    Sit-Stand Transfer Training  Transfer Training Sit-to-Stand Transfer: moderate assist (50% patient effort);  2 person assist;  full weight-bearing   rolling walker  Transfer Training Stand-to-Sit Transfer: minimum assist (75% patient effort);  2 person assist;  full weight-bearing   rolling walker  Sit-to-Stand Transfer Training Transfer Safety Analysis: impaired balance;  decreased strength;  pain;  cognitive, decreased safety awareness;  impaired coordination    Gait Training  Gait Training: moderate assist (50% patient effort);  2 person assist;  full weight-bearing   rolling walker;  10 feet;  x2  Gait Analysis: 3-point gait   decreased ruth;  increased time in double stance;  decreased hip/knee flexion;  decreased velocity of limb motion;  decreased step length;  decreased stride length;  decreased swing-to-stance ratio;  decreased toe clearance;  ataxic ;  decreased strength;  impaired balance;  pain;  cognitive, decreased safety awareness;  impaired coordination;  impaired motor control    Therapeutic Exercise  Therapeutic Exercise Detail: LUE shoulder flexion/extension 4/5, LUE elbow flexion/extension 4/5, LUE  strength 4/5. RUE shoulder flexion/extension 3-/5, RUE elbow flexion/extension 3+/5, R  strength 3+/5. LLE grossly 4+/5 throughout.RLE hip flexion grossly 3/5, RLE knee flexion/extension 3-/5, RLE ankle DF/PF 3-/5.       Visual Assessment:   · Visual Acuity	Quadrant Testing WNL  · Visual Tracking	normal  · Visual Neglect	none  · Visual Convergence	normal    Fine Motor Coordination:     Grossly Intact:   · Grossly Intact	pt is R hand dominant. Pt reports her RUE weakness is worse in comparison to her previous CVA.      Fine Motor Coordination:   · Left Hand, Finger to Nose	normal performance  · Right Hand, Finger to Nose	mild impairment  increased time and effort to perform, slight dysmetria noted  · Left Hand Thumb/Finger Opposition Skills	normal performance  · Right Hand Thumb/Finger Opposition Skills	normal performance    Upper Body Dressing Training:     · Level of [place]	minimum assist (75% patients effort)  · Physical Assist/Nonphysical Assist	1 person assist; nonverbal cues (demo/gestures); verbal cues; caridad gown around back seated EOB    Lower Body Dressing Training:     · Level of [place]	contact guard  · Physical Assist/Nonphysical Assist	verbal cues; nonverbal cues (demo/gestures); 1 person assist; caridad socks long sitting in bed            PM&R Impression : as above    Current Disposition Plan Recommendations :   acute rehab placement
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
## 2022-12-26 NOTE — PROGRESS NOTE ADULT - SUBJECTIVE AND OBJECTIVE BOX
Neurology Stroke Progress Note    INTERVAL HPI/OVERNIGHT EVENTS:  Patient seen and examined. Reports feeling anxious and wanting to leave hospital. As per psych, patient does not have capacity to leave AMA.    MEDICATIONS  (STANDING):  aspirin  chewable 81 milliGRAM(s) Oral daily  atorvastatin 80 milliGRAM(s) Oral at bedtime  ferrous    sulfate 325 milliGRAM(s) Oral <User Schedule>  folic acid 1 milliGRAM(s) Oral daily  heparin   Injectable 5000 Unit(s) SubCutaneous every 8 hours  iron sucrose IVPB 300 milliGRAM(s) IV Intermittent every 24 hours  melatonin 5 milliGRAM(s) Oral at bedtime  mirtazapine 7.5 milliGRAM(s) Oral at bedtime  multivitamin 1 Tablet(s) Oral daily  pantoprazole    Tablet 40 milliGRAM(s) Oral before breakfast    MEDICATIONS  (PRN):  acetaminophen     Tablet .. 650 milliGRAM(s) Oral every 6 hours PRN Mild Pain (1 - 3)  hydrOXYzine hydrochloride 25 milliGRAM(s) Oral three times a day PRN Anxiety  LORazepam     Tablet 1 milliGRAM(s) Oral every 8 hours PRN Anxiety      Allergies    Compazine (Unknown)  contrast media (iodine-based) (Unknown)  iv contrast (Unknown)  penicillin (Unknown)  penicillins (Other)  Toradol (Unknown)    Intolerances        Vital Signs Last 24 Hrs  T(C): 36.4 (26 Dec 2022 10:39), Max: 36.8 (26 Dec 2022 01:17)  T(F): 97.5 (26 Dec 2022 10:39), Max: 98.3 (26 Dec 2022 01:17)  HR: 94 (26 Dec 2022 11:49) (94 - 94)  BP: 140/78 (26 Dec 2022 11:49) (116/73 - 144/84)  BP(mean): 97 (26 Dec 2022 11:49) (88 - 107)  RR: 14 (26 Dec 2022 11:49) (14 - 16)  SpO2: 97% (26 Dec 2022 11:49) (97% - 97%)    Parameters below as of 26 Dec 2022 11:49  Patient On (Oxygen Delivery Method): room air        Neurologic:  -Mental status: Awake, alert, oriented to person, place, and time (year w/ choices). Speech is fluent with intact naming, repetition, and comprehension, no dysarthria. Follows commands. Attention/concentration intact. Forgetful.  -Cranial nerves:   II: Visual fields are full to confrontation.  III, IV, VI: Extraocular movements are intact without nystagmus. Pupils equally round and reactive to light.  V:  Facial sensation V1-V3 equal and intact   VII: Face is symmetric with normal eye closure and smile  VIII: Hearing is bilaterally intact to   XII: Tongue protrudes midline  Motor: Normal bulk and tone. RUE/RLE 4/5. Unable to voluntarily extend right leg. LUE/ LLE : 5/5.   Sensation: Intact to light touch bilaterally. No neglect or extinction on double simultaneous testing.  Coordination: No dysmetria on finger-to-nose out of proportion to weakness.   Gait: Deferred.    LABS:                        10.0   10.30 )-----------( 496      ( 26 Dec 2022 06:12 )             34.5     12-26    138  |  105  |  13  ----------------------------<  110<H>  3.7   |  23  |  0.61    Ca    9.2      26 Dec 2022 06:12  Phos  4.1     12-26  Mg     1.9     12-26            RADIOLOGY & ADDITIONAL TESTS:     Neurology Stroke Progress Note    INTERVAL HPI/OVERNIGHT EVENTS:  Patient seen and examined. Reports feeling anxious and wanting to leave hospital. As per psych, patient does not have capacity to leave AMA.    MEDICATIONS  (STANDING):  aspirin  chewable 81 milliGRAM(s) Oral daily  atorvastatin 80 milliGRAM(s) Oral at bedtime  ferrous    sulfate 325 milliGRAM(s) Oral <User Schedule>  folic acid 1 milliGRAM(s) Oral daily  heparin   Injectable 5000 Unit(s) SubCutaneous every 8 hours  iron sucrose IVPB 300 milliGRAM(s) IV Intermittent every 24 hours  melatonin 5 milliGRAM(s) Oral at bedtime  mirtazapine 7.5 milliGRAM(s) Oral at bedtime  multivitamin 1 Tablet(s) Oral daily  pantoprazole    Tablet 40 milliGRAM(s) Oral before breakfast    MEDICATIONS  (PRN):  acetaminophen     Tablet .. 650 milliGRAM(s) Oral every 6 hours PRN Mild Pain (1 - 3)  hydrOXYzine hydrochloride 25 milliGRAM(s) Oral three times a day PRN Anxiety  LORazepam     Tablet 1 milliGRAM(s) Oral every 8 hours PRN Anxiety      Allergies    Compazine (Unknown)  contrast media (iodine-based) (Unknown)  iv contrast (Unknown)  penicillin (Unknown)  penicillins (Other)  Toradol (Unknown)    Intolerances        Vital Signs Last 24 Hrs  T(C): 36.4 (26 Dec 2022 10:39), Max: 36.8 (26 Dec 2022 01:17)  T(F): 97.5 (26 Dec 2022 10:39), Max: 98.3 (26 Dec 2022 01:17)  HR: 94 (26 Dec 2022 11:49) (94 - 94)  BP: 140/78 (26 Dec 2022 11:49) (116/73 - 144/84)  BP(mean): 97 (26 Dec 2022 11:49) (88 - 107)  RR: 14 (26 Dec 2022 11:49) (14 - 16)  SpO2: 97% (26 Dec 2022 11:49) (97% - 97%)    Parameters below as of 26 Dec 2022 11:49  Patient On (Oxygen Delivery Method): room air        Neurologic:  -Mental status: Awake, alert, oriented to person, place, and time (year w/ choices). Speech is fluent with intact naming, repetition, and comprehension, no dysarthria. Follows commands. Attention/concentration intact. Forgetful.  -Cranial nerves:   II: Visual fields are full to confrontation.  III, IV, VI: Extraocular movements are intact without nystagmus. Pupils equally round and reactive to light.  V:  Facial sensation V1-V3 equal and intact   VII: Face is symmetric with normal eye closure and smile  VIII: Hearing is bilaterally intact   XII: Tongue protrudes midline  Motor: Normal bulk and tone. RUE/RLE 4/5. Unable to voluntarily extend right leg. LUE/ LLE : 5/5.   Sensation: Intact to light touch bilaterally. No neglect or extinction on double simultaneous testing.  Coordination: No dysmetria on finger-to-nose out of proportion to weakness.   Gait: Deferred.    LABS:                        10.0   10.30 )-----------( 496      ( 26 Dec 2022 06:12 )             34.5     12-26    138  |  105  |  13  ----------------------------<  110<H>  3.7   |  23  |  0.61    Ca    9.2      26 Dec 2022 06:12  Phos  4.1     12-26  Mg     1.9     12-26            RADIOLOGY & ADDITIONAL TESTS:

## 2022-12-26 NOTE — PROGRESS NOTE ADULT - PROBLEM SELECTOR PLAN 2
iron deficiency (ferritin 16, 3% sat); unclear etiology; Pt. reports occasional "dark" BMs, but not melena or BRBPR; no e/o hemolysis; VSS; monitor CBC, goal Hb > 7, transfuse if needed; cont. PPI, IV iron; f/u GI recs, may need Heme input as well.  Were able to get labs this morning, and Hb is >10.  Did receive multiple doses of IV iron.  HD stable, unlikely to be actively bleeding at this time.

## 2022-12-26 NOTE — PROGRESS NOTE ADULT - ASSESSMENT
per Neurology    62 y o F w/ PMH HTN(not on any meds per pt), Migraine, Sz disorder, depression- anxiety, prior stroke Hx in 2018 presented to [facility] ER for frequent falls to [facility] ER, admitted to Medicine for further work up, Stroke Neuro was consulted for frequent falls. Pt poor Vs unreliable historian. CTH w/ subacute / Chronic B/L MICAH infarcts. Unclear chronicity of R sided weakness as pt states she does not use walker and is pending Wheel chair. Stroke team consulted for frequent falls, given unclear Hx and chronicity of the deficits, recommended MRA H/N which showed acute/ sub acute infarcts in L MICAH Territory and L parieto temporal region with mass effect without herniation i/s/o B/L A2 HGS/occlusion and Prox L ICA HGS Vs occlusion. Pt transferred to Stroke tele for further work up and close neuro monitoring.      Neuro  #CVA workup  - continue aspirin 81mg daily, will closely monitor for drop in Hb, if stable will consider adding Plavix to the regimen. However patient continues to refuse labs  - continue atorvastatin 80mg daily  - q4hr stroke neuro checks and vitals  - MRA H/N :  Acute/ Subacute infarction L MICAH Territory and L parieto temporal region w/local mass effect. B/L A2 segment HGS/ Occlusion and L Proximal ICA HGS Vs Occlusion.  -  HCT Results: Subacute/ chronic B/L MICAH territory infarcts.  - NSGY Cx called for L ICA HGS Vs occlusion- Reccs to obtain CTA H/N and MR Hess, both pending  - Stroke education    Cards  #HTN  - permissive hypertension, Goal -180  - Not on any  home BP meds per pt  - Please avoid Hypotension i/s/o L ICA and B/L A2  HGS.  - TTE : No wall motion abnormalities/ No valvular Dz.  - EKG Results: Isolated V5 AYANA upon admission, with no reciprocal changes, no c/o CP, no c/f ischemia no c/o cp/sob.    #HLD  - high dose statin as above in CVA  - LDL results: 126    Heme  #HANH  MCV and iron labs demonstrate severe iron deficiency likely source is GI  - retic count elevated, hemolysis labs (LDH and haptoglobin) negative  - c/w iron 300mg x3days  - patient never had a colonoscopy, endorses only brown stool   - GI consulted, rec EGD/colonoscopy outpatient. Hold off for now i/s/o recent stroke  - Heme consulted, f/u recs  - active T&S, transfuse for Hb<7    Pulm  - call provider if SPO2 < 94%    GI  #Nutrition/Fluids/Electrolytes   - replete K<4 and Mg <2  - Diet: DASH/ TLC  - IVF: None for now.    Renal  - C/T Trend Bun/Cr.    Infectious Disease  - C/T trend Bun/Cr    Endocrine  - A1C results: 5.8    - TSH results: 0.638    DVT Prophylaxis  - lovenox sq for DVT prophylaxis   - SCDs for DVT prophylaxis

## 2022-12-26 NOTE — PROGRESS NOTE ADULT - ASSESSMENT
Jayla Thompson MD  You 12 minutes ago (2:44 PM)      She spoke with Dr. rAi Hillman on 4/11 and bactrim was sent in on 4/11 in place of cipro. It has been at the pharmacy since 4/11. [address] She needs to get RX. One twice a day for one week. And increase water intake. Spoke with patient. Two pt identifiers confirmed. Patient advised of the above message from Dr. Janay Maravilla. Pt verbalized understanding of information discussed w/ no further questions at this time. 62F w/ PMH HTN (not on any meds per pt), migraine, seizure disorder, depression, anxiety, prior stroke hx in 2018 presented to [facility] ER for frequent falls to [facility] ER, admitted to medicine for further work up, stroke consulted for frequent falls. Pt poor vs unreliable historian. CTH w/ subacute / chronic B/L MICAH infarcts. Unclear chronicity of R sided weakness as pt states she does not use walker and is pending wheel chair. Given unclear hx and chronicity of the deficits, recommended MRA H/N which showed acute/ sub acute infarcts in L MICAH Territory and L parieto temporal region with mass effect without herniation i/s/o B/L A2 HGS/occlusion and Prox L ICA HGS vs occlusion. Pt transferred to stroke tele for further work up and close neuro monitoring. Found to have severe HANH.    Neuro  #CVA workup  - continue aspirin 81mg daily, will closely monitor for drop in Hb  - if fecal occult neg, can start plavix  - continue atorvastatin 80mg daily  - q4hr stroke neuro checks and vitals  - MRA H/N :  Acute/ Subacute infarction L MICAH Territory and L parieto temporal region w/local mass effect. B/L A2 segment HGS/ Occlusion and L Proximal ICA HGS Vs Occlusion.  - HCT Results: Subacute/ chronic B/L MICAH territory infarcts.  - NSGY consulted for L ICA HGS Vs occlusion- Reccs to obtain CTA H/N and MR Hess, both pending  - Stroke education    Cards  #HTN  - permissive hypertension, Goal -180  - Not on any  home BP meds per pt  - Please avoid Hypotension i/s/o L ICA and B/L A2  HGS.  - TTE : No wall motion abnormalities/ No valvular Dz.  - EKG Results: Isolated V5 AYANA upon admission, with no reciprocal changes, no c/o CP, no c/f ischemia no c/o cp/sob.    #HLD  - high dose statin as above in CVA  - LDL results: 126    Heme  #HANH, appreciate heme recs  H/H increasing Reticulocyte count 3.3  haptoglobin 231, , T.Bili 0.2  platelets slightly elevated, coags wnl  Iron studies: serum iron 10, %saturation 3, ferritin 6  Peripheral blood smear showing anisocytosis, many acanthocytes, rare target cells, very rare schistocytes 0-1/hpf  Based on the above labs, patient is severely iron deficient. No signs of hemolysis.  Denies history of heavy bleeding, poor wound healing, easy bruising, hemarthrosis.  -s/p 300mg x 3days iron sucrose  - Pt has never had a colonoscopy. Would recommend GI workup to rule out occult malignancy/malabsorption/occult bleeding, but this can be done outpatient.  - Recommend Hematology follow up on discharge.  - started ferrous sulfate 325mg Mon, Wed, Fri  - f/u fecal occult  - patient never had a colonoscopy, endorses only brown stool   - GI consulted, rec EGD/colonoscopy outpatient. Hold off for now i/s/o recent stroke  - active T&S, transfuse for Hb<7    Pulm  - call provider if SPO2 < 94%    GI  #Nutrition/Fluids/Electrolytes   - replete K<4 and Mg <2  - Diet: DASH/ TLC  - IVF: None for now  - GI consulted for HANH, rec EGD/colonoscopy outpatient. Hold off for now i/s/o recent stroke    Renal  - no issues at this time    Infectious Disease  - no issues at this time    Endocrine  - A1C results: 5.8    - TSH results: 0.638      Psych  # depression  # anxiety  appreciate psych recs:   - self-reported history of chronic anxiety, insomnia, new reported depression, benzodiazepine use disorder, ?alcohol use disorder (per prior chart), ?opiate use disorder in remission, impaired ability to logically reason, in setting of hospitalization for evaluation s/p fall, now found with new CVA. Demonstrates very poor insight into the reason for her ongoing hospitalization, with no understanding of diagnosis of new stroke, recommended additional testing and possible interventions, and no appreciation of the reported significant risks of leaving the hospital against medical advice. Additionally, pt remains highly focused on receiving benzodiazepines for vague anxiety sx and insomnia, with poor insight into her benzodiazepine use disorder (with illicit in addition to prescribed use as outpt) and considerable risks of continued use, including fall risk, risk of additional cognitive impairment, risk in combination with many other medications listed for outpt use. Pt declines non-benzodiazepines for mood/anxiety sx. No acute safety concerns that would warrant inpatient level psychiatric care.  - pt currently LACKS capacity to leave the hospital AMA or refuse any acutely indicated testing or treatment.  - mirtazapine 7.5mg po QHS PRN for insomnia   - hydroxyzine PRN for acute anxiety - pt cites adverse reaction to gabapentin  - no psychiatric barrier to discharge when medically ready  - is prescribed 4mg ativan outpatient, started patient on ativan 0.1mg TID PRN on 12/25, still reporting on-going anxiety and insomnia, increased to 1mg TID PRN on 12/26    DVT Prophylaxis  - heparin sq for DVT prophylaxis   - SCDs for DVT prophylaxis       Dispo: PT/OT: AR     Discussed with attending Dr. Turner 62F w/ PMH HTN (not on any meds per pt), migraine, seizure disorder, depression, anxiety, prior stroke hx in 2018 presented to [facility] ER for frequent falls to [facility] ER, admitted to medicine for further work up, stroke consulted for frequent falls. Pt poor vs unreliable historian. CTH w/ subacute / chronic B/L MICAH infarcts. Unclear chronicity of R sided weakness as pt states she does not use walker and is pending wheel chair. Given unclear hx and chronicity of the deficits, recommended MRA H/N which showed acute/ sub acute infarcts in L MICAH Territory and L parieto temporal region with mass effect without herniation i/s/o B/L A2 HGS/occlusion and Prox L ICA HGS vs occlusion. Pt transferred to stroke tele for further work up and close neuro monitoring. Found to have severe HANH.    Neuro  #CVA workup  - continue aspirin 81mg daily, will closely monitor for drop in Hb  - no acute concern for bleed, but to start plavix, need fecal occult to be negative   - continue atorvastatin 80mg daily  - q4hr stroke neuro checks and vitals  - MRA H/N :  Acute/ Subacute infarction L MICAH Territory and L parieto temporal region w/local mass effect. B/L A2 segment HGS/ Occlusion and L Proximal ICA HGS Vs Occlusion.  - HCT Results: Subacute/ chronic B/L MICAH territory infarcts.  - NSGY consulted for L ICA HGS Vs occlusion- Reccs to obtain CTA H/N and MR Hess, both pending  - Stroke education    Cards  #HTN  - permissive hypertension, Goal -180  - Not on any  home BP meds per pt  - Please avoid Hypotension i/s/o L ICA and B/L A2  HGS.  - TTE : No wall motion abnormalities/ No valvular Dz.  - EKG Results: Isolated V5 AYANA upon admission, with no reciprocal changes, no c/o CP, no c/f ischemia no c/o cp/sob.    #HLD  - high dose statin as above in CVA  - LDL results: 126    Heme  #HANH, appreciate heme recs  H/H increasing Reticulocyte count 3.3  haptoglobin 231, , T.Bili 0.2  platelets slightly elevated, coags wnl  Iron studies: serum iron 10, %saturation 3, ferritin 6  Peripheral blood smear showing anisocytosis, many acanthocytes, rare target cells, very rare schistocytes 0-1/hpf  Based on the above labs, patient is severely iron deficient. No signs of hemolysis.  Denies history of heavy bleeding, poor wound healing, easy bruising, hemarthrosis.  -s/p 300mg x 3days iron sucrose  - Pt has never had a colonoscopy. Would recommend GI workup to rule out occult malignancy/malabsorption/occult bleeding, but this can be done outpatient.  - Recommend hematology follow up on discharge.  - started ferrous sulfate 325mg Mon, Wed, Fri  - f/u fecal occult  - patient never had a colonoscopy, endorses only brown stool   - GI consulted, rec EGD/colonoscopy outpatient. Hold off for now i/s/o recent stroke  - active T&S, transfuse for Hb<7    Pulm  - call provider if SPO2 < 94%    GI  #Nutrition/Fluids/Electrolytes   - replete K<4 and Mg <2  - Diet: DASH/ TLC  - IVF: None for now  - GI consulted for HANH, rec EGD/colonoscopy outpatient. Hold off for now i/s/o recent stroke    Renal  - no issues at this time    Infectious Disease  - no issues at this time    Endocrine  - A1C results: 5.8    - TSH results: 0.638      Psych  # depression  # anxiety  appreciate psych recs:   - self-reported history of chronic anxiety, insomnia, new reported depression, benzodiazepine use disorder, ?alcohol use disorder (per prior chart), ?opiate use disorder in remission, impaired ability to logically reason, in setting of hospitalization for evaluation s/p fall, now found with new CVA. Demonstrates very poor insight into the reason for her ongoing hospitalization, with no understanding of diagnosis of new stroke, recommended additional testing and possible interventions, and no appreciation of the reported significant risks of leaving the hospital against medical advice. Additionally, pt remains highly focused on receiving benzodiazepines for vague anxiety sx and insomnia, with poor insight into her benzodiazepine use disorder (with illicit in addition to prescribed use as outpt) and considerable risks of continued use, including fall risk, risk of additional cognitive impairment, risk in combination with many other medications listed for outpt use. Pt declines non-benzodiazepines for mood/anxiety sx. No acute safety concerns that would warrant inpatient level psychiatric care.  - pt currently LACKS capacity to leave the hospital AMA or refuse any acutely indicated testing or treatment.  - mirtazapine 7.5mg po QHS PRN for insomnia   - hydroxyzine PRN for acute anxiety - pt cites adverse reaction to gabapentin  - no psychiatric barrier to discharge when medically ready  - is prescribed 4mg ativan outpatient, started patient on ativan 0.1mg TID PRN on 12/25, still reporting on-going anxiety and insomnia, increased to 1mg TID PRN on 12/26    DVT Prophylaxis  - heparin sq for DVT prophylaxis   - SCDs for DVT prophylaxis       Dispo: PT/OT: AR     Discussed with attending Dr. Turner 62F w/ PMH HTN (not on any meds per pt), migraine, seizure disorder, depression, anxiety, prior stroke hx in 2018 presented to [facility] ER for frequent falls to [facility] ER, admitted to medicine for further work up, stroke consulted for frequent falls. Pt poor vs unreliable historian. CTH w/ subacute / chronic B/L MICAH infarcts. Unclear chronicity of R sided weakness as pt states she does not use walker and is pending wheel chair. Given unclear hx and chronicity of the deficits, recommended MRA H/N which showed acute/ sub acute infarcts in L MICAH Territory and L parieto temporal region with mass effect without herniation i/s/o B/L A2 HGS/occlusion and Prox L ICA HGS vs occlusion. Pt transferred to stroke tele for further work up and close neuro monitoring. Found to have severe HANH.    Neuro  #CVA workup  - continue aspirin 81mg daily, will closely monitor for drop in Hb  - no acute concern for bleed, but to start plavix, need fecal occult to be negative   - continue atorvastatin 80mg daily  - q4hr stroke neuro checks and vitals  - MRA H/N :  Acute/ Subacute infarction L MICAH Territory and L parieto temporal region w/local mass effect. B/L A2 segment HGS/ Occlusion and L Proximal ICA HGS Vs Occlusion.  - HCT Results: Subacute/ chronic B/L MICAH territory infarcts.  - NSGY consulted for L ICA HGS Vs occlusion- Reccs to obtain CTA H/N and MR Hess, both pending  - Stroke education    Cards  #HTN  - permissive hypertension, Goal -180  - Not on any  home BP meds per pt  - Please avoid Hypotension i/s/o L ICA and B/L A2  HGS.  - TTE : No wall motion abnormalities/ No valvular Dz.  - EKG Results: Isolated V5 AYANA upon admission, with no reciprocal changes, no c/o CP, no c/f ischemia no c/o cp/sob.    #HLD  - high dose statin as above in CVA  - LDL results: 126    Heme  #AHNH, appreciate heme recs  H/H increasing Reticulocyte count 3.3  haptoglobin 231, , T.Bili 0.2  platelets slightly elevated, coags wnl  Iron studies: serum iron 10, %saturation 3, ferritin 6  Peripheral blood smear showing anisocytosis, many acanthocytes, rare target cells, very rare schistocytes 0-1/hpf  Based on the above labs, patient is severely iron deficient. No signs of hemolysis.  Denies history of heavy bleeding, poor wound healing, easy bruising, hemarthrosis.  -s/p 300mg x 3days iron sucrose  - Pt has never had a colonoscopy. Would recommend GI workup to rule out occult malignancy/malabsorption/occult bleeding, but this can be done outpatient.  - Recommend hematology follow up on discharge. If she wishes to follow up with [facility], please set up an outpatient appointment for her to see Dr. Maribeth Reddy 1-2 weeks from discharge  - heme s/o  - started ferrous sulfate 325mg Mon, Wed, Fri  - f/u fecal occult  - patient never had a colonoscopy, endorses only brown stool   - GI consulted, rec EGD/colonoscopy outpatient. Hold off for now i/s/o recent stroke  - active T&S, transfuse for Hb<7    Pulm  - call provider if SPO2 < 94%    GI  #Nutrition/Fluids/Electrolytes   - replete K<4 and Mg <2  - Diet: DASH/ TLC  - IVF: None for now  - GI consulted for HANH, rec EGD/colonoscopy outpatient. Hold off for now i/s/o recent stroke    Renal  - no issues at this time    Infectious Disease  - no issues at this time    Endocrine  - A1C results: 5.8    - TSH results: 0.638      Psych  # depression  # anxiety  appreciate psych recs:   - self-reported history of chronic anxiety, insomnia, new reported depression, benzodiazepine use disorder, ?alcohol use disorder (per prior chart), ?opiate use disorder in remission, impaired ability to logically reason, in setting of hospitalization for evaluation s/p fall, now found with new CVA. Demonstrates very poor insight into the reason for her ongoing hospitalization, with no understanding of diagnosis of new stroke, recommended additional testing and possible interventions, and no appreciation of the reported significant risks of leaving the hospital against medical advice. Additionally, pt remains highly focused on receiving benzodiazepines for vague anxiety sx and insomnia, with poor insight into her benzodiazepine use disorder (with illicit in addition to prescribed use as outpt) and considerable risks of continued use, including fall risk, risk of additional cognitive impairment, risk in combination with many other medications listed for outpt use. Pt declines non-benzodiazepines for mood/anxiety sx. No acute safety concerns that would warrant inpatient level psychiatric care.  - pt currently LACKS capacity to leave the hospital AMA or refuse any acutely indicated testing or treatment.  - mirtazapine 7.5mg po QHS PRN for insomnia   - hydroxyzine PRN for acute anxiety - pt cites adverse reaction to gabapentin  - no psychiatric barrier to discharge when medically ready  - is prescribed 4mg ativan outpatient, started patient on ativan 0.1mg TID PRN on 12/25, still reporting on-going anxiety and insomnia, increased to 1mg TID PRN on 12/26    DVT Prophylaxis  - heparin sq for DVT prophylaxis   - SCDs for DVT prophylaxis       Dispo: PT/OT: AR     Discussed with attending Dr. Turner

## 2022-12-26 NOTE — PROGRESS NOTE ADULT - PROBLEM SELECTOR PLAN 1
MRI shows "acute/subacute infarction in the left MICAH territory as well as the left parietal temporal region; mild local mass effect without herniation;" also found to have L ICA severe stenosis/occlusion and MICAH evere stenosis/occlusion on imaging; cont. work-up and mgmt per Neuro and Neurosurgery; PT/OT; on ASA + statin; avoiding Plavix for now, in setting of anemia; pending CTA head and neck, MR NOVA.  IV had to be replaced

## 2022-12-26 NOTE — PROGRESS NOTE ADULT - SUBJECTIVE AND OBJECTIVE BOX
Slightly calmer today, after starting ativan PRN.  States that she wants to go to rehab    Remaining ROS negative       PHYSICAL EXAM:    General: sitting up in bed, no acute distress  HEENT: NC/AT, anicteric sclera; MMM  Cardiovascular: +S1/S2, RRR, no mrg  Respiratory: CTA B/L; no W/R/R  Gastrointestinal: soft, NT/ND; +BSx4  Extremities: WWP; no edema  Neurological: R sided lower extremity weakness, speech is fluent  Psychiatric: anxious affect  Dermatologic: no appreciable wounds or damage to the skin    VITAL SIGNS:  Vital Signs Last 24 Hrs  T(C): 36.4 (26 Dec 2022 10:39), Max: 36.8 (26 Dec 2022 01:17)  T(F): 97.5 (26 Dec 2022 10:39), Max: 98.3 (26 Dec 2022 01:17)  HR: 94 (26 Dec 2022 11:49) (94 - 94)  BP: 140/78 (26 Dec 2022 11:49) (116/73 - 144/84)  BP(mean): 97 (26 Dec 2022 11:49) (88 - 107)  RR: 14 (26 Dec 2022 11:49) (14 - 16)  SpO2: 97% (26 Dec 2022 11:49) (97% - 97%)    Parameters below as of 26 Dec 2022 11:49  Patient On (Oxygen Delivery Method): room air          MEDICATIONS:  MEDICATIONS  (STANDING):  aspirin  chewable 81 milliGRAM(s) Oral daily  atorvastatin 80 milliGRAM(s) Oral at bedtime  folic acid 1 milliGRAM(s) Oral daily  heparin   Injectable 5000 Unit(s) SubCutaneous every 8 hours  iron sucrose IVPB 300 milliGRAM(s) IV Intermittent every 24 hours  melatonin 5 milliGRAM(s) Oral at bedtime  mirtazapine 7.5 milliGRAM(s) Oral at bedtime  multivitamin 1 Tablet(s) Oral daily  pantoprazole    Tablet 40 milliGRAM(s) Oral before breakfast    MEDICATIONS  (PRN):  acetaminophen     Tablet .. 650 milliGRAM(s) Oral every 6 hours PRN Mild Pain (1 - 3)  hydrOXYzine hydrochloride 25 milliGRAM(s) Oral three times a day PRN Anxiety  LORazepam     Tablet 0.5 milliGRAM(s) Oral every 8 hours PRN Anxiety      ALLERGIES:  Allergies    Compazine (Unknown)  contrast media (iodine-based) (Unknown)  iv contrast (Unknown)  penicillin (Unknown)  penicillins (Other)  Toradol (Unknown)    Intolerances        LABS:                        10.0   10.30 )-----------( 496      ( 26 Dec 2022 06:12 )             34.5     12-26    138  |  105  |  13  ----------------------------<  110<H>  3.7   |  23  |  0.61    Ca    9.2      26 Dec 2022 06:12  Phos  4.1     12-26  Mg     1.9     12-26          CAPILLARY BLOOD GLUCOSE          RADIOLOGY & ADDITIONAL TESTS: Reviewed.

## 2022-12-26 NOTE — PROGRESS NOTE ADULT - SUBJECTIVE AND OBJECTIVE BOX
INTERVAL HPI/OVERNIGHT EVENTS:    SUBJECTIVE: Patient seen and examined at bedside.    VITAL SIGNS:  ICU Vital Signs Last 24 Hrs  T(C): 36.8 (26 Dec 2022 13:47), Max: 36.8 (26 Dec 2022 01:17)  T(F): 98.2 (26 Dec 2022 13:47), Max: 98.3 (26 Dec 2022 01:17)  HR: 94 (26 Dec 2022 11:49) (94 - 94)  BP: 140/78 (26 Dec 2022 11:49) (122/74 - 144/84)  BP(mean): 97 (26 Dec 2022 11:49) (90 - 107)  ABP: --  ABP(mean): --  RR: 14 (26 Dec 2022 11:49) (14 - 16)  SpO2: 97% (26 Dec 2022 11:49) (97% - 97%)    O2 Parameters below as of 26 Dec 2022 11:49  Patient On (Oxygen Delivery Method): room air              12-25 @ 07:01  -  12-26 @ 07:00  --------------------------------------------------------  IN: 0 mL / OUT: 700 mL / NET: -700 mL      CAPILLARY BLOOD GLUCOSE          PHYSICAL EXAM:    Constitutional: NAD  HEENT: NC/AT; PERRL, anicteric sclera; MMM  Neck: supple, no JVD  Cardiovascular: +S1/S2, RRR  Respiratory: CTA B/L, no W/R/R  Gastrointestinal: abdomen soft, NT/ND; no rebound or guarding; +BSx4  Genitourinary: no suprapubic tenderness or fullness  Extremities: WWP; no LE edema; no clubbing or cyanosis  Vascular: 2+ radial, DP/PT and femoral pulses B/L  Dermatologic: normal color and turgor; no visible rashes  Neurological: AAOx3; strength L >R    MEDICATIONS:  MEDICATIONS  (STANDING):  aspirin  chewable 81 milliGRAM(s) Oral daily  atorvastatin 80 milliGRAM(s) Oral at bedtime  ferrous    sulfate 325 milliGRAM(s) Oral <User Schedule>  folic acid 1 milliGRAM(s) Oral daily  heparin   Injectable 5000 Unit(s) SubCutaneous every 8 hours  iron sucrose IVPB 300 milliGRAM(s) IV Intermittent every 24 hours  melatonin 5 milliGRAM(s) Oral at bedtime  mirtazapine 7.5 milliGRAM(s) Oral at bedtime  multivitamin 1 Tablet(s) Oral daily  pantoprazole    Tablet 40 milliGRAM(s) Oral before breakfast  polyethylene glycol 3350 17 Gram(s) Oral once    MEDICATIONS  (PRN):  acetaminophen     Tablet .. 650 milliGRAM(s) Oral every 6 hours PRN Mild Pain (1 - 3)  hydrOXYzine hydrochloride 25 milliGRAM(s) Oral three times a day PRN Anxiety  LORazepam     Tablet 1 milliGRAM(s) Oral every 8 hours PRN Anxiety      ALLERGIES:  Allergies    Compazine (Unknown)  contrast media (iodine-based) (Unknown)  iv contrast (Unknown)  penicillin (Unknown)  penicillins (Other)  Toradol (Unknown)    Intolerances        LABS:                        10.0   10.30 )-----------( 496      ( 26 Dec 2022 06:12 )             34.5     12-26    138  |  105  |  13  ----------------------------<  110<H>  3.7   |  23  |  0.61    Ca    9.2      26 Dec 2022 06:12  Phos  4.1     12-26  Mg     1.9     12-26            RADIOLOGY & ADDITIONAL TESTS: Reviewed.

## 2022-12-27 LAB
APTT BLD: 28.5 SEC — SIGNIFICANT CHANGE UP (ref 27.5–35.5)
HCT VFR BLD CALC: 29.8 % — LOW (ref 34.5–45)
HGB BLD-MCNC: 9.1 G/DL — LOW (ref 11.5–15.5)
MCHC RBC-ENTMCNC: 23.9 PG — LOW (ref 27–34)
MCHC RBC-ENTMCNC: 30.5 GM/DL — LOW (ref 32–36)
MCV RBC AUTO: 78.4 FL — LOW (ref 80–100)
NRBC # BLD: 0 /100 WBCS — SIGNIFICANT CHANGE UP (ref 0–0)
PLATELET # BLD AUTO: 435 K/UL — HIGH (ref 150–400)
RBC # BLD: 3.8 M/UL — SIGNIFICANT CHANGE UP (ref 3.8–5.2)
RBC # FLD: 24.4 % — HIGH (ref 10.3–14.5)
WBC # BLD: 9.9 K/UL — SIGNIFICANT CHANGE UP (ref 3.8–10.5)
WBC # FLD AUTO: 9.9 K/UL — SIGNIFICANT CHANGE UP (ref 3.8–10.5)

## 2022-12-27 PROCEDURE — 99233 SBSQ HOSP IP/OBS HIGH 50: CPT

## 2022-12-27 PROCEDURE — 93880 EXTRACRANIAL BILAT STUDY: CPT | Mod: 26

## 2022-12-27 RX ORDER — CLOPIDOGREL BISULFATE 75 MG/1
75 TABLET, FILM COATED ORAL DAILY
Refills: 0 | Status: DISCONTINUED | OUTPATIENT
Start: 2022-12-27 | End: 2023-01-11

## 2022-12-27 RX ORDER — HEPARIN SODIUM 5000 [USP'U]/ML
5000 INJECTION INTRAVENOUS; SUBCUTANEOUS EVERY 8 HOURS
Refills: 0 | Status: DISCONTINUED | OUTPATIENT
Start: 2022-12-27 | End: 2023-01-03

## 2022-12-27 RX ORDER — SENNA PLUS 8.6 MG/1
2 TABLET ORAL AT BEDTIME
Refills: 0 | Status: DISCONTINUED | OUTPATIENT
Start: 2022-12-27 | End: 2023-01-11

## 2022-12-27 RX ORDER — HEPARIN SODIUM 5000 [USP'U]/ML
600 INJECTION INTRAVENOUS; SUBCUTANEOUS
Qty: 25000 | Refills: 0 | Status: DISCONTINUED | OUTPATIENT
Start: 2022-12-27 | End: 2022-12-27

## 2022-12-27 RX ADMIN — Medication 81 MILLIGRAM(S): at 11:35

## 2022-12-27 RX ADMIN — Medication 300 MILLIGRAM(S): at 00:02

## 2022-12-27 RX ADMIN — Medication 1 TABLET(S): at 11:35

## 2022-12-27 RX ADMIN — Medication 5 MILLIGRAM(S): at 21:15

## 2022-12-27 RX ADMIN — CLOPIDOGREL BISULFATE 75 MILLIGRAM(S): 75 TABLET, FILM COATED ORAL at 18:34

## 2022-12-27 RX ADMIN — Medication 1 MILLIGRAM(S): at 09:37

## 2022-12-27 RX ADMIN — MIRTAZAPINE 7.5 MILLIGRAM(S): 45 TABLET, ORALLY DISINTEGRATING ORAL at 21:16

## 2022-12-27 RX ADMIN — Medication 650 MILLIGRAM(S): at 10:32

## 2022-12-27 RX ADMIN — Medication 650 MILLIGRAM(S): at 18:34

## 2022-12-27 RX ADMIN — Medication 1 MILLIGRAM(S): at 11:35

## 2022-12-27 RX ADMIN — SENNA PLUS 2 TABLET(S): 8.6 TABLET ORAL at 21:15

## 2022-12-27 RX ADMIN — Medication 750 MILLIGRAM(S): at 00:45

## 2022-12-27 RX ADMIN — PANTOPRAZOLE SODIUM 40 MILLIGRAM(S): 20 TABLET, DELAYED RELEASE ORAL at 07:08

## 2022-12-27 RX ADMIN — ATORVASTATIN CALCIUM 80 MILLIGRAM(S): 80 TABLET, FILM COATED ORAL at 21:15

## 2022-12-27 RX ADMIN — Medication 25 MILLIGRAM(S): at 13:34

## 2022-12-27 RX ADMIN — Medication 650 MILLIGRAM(S): at 11:02

## 2022-12-27 RX ADMIN — MIRTAZAPINE 7.5 MILLIGRAM(S): 45 TABLET, ORALLY DISINTEGRATING ORAL at 00:27

## 2022-12-27 RX ADMIN — Medication 1 MILLIGRAM(S): at 18:34

## 2022-12-27 NOTE — PROGRESS NOTE ADULT - PROBLEM SELECTOR PLAN 1
MRI shows "acute/subacute infarction in the left MICAH territory as well as the left parietal temporal region; mild local mass effect without herniation;" also found to have L ICA severe stenosis/occlusion and MICAH severe stenosis/occlusion on imaging; cont. work-up and mgmt per Neuro and Neurosurgery; PT/OT; on ASA + statin; avoiding Plavix for now, in setting of anemia; pending CTA head and neck, MR NOVA

## 2022-12-27 NOTE — DIETITIAN INITIAL EVALUATION ADULT - OTHER CALCULATIONS
Actual body weight used to calculate needs due to pt's current body weight within % ideal body weight adjusted for maintenance

## 2022-12-27 NOTE — PROGRESS NOTE ADULT - ASSESSMENT
62F w/ PMH HTN (not on any meds per pt), migraine, seizure disorder, depression, anxiety, prior stroke hx in 2018 presented to [facility] ER for frequent falls to [facility] ER, admitted to medicine for further work up, stroke consulted for frequent falls. Pt poor vs unreliable historian. CTH w/ subacute / chronic B/L MICAH infarcts. Unclear chronicity of R sided weakness as pt states she does not use walker and is pending wheel chair. Given unclear hx and chronicity of the deficits, recommended MRA H/N which showed acute/ sub acute infarcts in L MICAH Territory and L parieto temporal region with mass effect without herniation i/s/o B/L A2 HGS/occlusion and Prox L ICA HGS vs occlusion. Pt transferred to stroke tele for further work up and close neuro monitoring. Found to have severe HANH. Carotid ultrasound completed on 12/27 shows 50-69% stenosis of left ICA.     Neuro  #CVA workup  - continue aspirin 81mg daily, will closely monitor for drop in Hb  - plavix started 12/27  - continue atorvastatin 80mg daily  - q4hr stroke neuro checks and vitals  - MRA H/N :  Acute/ Subacute infarction L MICAH Territory and L parieto temporal region w/local mass effect. B/L A2 segment HGS/ Occlusion and L Proximal ICA HGS Vs Occlusion.  - HCT Results: Subacute/ chronic B/L MICAH territory infarcts.  - NSGY consulted for L ICA HGS Vs occlusion- Reccs to obtain CTA H/N and MR Hess  - Patient stating she has anaphylaxis with contrast, carotid ultrasounds completed  - B/l carotid ultrasound significant for 50-69% stenosis mid LEFT internal carotid artery per NASCET peak systolic velocity measurements due to noncalcified atheromatous plaque.   - Stroke education    Cards  #HTN  - permissive hypertension, Goal -180  - Not on any  home BP meds per pt  - Please avoid Hypotension i/s/o L ICA and B/L A2  HGS  - BP in the 100's today, no focal neuro changes  - TTE : No wall motion abnormalities/ No valvular Dz.  - EKG Results: Isolated V5 AYANA upon admission, with no reciprocal changes, no c/o CP, no c/f ischemia no c/o cp/sob.    #HLD  - high dose statin as above in CVA  - LDL results: 126    Heme  #HANH, appreciate heme recs  H/H increasing Reticulocyte count 3.3  haptoglobin 231, , T.Bili 0.2  platelets slightly elevated, coags wnl  Iron studies: serum iron 10, %saturation 3, ferritin 6  Peripheral blood smear showing anisocytosis, many acanthocytes, rare target cells, very rare schistocytes 0-1/hpf  Based on the above labs, patient is severely iron deficient. No signs of hemolysis.  Denies history of heavy bleeding, poor wound healing, easy bruising, hemarthrosis.  -s/p 300mg x 3days iron sucrose  - Pt has never had a colonoscopy. Would recommend GI workup to rule out occult malignancy/malabsorption/occult bleeding, but this can be done outpatient.  - Recommend hematology follow up on discharge. If she wishes to follow up with [facility], please set up an outpatient appointment for her to see Dr. Maribeth Reddy 1-2 weeks from discharge  - heme s/o  - started ferrous sulfate 325mg Mon, Wed, Fri  - f/u fecal occult  - patient never had a colonoscopy, endorses only brown stool   - GI consulted, rec EGD/colonoscopy outpatient. Hold off for now i/s/o recent stroke  - active T&S, transfuse for Hb<7    Pulm  - call provider if SPO2 < 94%    GI  #Nutrition/Fluids/Electrolytes   - replete K<4 and Mg <2  - Diet: DASH/ TLC  - IVF: None for now  - GI consulted for HANH, rec EGD/colonoscopy outpatient. Hold off for now i/s/o recent stroke    Renal  - no issues at this time    Infectious Disease  - no issues at this time    Endocrine  - A1C results: 5.8    - TSH results: 0.638      Psych  # depression  # anxiety  appreciate psych recs:   - self-reported history of chronic anxiety, insomnia, new reported depression, benzodiazepine use disorder, ?alcohol use disorder (per prior chart), ?opiate use disorder in remission, impaired ability to logically reason, in setting of hospitalization for evaluation s/p fall, now found with new CVA. Demonstrates very poor insight into the reason for her ongoing hospitalization, with no understanding of diagnosis of new stroke, recommended additional testing and possible interventions, and no appreciation of the reported significant risks of leaving the hospital against medical advice. Additionally, pt remains highly focused on receiving benzodiazepines for vague anxiety sx and insomnia, with poor insight into her benzodiazepine use disorder (with illicit in addition to prescribed use as outpt) and considerable risks of continued use, including fall risk, risk of additional cognitive impairment, risk in combination with many other medications listed for outpt use. Pt declines non-benzodiazepines for mood/anxiety sx. No acute safety concerns that would warrant inpatient level psychiatric care.  - pt currently LACKS capacity to leave the hospital AMA or refuse any acutely indicated testing or treatment.  - mirtazapine 7.5mg po QHS PRN for insomnia   - hydroxyzine PRN for acute anxiety - pt cites adverse reaction to gabapentin  - no psychiatric barrier to discharge when medically ready  - is prescribed 4mg ativan outpatient, started patient on ativan 0.1mg TID PRN on 12/25, still reporting on-going anxiety and insomnia, increased to 1mg TID PRN on 12/26    DVT Prophylaxis  - heparin sq for DVT prophylaxis   - SCDs for DVT prophylaxis       Dispo: PT/OT: [facility]     Discussed with attending Dr. Bonifacio Bland

## 2022-12-27 NOTE — DIETITIAN INITIAL EVALUATION ADULT - OTHER INFO
62F w/ PMH HTN, migraine, seizure d/o, depression-anxiety, per patient multiiple CTA in September w/ complaints of fall.    Pt assessed at bedside. Rx and labs reviewed. Pt presents with no overt signs of nutritional wasting. Pt reports a good appetite today; eating lunch meal, observed >50% consumed. No reports of changes to wt, appetite, or PO intake PTA. Pt reports she feels fine and endorses no nutritional concerns at this time. No c/o NVCD of difficult chew/swallow. NKA to food. RDN will continue to reassess, intervene, and monitor as appropriate.     Pain: 3/10, back   GI: Abdomen ND/NT, +BS x4, LBM 12/25  Skin: WDI, +1 RUE

## 2022-12-27 NOTE — DIETITIAN INITIAL EVALUATION ADULT - PERTINENT LABORATORY DATA
12-26    138  |  105  |  13  ----------------------------<  110<H>  3.7   |  23  |  0.61    Ca    9.2      26 Dec 2022 06:12  Phos  4.1     12-26  Mg     1.9     12-26    A1C with Estimated Average Glucose Result: 5.8 % (12-21-22 @ 07:12)

## 2022-12-27 NOTE — DIETITIAN INITIAL EVALUATION ADULT - ADD RECOMMEND
-Continue current diet  -Encourage good PO intake   -Honor food preferences as able  -Monitor chemistry, GI fxn, and skin integrity

## 2022-12-27 NOTE — PROGRESS NOTE ADULT - PROBLEM SELECTOR PLAN 2
iron deficiency (ferritin 16, 3% sat); unclear etiology; Pt. reports occasional "dark" BMs, but not melena or BRBPR; no e/o hemolysis; VSS; monitor CBC, goal Hb > 7, transfuse if needed; cont. PPI, IV iron; appreciate GI and Heme-Onc recs, will likely need bidirectional endoscopies as outpatient

## 2022-12-27 NOTE — PROGRESS NOTE ADULT - SUBJECTIVE AND OBJECTIVE BOX
Patient is a 62y old  Female who presents with a chief complaint of mechanical fall (26 Dec 2022 14:42)      INTERVAL HPI/OVERNIGHT EVENTS:    Pt. seen and examined earlier today  Pt. c/o poor sleep and generalized weakness, otherwise has no complaints    Review of Systems: 12 point review of systems otherwise negative    MEDICATIONS  (STANDING):  aspirin  chewable 81 milliGRAM(s) Oral daily  atorvastatin 80 milliGRAM(s) Oral at bedtime  ferrous    sulfate 325 milliGRAM(s) Oral <User Schedule>  folic acid 1 milliGRAM(s) Oral daily  heparin   Injectable 5000 Unit(s) SubCutaneous every 8 hours  iron sucrose IVPB 300 milliGRAM(s) IV Intermittent every 24 hours  melatonin 5 milliGRAM(s) Oral at bedtime  mirtazapine 7.5 milliGRAM(s) Oral at bedtime  multivitamin 1 Tablet(s) Oral daily  pantoprazole    Tablet 40 milliGRAM(s) Oral before breakfast  senna 2 Tablet(s) Oral at bedtime    MEDICATIONS  (PRN):  acetaminophen     Tablet .. 650 milliGRAM(s) Oral every 6 hours PRN Mild Pain (1 - 3)  hydrOXYzine hydrochloride 25 milliGRAM(s) Oral three times a day PRN Anxiety  LORazepam     Tablet 1 milliGRAM(s) Oral every 8 hours PRN Anxiety      Allergies    Compazine (Unknown)  contrast media (iodine-based) (Unknown)  iv contrast (Unknown)  penicillin (Unknown)  penicillins (Other)  Toradol (Unknown)    Intolerances          Vital Signs Last 24 Hrs  T(C): 36.3 (27 Dec 2022 13:27), Max: 36.8 (27 Dec 2022 10:57)  T(F): 97.4 (27 Dec 2022 13:27), Max: 98.3 (27 Dec 2022 10:57)  HR: 108 (27 Dec 2022 12:29) (94 - 108)  BP: 128/75 (27 Dec 2022 12:29) (107/54 - 134/73)  BP(mean): 95 (27 Dec 2022 12:29) (76 - 97)  RR: 15 (27 Dec 2022 12:29) (14 - 16)  SpO2: 97% (27 Dec 2022 08:44) (95% - 98%)    Parameters below as of 27 Dec 2022 12:29  Patient On (Oxygen Delivery Method): room air      CAPILLARY BLOOD GLUCOSE          12-26 @ 07:01  -  12-27 @ 07:00  --------------------------------------------------------  IN: 0 mL / OUT: 800 mL / NET: -800 mL        Physical Exam:  (earlier today)  Daily     Daily   General:  comfortable-appearing in NAD, calm and cooperative  HEENT:  MMM  CV:  tachy but regular S1S2  Lungs:  CTA B/L  Abdomen:  soft NT ND  Extremities:  no edema B/L LE  Skin:  WWP, no visible sweat  Neuro:  AAOx3, no dysarthria, no tremor    LABS:                        10.0   10.30 )-----------( 496      ( 26 Dec 2022 06:12 )             34.5     12-26    138  |  105  |  13  ----------------------------<  110<H>  3.7   |  23  |  0.61    Ca    9.2      26 Dec 2022 06:12  Phos  4.1     12-26  Mg     1.9     12-26

## 2022-12-27 NOTE — PROGRESS NOTE ADULT - SUBJECTIVE AND OBJECTIVE BOX
Neurology Stroke Progress Note    INTERVAL HPI/OVERNIGHT EVENTS:  Patient seen and examined complaining of chronic headache. Nervous about pending tests.     MEDICATIONS  (STANDING):  aspirin  chewable 81 milliGRAM(s) Oral daily  atorvastatin 80 milliGRAM(s) Oral at bedtime  clopidogrel Tablet 75 milliGRAM(s) Oral daily  ferrous    sulfate 325 milliGRAM(s) Oral <User Schedule>  folic acid 1 milliGRAM(s) Oral daily  heparin   Injectable 5000 Unit(s) SubCutaneous every 8 hours  iron sucrose IVPB 300 milliGRAM(s) IV Intermittent every 24 hours  melatonin 5 milliGRAM(s) Oral at bedtime  mirtazapine 7.5 milliGRAM(s) Oral at bedtime  multivitamin 1 Tablet(s) Oral daily  pantoprazole    Tablet 40 milliGRAM(s) Oral before breakfast  senna 2 Tablet(s) Oral at bedtime    MEDICATIONS  (PRN):  acetaminophen     Tablet .. 650 milliGRAM(s) Oral every 6 hours PRN Mild Pain (1 - 3)  hydrOXYzine hydrochloride 25 milliGRAM(s) Oral three times a day PRN Anxiety  LORazepam     Tablet 1 milliGRAM(s) Oral every 8 hours PRN Anxiety      Allergies    Compazine (Unknown)  contrast media (iodine-based) (Unknown)  iv contrast (Unknown)  penicillin (Unknown)  penicillins (Other)  Toradol (Unknown)    Intolerances        Vital Signs Last 24 Hrs  T(C): 36.4 (27 Dec 2022 17:01), Max: 36.8 (27 Dec 2022 10:57)  T(F): 97.6 (27 Dec 2022 17:01), Max: 98.3 (27 Dec 2022 10:57)  HR: 100 (27 Dec 2022 15:55) (94 - 108)  BP: 94/54 (27 Dec 2022 15:55) (94/54 - 134/73)  BP(mean): 68 (27 Dec 2022 15:55) (68 - 97)  RR: 14 (27 Dec 2022 15:55) (14 - 16)  SpO2: 97% (27 Dec 2022 15:55) (95% - 98%)    Parameters below as of 27 Dec 2022 15:55  Patient On (Oxygen Delivery Method): room air        Neurologic:  -Mental status: Awake, alert, oriented to person, place, and time (year w/ choices). Speech is fluent with intact naming, repetition, and comprehension, no dysarthria. Follows commands. Attention/concentration intact. Forgetful.  -Cranial nerves:   II: Visual fields are full to confrontation.  III, IV, VI: Extraocular movements are intact without nystagmus. Pupils equally round and reactive to light.  V:  Facial sensation V1-V3 equal and intact   VII: Face is symmetric with normal eye closure and smile  VIII: Hearing is bilaterally intact   XII: Tongue protrudes midline  Motor: Normal bulk and tone. RUE/RLE 4/5. Unable to voluntarily extend right leg. LUE/ LLE : 5/5.   Sensation: Intact to light touch bilaterally. No neglect or extinction on double simultaneous testing.  Coordination: No dysmetria on finger-to-nose out of proportion to weakness.   Gait: Deferred.    LABS:                        9.1    9.90  )-----------( 435      ( 27 Dec 2022 13:19 )             29.8     12-26    138  |  105  |  13  ----------------------------<  110<H>  3.7   |  23  |  0.61    Ca    9.2      26 Dec 2022 06:12  Phos  4.1     12-26  Mg     1.9     12-26      PTT - ( 27 Dec 2022 12:34 )  PTT:28.5 sec      RADIOLOGY & ADDITIONAL TESTS:  < from: US Duplex Carotid Arteries Complete, Bilateral (12.27.22 @ 15:39) >  IMPRESSION:  50-69% stenosis mid LEFT internal carotid artery per NASCET peak systolic   velocity measurements due to noncalcified atheromatous plaque. Consider   CTA correlation.  No evidence of hemodynamically significant stenosis RIGHT internal   carotid artery.    < end of copied text >

## 2022-12-27 NOTE — DIETITIAN INITIAL EVALUATION ADULT - PERTINENT MEDS FT
MEDICATIONS  (STANDING):  aspirin  chewable 81 milliGRAM(s) Oral daily  atorvastatin 80 milliGRAM(s) Oral at bedtime  ferrous    sulfate 325 milliGRAM(s) Oral <User Schedule>  folic acid 1 milliGRAM(s) Oral daily  heparin   Injectable 5000 Unit(s) SubCutaneous every 8 hours  iron sucrose IVPB 300 milliGRAM(s) IV Intermittent every 24 hours  melatonin 5 milliGRAM(s) Oral at bedtime  mirtazapine 7.5 milliGRAM(s) Oral at bedtime  multivitamin 1 Tablet(s) Oral daily  pantoprazole    Tablet 40 milliGRAM(s) Oral before breakfast  senna 2 Tablet(s) Oral at bedtime    MEDICATIONS  (PRN):  acetaminophen     Tablet .. 650 milliGRAM(s) Oral every 6 hours PRN Mild Pain (1 - 3)  hydrOXYzine hydrochloride 25 milliGRAM(s) Oral three times a day PRN Anxiety  LORazepam     Tablet 1 milliGRAM(s) Oral every 8 hours PRN Anxiety

## 2022-12-28 DIAGNOSIS — F41.9 ANXIETY DISORDER, UNSPECIFIED: ICD-10-CM

## 2022-12-28 DIAGNOSIS — E78.5 HYPERLIPIDEMIA, UNSPECIFIED: ICD-10-CM

## 2022-12-28 DIAGNOSIS — R63.8 OTHER SYMPTOMS AND SIGNS CONCERNING FOOD AND FLUID INTAKE: ICD-10-CM

## 2022-12-28 DIAGNOSIS — D50.9 IRON DEFICIENCY ANEMIA, UNSPECIFIED: ICD-10-CM

## 2022-12-28 DIAGNOSIS — I10 ESSENTIAL (PRIMARY) HYPERTENSION: ICD-10-CM

## 2022-12-28 LAB
ANION GAP SERPL CALC-SCNC: 11 MMOL/L — SIGNIFICANT CHANGE UP (ref 5–17)
ANISOCYTOSIS BLD QL: SIGNIFICANT CHANGE UP
BASOPHILS # BLD AUTO: 0 K/UL — SIGNIFICANT CHANGE UP (ref 0–0.2)
BASOPHILS NFR BLD AUTO: 0 % — SIGNIFICANT CHANGE UP (ref 0–2)
BUN SERPL-MCNC: 13 MG/DL — SIGNIFICANT CHANGE UP (ref 7–23)
BURR CELLS BLD QL SMEAR: PRESENT — SIGNIFICANT CHANGE UP
CALCIUM SERPL-MCNC: 10 MG/DL — SIGNIFICANT CHANGE UP (ref 8.4–10.5)
CHLORIDE SERPL-SCNC: 103 MMOL/L — SIGNIFICANT CHANGE UP (ref 96–108)
CO2 SERPL-SCNC: 23 MMOL/L — SIGNIFICANT CHANGE UP (ref 22–31)
CREAT SERPL-MCNC: 0.62 MG/DL — SIGNIFICANT CHANGE UP (ref 0.5–1.3)
EGFR: 102 ML/MIN/1.73M2 — SIGNIFICANT CHANGE UP
ELLIPTOCYTES BLD QL SMEAR: SLIGHT — SIGNIFICANT CHANGE UP
EOSINOPHIL # BLD AUTO: 0.31 K/UL — SIGNIFICANT CHANGE UP (ref 0–0.5)
EOSINOPHIL NFR BLD AUTO: 2.6 % — SIGNIFICANT CHANGE UP (ref 0–6)
GIANT PLATELETS BLD QL SMEAR: PRESENT — SIGNIFICANT CHANGE UP
GLUCOSE SERPL-MCNC: 121 MG/DL — HIGH (ref 70–99)
HCT VFR BLD CALC: 35.1 % — SIGNIFICANT CHANGE UP (ref 34.5–45)
HGB BLD-MCNC: 10.5 G/DL — LOW (ref 11.5–15.5)
HYPOCHROMIA BLD QL: SIGNIFICANT CHANGE UP
LYMPHOCYTES # BLD AUTO: 2.49 K/UL — SIGNIFICANT CHANGE UP (ref 1–3.3)
LYMPHOCYTES # BLD AUTO: 20.8 % — SIGNIFICANT CHANGE UP (ref 13–44)
MAGNESIUM SERPL-MCNC: 1.9 MG/DL — SIGNIFICANT CHANGE UP (ref 1.6–2.6)
MANUAL SMEAR VERIFICATION: SIGNIFICANT CHANGE UP
MCHC RBC-ENTMCNC: 23.6 PG — LOW (ref 27–34)
MCHC RBC-ENTMCNC: 29.9 GM/DL — LOW (ref 32–36)
MCV RBC AUTO: 79.1 FL — LOW (ref 80–100)
METAMYELOCYTES # FLD: 0.9 % — HIGH (ref 0–0)
MICROCYTES BLD QL: SIGNIFICANT CHANGE UP
MONOCYTES # BLD AUTO: 0.42 K/UL — SIGNIFICANT CHANGE UP (ref 0–0.9)
MONOCYTES NFR BLD AUTO: 3.5 % — SIGNIFICANT CHANGE UP (ref 2–14)
NEUTROPHILS # BLD AUTO: 8.66 K/UL — HIGH (ref 1.8–7.4)
NEUTROPHILS NFR BLD AUTO: 72.2 % — SIGNIFICANT CHANGE UP (ref 43–77)
OVALOCYTES BLD QL SMEAR: SLIGHT — SIGNIFICANT CHANGE UP
PHOSPHATE SERPL-MCNC: 3.7 MG/DL — SIGNIFICANT CHANGE UP (ref 2.5–4.5)
PLAT MORPH BLD: NORMAL — SIGNIFICANT CHANGE UP
PLATELET # BLD AUTO: 552 K/UL — HIGH (ref 150–400)
POIKILOCYTOSIS BLD QL AUTO: SLIGHT — SIGNIFICANT CHANGE UP
POLYCHROMASIA BLD QL SMEAR: SIGNIFICANT CHANGE UP
POTASSIUM SERPL-MCNC: 4.1 MMOL/L — SIGNIFICANT CHANGE UP (ref 3.5–5.3)
POTASSIUM SERPL-SCNC: 4.1 MMOL/L — SIGNIFICANT CHANGE UP (ref 3.5–5.3)
RBC # BLD: 4.44 M/UL — SIGNIFICANT CHANGE UP (ref 3.8–5.2)
RBC # FLD: 26.4 % — HIGH (ref 10.3–14.5)
RBC BLD AUTO: ABNORMAL
SODIUM SERPL-SCNC: 137 MMOL/L — SIGNIFICANT CHANGE UP (ref 135–145)
WBC # BLD: 11.99 K/UL — HIGH (ref 3.8–10.5)
WBC # FLD AUTO: 11.99 K/UL — HIGH (ref 3.8–10.5)

## 2022-12-28 PROCEDURE — 99233 SBSQ HOSP IP/OBS HIGH 50: CPT

## 2022-12-28 PROCEDURE — 99232 SBSQ HOSP IP/OBS MODERATE 35: CPT

## 2022-12-28 RX ADMIN — Medication 1 MILLIGRAM(S): at 12:30

## 2022-12-28 RX ADMIN — Medication 1 TABLET(S): at 12:30

## 2022-12-28 RX ADMIN — Medication 1 MILLIGRAM(S): at 22:00

## 2022-12-28 RX ADMIN — Medication 650 MILLIGRAM(S): at 21:08

## 2022-12-28 RX ADMIN — ATORVASTATIN CALCIUM 80 MILLIGRAM(S): 80 TABLET, FILM COATED ORAL at 21:45

## 2022-12-28 RX ADMIN — Medication 25 MILLIGRAM(S): at 09:20

## 2022-12-28 RX ADMIN — Medication 650 MILLIGRAM(S): at 20:08

## 2022-12-28 RX ADMIN — SENNA PLUS 2 TABLET(S): 8.6 TABLET ORAL at 21:45

## 2022-12-28 RX ADMIN — Medication 5 MILLIGRAM(S): at 21:45

## 2022-12-28 RX ADMIN — CLOPIDOGREL BISULFATE 75 MILLIGRAM(S): 75 TABLET, FILM COATED ORAL at 12:30

## 2022-12-28 RX ADMIN — MIRTAZAPINE 7.5 MILLIGRAM(S): 45 TABLET, ORALLY DISINTEGRATING ORAL at 21:44

## 2022-12-28 RX ADMIN — Medication 81 MILLIGRAM(S): at 12:30

## 2022-12-28 RX ADMIN — Medication 1 MILLIGRAM(S): at 11:50

## 2022-12-28 RX ADMIN — Medication 1 MILLIGRAM(S): at 02:42

## 2022-12-28 RX ADMIN — HEPARIN SODIUM 5000 UNIT(S): 5000 INJECTION INTRAVENOUS; SUBCUTANEOUS at 21:44

## 2022-12-28 NOTE — PROGRESS NOTE ADULT - PROBLEM SELECTOR PLAN 1
MRA H/N: Acute/ Subacute infarction L MICAH Territory and L parieto temporal region w/local mass effect. B/L A2 segment HGS/ Occlusion and L Proximal ICA HGS Vs Occlusion.  - HCT Results: Subacute/ chronic B/L MICAH territory infarcts.  - B/l carotid ultrasound significant for 50-69% stenosis mid LEFT internal carotid artery per NASCET peak systolic velocity measurements due to noncalcified atheromatous plaque.  - ordered MRA neck w/ contrast for further assessment of ICA stenosis (no CT angio given patient reports anaphylaxis to contrast)  - continue aspirin 81mg daily  - continue plavix 75mg (started 12/27 H/H stable)  - continue atorvastatin 80mg daily  - Stroke education  - appreciate NSGY recs re: L ICA HGS Vs occlusion

## 2022-12-28 NOTE — PROGRESS NOTE ADULT - SUBJECTIVE AND OBJECTIVE BOX
Physical Medicine and Rehabilitation Progress Note :       Patient is a 62y old  Female who presents with a chief complaint of mechanical fall (28 Dec 2022 09:37)      HPI:  62F w/ PMH HTN, migraine, seizure d/o, depression-anxiety, per patient multiiple CTA in September w/ complaints of fall. Patient states that fall occurred while home, when syncopized, with LOC with head trauma. Pt reports no alcohol use, never smoker, received HHA 4 hours daily (every day). Has two adult children (one of whome is  and one living parent). Patient is a poor historian, does not recall exact events leading to fall. Patient also notes the need to ambulate via wheelchair in home. States to have been admitted to acute rehab s/p CVA in fall of . States to be "addicted to valium" utilizing 1 10mg tablet once every other day. States health aide is not with her all the time. Patient notes no continued need for antihypertensives (on multiple previously) as they caused dizziness. Notes that dizziness has not resolved and notes room spinning with changes in head position.     Initial vital signs: T: 98 F, HR: 93, BP: 110/71, R: 16, SpO2: 100% on RA    Labs: significant for WBC 12.91, Hgb 8, MCV 69, Plt 567, aPTT 25.1, K 3 s/p 40 meq PO in ED, Cr 0.67, remainder of CMP WNL, CECILIA <10,     CXR: No acute pulmonary pathology. Dextroscholiosis noted.     CT C-spine: Ankylosis of the C4 and C5 vertebrae is noted. There is moderate multilevel degenerative disc disease. No acute osseous abnormality of the cervical spine.    CTH: No acute intracranial hemorrhage or calvarial fracture. Subacute/chronic bilateral MICAH territory infarcts.    CT T-spine: No acute fracture. Patchy bilateral pulmonary nodular opacities, likely infectious/inflammatory. Moderate hiatal hernia.    EKG:     Medications: tylenol 750mg PO x1, KCl 40meq x1    Consults: none  (20 Dec 2022 04:52)                            10.5   11.99 )-----------( 552      ( 28 Dec 2022 10:00 )             35.1       12-    137  |  103  |  13  ----------------------------<  121<H>  4.1   |  23  |  0.62    Ca    10.0      28 Dec 2022 10:00  Phos  3.7       Mg     1.9           Vital Signs Last 24 Hrs  T(C): 36.8 (28 Dec 2022 09:44), Max: 36.8 (27 Dec 2022 20:35)  T(F): 98.3 (28 Dec 2022 09:44), Max: 98.3 (27 Dec 2022 20:35)  HR: 104 (27 Dec 2022 20:04) (100 - 106)  BP: 127/72 (28 Dec 2022 11:50) (94/54 - 128/60)  BP(mean): 93 (28 Dec 2022 11:50) (68 - 93)  RR: 18 (28 Dec 2022 04:02) (12 - 18)  SpO2: 97% (27 Dec 2022 20:04) (97% - 97%)    Parameters below as of 28 Dec 2022 11:50  Patient On (Oxygen Delivery Method): room air        MEDICATIONS  (STANDING):  aspirin  chewable 81 milliGRAM(s) Oral daily  atorvastatin 80 milliGRAM(s) Oral at bedtime  clopidogrel Tablet 75 milliGRAM(s) Oral daily  ferrous    sulfate 325 milliGRAM(s) Oral <User Schedule>  folic acid 1 milliGRAM(s) Oral daily  heparin   Injectable 5000 Unit(s) SubCutaneous every 8 hours  iron sucrose IVPB 300 milliGRAM(s) IV Intermittent every 24 hours  melatonin 5 milliGRAM(s) Oral at bedtime  mirtazapine 7.5 milliGRAM(s) Oral at bedtime  multivitamin 1 Tablet(s) Oral daily  pantoprazole    Tablet 40 milliGRAM(s) Oral before breakfast  senna 2 Tablet(s) Oral at bedtime    MEDICATIONS  (PRN):  acetaminophen     Tablet .. 650 milliGRAM(s) Oral every 6 hours PRN Mild Pain (1 - 3)  hydrOXYzine hydrochloride 25 milliGRAM(s) Oral three times a day PRN Anxiety  LORazepam     Tablet 1 milliGRAM(s) Oral every 8 hours PRN Anxiety         Physical / Occupational Therapy Functional Status Assessment :   2022    Cognitive/Neuro/Behavioral  Level of Consciousness: alert  Arousal Level: opens eyes spontaneously  Orientation: disoriented to;  situation  Speech: clear;  spontaneous  Mood/Behavior: cooperative    Language Assistance  Preferred Language to Address Healthcare Preferred Language to Address Healthcare: English    Therapeutic Interventions      Bed Mobility  Bed Mobility Training Rehab Potential: good, to achieve stated therapy goals  Bed Mobility Training Rolling/Turning: independent  Bed Mobility Training Scooting: moderate assist (50% patient effort);  minimum assist (75% patient effort);  2 person assist  Bed Mobility Training Sit-to-Supine: moderate assist (50% patient effort);  minimum assist (75% patient effort);  1 person assist  Bed Mobility Training Supine-to-Sit: moderate assist (50% patient effort);  minimum assist (75% patient effort);  2 person assist  Bed Mobility Training Limitations: impaired balance;  decreased strength;  impaired coordination;  cognitive, decreased safety awareness    Sit-Stand Transfer Training  Sit-to-Stand Transfer Training Rehab Potential: good, to achieve stated therapy goals  Transfer Training Sit-to-Stand Transfer: minimum assist (75% patient effort);  contact guard;  1 person assist;  weight-bearing as tolerated   rolling walker  Transfer Training Stand-to-Sit Transfer: minimum assist (75% patient effort);  contact guard;  1 person + 1 person to manage equipment;  weight-bearing as tolerated   rolling walker  Sit-to-Stand Transfer Training Transfer Safety Analysis: decreased weight-shifting ability;  impaired balance;  cognitive, decreased safety awareness    Toilet Transfer Training  Toilet Transfer Training Rehab Potential: good, to achieve stated therapy goals  Transfer Training Toilet Transfer: moderate assist (50% patient effort);  2 person assist;  verbal cues;  weight-bearing as tolerated   bilat UE support  Toilet Transfer Training Transfer Safety Analysis: decreased weight-shifting ability;  impaired balance;  impaired coordination    Gait Training  Gait Training Rehab Potential: good, to achieve stated therapy goals  Gait Training: moderate assist (50% patient effort);  2 person assist;  verbal cues;  nonverbal cues (demo/gestures);  weight-bearing as tolerated   rolling walker;  6'x4  Gait Analysis: increased time in double stance;  decreased step length;  decreased weight-shifting ability;  impaired coordination;  impaired balance;  ataxic RLE placement  Type of Rest Type of Rest: sitting;  x3 rest periods  Duration of Rest Duration of Rest: ~2min     Neuromuscular Re-education  Neuromuscular Re-education Detail: Seated LE coordination exercices: reaching to target x15 bilat       Therapeutic Exercise  Therapeutic Exercise Detail: Pt performed functional mobility with RW and min Ax 2 in bedroom (approx 20ft) with frequent seated rest breaks required (approx 4-5) each about 30 seconds at a time requiring increase time to complete task. Pt tachycardic up to 130 during task, occasionally 118bpm during rest breaks, RN aware.// Pt performed BUE forward punches x 10 reps, BUE overhead reaching x 10 reps in order to increase BUE AROM/strength.     Neuromuscular Re-education  Neuromuscular Re-education Detail: Finger to nose: LUE with moderate dysmetria noted and moderate tremors noted, RUE with slight dysmetria and slight tremors noted.     Lower Body Dressing Training  Lower Body Dressing Training Assistance: contact guard;  verbal cues;  nonverbal cues (demo/gestures);  1 person assist;  pull up socks seated EOB;  decreased strength;  impaired balance                PM&R Impression : as above    Current Disposition Plan Recommendations :    acute rehab placement

## 2022-12-28 NOTE — PROGRESS NOTE ADULT - PROBLEM SELECTOR PLAN 2
Self-reported history of chronic anxiety, insomnia, new reported depression, benzodiazepine use disorder, ?alcohol use disorder (per prior chart), ?opiate use disorder in remission, impaired ability to logically reason. Demonstrates very poor insight into the reason for her ongoing hospitalization, with no understanding of diagnosis of new stroke, recommended additional testing and possible interventions, and no appreciation of the reported significant risks of leaving the hospital against medical advice. Additionally, pt remains highly focused on receiving benzodiazepines for vague anxiety sx and insomnia, with poor insight into her benzodiazepine use disorder (with illicit in addition to prescribed use as outpt).   - per psych, no acute safety concerns that would warrant inpatient level psychiatric care.  - pt currently LACKS capacity to leave the hospital AMA or refuse any acutely indicated testing or treatment.  - mirtazapine 7.5mg po QHS PRN for insomnia   - hydroxyzine PRN for acute anxiety - pt cites adverse reaction to gabapentin  - is prescribed 4mg ativan outpatient, started patient on ativan 0.1mg TID PRN on 12/25, still reporting on-going anxiety and insomnia, increased to 1mg TID PRN on 12/26

## 2022-12-28 NOTE — PROGRESS NOTE ADULT - ASSESSMENT
62F w/ PMH HTN (not on any meds), migraines, seizure disorder, depression/anxiety, prior stroke hx in 2018 presented to [facility] ER for frequent falls to [facility] ER found to have acute/ sub acute infarcts in L [place] and L parieto temporal region with mass effect without herniation i/s/o B/L A2 HGS/occlusion and Prox L ICA HGS vs occlusion. Started on DAPT and high intensity statin after Hgb stable. Currently being stepped down to 7Wo (under Dr. Bland) for further management that includes MRA neck w/ contrast to further characterize ICA for any surgical intervention.     Neuro  #CVA workup  - continue aspirin 81mg daily  - continue plavix 75mg, started 12/27 H/H stable  - continue atorvastatin 80mg daily  - q4hr stroke neuro checks and vitals  - MRA H/N :  Acute/ Subacute infarction L [place] and L parieto temporal region w/local mass effect. B/L A2 segment HGS/ Occlusion and L Proximal ICA HGS Vs Occlusion.  - HCT Results: Subacute/ chronic B/L MICAH territory infarcts.  - NSGY consulted for L ICA HGS Vs occlusion- Recs to obtain CTA H/N and MR eHss  - Patient stating she has anaphylaxis with contrast, carotid ultrasound completed  - B/l carotid ultrasound significant for 50-69% stenosis mid LEFT internal carotid artery per NASCET peak systolic velocity measurements due to noncalcified atheromatous plaque.  - Obtain MRA neck w/ contrast for further assessment of ICA stenosis  - Stroke education    Cards  #HTN  - permissive hypertension, Goal -180  - Not on any  home BP meds per pt  - Please avoid Hypotension i/s/o L ICA and B/L A2  HGS  - BP in the 100's today, no focal neuro changes  - TTE : No wall motion abnormalities/ No valvular Dz.  - EKG Results: Isolated V5 AYANA upon admission, with no reciprocal changes, no c/o CP, no c/f ischemia no c/o cp/sob.    #HLD  - high dose statin as above in CVA  - LDL results: 126    Heme  #HANH, appreciate heme recs  H/H increasing Reticulocyte count 3.3  haptoglobin 231, , T.Bili 0.2  platelets slightly elevated, coags wnl  Iron studies: serum iron 10, %saturation 3, ferritin 6  Peripheral blood smear showing anisocytosis, many acanthocytes, rare target cells, very rare schistocytes 0-1/hpf  Based on the above labs, patient is severely iron deficient. No signs of hemolysis.  Denies history of heavy bleeding, poor wound healing, easy bruising, hemarthrosis.  -s/p 300mg x 3days iron sucrose  - Pt has never had a colonoscopy. Would recommend GI workup to rule out occult malignancy/malabsorption/occult bleeding, but this can be done outpatient.  - Recommend hematology follow up on discharge. If she wishes to follow up with Vivi, please set up an outpatient appointment for her to see Dr. Maribeth Reddy 1-2 weeks from discharge  - heme s/o  - started ferrous sulfate 325mg Mon, Wed, Fri  - f/u fecal occult  - patient never had a colonoscopy, endorses only brown stool   - GI consulted, rec EGD/colonoscopy outpatient. Hold off for now i/s/o recent stroke  - active T&S, transfuse for Hb<7    Pulm  - call provider if SPO2 < 94%    GI  #Nutrition/Fluids/Electrolytes   - replete K<4 and Mg <2  - Diet: DASH/ TLC  - IVF: None for now  - GI consulted for HANH, rec EGD/colonoscopy outpatient. Hold off for now i/s/o recent stroke    Renal  - no issues at this time    Infectious Disease  - no issues at this time    Endocrine  - A1C results: 5.8    - TSH results: 0.638      Psych  # depression  # anxiety  appreciate psych recs:   - self-reported history of chronic anxiety, insomnia, new reported depression, benzodiazepine use disorder, ?alcohol use disorder (per prior chart), ?opiate use disorder in remission, impaired ability to logically reason, in setting of hospitalization for evaluation s/p fall, now found with new CVA. Demonstrates very poor insight into the reason for her ongoing hospitalization, with no understanding of diagnosis of new stroke, recommended additional testing and possible interventions, and no appreciation of the reported significant risks of leaving the hospital against medical advice. Additionally, pt remains highly focused on receiving benzodiazepines for vague anxiety sx and insomnia, with poor insight into her benzodiazepine use disorder (with illicit in addition to prescribed use as outpt) and considerable risks of continued use, including fall risk, risk of additional cognitive impairment, risk in combination with many other medications listed for outpt use. Pt declines non-benzodiazepines for mood/anxiety sx. No acute safety concerns that would warrant inpatient level psychiatric care.  - pt currently LACKS capacity to leave the hospital AMA or refuse any acutely indicated testing or treatment.  - mirtazapine 7.5mg po QHS PRN for insomnia   - hydroxyzine PRN for acute anxiety - pt cites adverse reaction to gabapentin  - no psychiatric barrier to discharge when medically ready  - is prescribed 4mg ativan outpatient, started patient on ativan 0.1mg TID PRN on 12/25, still reporting on-going anxiety and insomnia, increased to 1mg TID PRN on 12/26    DVT Prophylaxis  - heparin sq for DVT prophylaxis   - SCDs for DVT prophylaxis    62F w/ PMH HTN (not on any meds), migraines, seizure disorder, depression/anxiety, prior stroke hx in 2018 presented to [facility] ER for frequent falls to [facility] ER found to have acute/ sub acute infarcts in [place] and L parieto temporal region with mass effect without herniation i/s/o B/L A2 HGS/occlusion and Prox L ICA HGS vs occlusion. Started on DAPT and high intensity statin after Hgb stable. Currently being stepped down to 7Wo (under Dr. Bland) for further management that includes MRA neck w/ contrast to further characterize ICA for any surgical intervention.       Cards  #HTN  - permissive hypertension, Goal -180  - Not on any  home BP meds per pt  - Please avoid Hypotension i/s/o L ICA and B/L A2  HGS  - BP in the 100's today, no focal neuro changes  - TTE : No wall motion abnormalities/ No valvular Dz.  - EKG Results: Isolated V5 AYNAA upon admission, with no reciprocal changes, no c/o CP, no c/f ischemia no c/o cp/sob.    #HLD  - high dose statin as above in CVA  - LDL results: 126    Heme  #HANH, appreciate heme recs  H/H increasing Reticulocyte count 3.3  haptoglobin 231, , T.Bili 0.2  platelets slightly elevated, coags wnl  Iron studies: serum iron 10, %saturation 3, ferritin 6  Peripheral blood smear showing anisocytosis, many acanthocytes, rare target cells, very rare schistocytes 0-1/hpf  Based on the above labs, patient is severely iron deficient. No signs of hemolysis.  Denies history of heavy bleeding, poor wound healing, easy bruising, hemarthrosis.  -s/p 300mg x 3days iron sucrose  - Pt has never had a colonoscopy. Would recommend GI workup to rule out occult malignancy/malabsorption/occult bleeding, but this can be done outpatient.  - Recommend hematology follow up on discharge. If she wishes to follow up with Vivi, please set up an outpatient appointment for her to see Dr. Maribeth Reddy 1-2 weeks from discharge  - heme s/o  - started ferrous sulfate 325mg Mon, Wed, Fri  - f/u fecal occult  - patient never had a colonoscopy, endorses only brown stool   - GI consulted, rec EGD/colonoscopy outpatient. Hold off for now i/s/o recent stroke  - active T&S, transfuse for Hb<7    Pulm  - call provider if SPO2 < 94%    GI  #Nutrition/Fluids/Electrolytes   - replete K<4 and Mg <2  - Diet: DASH/ TLC  - IVF: None for now  - GI consulted for HANH, rec EGD/colonoscopy outpatient. Hold off for now i/s/o recent stroke    Renal  - no issues at this time    Infectious Disease  - no issues at this time    Endocrine  - A1C results: 5.8    - TSH results: 0.638      Psych  # depression  # anxiety  appreciate psych recs:   - self-reported history of chronic anxiety, insomnia, new reported depression, benzodiazepine use disorder, ?alcohol use disorder (per prior chart), ?opiate use disorder in remission, impaired ability to logically reason, in setting of hospitalization for evaluation s/p fall, now found with new CVA. Demonstrates very poor insight into the reason for her ongoing hospitalization, with no understanding of diagnosis of new stroke, recommended additional testing and possible interventions, and no appreciation of the reported significant risks of leaving the hospital against medical advice. Additionally, pt remains highly focused on receiving benzodiazepines for vague anxiety sx and insomnia, with poor insight into her benzodiazepine use disorder (with illicit in addition to prescribed use as outpt) and considerable risks of continued use, including fall risk, risk of additional cognitive impairment, risk in combination with many other medications listed for outpt use. Pt declines non-benzodiazepines for mood/anxiety sx. No acute safety concerns that would warrant inpatient level psychiatric care.  - pt currently LACKS capacity to leave the hospital AMA or refuse any acutely indicated testing or treatment.  - mirtazapine 7.5mg po QHS PRN for insomnia   - hydroxyzine PRN for acute anxiety - pt cites adverse reaction to gabapentin  - no psychiatric barrier to discharge when medically ready  - is prescribed 4mg ativan outpatient, started patient on ativan 0.1mg TID PRN on 12/25, still reporting on-going anxiety and insomnia, increased to 1mg TID PRN on 12/26    DVT Prophylaxis  - heparin sq for DVT prophylaxis   - SCDs for DVT prophylaxis    62F w/ PMH HTN (not on any meds), migraines, seizure disorder, depression/anxiety, prior stroke hx in 2018 presented to [facility] for frequent falls to [facility] found to have acute/ sub acute infarcts in [place] and L parieto temporal region with mass effect without herniation i/s/o B/L A2 HGS/occlusion and Prox L ICA HGS vs occlusion. Started on DAPT and high intensity statin after Hgb stable. Currently being stepped down to 7Wo (under Dr. Bland) for further management that includes MRA neck w/ contrast to further characterize ICA for any surgical intervention.

## 2022-12-28 NOTE — PROGRESS NOTE ADULT - ASSESSMENT
per Neurology    62 y o F w/ PMH HTN (not on any meds per pt), migraine, seizure disorder, depression, anxiety, prior stroke hx in 2018 presented to [facility] ER for frequent falls to [facility] ER, admitted to medicine for further work up, stroke consulted for frequent falls. Pt poor vs unreliable historian. CTH w/ subacute / chronic B/L MICAH infarcts. Unclear chronicity of R sided weakness as pt states she does not use walker and is pending wheel chair. Given unclear hx and chronicity of the deficits, recommended MRA H/N which showed acute/ sub acute infarcts in [place] and L parieto temporal region with mass effect without herniation i/s/o B/L A2 HGS/occlusion and Prox L ICA HGS vs occlusion. Pt transferred to stroke tele for further work up and close neuro monitoring. Found to have severe HANH. Carotid ultrasound completed on 12/27 shows 50-69% stenosis of left ICA, now pending MRA neck with contrast for further workup.     Neuro  #CVA workup  - continue aspirin 81mg daily  - continue plavix 75mg, started 12/27 H/H stable  - continue atorvastatin 80mg daily  - q4hr stroke neuro checks and vitals  - MRA H/N :  Acute/ Subacute infarction L [place] and L parieto temporal region w/local mass effect. B/L A2 segment HGS/ Occlusion and L Proximal ICA HGS Vs Occlusion.  - HCT Results: Subacute/ chronic B/L MICAH territory infarcts.  - NSGY consulted for L ICA HGS Vs occlusion- Recs to obtain CTA H/N and MR Hess  - Patient stating she has anaphylaxis with contrast, carotid ultrasound completed  - B/l carotid ultrasound significant for 50-69% stenosis mid LEFT internal carotid artery per NASCET peak systolic velocity measurements due to noncalcified atheromatous plaque.  - Obtain MRA neck w/ contrast for further assessment of ICA stenosis  - Stroke education    Cards  #HTN  - permissive hypertension, Goal -180  - Not on any  home BP meds per pt  - Please avoid Hypotension i/s/o L ICA and B/L A2  HGS  - BP in the 100's today, no focal neuro changes  - TTE : No wall motion abnormalities/ No valvular Dz.  - EKG Results: Isolated V5 AYANA upon admission, with no reciprocal changes, no c/o CP, no c/f ischemia no c/o cp/sob.    #HLD  - high dose statin as above in CVA  - LDL results: 126    Heme  #HANH, appreciate heme recs  H/H increasing Reticulocyte count 3.3  haptoglobin 231, , T.Bili 0.2  platelets slightly elevated, coags wnl  Iron studies: serum iron 10, %saturation 3, ferritin 6  Peripheral blood smear showing anisocytosis, many acanthocytes, rare target cells, very rare schistocytes 0-1/hpf  Based on the above labs, patient is severely iron deficient. No signs of hemolysis.  Denies history of heavy bleeding, poor wound healing, easy bruising, hemarthrosis.  -s/p 300mg x 3days iron sucrose  - Pt has never had a colonoscopy. Would recommend GI workup to rule out occult malignancy/malabsorption/occult bleeding, but this can be done outpatient.  - Recommend hematology follow up on discharge. If she wishes to follow up with Vivi, please set up an outpatient appointment for her to see Dr. Maribeth Reddy 1-2 weeks from discharge  - heme s/o  - started ferrous sulfate 325mg Mon, Wed, Fri  - f/u fecal occult  - patient never had a colonoscopy, endorses only brown stool   - GI consulted, rec EGD/colonoscopy outpatient. Hold off for now i/s/o recent stroke  - active T&S, transfuse for Hb<7    Pulm  - call provider if SPO2 < 94%    GI  #Nutrition/Fluids/Electrolytes   - replete K<4 and Mg <2  - Diet: DASH/ TLC  - IVF: None for now  - GI consulted for HANH, rec EGD/colonoscopy outpatient. Hold off for now i/s/o recent stroke    Renal  - no issues at this time    Infectious Disease  - no issues at this time    Endocrine  - A1C results: 5.8    - TSH results: 0.638      Psych  # depression  # anxiety  appreciate psych recs:   - self-reported history of chronic anxiety, insomnia, new reported depression, benzodiazepine use disorder, ?alcohol use disorder (per prior chart), ?opiate use disorder in remission, impaired ability to logically reason, in setting of hospitalization for evaluation s/p fall, now found with new CVA. Demonstrates very poor insight into the reason for her ongoing hospitalization, with no understanding of diagnosis of new stroke, recommended additional testing and possible interventions, and no appreciation of the reported significant risks of leaving the hospital against medical advice. Additionally, pt remains highly focused on receiving benzodiazepines for vague anxiety sx and insomnia, with poor insight into her benzodiazepine use disorder (with illicit in addition to prescribed use as outpt) and considerable risks of continued use, including fall risk, risk of additional cognitive impairment, risk in combination with many other medications listed for outpt use. Pt declines non-benzodiazepines for mood/anxiety sx. No acute safety concerns that would warrant inpatient level psychiatric care.  - pt currently LACKS capacity to leave the hospital AMA or refuse any acutely indicated testing or treatment.  - mirtazapine 7.5mg po QHS PRN for insomnia   - hydroxyzine PRN for acute anxiety - pt cites adverse reaction to gabapentin  - no psychiatric barrier to discharge when medically ready  - is prescribed 4mg ativan outpatient, started patient on ativan 0.1mg TID PRN on 12/25, still reporting on-going anxiety and insomnia, increased to 1mg TID PRN on 12/26    DVT Prophylaxis  - heparin sq for DVT prophylaxis   - SCDs for DVT prophylaxis

## 2022-12-28 NOTE — PROGRESS NOTE ADULT - PROBLEM SELECTOR PLAN 4
Not on any home BP meds per pt  - BP in the 100's today, no focal neuro changes  - TTE : No wall motion abnormalities/ No valvular Dz.  - EKG Results: Isolated V5 AYANA upon admission, with no reciprocal changes, no c/o CP, no c/f ischemia no c/o cp/sob.

## 2022-12-28 NOTE — PROGRESS NOTE ADULT - ASSESSMENT
62F w/ PMH HTN (not on any meds per pt), migraine, seizure disorder, depression, anxiety, prior stroke hx in 2018 presented to [facility] ER for frequent falls to [facility] ER, admitted to medicine for further work up, stroke consulted for frequent falls. Pt poor vs unreliable historian. CTH w/ subacute / chronic B/L MICAH infarcts. Unclear chronicity of R sided weakness as pt states she does not use walker and is pending wheel chair. Given unclear hx and chronicity of the deficits, recommended MRA H/N which showed acute/ sub acute infarcts in L [place] and L parieto temporal region with mass effect without herniation i/s/o B/L A2 HGS/occlusion and Prox L ICA HGS vs occlusion. Pt transferred to stroke tele for further work up and close neuro monitoring. Found to have severe HANH. Carotid ultrasound completed on 12/27 shows 50-69% stenosis of left ICA, now pending MRA neck with contrast for further workup.     Neuro  #CVA workup  - continue aspirin 81mg daily  - continue plavix 75mg, started 12/27 H/H stable  - continue atorvastatin 80mg daily  - q4hr stroke neuro checks and vitals  - MRA H/N :  Acute/ Subacute infarction L [place] and L parieto temporal region w/local mass effect. B/L A2 segment HGS/ Occlusion and L Proximal ICA HGS Vs Occlusion.  - HCT Results: Subacute/ chronic B/L MICAH territory infarcts.  - NSGY consulted for L ICA HGS Vs occlusion- Recs to obtain CTA H/N and MR Hess  - Patient stating she has anaphylaxis with contrast, carotid ultrasound completed  - B/l carotid ultrasound significant for 50-69% stenosis mid LEFT internal carotid artery per NASCET peak systolic velocity measurements due to noncalcified atheromatous plaque.  - Obtain MRA neck w/ contrast for further assessment of ICA stenosis  - Stroke education    Cards  #HTN  - permissive hypertension, Goal -180  - Not on any  home BP meds per pt  - Please avoid Hypotension i/s/o L ICA and B/L A2  HGS  - BP in the 100's today, no focal neuro changes  - TTE : No wall motion abnormalities/ No valvular Dz.  - EKG Results: Isolated V5 AYANA upon admission, with no reciprocal changes, no c/o CP, no c/f ischemia no c/o cp/sob.    #HLD  - high dose statin as above in CVA  - LDL results: 126    Heme  #HANH, appreciate heme recs  H/H increasing Reticulocyte count 3.3  haptoglobin 231, , T.Bili 0.2  platelets slightly elevated, coags wnl  Iron studies: serum iron 10, %saturation 3, ferritin 6  Peripheral blood smear showing anisocytosis, many acanthocytes, rare target cells, very rare schistocytes 0-1/hpf  Based on the above labs, patient is severely iron deficient. No signs of hemolysis.  Denies history of heavy bleeding, poor wound healing, easy bruising, hemarthrosis.  -s/p 300mg x 3days iron sucrose  - Pt has never had a colonoscopy. Would recommend GI workup to rule out occult malignancy/malabsorption/occult bleeding, but this can be done outpatient.  - Recommend hematology follow up on discharge. If she wishes to follow up with Vivi, please set up an outpatient appointment for her to see Dr. Maribeth Reddy 1-2 weeks from discharge  - heme s/o  - started ferrous sulfate 325mg Mon, Wed, Fri  - f/u fecal occult  - patient never had a colonoscopy, endorses only brown stool   - GI consulted, rec EGD/colonoscopy outpatient. Hold off for now i/s/o recent stroke  - active T&S, transfuse for Hb<7    Pulm  - call provider if SPO2 < 94%    GI  #Nutrition/Fluids/Electrolytes   - replete K<4 and Mg <2  - Diet: DASH/ TLC  - IVF: None for now  - GI consulted for HANH, rec EGD/colonoscopy outpatient. Hold off for now i/s/o recent stroke    Renal  - no issues at this time    Infectious Disease  - no issues at this time    Endocrine  - A1C results: 5.8    - TSH results: 0.638      Psych  # depression  # anxiety  appreciate psych recs:   - self-reported history of chronic anxiety, insomnia, new reported depression, benzodiazepine use disorder, ?alcohol use disorder (per prior chart), ?opiate use disorder in remission, impaired ability to logically reason, in setting of hospitalization for evaluation s/p fall, now found with new CVA. Demonstrates very poor insight into the reason for her ongoing hospitalization, with no understanding of diagnosis of new stroke, recommended additional testing and possible interventions, and no appreciation of the reported significant risks of leaving the hospital against medical advice. Additionally, pt remains highly focused on receiving benzodiazepines for vague anxiety sx and insomnia, with poor insight into her benzodiazepine use disorder (with illicit in addition to prescribed use as outpt) and considerable risks of continued use, including fall risk, risk of additional cognitive impairment, risk in combination with many other medications listed for outpt use. Pt declines non-benzodiazepines for mood/anxiety sx. No acute safety concerns that would warrant inpatient level psychiatric care.  - pt currently LACKS capacity to leave the hospital AMA or refuse any acutely indicated testing or treatment.  - mirtazapine 7.5mg po QHS PRN for insomnia   - hydroxyzine PRN for acute anxiety - pt cites adverse reaction to gabapentin  - no psychiatric barrier to discharge when medically ready  - is prescribed 4mg ativan outpatient, started patient on ativan 0.1mg TID PRN on 12/25, still reporting on-going anxiety and insomnia, increased to 1mg TID PRN on 12/26    DVT Prophylaxis  - heparin sq for DVT prophylaxis   - SCDs for DVT prophylaxis

## 2022-12-28 NOTE — PROGRESS NOTE ADULT - SUBJECTIVE AND OBJECTIVE BOX
Patient is a 62y old  Female who presents with a chief complaint of mechanical fall (28 Dec 2022 13:14)      INTERVAL HPI/OVERNIGHT EVENTS:    Pt. seen and examined earlier today  Pt. c/o generalized weakness, but says she's rather go home than rehab  Pt. c/o chronic anxiety and poor sleep    Review of Systems: 12 point review of systems otherwise negative    MEDICATIONS  (STANDING):  aspirin  chewable 81 milliGRAM(s) Oral daily  atorvastatin 80 milliGRAM(s) Oral at bedtime  clopidogrel Tablet 75 milliGRAM(s) Oral daily  ferrous    sulfate 325 milliGRAM(s) Oral <User Schedule>  folic acid 1 milliGRAM(s) Oral daily  heparin   Injectable 5000 Unit(s) SubCutaneous every 8 hours  iron sucrose IVPB 300 milliGRAM(s) IV Intermittent every 24 hours  melatonin 5 milliGRAM(s) Oral at bedtime  mirtazapine 7.5 milliGRAM(s) Oral at bedtime  multivitamin 1 Tablet(s) Oral daily  pantoprazole    Tablet 40 milliGRAM(s) Oral before breakfast  senna 2 Tablet(s) Oral at bedtime    MEDICATIONS  (PRN):  acetaminophen     Tablet .. 650 milliGRAM(s) Oral every 6 hours PRN Mild Pain (1 - 3)  hydrOXYzine hydrochloride 25 milliGRAM(s) Oral three times a day PRN Anxiety  LORazepam     Tablet 1 milliGRAM(s) Oral every 8 hours PRN Anxiety      Allergies    Compazine (Unknown)  contrast media (iodine-based) (Unknown)  iv contrast (Unknown)  penicillin (Unknown)  penicillins (Other)  Toradol (Unknown)    Intolerances          Vital Signs Last 24 Hrs  T(C): 36.8 (28 Dec 2022 09:44), Max: 36.8 (27 Dec 2022 20:35)  T(F): 98.3 (28 Dec 2022 09:44), Max: 98.3 (27 Dec 2022 20:35)  HR: 104 (27 Dec 2022 20:04) (100 - 106)  BP: 127/72 (28 Dec 2022 11:50) (94/54 - 128/60)  BP(mean): 93 (28 Dec 2022 11:50) (68 - 93)  RR: 18 (28 Dec 2022 04:02) (12 - 18)  SpO2: 97% (27 Dec 2022 20:04) (97% - 97%)    Parameters below as of 28 Dec 2022 11:50  Patient On (Oxygen Delivery Method): room air      CAPILLARY BLOOD GLUCOSE          12-27 @ 07:01  -  12-28 @ 07:00  --------------------------------------------------------  IN: 0 mL / OUT: 1300 mL / NET: -1300 mL        Physical Exam:  (earlier today)  Daily     Daily   General:  comfortable-appearing in NAD, calm and cooperative  HEENT:  MMM  CV:  RRR  Lungs:  CTA B/L  Abdomen:  soft NT ND  Extremities:  no edema B/L LE  Skin:  WWP, no visible sweat  Neuro:  AAOx3, no dysarthria, no tremor      LABS:                        10.5   11.99 )-----------( 552      ( 28 Dec 2022 10:00 )             35.1     12-28    137  |  103  |  13  ----------------------------<  121<H>  4.1   |  23  |  0.62    Ca    10.0      28 Dec 2022 10:00  Phos  3.7     12-28  Mg     1.9     12-28      PTT - ( 27 Dec 2022 12:34 )  PTT:28.5 sec

## 2022-12-28 NOTE — PROGRESS NOTE ADULT - SUBJECTIVE AND OBJECTIVE BOX
Neurology Stroke Progress Note    INTERVAL HPI/OVERNIGHT EVENTS:  Patient seen and examined at bedside.     MEDICATIONS  (STANDING):  aspirin  chewable 81 milliGRAM(s) Oral daily  atorvastatin 80 milliGRAM(s) Oral at bedtime  clopidogrel Tablet 75 milliGRAM(s) Oral daily  ferrous    sulfate 325 milliGRAM(s) Oral <User Schedule>  folic acid 1 milliGRAM(s) Oral daily  heparin   Injectable 5000 Unit(s) SubCutaneous every 8 hours  iron sucrose IVPB 300 milliGRAM(s) IV Intermittent every 24 hours  melatonin 5 milliGRAM(s) Oral at bedtime  mirtazapine 7.5 milliGRAM(s) Oral at bedtime  multivitamin 1 Tablet(s) Oral daily  pantoprazole    Tablet 40 milliGRAM(s) Oral before breakfast  senna 2 Tablet(s) Oral at bedtime    MEDICATIONS  (PRN):  acetaminophen     Tablet .. 650 milliGRAM(s) Oral every 6 hours PRN Mild Pain (1 - 3)  hydrOXYzine hydrochloride 25 milliGRAM(s) Oral three times a day PRN Anxiety  LORazepam     Tablet 1 milliGRAM(s) Oral every 8 hours PRN Anxiety      Allergies    Compazine (Unknown)  contrast media (iodine-based) (Unknown)  iv contrast (Unknown)  penicillin (Unknown)  penicillins (Other)  Toradol (Unknown)    Intolerances        Vital Signs Last 24 Hrs  T(C): 36.6 (28 Dec 2022 06:54), Max: 36.8 (27 Dec 2022 10:57)  T(F): 97.9 (28 Dec 2022 06:54), Max: 98.3 (27 Dec 2022 10:57)  HR: 104 (27 Dec 2022 20:04) (100 - 108)  BP: 116/61 (28 Dec 2022 08:15) (94/54 - 128/75)  BP(mean): 81 (28 Dec 2022 08:15) (68 - 95)  RR: 18 (28 Dec 2022 04:02) (12 - 18)  SpO2: 97% (27 Dec 2022 20:04) (97% - 97%)    Parameters below as of 28 Dec 2022 08:15  Patient On (Oxygen Delivery Method): room air        Physical exam:  General: No acute distress, awake and alert  Eyes: Anicteric sclerae, moist conjunctivae, see below for CNs  Neck: trachea midline, FROM, supple, no thyromegaly or lymphadenopathy  Cardiovascular: Regular rate and rhythm, no murmurs, rubs, or gallops. No carotid bruits.   Pulmonary: Anterior breath sounds clear bilaterally, no crackles or wheezing. No use of accessory muscles  GI: Abdomen soft, non-distended, non-tender  Extremities: Radial and DP pulses +2, no edema    Neurologic:  -Mental status: Awake, alert, oriented to person, place, and time (year w/ choices). Speech is fluent with intact naming, repetition, and comprehension, no dysarthria. Follows commands. Attention/concentration intact. Forgetful.  -Cranial nerves:   II: Visual fields are full to confrontation.  III, IV, VI: Extraocular movements are intact without nystagmus. Pupils equally round and reactive to light.  V:  Facial sensation V1-V3 equal and intact   VII: Face is symmetric with normal eye closure and smile  VIII: Hearing is bilaterally intact   XII: Tongue protrudes midline  Motor: Normal bulk and tone. RUE/RLE 4/5, no drift noted. Unable to voluntarily extend right leg. LUE/ LLE : 5/5.   Sensation: Intact to light touch bilaterally. No neglect or extinction on double simultaneous testing.  Coordination: No dysmetria on finger-to-nose out of proportion to weakness.   Gait: Deferred.    LABS:                        9.1    9.90  )-----------( 435      ( 27 Dec 2022 13:19 )             29.8           PTT - ( 27 Dec 2022 12:34 )  PTT:28.5 sec      RADIOLOGY & ADDITIONAL TESTS:     Neurology Stroke Progress Note    INTERVAL HPI/OVERNIGHT EVENTS:  Patient seen and examined at bedside complaining that she has not slept and she wants to go home prior to going to rehab because she has things to do. Educated patient that going home prior to rehab was not possible. Discussed patient's worries about not having supplies for rehab with her mother who states she has a suitcase with clothes ready to go. Attempted to call patient's outpatient psych NP, patient has reportedly missed her last couple of appointments.     MEDICATIONS  (STANDING):  aspirin  chewable 81 milliGRAM(s) Oral daily  atorvastatin 80 milliGRAM(s) Oral at bedtime  clopidogrel Tablet 75 milliGRAM(s) Oral daily  ferrous    sulfate 325 milliGRAM(s) Oral <User Schedule>  folic acid 1 milliGRAM(s) Oral daily  heparin   Injectable 5000 Unit(s) SubCutaneous every 8 hours  iron sucrose IVPB 300 milliGRAM(s) IV Intermittent every 24 hours  melatonin 5 milliGRAM(s) Oral at bedtime  mirtazapine 7.5 milliGRAM(s) Oral at bedtime  multivitamin 1 Tablet(s) Oral daily  pantoprazole    Tablet 40 milliGRAM(s) Oral before breakfast  senna 2 Tablet(s) Oral at bedtime    MEDICATIONS  (PRN):  acetaminophen     Tablet .. 650 milliGRAM(s) Oral every 6 hours PRN Mild Pain (1 - 3)  hydrOXYzine hydrochloride 25 milliGRAM(s) Oral three times a day PRN Anxiety  LORazepam     Tablet 1 milliGRAM(s) Oral every 8 hours PRN Anxiety      Allergies    Compazine (Unknown)  contrast media (iodine-based) (Unknown)  iv contrast (Unknown)  penicillin (Unknown)  penicillins (Other)  Toradol (Unknown)    Intolerances        Vital Signs Last 24 Hrs  T(C): 36.6 (28 Dec 2022 06:54), Max: 36.8 (27 Dec 2022 10:57)  T(F): 97.9 (28 Dec 2022 06:54), Max: 98.3 (27 Dec 2022 10:57)  HR: 104 (27 Dec 2022 20:04) (100 - 108)  BP: 116/61 (28 Dec 2022 08:15) (94/54 - 128/75)  BP(mean): 81 (28 Dec 2022 08:15) (68 - 95)  RR: 18 (28 Dec 2022 04:02) (12 - 18)  SpO2: 97% (27 Dec 2022 20:04) (97% - 97%)    Parameters below as of 28 Dec 2022 08:15  Patient On (Oxygen Delivery Method): room air        Physical exam:  General: No acute distress, awake and alert  Eyes: Anicteric sclerae, moist conjunctivae, see below for CNs  Neck: trachea midline, FROM, supple, no thyromegaly or lymphadenopathy  Cardiovascular: Regular rate and rhythm, no murmurs, rubs, or gallops. No carotid bruits.   Pulmonary: Anterior breath sounds clear bilaterally, no crackles or wheezing. No use of accessory muscles  GI: Abdomen soft, non-distended, non-tender  Extremities: Radial and DP pulses +2, no edema    Neurologic:  -Mental status: Awake, alert, oriented to person, place, and time (year w/ choices). Speech is fluent with intact naming, repetition, and comprehension, no dysarthria. Follows commands. Attention/concentration intact. Forgetful.  -Cranial nerves:   II: Visual fields are full to confrontation.  III, IV, VI: Extraocular movements are intact without nystagmus. Pupils equally round and reactive to light.  V:  Facial sensation V1-V3 equal and intact   VII: Face is symmetric with normal eye closure and smile  VIII: Hearing is bilaterally intact   XII: Tongue protrudes midline  Motor: Normal bulk and tone. RUE/RLE 4/5, no drift noted. Unable to voluntarily extend right leg. LUE/ LLE : 5/5.   Sensation: Intact to light touch bilaterally. No neglect or extinction on double simultaneous testing.  Coordination: No dysmetria on finger-to-nose out of proportion to weakness.   Gait: Deferred.    LABS:                        9.1    9.90  )-----------( 435      ( 27 Dec 2022 13:19 )             29.8           PTT - ( 27 Dec 2022 12:34 )  PTT:28.5 sec      RADIOLOGY & ADDITIONAL TESTS:  < from: US Duplex Carotid Arteries Complete, Bilateral (12.27.22 @ 15:39) >  IMPRESSION:  50-69% stenosis mid LEFT internal carotid artery per NASCET peak systolic   velocity measurements due to noncalcified atheromatous plaque. Consider   CTA correlation.  No evidence of hemodynamically significant stenosis RIGHT internal   carotid artery.    < end of copied text >       Neurology Stroke Progress Note  -------------------------------TRANSFER FROM STROKE STEPDOWN TO REGIONAL---------------------------  62F w/ PMH HTN(not on any meds per pt), Migraine, Sz disorder, depression- anxiety, prior stroke Hx in 2018 presented to [facility] ER for frequent falls on 12/20 and was admitted to medicine for further work up. Pt lives alone by herself, states she takes pain meds (unclear of the name), admits to taking ambien, valium (prescribed by her psychiatrist) several times a week.  Has frequent falls and admits to falling everyday.  Also noted to be forgetful.   CTH done in ER w/Subacute/chronic bilateral MICAH territory infarcts. Pt with R sided weakness, states thats her baseline since her prior strokes and noted worsening weakness X atleast 1year. Recommended MRA H/N which showed acute/ subacute infarction in L MICAH territory and L parietal region, with mild local mass effect. B/L A2 HGS/ Occlusion and L ICA focal lack of flow related signal within the proximal L ICA 2/2 HGS VS Occlusion. PT transferred to Stroke telemetry for further work up. Pt is on ASA/ statin at this point, 2/2 downtrending H/H, but no overt signs of GIB.  She was seen by GI this admission with a plan to follow up as an outpatient. Has HANH, likely non complaint with her home iron regimen. Iron started by medicine team.  NeuroSx consulted for ICAD/ L ICA HGS Vs occlusion. Reccs to obtain CTA H/N and MR Hess. Pt allergic to IV contrast/ Iodine based dye, carotid doppler completed showing left mid-ICA stenosis 50-69%. Originally placed on ASA monotherapy and upped to DAPT on 12/27, H/H stable. TTE completed and unremarkable, no PFO noted. Now pending MRA neck w/ contrast to further assess for ICA stenosis.     Patient has been followed by psychiatry and seen multiple times through this admission. She is currently receiving lorazepam 1mg TID PRN, mirtazapine 7.5mg @ bedtime, and atarax 25mg TID PRN. She was last seen by psychiatry on 12/23 and she was deemed to lack capacity to leave the hospital or refuse medical treatment. The patient intermittently reports wanting to leave the hospital and lacks insight into the severity of her medical condition. As of now she has been able to be talked down and has not had any episodes of delirium or aggression.         INTERVAL HPI/OVERNIGHT EVENTS:  Patient seen and examined at bedside complaining that she has not slept and she wants to go home prior to going to rehab because she has things to do. Educated patient that going home prior to rehab was not possible. Discussed patient's worries about not having supplies for rehab with her mother who states she has a suitcase with clothes ready to go. Attempted to call patient's outpatient psych NP, patient has reportedly missed her last couple of appointments.     MEDICATIONS  (STANDING):  aspirin  chewable 81 milliGRAM(s) Oral daily  atorvastatin 80 milliGRAM(s) Oral at bedtime  clopidogrel Tablet 75 milliGRAM(s) Oral daily  ferrous    sulfate 325 milliGRAM(s) Oral <User Schedule>  folic acid 1 milliGRAM(s) Oral daily  heparin   Injectable 5000 Unit(s) SubCutaneous every 8 hours  iron sucrose IVPB 300 milliGRAM(s) IV Intermittent every 24 hours  melatonin 5 milliGRAM(s) Oral at bedtime  mirtazapine 7.5 milliGRAM(s) Oral at bedtime  multivitamin 1 Tablet(s) Oral daily  pantoprazole    Tablet 40 milliGRAM(s) Oral before breakfast  senna 2 Tablet(s) Oral at bedtime    MEDICATIONS  (PRN):  acetaminophen     Tablet .. 650 milliGRAM(s) Oral every 6 hours PRN Mild Pain (1 - 3)  hydrOXYzine hydrochloride 25 milliGRAM(s) Oral three times a day PRN Anxiety  LORazepam     Tablet 1 milliGRAM(s) Oral every 8 hours PRN Anxiety      Allergies    Compazine (Unknown)  contrast media (iodine-based) (Unknown)  iv contrast (Unknown)  penicillin (Unknown)  penicillins (Other)  Toradol (Unknown)    Intolerances        Vital Signs Last 24 Hrs  T(C): 36.6 (28 Dec 2022 06:54), Max: 36.8 (27 Dec 2022 10:57)  T(F): 97.9 (28 Dec 2022 06:54), Max: 98.3 (27 Dec 2022 10:57)  HR: 104 (27 Dec 2022 20:04) (100 - 108)  BP: 116/61 (28 Dec 2022 08:15) (94/54 - 128/75)  BP(mean): 81 (28 Dec 2022 08:15) (68 - 95)  RR: 18 (28 Dec 2022 04:02) (12 - 18)  SpO2: 97% (27 Dec 2022 20:04) (97% - 97%)    Parameters below as of 28 Dec 2022 08:15  Patient On (Oxygen Delivery Method): room air        Physical exam:  General: No acute distress, awake and alert  Eyes: Anicteric sclerae, moist conjunctivae, see below for CNs  Neck: trachea midline, FROM, supple, no thyromegaly or lymphadenopathy  Cardiovascular: Regular rate and rhythm, no murmurs, rubs, or gallops. No carotid bruits.   Pulmonary: Anterior breath sounds clear bilaterally, no crackles or wheezing. No use of accessory muscles  GI: Abdomen soft, non-distended, non-tender  Extremities: Radial and DP pulses +2, no edema    Neurologic:  -Mental status: Awake, alert, oriented to person, place, and time (year w/ choices). Speech is fluent with intact naming, repetition, and comprehension, no dysarthria. Follows commands. Attention/concentration intact. Forgetful.  -Cranial nerves:   II: Visual fields are full to confrontation.  III, IV, VI: Extraocular movements are intact without nystagmus. Pupils equally round and reactive to light.  V:  Facial sensation V1-V3 equal and intact   VII: Face is symmetric with normal eye closure and smile  VIII: Hearing is bilaterally intact   XII: Tongue protrudes midline  Motor: Normal bulk and tone. RUE/RLE 4/5, no drift noted. Unable to voluntarily extend right leg. LUE/ LLE : 5/5.   Sensation: Intact to light touch bilaterally. No neglect or extinction on double simultaneous testing.  Coordination: No dysmetria on finger-to-nose out of proportion to weakness.   Gait: Deferred.    LABS:                        9.1    9.90  )-----------( 435      ( 27 Dec 2022 13:19 )             29.8           PTT - ( 27 Dec 2022 12:34 )  PTT:28.5 sec      RADIOLOGY & ADDITIONAL TESTS:  < from: US Duplex Carotid Arteries Complete, Bilateral (12.27.22 @ 15:39) >  IMPRESSION:  50-69% stenosis mid LEFT internal carotid artery per NASCET peak systolic   velocity measurements due to noncalcified atheromatous plaque. Consider   CTA correlation.  No evidence of hemodynamically significant stenosis RIGHT internal   carotid artery.    < end of copied text >

## 2022-12-28 NOTE — PROGRESS NOTE ADULT - PROBLEM SELECTOR PLAN 6
F: tolerating PO, no IVF  E: replete K<4, Mg<2  N: regular  VTE Prophylaxis: hep SC   GI: pantoprazole  D: Stroke tele --> 7Wo (under Dr. Bland)

## 2022-12-28 NOTE — PROGRESS NOTE ADULT - PROBLEM SELECTOR PLAN 1
MRI shows "acute/subacute infarction in the left MICAH territory as well as the left parietal temporal region; mild local mass effect without herniation;" also found to have L ICA severe stenosis/occlusion and MICAH severe stenosis/occlusion on imaging; cont. work-up and mgmt per Neuro and Neurosurgery; PT/OT; on DAPT + statin, plan for MRA neck; acute rehab planning

## 2022-12-28 NOTE — PROGRESS NOTE ADULT - PROBLEM SELECTOR PLAN 3
Hgb stable 10.5, s/p 1u pRBC this admission. Also s/p 3 days iron sucrose. Denies history of heavy bleeding, poor wound healing, easy bruising, hemarthrosis.  - c/w ferrous sulfate 325mg M/W/F  - active T&S, transfuse for Hb<7 Hgb stable 10.5, s/p 1u pRBC this admission. Also s/p 3 days iron sucrose. Denies history of heavy bleeding, poor wound healing, easy bruising, hemarthrosis.  - c/w ferrous sulfate 325mg M/W/F  - active T&S, transfuse for Hb<7  - outpatient colonoscopy with GI   - outpatient f/up with heme/onc (Dr. Maribeth Reddy) 1-2 weeks from discharge  - appreciate medicine team recs

## 2022-12-28 NOTE — PROGRESS NOTE ADULT - SUBJECTIVE AND OBJECTIVE BOX
*INCOMPLETE NOTE*    ACCEPTING TRANSFER FROM STROKE TELE TO [facility]  Hospital Course:  62F w/ PMH HTN (not on any meds), Migraines, Sz disorder, depression/anxiety, prior stroke Hx in 2018 presented to [facility] ER on 12/20 for frequent falls. CTH done in ER w/Subacute/chronic bilateral MICAH territory infarcts. Pt with R sided weakness, states that's her baseline since her prior strokes and noted worsening weakness X at least 1year. MRA H/N also showed acute/ subacute infarction in L MICAH territory and L parietal region, with mild local mass effect. B/L A2 HGS/ Occlusion and L ICA focal lack of flow related signal within the proximal L ICA 2/2 HGS VS Occlusion. PT transferred to Stroke telemetry for further work up. She was only on ASA/ statin at this point 2/2 downtrending H/H, but no overt signs of GIB. She was seen by GI this admission with a plan to follow up as an outpatient, deemed anemia likely from non-compliance of home iron for HANH (also s/p 1u pRBC). NeuroSx consulted for ICAD/ L ICA HGS Vs occlusion. Reccs to obtain CTA H/N and MR Hess, however, Pt allergic to IV contrast/ Iodine based dye. Carotid doppler completed showing left mid-ICA stenosis 50-69%. Originally placed on ASA monotherapy but now on DAPT as of 12/27 with H/H stable. TTE completed and unremarkable, no PFO noted. Now pending MRA neck w/ contrast to further assess for ICA stenosis and NSGY input on any surgical intervention.     Of note, patient has been followed by psychiatry and seen multiple times through this admission. She is currently receiving lorazepam 1mg TID PRN, mirtazapine 7.5mg @ bedtime, and atarax 25mg TID PRN. She was last seen by psychiatry on 12/23 and she was deemed to lack capacity to leave the hospital or refuse medical treatment. The patient intermittently reports wanting to leave the hospital and lacks insight into the severity of her medical condition. As of now she has been able to be talked down and has not had any episodes of delirium or aggression.     INTERVAL HPI: Patient seen and examined at bedside.     VITALS  Vital Signs Last 24 Hrs  T(C): 37 (28 Dec 2022 17:14), Max: 37.1 (28 Dec 2022 14:25)  T(F): 98.6 (28 Dec 2022 17:14), Max: 98.7 (28 Dec 2022 14:25)  HR: 104 (27 Dec 2022 20:04) (104 - 106)  BP: 102/65 (28 Dec 2022 16:35) (102/57 - 128/60)  BP(mean): 78 (28 Dec 2022 16:35) (74 - 93)  RR: 18 (28 Dec 2022 04:02) (12 - 18)  SpO2: 97% (27 Dec 2022 20:04) (97% - 97%)    Parameters below as of 28 Dec 2022 16:35  Patient On (Oxygen Delivery Method): room air        CAPILLARY BLOOD GLUCOSE          PHYSICAL EXAM  General: NAD, sitting comfortably in bed   HEENT: PERRL/ EOMI, no scleral icterus, MMM  Neck: Supple, no JVD  Respiratory: lungs CTA b/l, no wheezes/crackles, no accessory muscle use  Cardiovascular: Regular rhythm/rate; +S1 +S2, no murmurs  Gastrointestinal: Soft, NTND, normoactive BS, no rebound, no guarding  Genitourinary: no suprapubic tenderness  Extremities: WWP, no cyanosis, no clubbing, no edema, pulses equal  Neurological: A&Ox3, CNII-XII grossly intact, no gross focal deficits, follows commands  Skin: Normal temperature, warm, dry    MEDICATIONS  (STANDING):  aspirin  chewable 81 milliGRAM(s) Oral daily  atorvastatin 80 milliGRAM(s) Oral at bedtime  clopidogrel Tablet 75 milliGRAM(s) Oral daily  ferrous    sulfate 325 milliGRAM(s) Oral <User Schedule>  folic acid 1 milliGRAM(s) Oral daily  heparin   Injectable 5000 Unit(s) SubCutaneous every 8 hours  iron sucrose IVPB 300 milliGRAM(s) IV Intermittent every 24 hours  melatonin 5 milliGRAM(s) Oral at bedtime  mirtazapine 7.5 milliGRAM(s) Oral at bedtime  multivitamin 1 Tablet(s) Oral daily  pantoprazole    Tablet 40 milliGRAM(s) Oral before breakfast  senna 2 Tablet(s) Oral at bedtime    MEDICATIONS  (PRN):  acetaminophen     Tablet .. 650 milliGRAM(s) Oral every 6 hours PRN Mild Pain (1 - 3)  hydrOXYzine hydrochloride 25 milliGRAM(s) Oral three times a day PRN Anxiety  LORazepam     Tablet 1 milliGRAM(s) Oral every 8 hours PRN Anxiety      Compazine (Unknown)  contrast media (iodine-based) (Unknown)  iv contrast (Unknown)  penicillin (Unknown)  penicillins (Other)  Toradol (Unknown)      LABS                        10.5   11.99 )-----------( 552      ( 28 Dec 2022 10:00 )             35.1     12-28    137  |  103  |  13  ----------------------------<  121<H>  4.1   |  23  |  0.62    Ca    10.0      28 Dec 2022 10:00  Phos  3.7     12-28  Mg     1.9     12-28      PTT - ( 27 Dec 2022 12:34 )  PTT:28.5 sec          RADIOLOGY & ADDITIONAL TESTS: Reviewed ACCEPTING TRANSFER FROM STROKE TELE TO [facility]  Hospital Course:  62F w/ PMH HTN (not on any meds), Migraines, Sz disorder, depression/anxiety, prior stroke Hx in 2018 presented to [facility] ER on 12/20 for frequent falls. CTH done in ER w/Subacute/chronic bilateral MICAH territory infarcts. Pt with R sided weakness, states that's her baseline since her prior strokes and noted worsening weakness X at least 1year. MRA H/N also showed acute/ subacute infarction in L MICAH territory and L parietal region, with mild local mass effect. B/L A2 HGS/ Occlusion and L ICA focal lack of flow related signal within the proximal L ICA 2/2 HGS VS Occlusion. PT transferred to Stroke telemetry for further work up. She was only on ASA/ statin at this point 2/2 downtrending H/H, but no overt signs of GIB. She was seen by GI this admission with a plan to follow up as an outpatient, deemed anemia likely from non-compliance of home iron for HANH (also s/p 1u pRBC). NeuroSx consulted for ICAD/ L ICA HGS Vs occlusion. Reccs to obtain CTA H/N and MR Hess, however, Pt allergic to IV contrast/ Iodine based dye. Carotid doppler completed showing left mid-ICA stenosis 50-69%. Originally placed on ASA monotherapy but now on DAPT as of 12/27 with H/H stable. TTE completed and unremarkable, no PFO noted. Now pending MRA neck w/ contrast to further assess for ICA stenosis and NSGY input on any surgical intervention.     Of note, patient has been followed by psychiatry and seen multiple times through this admission. She is currently receiving lorazepam 1mg TID PRN, mirtazapine 7.5mg @ bedtime, and atarax 25mg TID PRN. She was last seen by psychiatry on 12/23 and she was deemed to lack capacity to leave the hospital or refuse medical treatment. The patient intermittently reports wanting to leave the hospital and lacks insight into the severity of her medical condition. As of now she has been able to be talked down and has not had any episodes of delirium or aggression.     INTERVAL HPI: Patient seen and examined at bedside.     VITALS  Vital Signs Last 24 Hrs  T(C): 37 (28 Dec 2022 17:14), Max: 37.1 (28 Dec 2022 14:25)  T(F): 98.6 (28 Dec 2022 17:14), Max: 98.7 (28 Dec 2022 14:25)  HR: 104 (27 Dec 2022 20:04) (104 - 106)  BP: 102/65 (28 Dec 2022 16:35) (102/57 - 128/60)  BP(mean): 78 (28 Dec 2022 16:35) (74 - 93)  RR: 18 (28 Dec 2022 04:02) (12 - 18)  SpO2: 97% (27 Dec 2022 20:04) (97% - 97%)    Parameters below as of 28 Dec 2022 16:35  Patient On (Oxygen Delivery Method): room air        CAPILLARY BLOOD GLUCOSE        Physical exam:  General: No acute distress, awake and alert  Eyes: Anicteric sclerae, moist conjunctivae, see below for CNs  Neck: trachea midline, FROM, supple, no thyromegaly or lymphadenopathy  Cardiovascular: Regular rate and rhythm, no murmurs, rubs, or gallops. No carotid bruits.   Pulmonary: Anterior breath sounds clear bilaterally, no crackles or wheezing. No use of accessory muscles  GI: Abdomen soft, non-distended, non-tender  Extremities: Radial and DP pulses +2, no edema    Neurologic:  -Mental status: Awake, alert, oriented to person, place, and time (year w/ choices). Speech is fluent with intact naming, repetition, and comprehension, no dysarthria. Follows commands. Attention/concentration intact. Forgetful.  -Cranial nerves:   II: Visual fields are full to confrontation.  III, IV, VI: Extraocular movements are intact without nystagmus. Pupils equally round and reactive to light.  V:  Facial sensation V1-V3 equal and intact   VII: Face is symmetric with normal eye closure and smile  VIII: Hearing is bilaterally intact   XII: Tongue protrudes midline  Motor: Normal bulk and tone. RUE/RLE 4/5, no drift noted. Unable to voluntarily extend right leg. LUE/ LLE : 5/5.   Sensation: Intact to light touch bilaterally. No neglect or extinction on double simultaneous testing.  Coordination: No dysmetria on finger-to-nose out of proportion to weakness.   Gait: Deferred.    MEDICATIONS  (STANDING):  aspirin  chewable 81 milliGRAM(s) Oral daily  atorvastatin 80 milliGRAM(s) Oral at bedtime  clopidogrel Tablet 75 milliGRAM(s) Oral daily  ferrous    sulfate 325 milliGRAM(s) Oral <User Schedule>  folic acid 1 milliGRAM(s) Oral daily  heparin   Injectable 5000 Unit(s) SubCutaneous every 8 hours  iron sucrose IVPB 300 milliGRAM(s) IV Intermittent every 24 hours  melatonin 5 milliGRAM(s) Oral at bedtime  mirtazapine 7.5 milliGRAM(s) Oral at bedtime  multivitamin 1 Tablet(s) Oral daily  pantoprazole    Tablet 40 milliGRAM(s) Oral before breakfast  senna 2 Tablet(s) Oral at bedtime    MEDICATIONS  (PRN):  acetaminophen     Tablet .. 650 milliGRAM(s) Oral every 6 hours PRN Mild Pain (1 - 3)  hydrOXYzine hydrochloride 25 milliGRAM(s) Oral three times a day PRN Anxiety  LORazepam     Tablet 1 milliGRAM(s) Oral every 8 hours PRN Anxiety      Compazine (Unknown)  contrast media (iodine-based) (Unknown)  iv contrast (Unknown)  penicillin (Unknown)  penicillins (Other)  Toradol (Unknown)      LABS                        10.5   11.99 )-----------( 552      ( 28 Dec 2022 10:00 )             35.1     12-28    137  |  103  |  13  ----------------------------<  121<H>  4.1   |  23  |  0.62    Ca    10.0      28 Dec 2022 10:00  Phos  3.7     12-28  Mg     1.9     12-28      PTT - ( 27 Dec 2022 12:34 )  PTT:28.5 sec          RADIOLOGY & ADDITIONAL TESTS: Reviewed

## 2022-12-29 PROCEDURE — 99232 SBSQ HOSP IP/OBS MODERATE 35: CPT

## 2022-12-29 PROCEDURE — 99233 SBSQ HOSP IP/OBS HIGH 50: CPT

## 2022-12-29 PROCEDURE — 70548 MR ANGIOGRAPHY NECK W/DYE: CPT | Mod: 26

## 2022-12-29 RX ORDER — POLYETHYLENE GLYCOL 3350 17 G/17G
17 POWDER, FOR SOLUTION ORAL
Refills: 0 | Status: DISCONTINUED | OUTPATIENT
Start: 2022-12-29 | End: 2023-01-11

## 2022-12-29 RX ADMIN — Medication 1 MILLIGRAM(S): at 12:29

## 2022-12-29 RX ADMIN — HEPARIN SODIUM 5000 UNIT(S): 5000 INJECTION INTRAVENOUS; SUBCUTANEOUS at 06:49

## 2022-12-29 RX ADMIN — Medication 1 MILLIGRAM(S): at 08:22

## 2022-12-29 RX ADMIN — Medication 25 MILLIGRAM(S): at 12:29

## 2022-12-29 RX ADMIN — Medication 650 MILLIGRAM(S): at 21:05

## 2022-12-29 RX ADMIN — Medication 81 MILLIGRAM(S): at 12:29

## 2022-12-29 RX ADMIN — Medication 1 MILLIGRAM(S): at 16:22

## 2022-12-29 RX ADMIN — PANTOPRAZOLE SODIUM 40 MILLIGRAM(S): 20 TABLET, DELAYED RELEASE ORAL at 06:49

## 2022-12-29 RX ADMIN — HEPARIN SODIUM 5000 UNIT(S): 5000 INJECTION INTRAVENOUS; SUBCUTANEOUS at 14:50

## 2022-12-29 RX ADMIN — Medication 5 MILLIGRAM(S): at 21:06

## 2022-12-29 RX ADMIN — MIRTAZAPINE 7.5 MILLIGRAM(S): 45 TABLET, ORALLY DISINTEGRATING ORAL at 21:06

## 2022-12-29 RX ADMIN — SENNA PLUS 2 TABLET(S): 8.6 TABLET ORAL at 21:06

## 2022-12-29 RX ADMIN — Medication 10 MILLIGRAM(S): at 18:45

## 2022-12-29 RX ADMIN — Medication 1 MILLIGRAM(S): at 22:28

## 2022-12-29 RX ADMIN — Medication 650 MILLIGRAM(S): at 22:05

## 2022-12-29 RX ADMIN — CLOPIDOGREL BISULFATE 75 MILLIGRAM(S): 75 TABLET, FILM COATED ORAL at 12:29

## 2022-12-29 RX ADMIN — ATORVASTATIN CALCIUM 80 MILLIGRAM(S): 80 TABLET, FILM COATED ORAL at 21:06

## 2022-12-29 RX ADMIN — Medication 1 TABLET(S): at 12:29

## 2022-12-29 RX ADMIN — POLYETHYLENE GLYCOL 3350 17 GRAM(S): 17 POWDER, FOR SOLUTION ORAL at 18:45

## 2022-12-29 RX ADMIN — HEPARIN SODIUM 5000 UNIT(S): 5000 INJECTION INTRAVENOUS; SUBCUTANEOUS at 21:05

## 2022-12-29 RX ADMIN — Medication 1 MILLIGRAM(S): at 16:53

## 2022-12-29 NOTE — PROGRESS NOTE ADULT - SUBJECTIVE AND OBJECTIVE BOX
Patient is a 62y old  Female who presents with a chief complaint of mechanical fall (29 Dec 2022 08:33)      INTERVAL HPI/OVERNIGHT EVENTS:  Patient seen and examined at bedside. No acute events overnight. This morning, pt lying in bed, in NAD, with no complaints. Continues to have R sided leg weakness, unable to voluntarily extend. Requesting additional doses of ativan for anxiety.     Denies fever, chills, HA, dizziness, CP, SOB, palpitations, abdominal pain, changes in bowel/urinary habits, numbness, or tingling. 12pt ROS otherwise negative.      FAMILY HISTORY:  No pertinent family history in first degree relatives        VITAL SIGNS:  T(C): 36.7 (12-29-22 @ 12:31), Max: 37.1 (12-28-22 @ 14:25)  HR: 101 (12-29-22 @ 12:31) (97 - 107)  BP: 118/78 (12-29-22 @ 12:31) (102/65 - 120/77)  RR: 18 (12-29-22 @ 12:31) (16 - 18)  SpO2: 98% (12-29-22 @ 12:31) (96% - 98%)  Wt(kg): --Vital Signs Last 24 Hrs  T(C): 36.7 (29 Dec 2022 12:31), Max: 37.1 (28 Dec 2022 14:25)  T(F): 98.1 (29 Dec 2022 12:31), Max: 98.8 (28 Dec 2022 19:03)  HR: 101 (29 Dec 2022 12:31) (97 - 107)  BP: 118/78 (29 Dec 2022 12:31) (102/65 - 120/77)  BP(mean): 78 (28 Dec 2022 16:35) (78 - 78)  RR: 18 (29 Dec 2022 12:31) (16 - 18)  SpO2: 98% (29 Dec 2022 12:31) (96% - 98%)    Parameters below as of 29 Dec 2022 12:31  Patient On (Oxygen Delivery Method): room air      Compazine (Unknown)  contrast media (iodine-based) (Unknown)  iv contrast (Unknown)  penicillin (Unknown)  penicillins (Other)  Toradol (Unknown)      PHYSICAL EXAM:   General: No acute distress, awake and alert  Eyes: Anicteric sclerae, moist conjunctivae, see below for CNs  Neck: trachea midline, FROM, supple, no thyromegaly or lymphadenopathy  Cardiovascular: Regular rate and rhythm, no murmurs, rubs, or gallops. No carotid bruits.   Pulmonary: Anterior breath sounds clear bilaterally, no crackles or wheezing. No use of accessory muscles  GI: Abdomen soft, non-distended, non-tender  Extremities: Radial and DP pulses +2, no edema    Neurologic:  -Mental status: Awake, alert, oriented to person, place, and time. Speech is fluent with intact naming, repetition, and comprehension, no dysarthria. Follows commands. Attention/concentration intact. Forgetful.  -Cranial nerves:   II: Visual fields are full to confrontation.  III, IV, VI: Extraocular movements are intact without nystagmus. Pupils equally round and reactive to light.  V:  Facial sensation V1-V3 equal and intact   VII: Face is symmetric with normal eye closure and smile  VIII: Hearing is bilaterally intact   XII: Tongue protrudes midline  Motor: Normal bulk and tone. RUE/RLE 4/5, no drift noted. Unable to voluntarily extend right leg. LUE/ LLE : 5/5.   Sensation: Intact to light touch bilaterally. No neglect or extinction on double simultaneous testing.  Coordination: No dysmetria on finger-to-nose out of proportion to weakness.   Gait: Deferred.    Consultant(s) Notes Reviewed:  [x ] YES  [ ] NO  Care Discussed with Consultants/Other Providers [ x] YES  [ ] NO    LABS:                        10.5   11.99 )-----------( 552      ( 28 Dec 2022 10:00 )             35.1     12-28    137  |  103  |  13  ----------------------------<  121<H>  4.1   |  23  |  0.62    Ca    10.0      28 Dec 2022 10:00  Phos  3.7     12-28  Mg     1.9     12-28          CAPILLARY BLOOD GLUCOSE                              I & O Summary:    12-28-22 @ 07:01  -  12-29-22 @ 07:00  --------------------------------------------------------  IN: 0 mL / OUT: 1550 mL / NET: -1550 mL        Microbiology:        RADIOLOGY, EKG AND ADDITIONAL TESTS: Reviewed.      RADIOLOGY & ADDITIONAL TESTS:    Imaging Personally Reviewed:  [ ] YES  [ ] NO  acetaminophen     Tablet .. 650 milliGRAM(s) Oral every 6 hours PRN  aspirin  chewable 81 milliGRAM(s) Oral daily  atorvastatin 80 milliGRAM(s) Oral at bedtime  clopidogrel Tablet 75 milliGRAM(s) Oral daily  ferrous    sulfate 325 milliGRAM(s) Oral <User Schedule>  folic acid 1 milliGRAM(s) Oral daily  heparin   Injectable 5000 Unit(s) SubCutaneous every 8 hours  hydrOXYzine hydrochloride 25 milliGRAM(s) Oral three times a day PRN  LORazepam     Tablet 1 milliGRAM(s) Oral every 8 hours PRN  melatonin 5 milliGRAM(s) Oral at bedtime  mirtazapine 7.5 milliGRAM(s) Oral at bedtime  multivitamin 1 Tablet(s) Oral daily  pantoprazole    Tablet 40 milliGRAM(s) Oral before breakfast  senna 2 Tablet(s) Oral at bedtime      HEALTH ISSUES - PROBLEM Dx:  CVA (cerebrovascular accident)    Anxiety and depression    HTN (hypertension)    HLD (hyperlipidemia)    Iron deficiency anemia    Nutrition, metabolism, and development symptoms

## 2022-12-29 NOTE — PROGRESS NOTE ADULT - SUBJECTIVE AND OBJECTIVE BOX
Patient is a 62y old  Female who presents with a chief complaint of mechanical fall (29 Dec 2022 08:33)      INTERVAL HPI/OVERNIGHT EVENTS:    Pt. seen and examined earlier today  Pt. c/o generalized weakness, poor sleep, chronic anxiety    Review of Systems: 12 point review of systems otherwise negative    MEDICATIONS  (STANDING):  aspirin  chewable 81 milliGRAM(s) Oral daily  atorvastatin 80 milliGRAM(s) Oral at bedtime  clopidogrel Tablet 75 milliGRAM(s) Oral daily  ferrous    sulfate 325 milliGRAM(s) Oral <User Schedule>  folic acid 1 milliGRAM(s) Oral daily  heparin   Injectable 5000 Unit(s) SubCutaneous every 8 hours  melatonin 5 milliGRAM(s) Oral at bedtime  mirtazapine 7.5 milliGRAM(s) Oral at bedtime  multivitamin 1 Tablet(s) Oral daily  pantoprazole    Tablet 40 milliGRAM(s) Oral before breakfast  polyethylene glycol 3350 17 Gram(s) Oral two times a day  senna 2 Tablet(s) Oral at bedtime    MEDICATIONS  (PRN):  acetaminophen     Tablet .. 650 milliGRAM(s) Oral every 6 hours PRN Mild Pain (1 - 3)  hydrOXYzine hydrochloride 25 milliGRAM(s) Oral three times a day PRN Anxiety  LORazepam     Tablet 1 milliGRAM(s) Oral every 8 hours PRN Anxiety      Allergies    Compazine (Unknown)  contrast media (iodine-based) (Unknown)  iv contrast (Unknown)  penicillin (Unknown)  penicillins (Other)  Toradol (Unknown)    Intolerances          Vital Signs Last 24 Hrs  T(C): 36.7 (29 Dec 2022 12:31), Max: 37.1 (28 Dec 2022 19:03)  T(F): 98.1 (29 Dec 2022 12:31), Max: 98.8 (28 Dec 2022 19:03)  HR: 101 (29 Dec 2022 12:31) (97 - 107)  BP: 118/78 (29 Dec 2022 12:31) (102/65 - 120/77)  BP(mean): 78 (28 Dec 2022 16:35) (78 - 78)  RR: 18 (29 Dec 2022 12:31) (16 - 18)  SpO2: 98% (29 Dec 2022 12:31) (96% - 98%)    Parameters below as of 29 Dec 2022 12:31  Patient On (Oxygen Delivery Method): room air      CAPILLARY BLOOD GLUCOSE          12-28 @ 07:01  -  12-29 @ 07:00  --------------------------------------------------------  IN: 0 mL / OUT: 1550 mL / NET: -1550 mL        Physical Exam:  (earlier today)  Daily     Daily   General:  comfortable-appearing in NAD  HEENT:  MMM  Skin:  no visible sweat  Neuro:  AAOx3, no dysarthria, no tremor  Pt. refused the rest of the exam    LABS:                        10.5   11.99 )-----------( 552      ( 28 Dec 2022 10:00 )             35.1     12-28    137  |  103  |  13  ----------------------------<  121<H>  4.1   |  23  |  0.62    Ca    10.0      28 Dec 2022 10:00  Phos  3.7     12-28  Mg     1.9     12-28

## 2022-12-29 NOTE — PROGRESS NOTE ADULT - ASSESSMENT
62F w/ PMH HTN (not on any meds), migraines, seizure disorder, depression/anxiety, prior stroke hx in 2018 presented to [facility] ER for frequent falls to [facility] ER found to have acute/ sub acute infarcts in L MICAH Territory and L parieto temporal region with mass effect without herniation i/s/o B/L A2 HGS/occlusion and Prox L ICA HGS vs occlusion. Started on DAPT and high intensity statin after Hgb stable. Currently being stepped down to 7Wo (under Dr. Bland) for further management that includes MRA neck w/ contrast to further characterize ICA for any surgical intervention.

## 2022-12-29 NOTE — PROGRESS NOTE ADULT - PROBLEM SELECTOR PLAN 6
F: tolerating PO, no IVF  E: replete K<4, Mg<2  N: regular  VTE Prophylaxis: hep SC   GI: pantoprazole  D: 7Wo (under Dr. Bland)

## 2022-12-29 NOTE — PROGRESS NOTE ADULT - PROBLEM SELECTOR PLAN 3
Hgb stable 10.5, s/p 1u pRBC. Also s/p 3 days iron sucrose. Denies history of heavy bleeding, poor wound healing, easy bruising, hemarthrosis.  - c/w ferrous sulfate 325mg M/W/F  - active T&S, transfuse for Hb<7  - outpatient colonoscopy with GI   - outpatient f/up with heme/onc (Dr. Maribeth Reddy) 1-2 weeks from discharge  - appreciate medicine team recs

## 2022-12-30 PROCEDURE — 99232 SBSQ HOSP IP/OBS MODERATE 35: CPT

## 2022-12-30 PROCEDURE — 99233 SBSQ HOSP IP/OBS HIGH 50: CPT

## 2022-12-30 RX ORDER — QUETIAPINE FUMARATE 200 MG/1
50 TABLET, FILM COATED ORAL ONCE
Refills: 0 | Status: COMPLETED | OUTPATIENT
Start: 2022-12-30 | End: 2022-12-30

## 2022-12-30 RX ADMIN — Medication 81 MILLIGRAM(S): at 12:37

## 2022-12-30 RX ADMIN — CLOPIDOGREL BISULFATE 75 MILLIGRAM(S): 75 TABLET, FILM COATED ORAL at 12:37

## 2022-12-30 RX ADMIN — Medication 1 MILLIGRAM(S): at 12:37

## 2022-12-30 RX ADMIN — Medication 1 MILLIGRAM(S): at 22:08

## 2022-12-30 RX ADMIN — HEPARIN SODIUM 5000 UNIT(S): 5000 INJECTION INTRAVENOUS; SUBCUTANEOUS at 07:38

## 2022-12-30 RX ADMIN — Medication 5 MILLIGRAM(S): at 21:13

## 2022-12-30 RX ADMIN — SENNA PLUS 2 TABLET(S): 8.6 TABLET ORAL at 21:13

## 2022-12-30 RX ADMIN — HEPARIN SODIUM 5000 UNIT(S): 5000 INJECTION INTRAVENOUS; SUBCUTANEOUS at 14:00

## 2022-12-30 RX ADMIN — POLYETHYLENE GLYCOL 3350 17 GRAM(S): 17 POWDER, FOR SOLUTION ORAL at 07:39

## 2022-12-30 RX ADMIN — Medication 25 MILLIGRAM(S): at 13:58

## 2022-12-30 RX ADMIN — Medication 1 MILLIGRAM(S): at 12:38

## 2022-12-30 RX ADMIN — Medication 1 TABLET(S): at 12:37

## 2022-12-30 RX ADMIN — ATORVASTATIN CALCIUM 80 MILLIGRAM(S): 80 TABLET, FILM COATED ORAL at 21:13

## 2022-12-30 RX ADMIN — QUETIAPINE FUMARATE 50 MILLIGRAM(S): 200 TABLET, FILM COATED ORAL at 18:21

## 2022-12-30 RX ADMIN — MIRTAZAPINE 7.5 MILLIGRAM(S): 45 TABLET, ORALLY DISINTEGRATING ORAL at 21:13

## 2022-12-30 RX ADMIN — PANTOPRAZOLE SODIUM 40 MILLIGRAM(S): 20 TABLET, DELAYED RELEASE ORAL at 07:39

## 2022-12-30 RX ADMIN — POLYETHYLENE GLYCOL 3350 17 GRAM(S): 17 POWDER, FOR SOLUTION ORAL at 18:21

## 2022-12-30 RX ADMIN — HEPARIN SODIUM 5000 UNIT(S): 5000 INJECTION INTRAVENOUS; SUBCUTANEOUS at 21:13

## 2022-12-30 NOTE — PROGRESS NOTE ADULT - ASSESSMENT
62F w/ PMH HTN (not on any meds), migraines, seizure disorder, depression/anxiety, prior stroke hx in 2018 presented to [facility] ER for frequent falls to [facility] ER found to have acute/ sub acute infarcts in L MICAH Territory and L parieto temporal region with mass effect without herniation i/s/o B/L A2 HGS/occlusion and Prox L ICA HGS vs occlusion. Started on DAPT and high intensity statin after Hgb stable. Currently stepped down to 7Wo (under Dr. Bland) for further management that includes MRA neck w/ contrast to further characterize ICA for any surgical intervention.

## 2022-12-30 NOTE — PROGRESS NOTE ADULT - SUBJECTIVE AND OBJECTIVE BOX
Physical Medicine and Rehabilitation Progress Note :       Patient is a 62y old  Female who presents with a chief complaint of mechanical fall (30 Dec 2022 09:00)      HPI:  62F w/ PMH HTN, migraine, seizure d/o, depression-anxiety, per patient multiiple CTA in September w/ complaints of fall. Patient states that fall occurred while home, when syncopized, with LOC with head trauma. Pt reports no alcohol use, never smoker, received HHA 4 hours daily (every day). Has two adult children (one of whome is  and one living parent). Patient is a poor historian, does not recall exact events leading to fall. Patient also notes the need to ambulate via wheelchair in home. States to have been admitted to acute rehab s/p CVA in fall of . States to be "addicted to valium" utilizing 1 10mg tablet once every other day. States health aide is not with her all the time. Patient notes no continued need for antihypertensives (on multiple previously) as they caused dizziness. Notes that dizziness has not resolved and notes room spinning with changes in head position.     Initial vital signs: T: 98 F, HR: 93, BP: 110/71, R: 16, SpO2: 100% on RA    Labs: significant for WBC 12.91, Hgb 8, MCV 69, Plt 567, aPTT 25.1, K 3 s/p 40 meq PO in ED, Cr 0.67, remainder of CMP WNL, CECILIA <10,     CXR: No acute pulmonary pathology. Dextroscholiosis noted.     CT C-spine: Ankylosis of the C4 and C5 vertebrae is noted. There is moderate multilevel degenerative disc disease. No acute osseous abnormality of the cervical spine.    CTH: No acute intracranial hemorrhage or calvarial fracture. Subacute/chronic bilateral MICAH territory infarcts.    CT T-spine: No acute fracture. Patchy bilateral pulmonary nodular opacities, likely infectious/inflammatory. Moderate hiatal hernia.    EKG:     Medications: tylenol 750mg PO x1, KCl 40meq x1    Consults: none  (20 Dec 2022 04:52)                Vital Signs Last 24 Hrs  T(C): 36.8 (30 Dec 2022 06:46), Max: 36.9 (29 Dec 2022 21:13)  T(F): 98.2 (30 Dec 2022 06:46), Max: 98.4 (29 Dec 2022 21:13)  HR: 99 (30 Dec 2022 06:46) (99 - 104)  BP: 114/76 (30 Dec 2022 06:46) (106/63 - 118/78)  BP(mean): --  RR: 16 (30 Dec 2022 06:46) (16 - 18)  SpO2: 95% (30 Dec 2022 06:46) (95% - 98%)    Parameters below as of 29 Dec 2022 21:13  Patient On (Oxygen Delivery Method): room air        MEDICATIONS  (STANDING):  aspirin  chewable 81 milliGRAM(s) Oral daily  atorvastatin 80 milliGRAM(s) Oral at bedtime  clopidogrel Tablet 75 milliGRAM(s) Oral daily  ferrous    sulfate 325 milliGRAM(s) Oral <User Schedule>  folic acid 1 milliGRAM(s) Oral daily  heparin   Injectable 5000 Unit(s) SubCutaneous every 8 hours  melatonin 5 milliGRAM(s) Oral at bedtime  mirtazapine 7.5 milliGRAM(s) Oral at bedtime  multivitamin 1 Tablet(s) Oral daily  pantoprazole    Tablet 40 milliGRAM(s) Oral before breakfast  polyethylene glycol 3350 17 Gram(s) Oral two times a day  senna 2 Tablet(s) Oral at bedtime    MEDICATIONS  (PRN):  acetaminophen     Tablet .. 650 milliGRAM(s) Oral every 6 hours PRN Mild Pain (1 - 3)  hydrOXYzine hydrochloride 25 milliGRAM(s) Oral three times a day PRN Anxiety  LORazepam     Tablet 1 milliGRAM(s) Oral every 8 hours PRN Anxiety         Physical Therapy Functional Status Assessment :   2022    Pain Assessment/Number Scale (0-10) Adult  Presence of Pain: complains of pain/discomfort  Body Location: RLE   Radiation To Radiation To: pt did not quantify pain     Cognitive/Neuro      Cognitive/Neuro/Behavioral  Cognitive/Neuro/Behavioral [WDL Definition: Alert; opens eyes spontaneously; arouses to voice or touch; oriented x 4; follows commands; speech spontaneous, logical; purposeful motor response; behavior appropriate to situation]: WDL    Language Assistance  Preferred Language to Address Healthcare Preferred Language to Address Healthcare: English    Therapeutic Interventions      Bed Mobility  Bed Mobility Training Scooting: minimum assist (75% patient effort);  1 person assist;  nonverbal cues (demo/gestures);  verbal cues;  bed rails  Bed Mobility Training Sit-to-Supine: minimum assist (75% patient effort);  contact guard;  verbal cues;  1 person assist;  nonverbal cues (demo/gestures);  bed rails  Bed Mobility Training Supine-to-Sit: moderate assist (50% patient effort);  1 person assist;  nonverbal cues (demo/gestures);  verbal cues;  bed rails  Bed Mobility Training Limitations: decreased ability to use arms for pushing/pulling;  decreased ability to use legs for bridging/pushing;  impaired ability to control trunk for mobility;  decreased strength;  impaired balance;  impaired postural control;  pain    Sit-Stand Transfer Training  Transfer Training Sit-to-Stand Transfer: contact guard;  1 person assist;  nonverbal cues (demo/gestures);  verbal cues;  rolling walker  Transfer Training Stand-to-Sit Transfer: contact guard;  1 person assist;  nonverbal cues (demo/gestures);  verbal cues;  rolling walker  Sit-to-Stand Transfer Training Transfer Safety Analysis: decreased balance;  decreased sequencing ability;  decreased step length;  decreased weight-shifting ability;  decreased strength;  impaired balance;  impaired postural control;  impaired motor control;  rolling walker    Gait Training  Gait Training: moderate assist (50% patient effort);  2 person assist;  nonverbal cues (demo/gestures);  verbal cues;  rolling walker;  2 side steps   Gait Analysis: 3-point gait   decreased ruth;  crouch;  increased time in double stance;  decreased hip/knee flexion;  shuffling;  decreased step length;  decreased trunk rotation;  decreased weight-shifting ability;  decreased strength;  impaired balance;  impaired postural control;  impaired motor control;  2 side steps ;  rolling walker    Therapeutic Exercise  Therapeutic Exercise Detail: bridging supine in bed x3BLE sitting on EOB x5: LLE AROM LAQ, RLE PROM LAQ, AROM BLE marchingweight shifting in standing               PM&R Impression : as above    Current Disposition Plan Recommendations :    acute rehab placement

## 2022-12-30 NOTE — PROGRESS NOTE ADULT - SUBJECTIVE AND OBJECTIVE BOX
Patient is a 62y old  Female who presents with a chief complaint of mechanical fall (30 Dec 2022 10:43)      INTERVAL HPI/OVERNIGHT EVENTS:    Pt. seen and examined earlier today  Pt. eating lunch  Pt. c/o chronic anxiety  Pt. agreeable to [facility] acute rehab placement    Review of Systems: 12 point review of systems otherwise negative    MEDICATIONS  (STANDING):  aspirin  chewable 81 milliGRAM(s) Oral daily  atorvastatin 80 milliGRAM(s) Oral at bedtime  clopidogrel Tablet 75 milliGRAM(s) Oral daily  ferrous    sulfate 325 milliGRAM(s) Oral <User Schedule>  folic acid 1 milliGRAM(s) Oral daily  heparin   Injectable 5000 Unit(s) SubCutaneous every 8 hours  melatonin 5 milliGRAM(s) Oral at bedtime  mirtazapine 7.5 milliGRAM(s) Oral at bedtime  multivitamin 1 Tablet(s) Oral daily  pantoprazole    Tablet 40 milliGRAM(s) Oral before breakfast  polyethylene glycol 3350 17 Gram(s) Oral two times a day  senna 2 Tablet(s) Oral at bedtime    MEDICATIONS  (PRN):  acetaminophen     Tablet .. 650 milliGRAM(s) Oral every 6 hours PRN Mild Pain (1 - 3)  hydrOXYzine hydrochloride 25 milliGRAM(s) Oral three times a day PRN Anxiety  LORazepam     Tablet 1 milliGRAM(s) Oral every 8 hours PRN Anxiety      Allergies    Compazine (Unknown)  contrast media (iodine-based) (Unknown)  iv contrast (Unknown)  penicillin (Unknown)  penicillins (Other)  Toradol (Unknown)    Intolerances          Vital Signs Last 24 Hrs  T(C): 36.8 (30 Dec 2022 13:04), Max: 36.9 (29 Dec 2022 21:13)  T(F): 98.3 (30 Dec 2022 13:04), Max: 98.4 (29 Dec 2022 21:13)  HR: 98 (30 Dec 2022 13:04) (98 - 104)  BP: 108/70 (30 Dec 2022 13:04) (106/63 - 114/76)  BP(mean): --  RR: 16 (30 Dec 2022 13:04) (16 - 16)  SpO2: 95% (30 Dec 2022 13:04) (95% - 97%)    Parameters below as of 30 Dec 2022 13:04  Patient On (Oxygen Delivery Method): room air      CAPILLARY BLOOD GLUCOSE            Physical Exam:  (earlier today)  Daily     Daily   General:  comfortable-appearing in NAD, eating lunch  Skin:  WWP  Neuro:  AAOx3, no dysarthria     LABS:

## 2022-12-30 NOTE — PROGRESS NOTE ADULT - SUBJECTIVE AND OBJECTIVE BOX
Patient is a 62y old  Female who presents with a chief complaint of mechanical fall (30 Dec 2022 10:43)      INTERVAL HPI/OVERNIGHT EVENTS:  Patient seen and examined at bedside. Overnight, received an additional 1 mg ativan PO for anxiety. This morning, pt lying in bed, in NAD, with no complaints. Neurologic exam unchanged from yesterday, continues to have R sided leg weakness.     Denies fever, chills, HA, dizziness, CP, SOB, palpitations, abdominal pain, n/v, changes in bowel/urinary habits, numbness, or tingling. 12pt ROS otherwise negative.     FAMILY HISTORY:  No pertinent family history in first degree relatives        VITAL SIGNS:  T(C): 36.8 (12-30-22 @ 06:46), Max: 36.9 (12-29-22 @ 21:13)  HR: 99 (12-30-22 @ 06:46) (99 - 104)  BP: 114/76 (12-30-22 @ 06:46) (106/63 - 118/78)  RR: 16 (12-30-22 @ 06:46) (16 - 18)  SpO2: 95% (12-30-22 @ 06:46) (95% - 98%)  Wt(kg): --Vital Signs Last 24 Hrs  T(C): 36.8 (30 Dec 2022 06:46), Max: 36.9 (29 Dec 2022 21:13)  T(F): 98.2 (30 Dec 2022 06:46), Max: 98.4 (29 Dec 2022 21:13)  HR: 99 (30 Dec 2022 06:46) (99 - 104)  BP: 114/76 (30 Dec 2022 06:46) (106/63 - 118/78)  BP(mean): --  RR: 16 (30 Dec 2022 06:46) (16 - 18)  SpO2: 95% (30 Dec 2022 06:46) (95% - 98%)    Parameters below as of 29 Dec 2022 21:13  Patient On (Oxygen Delivery Method): room air      Compazine (Unknown)  contrast media (iodine-based) (Unknown)  iv contrast (Unknown)  penicillin (Unknown)  penicillins (Other)  Toradol (Unknown)      PHYSICAL EXAM:   General: No acute distress, awake and alert  Eyes: Anicteric sclerae, moist conjunctivae, see below for CNs  Neck: trachea midline, FROM, supple, no thyromegaly or lymphadenopathy  Cardiovascular: Regular rate and rhythm, no murmurs, rubs, or gallops. No carotid bruits.   Pulmonary: Anterior breath sounds clear bilaterally, no crackles or wheezing. No use of accessory muscles  GI: Abdomen soft, non-distended, non-tender  Extremities: Radial and DP pulses +2, no edema    Neurologic:  -Mental status: Awake, alert, oriented to person, place, and time. Speech is fluent with intact naming, repetition, and comprehension, no dysarthria. Follows commands. Attention/concentration intact. Forgetful.  -Cranial nerves:   II: Visual fields are full to confrontation.  III, IV, VI: Extraocular movements are intact without nystagmus. Pupils equally round and reactive to light.  V:  Facial sensation V1-V3 equal and intact   VII: Face is symmetric with normal eye closure and smile  VIII: Hearing is bilaterally intact   XII: Tongue protrudes midline  Motor: Normal bulk and tone. RUE/RLE 4/5, no drift noted. Unable to voluntarily extend right leg. LUE/ LLE : 5/5.   Sensation: Intact to light touch bilaterally. No neglect or extinction on double simultaneous testing.  Coordination: No dysmetria on finger-to-nose out of proportion to weakness.   Gait: Deferred.    Consultant(s) Notes Reviewed:  [x ] YES  [ ] NO  Care Discussed with Consultants/Other Providers [ x] YES  [ ] NO    LABS:              CAPILLARY BLOOD GLUCOSE                              I & O Summary:      Microbiology:        RADIOLOGY, EKG AND ADDITIONAL TESTS: Reviewed.      RADIOLOGY & ADDITIONAL TESTS:    Imaging Personally Reviewed:  [ ] YES  [ ] NO  acetaminophen     Tablet .. 650 milliGRAM(s) Oral every 6 hours PRN  aspirin  chewable 81 milliGRAM(s) Oral daily  atorvastatin 80 milliGRAM(s) Oral at bedtime  clopidogrel Tablet 75 milliGRAM(s) Oral daily  ferrous    sulfate 325 milliGRAM(s) Oral <User Schedule>  folic acid 1 milliGRAM(s) Oral daily  heparin   Injectable 5000 Unit(s) SubCutaneous every 8 hours  hydrOXYzine hydrochloride 25 milliGRAM(s) Oral three times a day PRN  LORazepam     Tablet 1 milliGRAM(s) Oral every 8 hours PRN  melatonin 5 milliGRAM(s) Oral at bedtime  mirtazapine 7.5 milliGRAM(s) Oral at bedtime  multivitamin 1 Tablet(s) Oral daily  pantoprazole    Tablet 40 milliGRAM(s) Oral before breakfast  polyethylene glycol 3350 17 Gram(s) Oral two times a day  senna 2 Tablet(s) Oral at bedtime      HEALTH ISSUES - PROBLEM Dx:  CVA (cerebrovascular accident)    Anxiety and depression    HTN (hypertension)    HLD (hyperlipidemia)    Iron deficiency anemia    Nutrition, metabolism, and development symptoms           Patient is a 62y old  Female who presents with a chief complaint of mechanical fall (30 Dec 2022 10:43)      INTERVAL HPI/OVERNIGHT EVENTS:  Patient seen and examined at bedside. Overnight, received an additional 1 mg ativan PO for anxiety. This morning, pt lying in bed, in NAD, with no complaints. Neurologic exam unchanged from yesterday, continues to have R sided leg weakness. s/p MRA neck w/ contrast yesterday which pt successfully tolerated, however required 1 mg ativan IV x2 doses for anxiety.    Denies fever, chills, HA, dizziness, CP, SOB, palpitations, abdominal pain, n/v, changes in bowel/urinary habits, numbness, or tingling. 12pt ROS otherwise negative.     FAMILY HISTORY:  No pertinent family history in first degree relatives        VITAL SIGNS:  T(C): 36.8 (12-30-22 @ 06:46), Max: 36.9 (12-29-22 @ 21:13)  HR: 99 (12-30-22 @ 06:46) (99 - 104)  BP: 114/76 (12-30-22 @ 06:46) (106/63 - 118/78)  RR: 16 (12-30-22 @ 06:46) (16 - 18)  SpO2: 95% (12-30-22 @ 06:46) (95% - 98%)  Wt(kg): --Vital Signs Last 24 Hrs  T(C): 36.8 (30 Dec 2022 06:46), Max: 36.9 (29 Dec 2022 21:13)  T(F): 98.2 (30 Dec 2022 06:46), Max: 98.4 (29 Dec 2022 21:13)  HR: 99 (30 Dec 2022 06:46) (99 - 104)  BP: 114/76 (30 Dec 2022 06:46) (106/63 - 118/78)  BP(mean): --  RR: 16 (30 Dec 2022 06:46) (16 - 18)  SpO2: 95% (30 Dec 2022 06:46) (95% - 98%)    Parameters below as of 29 Dec 2022 21:13  Patient On (Oxygen Delivery Method): room air      Compazine (Unknown)  contrast media (iodine-based) (Unknown)  iv contrast (Unknown)  penicillin (Unknown)  penicillins (Other)  Toradol (Unknown)      PHYSICAL EXAM:   General: No acute distress, awake and alert  Eyes: Anicteric sclerae, moist conjunctivae, see below for CNs  Neck: trachea midline, FROM, supple, no thyromegaly or lymphadenopathy  Cardiovascular: Regular rate and rhythm, no murmurs, rubs, or gallops. No carotid bruits.   Pulmonary: Anterior breath sounds clear bilaterally, no crackles or wheezing. No use of accessory muscles  GI: Abdomen soft, non-distended, non-tender  Extremities: Radial and DP pulses +2, no edema    Neurologic:  -Mental status: Awake, alert, oriented to person, place, and time. Speech is fluent with intact naming, repetition, and comprehension, no dysarthria. Follows commands. Attention/concentration intact. Forgetful.  -Cranial nerves:   II: Visual fields are full to confrontation.  III, IV, VI: Extraocular movements are intact without nystagmus. Pupils equally round and reactive to light.  V:  Facial sensation V1-V3 equal and intact   VII: Face is symmetric with normal eye closure and smile  VIII: Hearing is bilaterally intact   XII: Tongue protrudes midline  Motor: Normal bulk and tone. RUE/RLE 4/5, no drift noted. Unable to voluntarily extend right leg. LUE/ LLE : 5/5.   Sensation: Intact to light touch bilaterally. No neglect or extinction on double simultaneous testing.  Coordination: No dysmetria on finger-to-nose out of proportion to weakness.   Gait: Deferred.    Consultant(s) Notes Reviewed:  [x ] YES  [ ] NO  Care Discussed with Consultants/Other Providers [ x] YES  [ ] NO    LABS:              CAPILLARY BLOOD GLUCOSE                              I & O Summary:      Microbiology:        RADIOLOGY, EKG AND ADDITIONAL TESTS: Reviewed.      RADIOLOGY & ADDITIONAL TESTS:    Imaging Personally Reviewed:  [ ] YES  [ ] NO  acetaminophen     Tablet .. 650 milliGRAM(s) Oral every 6 hours PRN  aspirin  chewable 81 milliGRAM(s) Oral daily  atorvastatin 80 milliGRAM(s) Oral at bedtime  clopidogrel Tablet 75 milliGRAM(s) Oral daily  ferrous    sulfate 325 milliGRAM(s) Oral <User Schedule>  folic acid 1 milliGRAM(s) Oral daily  heparin   Injectable 5000 Unit(s) SubCutaneous every 8 hours  hydrOXYzine hydrochloride 25 milliGRAM(s) Oral three times a day PRN  LORazepam     Tablet 1 milliGRAM(s) Oral every 8 hours PRN  melatonin 5 milliGRAM(s) Oral at bedtime  mirtazapine 7.5 milliGRAM(s) Oral at bedtime  multivitamin 1 Tablet(s) Oral daily  pantoprazole    Tablet 40 milliGRAM(s) Oral before breakfast  polyethylene glycol 3350 17 Gram(s) Oral two times a day  senna 2 Tablet(s) Oral at bedtime      HEALTH ISSUES - PROBLEM Dx:  CVA (cerebrovascular accident)    Anxiety and depression    HTN (hypertension)    HLD (hyperlipidemia)    Iron deficiency anemia    Nutrition, metabolism, and development symptoms

## 2022-12-30 NOTE — BH CONSULTATION LIAISON PROGRESS NOTE - NSBHCHARTREVIEWVS_PSY_A_CORE FT
Vital Signs Last 24 Hrs  T(C): 36.8 (30 Dec 2022 13:04), Max: 36.9 (29 Dec 2022 21:13)  T(F): 98.3 (30 Dec 2022 13:04), Max: 98.4 (29 Dec 2022 21:13)  HR: 98 (30 Dec 2022 13:04) (98 - 104)  BP: 108/70 (30 Dec 2022 13:04) (106/63 - 114/76)  BP(mean): --  RR: 16 (30 Dec 2022 13:04) (16 - 16)  SpO2: 95% (30 Dec 2022 13:04) (95% - 97%)    Parameters below as of 30 Dec 2022 13:04  Patient On (Oxygen Delivery Method): room air    
Vital Signs Last 24 Hrs  T(C): 36.9 (23 Dec 2022 09:48), Max: 37.1 (23 Dec 2022 06:45)  T(F): 98.4 (23 Dec 2022 09:48), Max: 98.7 (23 Dec 2022 06:45)  HR: 104 (23 Dec 2022 08:51) (86 - 104)  BP: 123/69 (23 Dec 2022 08:51) (116/57 - 127/67)  BP(mean): 90 (23 Dec 2022 08:51) (79 - 91)  RR: 16 (23 Dec 2022 08:51) (16 - 18)  SpO2: 98% (23 Dec 2022 08:51) (94% - 98%)    Parameters below as of 23 Dec 2022 08:51  Patient On (Oxygen Delivery Method): room air    
Vital Signs Last 24 Hrs  T(C): 36.8 (21 Dec 2022 04:52), Max: 36.9 (20 Dec 2022 15:35)  T(F): 98.3 (21 Dec 2022 04:52), Max: 98.4 (20 Dec 2022 15:35)  HR: 88 (21 Dec 2022 04:52) (82 - 90)  BP: 151/66 (21 Dec 2022 04:52) (119/76 - 151/66)  BP(mean): --  RR: 18 (21 Dec 2022 04:52) (18 - 20)  SpO2: 99% (21 Dec 2022 04:52) (98% - 100%)    Parameters below as of 20 Dec 2022 21:31  Patient On (Oxygen Delivery Method): room air

## 2022-12-30 NOTE — BH CONSULTATION LIAISON PROGRESS NOTE - NSBHASSESSMENTFT_PSY_ALL_CORE
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 Pt demonstrates very poor insight into the reason for her ongoing hospitalization, with no understanding of diagnosis of new stroke, recommended additional testing and possible interventions, and no appreciation of the reported significant risks of leaving the hospital against medical advice.     Additionally, pt remains highly focused on receiving benzodiazepines for vague anxiety sx and insomnia, with poor insight into her benzodiazepine use disorder (with illicit in addition to prescribed use as outpt) and considerable risks of continued use, including fall risk, risk of additional cognitive impairment, risk in combination with many other medications listed for outpt use. Some depressive sx may also be present, likely chronic related to son's death (pt not forthcoming about this loss or particular sx), without evidence of a major depressive episode, erich, or psychosis. Post-stroke mood and cognitive changes are also likely. Would encourage continued education about benzodiazepine use, coordination with outpt prescribers (family med NP per ISTOP), and referral to outpt dual diagnosis treatment. Pt declines non-benzodiazepines for mood/anxiety sx. No acute safety concerns that would warrant inpatient level psychiatric care."    --Pt reassessed today, demonstrates clear addictive behavior, even reporting that she would not honestly report prior helpful non-BZD medications due to her goal of getting BZD, and she has been getting higher doses of benzodiazepines in response to her requests in the hospital.  Pt illogical and contradictory at times, and at high risk for adverse consequences of continued benzodiazepine use medically and behaviorally.  Presentation not c/w BZD withdrawal.  Chart reviewed noting much lower total dosing earlier in hospitalization.  Pt contemplative and with some insight though, and addiction referrals would be very helpful.  In additional to prior reccs below, would:  -taper benzodiazepine in predictable way for pt, eg clonazepam 1mg BID tomorrow, 1mg total on Sunday, additional bzd only in case of treating withdrawal syndrome  -can increase remeron to 15mg PO qHS if helpful for pt  -f/u with dual dx resources as in prior CL note, within 1d of discharge from facility  -haldol 2mg PO/IM q4h PRN agitation and benadryl 25mg PO/IM q4h PRN agitation if needed      RECOMMENDATIONS  -pt currently LACKS capacity to leave the hospital AMA or refuse any acutely indicated testing or treatment. encourage involvement of surrogate decision maker - pt cites her mother as desired HCP (unknown if formally designated)  -no need for 1:1 observation for suicidality or indication for psychiatric admission. Supervised room for elopement risk as per primary team discretion  -CIWA monitoring for benzodiazepine withdrawal   -discourage continued use of benzodiazepines or hypnotics given substance use disorder with ongoing alprazolam abuse, neurocognitive disorder, and likely contribution to fall risk  -consider mirtazapine 7.5mg po QHS PRN for insomnia - would administered earlier in night if able due to sedation this morning  -consider hydroxyzine PRN for acute anxiety - pt cites adverse reaction to gabapentin  -agree with thiamine supplementation  -evaluation for potential additional contributors to neurocognitive decline (TSH WNL, check  B12, folate, RPR). Consider HIV testing (EMS reports indicates PMH)  -delirium precautions  -encourage communication with outpt prescribers of benzodiazepines and hypnotics re: recommendation to discontinue use  -could benefit from SSRI but declines use - recommend additional evaluation as outpt  -encourage engagement in outpt psychiatric care - pt reports weekly psychotherapy  -no psychiatric barrier to discharge when medically ready. Please contact with questions

## 2022-12-30 NOTE — BH CONSULTATION LIAISON PROGRESS NOTE - NSBHMSESPEECH_PSY_A_CORE
Normal volume, rate, productivity, spontaneity and articulation
Non-verbal: unable to assess speech further
Normal volume, rate, productivity, spontaneity and articulation

## 2022-12-30 NOTE — BH CONSULTATION LIAISON PROGRESS NOTE - NSBHCONSULTFOLLOWAFTERCARE_PSY_A_CORE FT
f/u with established psychotherapist  Please see initial consult note for additional dual diagnosis referral options- jon to transfer prescribing care to addiction service  Please communicate with outpt prescribers of benzodiazepines re: recommendation to discontinue use
f/u with established psychotherapist  Please see initial consult note for additional dual diagnosis referral options  Please communicate with outpt prescribers of benzodiazepines re: recommendation to discontinue use
f/u with established psychotherapist  Please see initial consult note for additional dual diagnosis referral options  Please communicate with outpt prescribers of benzodiazepines re: recommendation to discontinue use

## 2022-12-30 NOTE — BH CONSULTATION LIAISON PROGRESS NOTE - NSBHFUPINTERVALHXFT_PSY_A_CORE
d/w primary team, Psychiatry reconsulted today that pt demanding benzodiazepines, threatening to “flip her shit” if not receiving them and requesting to speak again with psychiatry.  Pt will be going to rehab facility next week.  Per chart review pt received total 6mg Ativan over the past 24h, yet was receiving substantially less previously in hospital course.  They seek medication recommendations.  Per review, psychiatry had seen pt several times, noting BZD use disorder, alcohol abuse, falls, neurocog issues.      Pt assessed at bedside.  She was alert, in NAD, began describing history of abusing "anything and everything" jon benzodiazepines, had been sober for 12yrs, then quickly started abusing benzodiazepines again.  She reports wanting a new psychiatrist who doesn't act like a "drug dealer" but also demands only to get benzodiazepines.  Pt describes inability to tolerate anxiety or distress, needing more and more BZD to control anxiety, and currently feels that she needs more.  Pt adamantly refuses other classes of medications, even adjunctively, citing that they don't work for her.  Pt reports vague side effects to some antidepressants, or does not recall any specific adverse reaction, but also notes that even if there was a medication that was tolerated, she wouldn't say so because she knows she only wants BZD.  Pt adamantly and categorically refuses any non-bzd medications "It's a No!"  Pt though acknowledged that she did not want addiction to worsen or to have adverse effects from the medications, though continued to refuse anything other than high dose ativan at bedtime and as needed.  Pt future-oriented, no SI.
Psychiatry f/u for evaluation of neurocognitive deficits, benzodiazepine use disorder, mood and anxiety sx. Interim hx reviewed - no acute events. Pt c/o knee pain overnight and requesting narcotics, received PRN mirtazapine for insomnia around 330am. No medications administered for withdrawal per MAR. Pt with Hgb 6.9 on CBC this AM. Utox +benzodiazepines and barbituates (has been receiving Fiorcet).    EMS report reviewed - lists many outpt meds (unclear of source): trazodone, methadone, gabepentin, lisinopril, Klonopin, Suboxone, acetaminophen with codeine, penicillin, motrin, meclizine, atenolol, tramadol, Ambien, Prozac, Seroquel, Valium, Xanax, Clonidine, Keppra, Phenobarbital, Cipro, Ibuprofen, Lorazepam, Vit D, Aspirin, Ferrous Gluconate, Losartan. Also reports PMH of HIV/AIDS    On interview this morning, pt found asleep, not arousable to voice, briefly opens eyes to light tactile simulation but unable to maintain arousal. Per RN at bedside, pt has been difficult to arouse this morning. Pt unable to further participate in assessment.
Psychiatry f/u for assessment of capacity to leave AMA and anxiety management. Interim hx reviewed - pt found with new CVA, moved to 7LA, recommended for additional imaging including CTA, MRA nova, as well as possible surgical intervention. Receiving pRBCs for anemia. Also receiving thiamine supplementation. Pt requesting discharge since yesterday, per primary team does not seem to appreciate risks of discharge which include recurrent strokes, chronic debility related to strokes, potential death. No reported attempts to leave or agitation. Per MAR, pt received mirtazapine PRN twice for insomnia, received clonazepam PRN last night.    On interview, pt awake, calm, oriented to self/location/date, minimally to situation, no recall of prior meeting with writer. Pt reports that she is "in a terrible state physically and emotionally" and knows that she needs to be in the hospital "for physical therapy", but feels so bad - with diffuse pain and "shakes" - that she wants to be discharged. Pt immediately mentions the death of her son years ago (age 29, as a medical student) as reason for emotional distress, then states "I don't want to talk about it". Pt denies knowledge of any other reason for hospitalization other than "PT", denies knowledge of new strokes, denies knowledge of any recommended imaging or possible interventions. Denies that there are any risks of leaving the hospital although repeatedly states "I don't want to leave". Pt requests Valium, Klonopin, and other benzodiazepines for anxiety and declines to consider any alternative medications, citing adverse reactions to all, states prior rash with gabapentin. States that her memory is "already shot" and her falls are unrelated to benzodiazepines so she believes she should receive in the hospital. No reported AVH, no SI/HI. States that she would like her mother to be her HCP.

## 2022-12-30 NOTE — BH CONSULTATION LIAISON PROGRESS NOTE - NSBHCONSULTMEDPRNREASON_PSY_A_CORE
Occupational Therapy Evaluation    Visit Type: Initial Evaluation  Visit: 1  Referring Provider: Shona Vega*  Medical Diagnosis (from order): Diagnosis Information    Diagnosis  457.1 (ICD-9-CM) - I89.0 (ICD-10-CM) - Lymphedema       Treatment Diagnosis: LLE, RLE, abdomen with increased pain/symptoms, impaired range of motion, impaired muscle length/flexibility, impaired tissue/wound healing, impaired tissue mobility, impaired strength, increased swelling, impaired activity tolerance and impaired mobility  Chart reviewed at time of initial evaluation (relevant co-morbidities, allergies, tests and medications listed):  diabetes and hypertension  Past Medical History:  No date: Edema  No date: Morbid obesity (CMS/[facility])  Type 2 diabetes mellitus   Sleep apnea  Secondary HTN  Past Surgical History:  1989: Hernia repair-abdomen          SUBJECTIVE                                                                                                               Pt is process for bariatric surgery. Pt will have first visit dietician next week. Pt had to reschedule OT therapy visit for bariatric exercise assessment from today to November 11, 2022  Pt has history of carleen leg lymphedema for past 7-8 years. Pt returns to lymphedema therapy to improve his swelling, progress toward compression garment fitting as he has never been able to progress in therapy to the point of full reduction and stabilization for therapy fitting.Pt has had several past episodes of treatment with our clinic for his leg swelling, but has been interrupted due to his schedule conflict and transport issues.    Prior lymphedema treatment:     - Compression garments: 6-12 months (old tubulurs)    - Lymphatic massage: 6-12 months    - Lymphatic pump: 6-12 months    - Exercise: 6-12 months    Pain / Symptoms  - Pain rating (out of 10): Current: 7 (right knee) ; Best: 3; Worst: 8  - Heaviness rating (out of 10): Current: 8; Best: 8; Worst: 8  - Quality 
/ Description: ache, pressure, tight, stiff  - Alleviating Factors:      - Icy Hot, vasopneumatic compression ( Pt uses pump 1 hour each leg x 1 time per day)  - Progression since onset: worsening    Function:   Limitations / Exacerbation Factors:   - bed mobility, grooming/hygiene/self-care activities, lower body dressing, meal/food prep, sleep disturbed, grocery shopping, house/yard work, standing tasks, walking and standing, low transfer (toilet/couch) and floor transfers, stairs, community distances, walking quickly as required to cross a street/exit a building rapidly, household distances  Prior Level of Function: pain free ADLs and IADLs,  Personal Occupations Profile Affected: bathing/showering, personal hygiene/grooming, lower body dressing, community mobility, meal preparation/cleanup, Cheondoism/spiritual activities/expression, home establishment/managements, health management/maintenance, shopping, sleep participation    Patient Goals: decreased edema, decreased pain and independence with ADLs/IADLs.    Prior treatment  - outpatient OT  - Discharged from hospital, home health, or skilled nursing facility in last 30 days: no  Home Environment   - Patient lives with: significant other  Patient [facility] at: [facility].  - Assistance available: no assist  - Denies 2 or more falls or an unexplained fall with injury in the last year.  - Feel safe at home / work / school: yes     OBJECTIVE                                                                                                                    Observation   Noted that pt needs increased time with sit to stand transfer from arm chair, noted slower labored gait.             Stemmer's/Skin Condition  Stemmer's Sign:      - LLE positive     - RLE positive  Tissue:      - LLE: elephantiasis, dry skin, hyperkeratosis, hyperpigmentation, erythema/red, firmness and fibrosis       - RLE: dry skin, elephantiasis, erythema/red, firmness, fibrosis, hyperkeratosis and 
hyperpigmentation  Abdomen-tight,restricted, firm centrally  Measurements  LE Circumference (cm)       • 20 cm proximal to SPB: left:  ; right:         • 15 cm proximal to SPB: left:  ; right:         • 10 cm proximal to SPB: left: 101.3; right: 103.2       • superior patellar border (SPB): left: 89.9; right: 97.1       • 10 cm distal to SPB: left: 76.3; right: 81.3       • 20 cm distal to SPB: left: 66.4; right: 66.1       • 30 cm distal to SPB: left: 56.1; right: 68.1       • 35 cm distal to SPB: left: 44.3; right: 45       • 40 cm distal to SPB: left: 41.3; right: 47.1       • ankle (malleoli level): left: 44; right: 44.4       • calcaneous: left: 42.1; right: 43.1       • 5 cm proximal to first web space: left: 27.3; right: 28.3       • metatarsal phalangeal: left: 27; right: 27     - Total Circumference: left: 616; right: 650.7  LE Volume       • 15 cm proximal to SPB: left:  ; right:         • 10 cm proximal to SPB: left: 2722.66; right: 2825.75       • superior patellar border (SPB): left: 7283.25; right: 7986.04       • 10 cm distal to SPB: left: 5508.91; right:         • 20 cm distal to SPB: left:  ; right: 4338.77       • 30 cm distal to SPB: left: 2993.16; right: 3584.04       • 35 cm distal to SPB: left: 2015.1; right: 2580.82       • 40 cm distal to SPB: left: 1458.68; right: 1688.22       • ankle (malleoli level): left: 1448.37; right: 1666.49     - Total Volume: left: 84802.76; right: 98745.91   Values stored in flowsheets.       Severity of Lymphedema: Stage III - Lymphostatic elephantiasis where pitting can be absent and trophic skin changes such as acanthosis, alterations in skin character and thickness, further deposition of fat and fibrosis, and warty overgrowths have developed.    Outcome/Assessments  Outcome Measures:   Lymphedema Life Impact Scale: LLIS Impairment Score: 85.29 % (scored 0-100, higher score indicates higher impairment) see flowsheet for additional 
documentation        Treatment     Manual Therapy   Lymphatic Drainage:     - Position: seated    - Body region: deep breathing, abdominal sequencing, short neck, left axilla, right axilla, left axillo-inguinal anastomosis, left groin and right axillo-inguinal anastomosis  Compression Bandage:    - tubular stocking (size H tetragrip for bilateral lower legs to base of toes)    Patient Education/Instruction  - Compression bandage:    - Wear schedule: wear during the day, every day, and remove before bedtime    - Indications for removal    Skilled input: verbal instruction/cues, tactile instruction/cues and as detailed above    Writer verbally educated and received verbal consent for hand placement, positioning of patient, and techniques to be performed today from patient for clothing adjustments for techniques, therapist position for techniques and hand placement and palpation for techniques as described above and how they are pertinent to the patient's plan of care.    Home Exercise Program  Compression tubulars daily, remove night  Lotion legs nightly  Short duration walks 2-3 times per day for 10 minutes  Vasopneumatic compression home pump 1- 2 times daily.       ASSESSMENT                                                                                                          40 year old patient has reported functional limitations listed above impacted by signs and symptoms consistent with treatment diagnosis below.  Treatment Diagnosis:   - Involved: LLE, RLE, abdomen  - Symptoms/impairments: increased pain/symptoms, impaired range of motion, impaired muscle length/flexibility, impaired tissue/wound healing, impaired tissue mobility, impaired strength, increased swelling, impaired activity tolerance and impaired mobility    Prognosis: Patient will benefit from skilled therapy.  Rehabilitative potential is: fair due to. Positive factors: age, positive response/result of prior treatment and motivation level. 
Negative factors: co-morbidities, scheduling implications of care choices conflicts/challenges, financial implications of care choices conflicts/challenges and transportation challenges.  Clinical decision making: Moderate - Patient has several limitations (3-5), comorbidities and/or complexities, as noted in detailed assessment above, that impact their occupational profile.  Resulting in several treatment options and minimal to moderate task modification consistent with moderate clinical decision making complexity.    Patient Education:   Who will be receiving education: patient  Are they ready to learn: yes  Preferred learning style: written, verbal and demonstration  Barriers to learning: no barriers apparent at this time  Results of above outlined education: Verbalizes understanding    PLAN                                                                                                                         The following skilled interventions to be implemented to achieve goals listed below:  Neuromuscular Re-Education (00192)  Therapeutic Activity (67075)  Therapeutic Exercise (52211)  Manual Therapy (29033)  Activities of Daily Living/Self Care (40457)    Frequency / Duration  2 times per week tapering as patient progresses for 12 weeks for an estimated total of 16 visits    Patient involved in and agreed to plan of care and goals.  Patient given attendance policy at time of initial evaluation. and Attendance policy reviewed with patient.    Suggestions for next session as indicated: Progress per plan of care, manual lymph drainage, compression bandaging, there exercise for lymph drainage, stretching, skin care and lymphedema precaution education.    Goals  Reduce overall limb girth 20 cm each leg  Improve tissue quality based on assessment by palpation through abdomen and legs  Reduce pain/heaviness to 3/10  The above improvements in impairments to assist in obtaining goals listed below  Long Term Goals: to 
be met by end of plan of care   1. Pt will tolerate wearing shoes for 8 hours with minimal pain per day to allow increased safety and improvement in mobility  2. Pt will demo independence with home program of self manual lymphatic drainage, compression , there ex and skin care for long term management of lymphedema.  3. Pt will verbalize 3 strategies to reduce potential for infection.    4. Pt will demo improved movement of joints through reduction of swelling to allow for functional transfers with minimal pain.  5.  Pt will demo reduction and stabilization of measurements to allow measurement and fitting of compression garment for long term management of swelling.          Therapy procedure time and total treatment time can be found documented on the Time Entry flowsheet  
sleep...

## 2022-12-30 NOTE — BH CONSULTATION LIAISON PROGRESS NOTE - NSICDXBHSECONDARYDX_PSY_ALL_CORE
Anxiety   F41.9  Severe benzodiazepine use disorder   F13.20  Depression, unspecified   F32.A  Insomnia   G47.00  

## 2022-12-30 NOTE — BH CONSULTATION LIAISON PROGRESS NOTE - NSICDXBHPRIMARYDX_PSY_ALL_CORE
Neurocognitive disorder   R41.9  

## 2022-12-30 NOTE — PROGRESS NOTE ADULT - ASSESSMENT
per Neurology    62 y o F w/ PMH HTN (not on any meds), migraines, seizure disorder, depression/anxiety, prior stroke hx in 2018 presented to [facility] ER for frequent falls to [facility] ER found to have acute/ sub acute infarcts in L MICAH Territory and L parieto temporal region with mass effect without herniation i/s/o B/L A2 HGS/occlusion and Prox L ICA HGS vs occlusion. Started on DAPT and high intensity statin after Hgb stable. Currently being stepped down to 7Wo (under Dr. Bland) for further management that includes MRA neck w/ contrast to further characterize ICA for any surgical intervention.       Problem/Plan - 1:  ·  Problem: CVA (cerebrovascular accident).   ·  Plan: MRA H/N: Acute/ Subacute infarction L MICAH Territory and L parieto temporal region w/local mass effect. B/L A2 segment HGS/ Occlusion and L Proximal ICA HGS Vs Occlusion.  - HCT Results: Subacute/ chronic B/L MICAH territory infarcts.  - B/l carotid ultrasound significant for 50-69% stenosis mid LEFT internal carotid artery per NASCET peak systolic velocity measurements due to noncalcified atheromatous plaque.  - f/u MRA neck w/ contrast for further assessment of ICA stenosis (no CT angio given patient reports anaphylaxis to iodinated contrast)  - continue aspirin 81mg daily  - continue plavix 75mg (started 12/27 H/H stable)  - continue atorvastatin 80mg daily  - Stroke education  - appreciate NSGY recs re: L ICA HGS Vs occlusion.    Problem/Plan - 2:  ·  Problem: Anxiety and depression.   ·  Plan: Self-reported history of chronic anxiety, insomnia, new reported depression, benzodiazepine use disorder, ?alcohol use disorder (per prior chart), ?opiate use disorder in remission, impaired ability to logically reason. Demonstrates very poor insight into the reason for her ongoing hospitalization, with no understanding of diagnosis of new stroke, recommended additional testing and possible interventions, and no appreciation of the reported significant risks of leaving the hospital against medical advice. Additionally, pt remains highly focused on receiving benzodiazepines for vague anxiety sx and insomnia, with poor insight into her benzodiazepine use disorder (with illicit in addition to prescribed use as outpt).   - per psych, no acute safety concerns that would warrant inpatient level psychiatric care.  - pt currently LACKS capacity to leave the hospital AMA or refuse any acutely indicated testing or treatment.  - mirtazapine 7.5mg po QHS PRN for insomnia   - hydroxyzine PRN for acute anxiety - pt cites adverse reaction to gabapentin  - is prescribed 4mg ativan outpatient, started patient on ativan 0.1mg TID PRN on 12/25, still reporting on-going anxiety and insomnia, increased to 1mg TID PRN on 12/26.    Problem/Plan - 3:  ·  Problem: Iron deficiency anemia.   ·  Plan: Hgb stable 10.5, s/p 1u pRBC. Also s/p 3 days iron sucrose. Denies history of heavy bleeding, poor wound healing, easy bruising, hemarthrosis.  - c/w ferrous sulfate 325mg M/W/F  - active T&S, transfuse for Hb<7  - outpatient colonoscopy with GI   - outpatient f/up with heme/onc (Dr. Maribeth Reddy) 1-2 weeks from discharge  - appreciate medicine team recs.    Problem/Plan - 4:  ·  Problem: HTN (hypertension).   ·  Plan: Not on any home BP meds per pt  - BP in the 100's today, no focal neuro changes  - TTE : No wall motion abnormalities/ No valvular Dz.  - EKG Results: Isolated V5 AYANA upon admission, with no reciprocal changes, no c/o CP, no c/f ischemia no c/o cp/sob.    Problem/Plan - 5:  ·  Problem: HLD (hyperlipidemia).   ·  Plan:   - c/w statin 80mg qhs.    Problem/Plan - 6:  ·  Problem: Nutrition, metabolism, and development symptoms.   ·  Plan: F: tolerating PO, no IVF  E: replete K<4, Mg<2  N: regular  VTE Prophylaxis: hep SC   GI: pantoprazole  D: 7Wo (under Dr. Bland).

## 2022-12-30 NOTE — BH CONSULTATION LIAISON PROGRESS NOTE - OTHER
no impulsivity during interview not internally preoccupied no voiced delusions or paranoia. No SI/HI

## 2022-12-30 NOTE — PROGRESS NOTE ADULT - PROBLEM SELECTOR PLAN 1
MRI shows "acute/subacute infarction in the left MICAH territory as well as the left parietal temporal region; mild local mass effect without herniation;" also found to have L ICA severe stenosis/occlusion and MICAH severe stenosis/occlusion on imaging; cont. work-up and mgmt per Neuro and Neurosurgery; PT/OT; on DAPT + statin; acute rehab planning

## 2022-12-30 NOTE — PROGRESS NOTE ADULT - PROBLEM SELECTOR PLAN 1
MRA H/N: Acute/ Subacute infarction L MICAH Territory and L parieto temporal region w/local mass effect. B/L A2 segment HGS/ Occlusion and L Proximal ICA HGS Vs Occlusion.  - HCT Results: Subacute/ chronic B/L MICAH territory infarcts.  - B/l carotid ultrasound significant for 50-69% stenosis mid LEFT internal carotid artery per NASCET peak systolic velocity measurements due to noncalcified atheromatous plaque.  - MRA neck w/ contrast showed focal severe stenosis of the proximal L ICA.    Plan:  - continue aspirin 81mg daily  - continue plavix 75mg (started 12/27 H/H stable)  - continue atorvastatin 80mg daily  - Stroke education  - f/u NSGY recs

## 2022-12-30 NOTE — BH CONSULTATION LIAISON PROGRESS NOTE - NSICDXBHTERTIARYDX_PSY_ALL_CORE
R/O Delirium   R41.0  R/O Alcohol abuse   F10.10  

## 2022-12-30 NOTE — BH CONSULTATION LIAISON PROGRESS NOTE - NSBHATTESTBILLINGAW_PSY_A_CORE
97035-Gllaxuxhtn Inpatient care - low complexity - 15 minutes
39740-Fwfcaxtbnz Inpatient care - high complexity - 35 minutes
18385-Nmmsuhrruv Inpatient care - high complexity - 35 minutes

## 2022-12-30 NOTE — BH CONSULTATION LIAISON PROGRESS NOTE - CURRENT MEDICATION
MEDICATIONS  (STANDING):  aspirin  chewable 81 milliGRAM(s) Oral daily  atorvastatin 80 milliGRAM(s) Oral at bedtime  clopidogrel Tablet 75 milliGRAM(s) Oral daily  ferrous    sulfate 325 milliGRAM(s) Oral <User Schedule>  folic acid 1 milliGRAM(s) Oral daily  heparin   Injectable 5000 Unit(s) SubCutaneous every 8 hours  melatonin 5 milliGRAM(s) Oral at bedtime  mirtazapine 7.5 milliGRAM(s) Oral at bedtime  multivitamin 1 Tablet(s) Oral daily  pantoprazole    Tablet 40 milliGRAM(s) Oral before breakfast  polyethylene glycol 3350 17 Gram(s) Oral two times a day  QUEtiapine 50 milliGRAM(s) Oral once  senna 2 Tablet(s) Oral at bedtime    MEDICATIONS  (PRN):  acetaminophen     Tablet .. 650 milliGRAM(s) Oral every 6 hours PRN Mild Pain (1 - 3)  hydrOXYzine hydrochloride 25 milliGRAM(s) Oral three times a day PRN Anxiety  LORazepam     Tablet 1 milliGRAM(s) Oral every 8 hours PRN Anxiety  
MEDICATIONS  (STANDING):  acetaminophen   IVPB .. 750 milliGRAM(s) IV Intermittent once  aspirin  chewable 81 milliGRAM(s) Oral daily  atorvastatin 80 milliGRAM(s) Oral at bedtime  enoxaparin Injectable 40 milliGRAM(s) SubCutaneous every 24 hours  folic acid 1 milliGRAM(s) Oral daily  iron sucrose IVPB 300 milliGRAM(s) IV Intermittent every 24 hours  melatonin 5 milliGRAM(s) Oral at bedtime  multivitamin 1 Tablet(s) Oral daily  thiamine IVPB 500 milliGRAM(s) IV Intermittent every 24 hours    MEDICATIONS  (PRN):  
MEDICATIONS  (STANDING):  aspirin enteric coated 81 milliGRAM(s) Oral at bedtime  atorvastatin 80 milliGRAM(s) Oral at bedtime  enoxaparin Injectable 40 milliGRAM(s) SubCutaneous every 24 hours  folic acid 1 milliGRAM(s) Oral daily  melatonin 5 milliGRAM(s) Oral at bedtime  multivitamin 1 Tablet(s) Oral daily    MEDICATIONS  (PRN):

## 2022-12-31 LAB
ANION GAP SERPL CALC-SCNC: 11 MMOL/L — SIGNIFICANT CHANGE UP (ref 5–17)
BUN SERPL-MCNC: 12 MG/DL — SIGNIFICANT CHANGE UP (ref 7–23)
CALCIUM SERPL-MCNC: 9.8 MG/DL — SIGNIFICANT CHANGE UP (ref 8.4–10.5)
CHLORIDE SERPL-SCNC: 105 MMOL/L — SIGNIFICANT CHANGE UP (ref 96–108)
CO2 SERPL-SCNC: 23 MMOL/L — SIGNIFICANT CHANGE UP (ref 22–31)
CREAT SERPL-MCNC: 0.59 MG/DL — SIGNIFICANT CHANGE UP (ref 0.5–1.3)
EGFR: 103 ML/MIN/1.73M2 — SIGNIFICANT CHANGE UP
GLUCOSE SERPL-MCNC: 104 MG/DL — HIGH (ref 70–99)
HCT VFR BLD CALC: 33.9 % — LOW (ref 34.5–45)
HGB BLD-MCNC: 10 G/DL — LOW (ref 11.5–15.5)
MCHC RBC-ENTMCNC: 24 PG — LOW (ref 27–34)
MCHC RBC-ENTMCNC: 29.5 GM/DL — LOW (ref 32–36)
MCV RBC AUTO: 81.5 FL — SIGNIFICANT CHANGE UP (ref 80–100)
NRBC # BLD: 0 /100 WBCS — SIGNIFICANT CHANGE UP (ref 0–0)
PLATELET # BLD AUTO: 396 K/UL — SIGNIFICANT CHANGE UP (ref 150–400)
POTASSIUM SERPL-MCNC: 4 MMOL/L — SIGNIFICANT CHANGE UP (ref 3.5–5.3)
POTASSIUM SERPL-SCNC: 4 MMOL/L — SIGNIFICANT CHANGE UP (ref 3.5–5.3)
RBC # BLD: 4.16 M/UL — SIGNIFICANT CHANGE UP (ref 3.8–5.2)
RBC # FLD: 27.1 % — HIGH (ref 10.3–14.5)
SARS-COV-2 RNA SPEC QL NAA+PROBE: SIGNIFICANT CHANGE UP
SODIUM SERPL-SCNC: 139 MMOL/L — SIGNIFICANT CHANGE UP (ref 135–145)
WBC # BLD: 7.83 K/UL — SIGNIFICANT CHANGE UP (ref 3.8–10.5)
WBC # FLD AUTO: 7.83 K/UL — SIGNIFICANT CHANGE UP (ref 3.8–10.5)

## 2022-12-31 PROCEDURE — 93010 ELECTROCARDIOGRAM REPORT: CPT

## 2022-12-31 PROCEDURE — 99233 SBSQ HOSP IP/OBS HIGH 50: CPT

## 2022-12-31 PROCEDURE — 99232 SBSQ HOSP IP/OBS MODERATE 35: CPT

## 2022-12-31 RX ORDER — QUETIAPINE FUMARATE 200 MG/1
25 TABLET, FILM COATED ORAL ONCE
Refills: 0 | Status: COMPLETED | OUTPATIENT
Start: 2022-12-31 | End: 2022-12-31

## 2022-12-31 RX ORDER — LACTULOSE 10 G/15ML
10 SOLUTION ORAL EVERY 24 HOURS
Refills: 0 | Status: DISCONTINUED | OUTPATIENT
Start: 2022-12-31 | End: 2023-01-11

## 2022-12-31 RX ORDER — QUETIAPINE FUMARATE 200 MG/1
50 TABLET, FILM COATED ORAL AT BEDTIME
Refills: 0 | Status: DISCONTINUED | OUTPATIENT
Start: 2022-12-31 | End: 2023-01-02

## 2022-12-31 RX ADMIN — LACTULOSE 10 GRAM(S): 10 SOLUTION ORAL at 09:28

## 2022-12-31 RX ADMIN — Medication 1 MILLIGRAM(S): at 11:07

## 2022-12-31 RX ADMIN — QUETIAPINE FUMARATE 25 MILLIGRAM(S): 200 TABLET, FILM COATED ORAL at 15:02

## 2022-12-31 RX ADMIN — ATORVASTATIN CALCIUM 80 MILLIGRAM(S): 80 TABLET, FILM COATED ORAL at 21:37

## 2022-12-31 RX ADMIN — POLYETHYLENE GLYCOL 3350 17 GRAM(S): 17 POWDER, FOR SOLUTION ORAL at 06:56

## 2022-12-31 RX ADMIN — HEPARIN SODIUM 5000 UNIT(S): 5000 INJECTION INTRAVENOUS; SUBCUTANEOUS at 06:54

## 2022-12-31 RX ADMIN — Medication 1 MILLIGRAM(S): at 18:59

## 2022-12-31 RX ADMIN — Medication 81 MILLIGRAM(S): at 11:05

## 2022-12-31 RX ADMIN — Medication 25 MILLIGRAM(S): at 08:31

## 2022-12-31 RX ADMIN — POLYETHYLENE GLYCOL 3350 17 GRAM(S): 17 POWDER, FOR SOLUTION ORAL at 17:22

## 2022-12-31 RX ADMIN — CLOPIDOGREL BISULFATE 75 MILLIGRAM(S): 75 TABLET, FILM COATED ORAL at 11:05

## 2022-12-31 RX ADMIN — Medication 1 MILLIGRAM(S): at 09:27

## 2022-12-31 RX ADMIN — HEPARIN SODIUM 5000 UNIT(S): 5000 INJECTION INTRAVENOUS; SUBCUTANEOUS at 13:09

## 2022-12-31 RX ADMIN — Medication 650 MILLIGRAM(S): at 20:56

## 2022-12-31 RX ADMIN — HEPARIN SODIUM 5000 UNIT(S): 5000 INJECTION INTRAVENOUS; SUBCUTANEOUS at 21:37

## 2022-12-31 RX ADMIN — QUETIAPINE FUMARATE 50 MILLIGRAM(S): 200 TABLET, FILM COATED ORAL at 21:31

## 2022-12-31 RX ADMIN — Medication 5 MILLIGRAM(S): at 21:31

## 2022-12-31 RX ADMIN — SENNA PLUS 2 TABLET(S): 8.6 TABLET ORAL at 21:30

## 2022-12-31 RX ADMIN — MIRTAZAPINE 7.5 MILLIGRAM(S): 45 TABLET, ORALLY DISINTEGRATING ORAL at 21:31

## 2022-12-31 RX ADMIN — Medication 1 TABLET(S): at 11:05

## 2022-12-31 RX ADMIN — PANTOPRAZOLE SODIUM 40 MILLIGRAM(S): 20 TABLET, DELAYED RELEASE ORAL at 06:54

## 2022-12-31 RX ADMIN — Medication 650 MILLIGRAM(S): at 21:56

## 2022-12-31 NOTE — PROGRESS NOTE ADULT - PROBLEM SELECTOR PLAN 1
MRI shows "acute/subacute infarction in the left MICAH territory as well as the left parietal temporal region; mild local mass effect without herniation;" also found to have L ICA severe stenosis/occlusion and MICAH severe stenosis/occlusion on imaging;   -cont. work-up and mgmt per Neuro and Neurosurgery  -PT/OT  -c/w DAPT + statin  -acute rehab planning

## 2022-12-31 NOTE — PROGRESS NOTE ADULT - NSPROGADDITIONALINFOA_GEN_ALL_CORE
DVT ppx: HSQ
HSQ for DVT ppx
DVT ppx: HSQ
DVT ppx: A/C held in setting of anemia
DVT ppx: HSQ
DVT ppx: HSQ

## 2022-12-31 NOTE — PROGRESS NOTE ADULT - SUBJECTIVE AND OBJECTIVE BOX
INTERNAL MEDICINE PROGRESS NOTE    INTERVAL HPI/OVERNIGHT EVENTS:  Patient seen and examined at bedside. Patient reports feeling lousy overall, but denies any pain, cough, SOB, n/v/d.    VITAL SIGNS:  T(F): 98.7 (12-31-22 @ 11:07)  HR: 100 (12-31-22 @ 11:07)  BP: 108/67 (12-31-22 @ 11:07)  RR: 18 (12-31-22 @ 11:07)  SpO2: 97% (12-31-22 @ 11:07)  Wt(kg): --    PHYSICAL EXAM:  Constitutional: NAD  Head: NC/AT  EENT: PERRL, anicteric sclera; oropharynx clear, MMM  Neck: supple, no appreciable JVD  Respiratory: CTA B/L; no W/R/R  Cardiovascular: +S1/S2, RRR  Gastrointestinal: soft, NT/ND  Extremities: WWP; no edema, clubbing or cyanosis  Vascular: 2+ radial pulses B/L  Neurological: awake and alert; GENTILE    MEDICATIONS  (STANDING):  aspirin  chewable 81 milliGRAM(s) Oral daily  atorvastatin 80 milliGRAM(s) Oral at bedtime  clopidogrel Tablet 75 milliGRAM(s) Oral daily  ferrous    sulfate 325 milliGRAM(s) Oral <User Schedule>  folic acid 1 milliGRAM(s) Oral daily  heparin   Injectable 5000 Unit(s) SubCutaneous every 8 hours  lactulose Syrup 10 Gram(s) Oral every 24 hours  melatonin 5 milliGRAM(s) Oral at bedtime  mirtazapine 7.5 milliGRAM(s) Oral at bedtime  multivitamin 1 Tablet(s) Oral daily  pantoprazole    Tablet 40 milliGRAM(s) Oral before breakfast  polyethylene glycol 3350 17 Gram(s) Oral two times a day  senna 2 Tablet(s) Oral at bedtime    MEDICATIONS  (PRN):  acetaminophen     Tablet .. 650 milliGRAM(s) Oral every 6 hours PRN Mild Pain (1 - 3)  hydrOXYzine hydrochloride 25 milliGRAM(s) Oral three times a day PRN Anxiety  LORazepam     Tablet 1 milliGRAM(s) Oral every 8 hours PRN Anxiety      Allergies    Compazine (Unknown)  contrast media (iodine-based) (Unknown)  iv contrast (Unknown)  penicillin (Unknown)  penicillins (Other)  Toradol (Unknown)    Intolerances        LABS:                        10.0   7.83  )-----------( 396      ( 31 Dec 2022 05:30 )             33.9     12-31    139  |  105  |  12  ----------------------------<  104<H>  4.0   |  23  |  0.59    Ca    9.8      31 Dec 2022 05:30            RADIOLOGY & ADDITIONAL TESTS: Reviewed     INTERNAL MEDICINE PROGRESS NOTE    INTERVAL HPI/OVERNIGHT EVENTS:  Patient seen and examined at bedside. Patient reports feeling lousy overall, but denies any pain, cough, SOB, n/v/d.    VITAL SIGNS:  T(F): 98.7 (12-31-22 @ 11:07)  HR: 100 (12-31-22 @ 11:07)  BP: 108/67 (12-31-22 @ 11:07)  RR: 18 (12-31-22 @ 11:07)  SpO2: 97% (12-31-22 @ 11:07)  Wt(kg): --    PHYSICAL EXAM:  Constitutional: NAD  Head: NC/AT  EENT: MMM  Neck: supple  Respiratory: CTA B/L; no W/R/R  Cardiovascular: +S1/S2, RRR  Gastrointestinal: soft, NT/ND  Extremities: WWP; no edema  Neurological: awake and alert; GENTILE    MEDICATIONS  (STANDING):  aspirin  chewable 81 milliGRAM(s) Oral daily  atorvastatin 80 milliGRAM(s) Oral at bedtime  clopidogrel Tablet 75 milliGRAM(s) Oral daily  ferrous    sulfate 325 milliGRAM(s) Oral <User Schedule>  folic acid 1 milliGRAM(s) Oral daily  heparin   Injectable 5000 Unit(s) SubCutaneous every 8 hours  lactulose Syrup 10 Gram(s) Oral every 24 hours  melatonin 5 milliGRAM(s) Oral at bedtime  mirtazapine 7.5 milliGRAM(s) Oral at bedtime  multivitamin 1 Tablet(s) Oral daily  pantoprazole    Tablet 40 milliGRAM(s) Oral before breakfast  polyethylene glycol 3350 17 Gram(s) Oral two times a day  senna 2 Tablet(s) Oral at bedtime    MEDICATIONS  (PRN):  acetaminophen     Tablet .. 650 milliGRAM(s) Oral every 6 hours PRN Mild Pain (1 - 3)  hydrOXYzine hydrochloride 25 milliGRAM(s) Oral three times a day PRN Anxiety  LORazepam     Tablet 1 milliGRAM(s) Oral every 8 hours PRN Anxiety      Allergies    Compazine (Unknown)  contrast media (iodine-based) (Unknown)  iv contrast (Unknown)  penicillin (Unknown)  penicillins (Other)  Toradol (Unknown)    Intolerances        LABS:                        10.0   7.83  )-----------( 396      ( 31 Dec 2022 05:30 )             33.9     12-31    139  |  105  |  12  ----------------------------<  104<H>  4.0   |  23  |  0.59    Ca    9.8      31 Dec 2022 05:30            RADIOLOGY & ADDITIONAL TESTS: Reviewed

## 2022-12-31 NOTE — PROGRESS NOTE ADULT - ASSESSMENT
63 y/o F w/ PMH HTN, migraines, seizure disorder, depression/anxiety, prior stroke who presented with frequent falls, found to have acute/subacute infarcts in L MICAH Territory, admitted for stroke workup.

## 2022-12-31 NOTE — PROGRESS NOTE ADULT - PROBLEM SELECTOR PLAN 2
iron deficiency (ferritin 16, 3% sat); unclear etiology; Pt. reports occasional "dark" BMs, but not melena or BRBPR; no e/o hemolysis; VSS; monitor CBC, goal Hb > 7, transfuse if needed  -cont. PPI, IV iron  -appreciate GI and Heme-Onc recs  -will likely need bidirectional endoscopies as outpatient

## 2023-01-01 PROCEDURE — 99232 SBSQ HOSP IP/OBS MODERATE 35: CPT

## 2023-01-01 PROCEDURE — 99233 SBSQ HOSP IP/OBS HIGH 50: CPT

## 2023-01-01 RX ORDER — QUETIAPINE FUMARATE 200 MG/1
25 TABLET, FILM COATED ORAL ONCE
Refills: 0 | Status: COMPLETED | OUTPATIENT
Start: 2023-01-01 | End: 2023-01-01

## 2023-01-01 RX ADMIN — Medication 1 MILLIGRAM(S): at 16:45

## 2023-01-01 RX ADMIN — PANTOPRAZOLE SODIUM 40 MILLIGRAM(S): 20 TABLET, DELAYED RELEASE ORAL at 06:38

## 2023-01-01 RX ADMIN — ATORVASTATIN CALCIUM 80 MILLIGRAM(S): 80 TABLET, FILM COATED ORAL at 21:24

## 2023-01-01 RX ADMIN — SENNA PLUS 2 TABLET(S): 8.6 TABLET ORAL at 21:25

## 2023-01-01 RX ADMIN — POLYETHYLENE GLYCOL 3350 17 GRAM(S): 17 POWDER, FOR SOLUTION ORAL at 06:40

## 2023-01-01 RX ADMIN — Medication 81 MILLIGRAM(S): at 11:23

## 2023-01-01 RX ADMIN — QUETIAPINE FUMARATE 50 MILLIGRAM(S): 200 TABLET, FILM COATED ORAL at 21:25

## 2023-01-01 RX ADMIN — HEPARIN SODIUM 5000 UNIT(S): 5000 INJECTION INTRAVENOUS; SUBCUTANEOUS at 13:34

## 2023-01-01 RX ADMIN — QUETIAPINE FUMARATE 25 MILLIGRAM(S): 200 TABLET, FILM COATED ORAL at 16:45

## 2023-01-01 RX ADMIN — Medication 1 MILLIGRAM(S): at 11:22

## 2023-01-01 RX ADMIN — HEPARIN SODIUM 5000 UNIT(S): 5000 INJECTION INTRAVENOUS; SUBCUTANEOUS at 06:41

## 2023-01-01 RX ADMIN — HEPARIN SODIUM 5000 UNIT(S): 5000 INJECTION INTRAVENOUS; SUBCUTANEOUS at 21:27

## 2023-01-01 RX ADMIN — Medication 25 MILLIGRAM(S): at 15:31

## 2023-01-01 RX ADMIN — MIRTAZAPINE 7.5 MILLIGRAM(S): 45 TABLET, ORALLY DISINTEGRATING ORAL at 21:24

## 2023-01-01 RX ADMIN — CLOPIDOGREL BISULFATE 75 MILLIGRAM(S): 75 TABLET, FILM COATED ORAL at 11:22

## 2023-01-01 RX ADMIN — Medication 1 TABLET(S): at 11:22

## 2023-01-01 RX ADMIN — Medication 5 MILLIGRAM(S): at 21:26

## 2023-01-01 RX ADMIN — Medication 1 MILLIGRAM(S): at 10:42

## 2023-01-01 RX ADMIN — Medication 10 MILLIGRAM(S): at 18:32

## 2023-01-01 NOTE — PROGRESS NOTE ADULT - ASSESSMENT
62F w/ PMH HTN (not on any meds), migraines, seizure disorder, depression/anxiety, prior stroke hx in 2018 presented to [facility] ER for frequent falls to [facility] ER found to have acute/ sub acute infarcts in L MICAH Territory and L parieto temporal region with mass effect without herniation i/s/o B/L A2 HGS/occlusion and Prox L ICA HGS vs occlusion. Started on DAPT and high intensity statin after Hgb stable. Currently stepped down to 7Wo (under Dr. Bland) for further management that includes MRA neck w/ contrast to further characterize ICA for any surgical intervention.

## 2023-01-01 NOTE — PROGRESS NOTE ADULT - PROBLEM SELECTOR PLAN 2
iron deficiency (ferritin 16, 3% sat); unclear etiology; Pt. reports occasional "dark" BMs, but not melena or BRBPR; no e/o hemolysis; VSS; monitor CBC, goal Hb > 7, transfuse if needed  -cont. PPI, IV iron  -will likely need bidirectional endoscopies as outpatient

## 2023-01-01 NOTE — PROGRESS NOTE ADULT - PROBLEM SELECTOR PLAN 1
MRI shows "acute/subacute infarction in the left MICAH territory as well as the left parietal temporal region; mild local mass effect without herniation;" also found to have L ICA severe stenosis/occlusion and MICAH severe stenosis/occlusion on imaging;   -cont. work-up and mgmt per Neuro and Neurosurgery  -PT/OT  -c/w DAPT + statin  -f/u neurosurgery regarding possible stent placement  -acute rehab planning

## 2023-01-01 NOTE — PROGRESS NOTE ADULT - SUBJECTIVE AND OBJECTIVE BOX
Patient is a 62y old  Female who presents with a chief complaint of mechanical fall (01 Jan 2023 14:14)      INTERVAL HPI/OVERNIGHT EVENTS:  Patient seen and examined at bedside. No acute events overnight. This morning, pt continues to ask for additional ativan. No other complaints. Neurologic exam unchanged from yesterday.     Denies fever, chills, HA, dizziness, CP, SOB, palpitations, abdominal pain, n/v, changes in bowel/urinary habits, numbness, or tingling. 12pt ROS otherwise negative.      FAMILY HISTORY:  No pertinent family history in first degree relatives        VITAL SIGNS:  T(C): 36.7 (01-01-23 @ 18:51), Max: 36.9 (01-01-23 @ 06:47)  HR: 99 (01-01-23 @ 11:06) (98 - 99)  BP: 124/79 (01-01-23 @ 11:06) (114/76 - 124/79)  RR: 16 (01-01-23 @ 11:06) (16 - 18)  SpO2: 98% (01-01-23 @ 11:06) (98% - 98%)  Wt(kg): --Vital Signs Last 24 Hrs  T(C): 36.7 (01 Jan 2023 18:51), Max: 36.9 (01 Jan 2023 06:47)  T(F): 98.1 (01 Jan 2023 18:51), Max: 98.5 (01 Jan 2023 06:47)  HR: 99 (01 Jan 2023 11:06) (98 - 99)  BP: 124/79 (01 Jan 2023 11:06) (114/76 - 124/79)  BP(mean): --  RR: 16 (01 Jan 2023 11:06) (16 - 18)  SpO2: 98% (01 Jan 2023 11:06) (98% - 98%)    Parameters below as of 01 Jan 2023 11:06  Patient On (Oxygen Delivery Method): room air      Compazine (Unknown)  contrast media (iodine-based) (Unknown)  iv contrast (Unknown)  penicillin (Unknown)  penicillins (Other)  Toradol (Unknown)      PHYSICAL EXAM:  General: No acute distress, awake and alert  Eyes: Anicteric sclerae, moist conjunctivae, see below for CNs  Neck: trachea midline, FROM, supple, no thyromegaly or lymphadenopathy  Cardiovascular: Regular rate and rhythm, no murmurs, rubs, or gallops. No carotid bruits.   Pulmonary: Anterior breath sounds clear bilaterally, no crackles or wheezing. No use of accessory muscles  GI: Abdomen soft, non-distended, non-tender  Extremities: Radial and DP pulses +2, no edema    Neurologic:  -Mental status: Awake, alert, oriented to person, place, and time. Speech is fluent with intact naming, repetition, and comprehension, no dysarthria. Follows commands. Attention/concentration intact. Forgetful.  -Cranial nerves:   II: Visual fields are full to confrontation.  III, IV, VI: Extraocular movements are intact without nystagmus. Pupils equally round and reactive to light.  V:  Facial sensation V1-V3 equal and intact   VII: Face is symmetric with normal eye closure and smile  VIII: Hearing is bilaterally intact   XII: Tongue protrudes midline  Motor: Normal bulk and tone. RUE/RLE 4/5, no drift noted. Unable to voluntarily extend right leg. LUE/ LLE : 5/5.   Sensation: Intact to light touch bilaterally. No neglect or extinction on double simultaneous testing.  Coordination: No dysmetria on finger-to-nose out of proportion to weakness.   Gait: Deferred.    Consultant(s) Notes Reviewed:  [x ] YES  [ ] NO  Care Discussed with Consultants/Other Providers [ x] YES  [ ] NO    LABS:                        10.0   7.83  )-----------( 396      ( 31 Dec 2022 05:30 )             33.9     12-31    139  |  105  |  12  ----------------------------<  104<H>  4.0   |  23  |  0.59    Ca    9.8      31 Dec 2022 05:30          CAPILLARY BLOOD GLUCOSE                              I & O Summary:      Microbiology:        RADIOLOGY, EKG AND ADDITIONAL TESTS: Reviewed.      RADIOLOGY & ADDITIONAL TESTS:    Imaging Personally Reviewed:  [ ] YES  [ ] NO  acetaminophen     Tablet .. 650 milliGRAM(s) Oral every 6 hours PRN  aspirin  chewable 81 milliGRAM(s) Oral daily  atorvastatin 80 milliGRAM(s) Oral at bedtime  clopidogrel Tablet 75 milliGRAM(s) Oral daily  ferrous    sulfate 325 milliGRAM(s) Oral <User Schedule>  folic acid 1 milliGRAM(s) Oral daily  heparin   Injectable 5000 Unit(s) SubCutaneous every 8 hours  hydrOXYzine hydrochloride 25 milliGRAM(s) Oral three times a day PRN  lactulose Syrup 10 Gram(s) Oral every 24 hours  LORazepam     Tablet 1 milliGRAM(s) Oral every 8 hours PRN  melatonin 5 milliGRAM(s) Oral at bedtime  mirtazapine 7.5 milliGRAM(s) Oral at bedtime  multivitamin 1 Tablet(s) Oral daily  pantoprazole    Tablet 40 milliGRAM(s) Oral before breakfast  polyethylene glycol 3350 17 Gram(s) Oral two times a day  QUEtiapine 50 milliGRAM(s) Oral at bedtime  senna 2 Tablet(s) Oral at bedtime      HEALTH ISSUES - PROBLEM Dx:  CVA (cerebrovascular accident)    Anxiety and depression    HTN (hypertension)    HLD (hyperlipidemia)    Iron deficiency anemia    Nutrition, metabolism, and development symptoms

## 2023-01-01 NOTE — PROGRESS NOTE ADULT - SUBJECTIVE AND OBJECTIVE BOX
INTERNAL MEDICINE PROGRESS NOTE    INTERVAL HPI/OVERNIGHT EVENTS:  Patient seen and examined at bedside. Patient is upset that she is not getting adequate dose of benzodiazepines. She states she is very depressed because of her neurological symptoms, requesting to speak to psychiatrist.    VITAL SIGNS:  T(F): 98.3 (01-01-23 @ 11:06)  HR: 99 (01-01-23 @ 11:06)  BP: 124/79 (01-01-23 @ 11:06)  RR: 16 (01-01-23 @ 11:06)  SpO2: 98% (01-01-23 @ 11:06)  Wt(kg): --    PHYSICAL EXAM:  Constitutional: NAD  Head: NC/AT  EENT: MMM  Neck: supple  Respiratory: CTA B/L  Cardiovascular: +S1/S2, RRR  Gastrointestinal: soft, NT/ND  Extremities: WWP; no edema  Neurological: awake and alert; GENTILE    MEDICATIONS  (STANDING):  aspirin  chewable 81 milliGRAM(s) Oral daily  atorvastatin 80 milliGRAM(s) Oral at bedtime  clopidogrel Tablet 75 milliGRAM(s) Oral daily  ferrous    sulfate 325 milliGRAM(s) Oral <User Schedule>  folic acid 1 milliGRAM(s) Oral daily  heparin   Injectable 5000 Unit(s) SubCutaneous every 8 hours  lactulose Syrup 10 Gram(s) Oral every 24 hours  melatonin 5 milliGRAM(s) Oral at bedtime  mirtazapine 7.5 milliGRAM(s) Oral at bedtime  multivitamin 1 Tablet(s) Oral daily  pantoprazole    Tablet 40 milliGRAM(s) Oral before breakfast  polyethylene glycol 3350 17 Gram(s) Oral two times a day  QUEtiapine 50 milliGRAM(s) Oral at bedtime  senna 2 Tablet(s) Oral at bedtime    MEDICATIONS  (PRN):  acetaminophen     Tablet .. 650 milliGRAM(s) Oral every 6 hours PRN Mild Pain (1 - 3)  hydrOXYzine hydrochloride 25 milliGRAM(s) Oral three times a day PRN Anxiety  LORazepam     Tablet 1 milliGRAM(s) Oral every 8 hours PRN Anxiety      Allergies    Compazine (Unknown)  contrast media (iodine-based) (Unknown)  iv contrast (Unknown)  penicillin (Unknown)  penicillins (Other)  Toradol (Unknown)    Intolerances        LABS:                        10.0   7.83  )-----------( 396      ( 31 Dec 2022 05:30 )             33.9     12-31    139  |  105  |  12  ----------------------------<  104<H>  4.0   |  23  |  0.59    Ca    9.8      31 Dec 2022 05:30            RADIOLOGY & ADDITIONAL TESTS: Reviewed

## 2023-01-02 ENCOUNTER — TRANSCRIPTION ENCOUNTER (OUTPATIENT)
Age: 63
End: 2023-01-02

## 2023-01-02 DIAGNOSIS — R73.03 PREDIABETES: ICD-10-CM

## 2023-01-02 DIAGNOSIS — Z91.041 RADIOGRAPHIC DYE ALLERGY STATUS: ICD-10-CM

## 2023-01-02 DIAGNOSIS — Z01.818 ENCOUNTER FOR OTHER PREPROCEDURAL EXAMINATION: ICD-10-CM

## 2023-01-02 DIAGNOSIS — R76.8 OTHER SPECIFIED ABNORMAL IMMUNOLOGICAL FINDINGS IN SERUM: ICD-10-CM

## 2023-01-02 DIAGNOSIS — G47.00 INSOMNIA, UNSPECIFIED: ICD-10-CM

## 2023-01-02 LAB
ANION GAP SERPL CALC-SCNC: 10 MMOL/L — SIGNIFICANT CHANGE UP (ref 5–17)
BUN SERPL-MCNC: 11 MG/DL — SIGNIFICANT CHANGE UP (ref 7–23)
CALCIUM SERPL-MCNC: 9.5 MG/DL — SIGNIFICANT CHANGE UP (ref 8.4–10.5)
CHLORIDE SERPL-SCNC: 103 MMOL/L — SIGNIFICANT CHANGE UP (ref 96–108)
CO2 SERPL-SCNC: 25 MMOL/L — SIGNIFICANT CHANGE UP (ref 22–31)
CREAT SERPL-MCNC: 0.6 MG/DL — SIGNIFICANT CHANGE UP (ref 0.5–1.3)
EGFR: 103 ML/MIN/1.73M2 — SIGNIFICANT CHANGE UP
GLUCOSE SERPL-MCNC: 105 MG/DL — HIGH (ref 70–99)
HCT VFR BLD CALC: 34.2 % — LOW (ref 34.5–45)
HGB BLD-MCNC: 10.2 G/DL — LOW (ref 11.5–15.5)
MCHC RBC-ENTMCNC: 24.5 PG — LOW (ref 27–34)
MCHC RBC-ENTMCNC: 29.8 GM/DL — LOW (ref 32–36)
MCV RBC AUTO: 82.2 FL — SIGNIFICANT CHANGE UP (ref 80–100)
NRBC # BLD: 0 /100 WBCS — SIGNIFICANT CHANGE UP (ref 0–0)
PA ADP PRP-ACNC: 52 PRU — LOW (ref 194–418)
PLATELET # BLD AUTO: 426 K/UL — HIGH (ref 150–400)
PLATELET RESPONSE ASPIRIN RESULT: 350 ARU — SIGNIFICANT CHANGE UP (ref 350–700)
POTASSIUM SERPL-MCNC: 3.8 MMOL/L — SIGNIFICANT CHANGE UP (ref 3.5–5.3)
POTASSIUM SERPL-SCNC: 3.8 MMOL/L — SIGNIFICANT CHANGE UP (ref 3.5–5.3)
RBC # BLD: 4.16 M/UL — SIGNIFICANT CHANGE UP (ref 3.8–5.2)
RBC # FLD: 26.3 % — HIGH (ref 10.3–14.5)
SARS-COV-2 RNA SPEC QL NAA+PROBE: SIGNIFICANT CHANGE UP
SODIUM SERPL-SCNC: 138 MMOL/L — SIGNIFICANT CHANGE UP (ref 135–145)
WBC # BLD: 6.78 K/UL — SIGNIFICANT CHANGE UP (ref 3.8–10.5)
WBC # FLD AUTO: 6.78 K/UL — SIGNIFICANT CHANGE UP (ref 3.8–10.5)

## 2023-01-02 PROCEDURE — 99233 SBSQ HOSP IP/OBS HIGH 50: CPT

## 2023-01-02 PROCEDURE — 99231 SBSQ HOSP IP/OBS SF/LOW 25: CPT

## 2023-01-02 PROCEDURE — 99232 SBSQ HOSP IP/OBS MODERATE 35: CPT

## 2023-01-02 RX ORDER — DIPHENHYDRAMINE HCL 50 MG
50 CAPSULE ORAL ONCE
Refills: 0 | Status: COMPLETED | OUTPATIENT
Start: 2023-01-03 | End: 2023-01-03

## 2023-01-02 RX ORDER — TRAZODONE HCL 50 MG
100 TABLET ORAL AT BEDTIME
Refills: 0 | Status: DISCONTINUED | OUTPATIENT
Start: 2023-01-02 | End: 2023-01-11

## 2023-01-02 RX ORDER — TRAZODONE HCL 50 MG
50 TABLET ORAL EVERY 24 HOURS
Refills: 0 | Status: DISCONTINUED | OUTPATIENT
Start: 2023-01-03 | End: 2023-01-11

## 2023-01-02 RX ADMIN — Medication 1 MILLIGRAM(S): at 13:57

## 2023-01-02 RX ADMIN — HEPARIN SODIUM 5000 UNIT(S): 5000 INJECTION INTRAVENOUS; SUBCUTANEOUS at 13:57

## 2023-01-02 RX ADMIN — Medication 1 TABLET(S): at 12:03

## 2023-01-02 RX ADMIN — CLOPIDOGREL BISULFATE 75 MILLIGRAM(S): 75 TABLET, FILM COATED ORAL at 12:03

## 2023-01-02 RX ADMIN — Medication 32 MILLIGRAM(S): at 23:39

## 2023-01-02 RX ADMIN — MIRTAZAPINE 7.5 MILLIGRAM(S): 45 TABLET, ORALLY DISINTEGRATING ORAL at 21:09

## 2023-01-02 RX ADMIN — HEPARIN SODIUM 5000 UNIT(S): 5000 INJECTION INTRAVENOUS; SUBCUTANEOUS at 06:37

## 2023-01-02 RX ADMIN — Medication 5 MILLIGRAM(S): at 21:10

## 2023-01-02 RX ADMIN — Medication 1 MILLIGRAM(S): at 12:03

## 2023-01-02 RX ADMIN — PANTOPRAZOLE SODIUM 40 MILLIGRAM(S): 20 TABLET, DELAYED RELEASE ORAL at 06:37

## 2023-01-02 RX ADMIN — Medication 100 MILLIGRAM(S): at 21:10

## 2023-01-02 RX ADMIN — Medication 1 MILLIGRAM(S): at 22:25

## 2023-01-02 RX ADMIN — SENNA PLUS 2 TABLET(S): 8.6 TABLET ORAL at 21:10

## 2023-01-02 RX ADMIN — Medication 1 MILLIGRAM(S): at 05:53

## 2023-01-02 RX ADMIN — Medication 81 MILLIGRAM(S): at 12:03

## 2023-01-02 RX ADMIN — ATORVASTATIN CALCIUM 80 MILLIGRAM(S): 80 TABLET, FILM COATED ORAL at 21:10

## 2023-01-02 NOTE — PROGRESS NOTE ADULT - PROBLEM SELECTOR PLAN 2
MRI shows "acute/subacute infarction in the left MICAH territory as well as the left parietal temporal region; mild local mass effect without herniation;" also found to have L ICA severe stenosis/occlusion and MICAH severe stenosis/occlusion on imaging;   -cont. work-up and mgmt per Neuro and Neurosurgery  -PT/OT  -c/w DAPT + statin  -planning for stent placement with neurosurgery   -acute rehab planning Patient with prior history of contrast media allergy.  -give medrol 32mg PO 12 hours and 2 hours piror to procedure with benadryl 50mg one hour prior to procedure per protocol

## 2023-01-02 NOTE — PROGRESS NOTE ADULT - SUBJECTIVE AND OBJECTIVE BOX
Patient is a 62y old  Female who presents with a chief complaint of mechanical fall (01 Jan 2023 14:14)      INTERVAL HPI/OVERNIGHT EVENTS:  Patient seen and examined at bedside. No acute events overnight. This morning, pt continues to ask for additional ativan. No other complaints. Neurologic exam unchanged from yesterday.     Denies fever, chills, HA, dizziness, CP, SOB, palpitations, abdominal pain, n/v, changes in bowel/urinary habits, numbness, or tingling. 12pt ROS otherwise negative.      FAMILY HISTORY:  No pertinent family history in first degree relatives    Vital Signs Last 24 Hrs  T(C): 36.4 (02 Jan 2023 12:06), Max: 36.9 (01 Jan 2023 21:30)  T(F): 97.5 (02 Jan 2023 12:06), Max: 98.4 (01 Jan 2023 21:30)  HR: 87 (02 Jan 2023 12:06) (87 - 99)  BP: 116/78 (02 Jan 2023 12:06) (113/74 - 116/78)  BP(mean): --  RR: 18 (02 Jan 2023 12:06) (18 - 18)  SpO2: 96% (02 Jan 2023 12:06) (95% - 96%)    Parameters below as of 02 Jan 2023 12:06  Patient On (Oxygen Delivery Method): room air      Patient On (Oxygen Delivery Method): room air      Compazine (Unknown)  contrast media (iodine-based) (Unknown)  iv contrast (Unknown)  penicillin (Unknown)  penicillins (Other)  Toradol (Unknown)      PHYSICAL EXAM:  General: No acute distress, awake and alert  Eyes: Anicteric sclerae, moist conjunctivae, see below for CNs  Neck: trachea midline, FROM, supple, no thyromegaly or lymphadenopathy  Cardiovascular: Regular rate and rhythm, no murmurs, rubs, or gallops. No carotid bruits.   Pulmonary: Anterior breath sounds clear bilaterally, no crackles or wheezing. No use of accessory muscles  GI: Abdomen soft, non-distended, non-tender  Extremities: Radial and DP pulses +2, no edema    Neurologic:  -Mental status: Awake, alert, oriented to person, place, and time. Speech is fluent with intact naming, repetition, and comprehension, no dysarthria. Follows commands. Attention/concentration intact. Forgetful.  -Cranial nerves:   II: Visual fields are full to confrontation.  III, IV, VI: Extraocular movements are intact without nystagmus. Pupils equally round and reactive to light.  V:  Facial sensation V1-V3 equal and intact   VII: Face is symmetric with normal eye closure and smile  VIII: Hearing is bilaterally intact   XII: Tongue protrudes midline  Motor: Normal bulk and tone. RUE/RLE 4/5, no drift noted. Unable to voluntarily extend right leg. LUE/ LLE : 5/5.   Sensation: Intact to light touch bilaterally. No neglect or extinction on double simultaneous testing.  Coordination: No dysmetria on finger-to-nose out of proportion to weakness.   Gait: Deferred.    Consultant(s) Notes Reviewed:  [x ] YES  [ ] NO  Care Discussed with Consultants/Other Providers [ x] YES  [ ] NO    LABS:                        10.0   7.83  )-----------( 396      ( 31 Dec 2022 05:30 )             33.9     12-31    139  |  105  |  12  ----------------------------<  104<H>  4.0   |  23  |  0.59    Ca    9.8      31 Dec 2022 05:30          CAPILLARY BLOOD GLUCOSE                              I & O Summary:      Microbiology:        RADIOLOGY, EKG AND ADDITIONAL TESTS: Reviewed.      RADIOLOGY & ADDITIONAL TESTS:    Imaging Personally Reviewed:  [ ] YES  [ ] NO  acetaminophen     Tablet .. 650 milliGRAM(s) Oral every 6 hours PRN  aspirin  chewable 81 milliGRAM(s) Oral daily  atorvastatin 80 milliGRAM(s) Oral at bedtime  clopidogrel Tablet 75 milliGRAM(s) Oral daily  ferrous    sulfate 325 milliGRAM(s) Oral <User Schedule>  folic acid 1 milliGRAM(s) Oral daily  heparin   Injectable 5000 Unit(s) SubCutaneous every 8 hours  hydrOXYzine hydrochloride 25 milliGRAM(s) Oral three times a day PRN  lactulose Syrup 10 Gram(s) Oral every 24 hours  LORazepam     Tablet 1 milliGRAM(s) Oral every 8 hours PRN  melatonin 5 milliGRAM(s) Oral at bedtime  mirtazapine 7.5 milliGRAM(s) Oral at bedtime  multivitamin 1 Tablet(s) Oral daily  pantoprazole    Tablet 40 milliGRAM(s) Oral before breakfast  polyethylene glycol 3350 17 Gram(s) Oral two times a day  QUEtiapine 50 milliGRAM(s) Oral at bedtime  senna 2 Tablet(s) Oral at bedtime      HEALTH ISSUES - PROBLEM Dx:  CVA (cerebrovascular accident)    Anxiety and depression    HTN (hypertension)    HLD (hyperlipidemia)    Iron deficiency anemia    Nutrition, metabolism, and development symptoms

## 2023-01-02 NOTE — PROGRESS NOTE ADULT - PROBLEM SELECTOR PLAN 1
Patient planned for ICA stent placement by neurosurgery. Patient has had anesthesia in the past with no prior complications. Prior to admission, she would walk miles without stopping and swam for exercise without difficulty. Mets >4. No history of heart disease, EKG NSR, echo 12/20 is normal. RCRI 1 (for recent CVA).   -patient is medically optimized for procedure Patient planned for ICA stent placement by neurosurgery. Patient has had anesthesia in the past with no prior complications. Prior to admission, she would walk miles without stopping and swam for exercise without difficulty. Mets >4. No history of heart disease, EKG NSR, echo 12/20 is normal. RCRI 1 (for recent CVA). Patient has known contrast media allergy.  -will pre-treat for contrast media allergy per protocol. Following this, patient will be medically optimized for procedure

## 2023-01-02 NOTE — PROGRESS NOTE ADULT - PROBLEM SELECTOR PLAN 4
HbA1c 5.8%  -monitor blood glucose. iron deficiency (ferritin 16, 3% sat); unclear etiology; Pt. reports occasional "dark" BMs, but not melena or BRBPR; no e/o hemolysis; VSS; monitor CBC, goal Hb > 7, transfuse if needed  -cont. PPI, IV iron  -will likely need bidirectional endoscopies as outpatient.

## 2023-01-02 NOTE — PROGRESS NOTE ADULT - ASSESSMENT
62F w/ PMH HTN (not on any meds), migraines, seizure disorder, depression/anxiety, prior stroke hx in 2018 presented to [facility] ER for frequent falls to [facility] ER found to have acute/ sub acute infarcts in L MICAH Territory and L parieto temporal region with mass effect without herniation i/s/o B/L A2 HGS/occlusion and Prox L ICA HGS vs occlusion. Started on DAPT and high intensity statin after Hgb stable. Currently stepped down to 7Wo (under Dr. Bland) for further management that includes MRA neck w/ contrast to further characterize ICA for any surgical intervention.

## 2023-01-02 NOTE — PROGRESS NOTE ADULT - PROBLEM SELECTOR PLAN 1
MRA H/N: Acute/ Subacute infarction L MICAH Territory and L parieto temporal region w/local mass effect. B/L A2 segment HGS/ Occlusion and L Proximal ICA HGS Vs Occlusion.  - HCT Results: Subacute/ chronic B/L MICAH territory infarcts.  - B/l carotid ultrasound significant for 50-69% stenosis mid LEFT internal carotid artery per NASCET peak systolic velocity measurements due to noncalcified atheromatous plaque.  - MRA neck w/ contrast showed focal severe stenosis of the proximal L ICA.    Plan:  - continue aspirin 81mg daily  - continue plavix 75mg (started 12/27 H/H stable)  - continue atorvastatin 80mg daily  - Stroke education  - f/u NSGY recs: plan for angiogram and stent

## 2023-01-02 NOTE — PROGRESS NOTE ADULT - PROBLEM SELECTOR PLAN 3
iron deficiency (ferritin 16, 3% sat); unclear etiology; Pt. reports occasional "dark" BMs, but not melena or BRBPR; no e/o hemolysis; VSS; monitor CBC, goal Hb > 7, transfuse if needed  -cont. PPI, IV iron  -will likely need bidirectional endoscopies as outpatient. MRI shows "acute/subacute infarction in the left MICAH territory as well as the left parietal temporal region; mild local mass effect without herniation;" also found to have L ICA severe stenosis/occlusion and MICAH severe stenosis/occlusion on imaging;   -cont. work-up and mgmt per Neuro and Neurosurgery  -PT/OT  -c/w DAPT + statin  -planning for stent placement with neurosurgery   -acute rehab planning

## 2023-01-02 NOTE — PROGRESS NOTE ADULT - SUBJECTIVE AND OBJECTIVE BOX
INTERNAL MEDICINE PROGRESS NOTE    INTERVAL HPI/OVERNIGHT EVENTS:  Patient seen and examined at bedside. Patient reports her mood is a little better today. Had BM. Is agreeing to stent placement by neurosurgery. No new complaints.     VITAL SIGNS:  T(F): 97.5 (01-02-23 @ 12:06)  HR: 87 (01-02-23 @ 12:06)  BP: 116/78 (01-02-23 @ 12:06)  RR: 18 (01-02-23 @ 12:06)  SpO2: 96% (01-02-23 @ 12:06)  Wt(kg): --    PHYSICAL EXAM:  Constitutional: NAD  Head: NC/AT  EENT: MMM  Neck: supple  Respiratory: CTA B/L  Cardiovascular: +S1/S2, RRR  Gastrointestinal: soft, NT/ND  Extremities: WWP; no edema  Neurological: awake and alert; GENTILE    MEDICATIONS  (STANDING):  aspirin  chewable 81 milliGRAM(s) Oral daily  atorvastatin 80 milliGRAM(s) Oral at bedtime  clopidogrel Tablet 75 milliGRAM(s) Oral daily  ferrous    sulfate 325 milliGRAM(s) Oral <User Schedule>  folic acid 1 milliGRAM(s) Oral daily  heparin   Injectable 5000 Unit(s) SubCutaneous every 8 hours  lactulose Syrup 10 Gram(s) Oral every 24 hours  melatonin 5 milliGRAM(s) Oral at bedtime  mirtazapine 7.5 milliGRAM(s) Oral at bedtime  multivitamin 1 Tablet(s) Oral daily  pantoprazole    Tablet 40 milliGRAM(s) Oral before breakfast  polyethylene glycol 3350 17 Gram(s) Oral two times a day  senna 2 Tablet(s) Oral at bedtime  traZODone 100 milliGRAM(s) Oral at bedtime    MEDICATIONS  (PRN):  acetaminophen     Tablet .. 650 milliGRAM(s) Oral every 6 hours PRN Mild Pain (1 - 3)  hydrOXYzine hydrochloride 25 milliGRAM(s) Oral three times a day PRN Anxiety  LORazepam     Tablet 1 milliGRAM(s) Oral every 8 hours PRN Anxiety      Allergies    Compazine (Unknown)  contrast media (iodine-based) (Unknown)  iv contrast (Unknown)  penicillin (Unknown)  penicillins (Other)  Toradol (Unknown)    Intolerances        LABS:                        10.2   6.78  )-----------( 426      ( 02 Jan 2023 07:51 )             34.2     01-02    138  |  103  |  11  ----------------------------<  105<H>  3.8   |  25  |  0.60    Ca    9.5      02 Jan 2023 07:51            RADIOLOGY & ADDITIONAL TESTS: Reviewed

## 2023-01-02 NOTE — PROGRESS NOTE ADULT - PROBLEM SELECTOR PLAN 2
Self-reported history of chronic anxiety, insomnia, new reported depression, benzodiazepine use disorder, ?alcohol use disorder (per prior chart), ?opiate use disorder in remission, impaired ability to logically reason. Demonstrates very poor insight into the reason for her ongoing hospitalization, with no understanding of diagnosis of new stroke, recommended additional testing and possible interventions, and no appreciation of the reported significant risks of leaving the hospital against medical advice. Additionally, pt remains highly focused on receiving benzodiazepines for vague anxiety sx and insomnia, with poor insight into her benzodiazepine use disorder (with illicit in addition to prescribed use as outpt).   - per psych, no acute safety concerns that would warrant inpatient level psychiatric care.  - pt currently LACKS capacity to leave the hospital AMA or refuse any acutely indicated testing or treatment.  - mirtazapine 7.5mg po QHS PRN for insomnia   - additionally offering trazodone for insomnia and anxiety  - hydroxyzine PRN for acute anxiety - pt cites adverse reaction to gabapentin  - is prescribed 4mg ativan outpatient, started patient on ativan 0.1mg TID PRN on 12/25, still reporting on-going anxiety and insomnia, increased to 1mg TID PRN on 12/26

## 2023-01-02 NOTE — PROGRESS NOTE ADULT - SUBJECTIVE AND OBJECTIVE BOX
Procedure: Cerebral angiogram, possible right carotid stent   Doctor: Dr. Morrison   Consent: Signed by: Mother                    NAME/NUMBER if not patient: Hanane Dietz [phone]    Compazine (Unknown)  contrast media (iodine-based) (Unknown)  iv contrast (Unknown)  penicillin (Unknown)  penicillins (Other)  Toradol (Unknown)    SUBJECTIVE:     T(C): 36.4 (01-02-23 @ 12:06), Max: 36.9 (01-01-23 @ 21:30)  HR: 87 (01-02-23 @ 12:06) (87 - 99)  BP: 116/78 (01-02-23 @ 12:06) (113/74 - 116/78)  RR: 18 (01-02-23 @ 12:06) (18 - 18)  SpO2: 96% (01-02-23 @ 12:06) (95% - 96%)  Wt(kg): --    EXAM:  Pulses:    01-02    138  |  103  |  11  ----------------------------<  105<H>  3.8   |  25  |  0.60    Ca    9.5      02 Jan 2023 07:51    CBC Full  -  ( 02 Jan 2023 07:51 )  WBC Count : 6.78 K/uL  RBC Count : 4.16 M/uL  Hemoglobin : 10.2 g/dL  Hematocrit : 34.2 %  Platelet Count - Automated : 426 K/uL  Mean Cell Volume : 82.2 fl  Mean Cell Hemoglobin : 24.5 pg  Mean Cell Hemoglobin Concentration : 29.8 gm/dL  Auto Neutrophil # : x  Auto Lymphocyte # : x  Auto Monocyte # : x  Auto Eosinophil # : x  Auto Basophil # : x  Auto Neutrophil % : x  Auto Lymphocyte % : x  Auto Monocyte % : x  Auto Eosinophil % : x  Auto Basophil % : x    Pregnancy test (serum hcg for any female under 56, must be resulted day before OR): [ ] Negative Result  [ ] Positive Result  [X] N/A : male or female>57 y/o    COVID swab (in past 48hrs): Pending     CXR: Clear   EKG: WNL   ECHO if available: < from: TTE Echo Complete w/o Contrast w/ Doppler (12.20.22 @ 10:46) >    CONCLUSIONS:     1. Small left ventricular size.   2. Normal left ventricular systolic function.   3. No obvious wallmotion abnormalities (mid inferolateral wall not   fully visualized).   4. Probably normal right ventricular systolic function.   5. No significant valvular disease.   6. Pulmonary artery systolic pressure is 18 mmHg.   7. Trivial pericardial effusion.   8. No prior echo is available for comparison.    < end of copied text >    Medical Clearances: Primary team     Heparin drip: No               If yes --> Needs to be held 2 hours prior to angiogram, order placed: []yes   []no, primary team aware []yes   []no   []n/a    Contrast allergy: [X] yes   [] no  If yes --> premedication ordered [X]y []n - PRIMARY TEAM NOTIFIED     Implanted Devices (pacemaker, drug pump...etc):  []YES   [X] NO                  If yes --> EPS consulted to interrogate device: [ ] YES  [ ] NO                            If yes -->  EPS called to let them know patient going for procedure: [ ] device needs to be turned off                                                                                                                                                 [ ] magnet needs to be placed for surgery                                                                                                                                                [ ] nothing to do per EP, may proceed with cautery use in OR                                  Assessment:  63 y/o female with h/o HTN, migraines, seizure disorder, anxiety/depression, CVA 2018 admitted with acute/subacute infarcts L MICAH territory as well as proximal L ICA high grade stenosis vs occlusion. Started on DAPT. Pre-op for angiogram with possible left carotid stent placement (1/3/23).     Plan:  - Consent obtained and in chart, all risks, benefits and alternatives discussed including risk of bleeding at access site or in the brain that can cause stroke like symptoms, infection, acute kidney injury.  - NPO after MN  - IVF once NPO   - Please obtain an active T & S   - Please obtain PT/PTT/INR   - Please pre-medicate for contrast allergy - discussed over the phone with primary team  - Please obtain covid swab     D/W Dr. Morrison       Procedure: Cerebral angiogram, possible right carotid stent   Doctor: Dr. Morrison   Consent: Signed by: Mother                    NAME/NUMBER if not patient: Hanane Dietz [phone]    Compazine (Unknown)   contrast media (iodine-based) (Unknown)   iv contrast (Unknown)   penicillin (Unknown)   penicillins (Other)   Toradol (Unknown)     SUBJECTIVE: Patient understands procedure and denies new symptoms at this time.     T(C): 36.4 (01-02-23 @ 12:06), Max: 36.9 (01-01-23 @ 21:30)  HR: 87 (01-02-23 @ 12:06) (87 - 99)  BP: 116/78 (01-02-23 @ 12:06) (113/74 - 116/78)  RR: 18 (01-02-23 @ 12:06) (18 - 18)  SpO2: 96% (01-02-23 @ 12:06) (95% - 96%)  Wt(kg): --    EXAM:  Constitutional: 63 y/o female awake, alert in no acute distress.   Eyes: Sclera anicteric, conjunctiva noninjected.   ENMT: Oropharyngeal mucosa moist, pink. Tongue midline.    Respiratory: Clear to auscultation bilaterally.   Cardiovascular: Regular rate and rhythm.   Gastrointestinal: Soft, nontender, nondistended.   Vascular: Extremities warm, no ulcers, no discoloration of skin.    Neurological: AA&O x 3, conversant, appropriate. CN II-XII grossly intact. GENTILE x 4, RUE 4+/5, RLE 3/5, LUE throughout UE/LE. Sensation intact to light touch throughout. DTRs: 2+ symmetric throughout. No dysmetria.   Skin: Warm, dry, no erythema.   Pulses: 2+ b/l DP/PT     01-02    138  |  103  |  11  ----------------------------<  105<H>  3.8   |  25  |  0.60    Ca    9.5      02 Jan 2023 07:51    CBC Full  -  ( 02 Jan 2023 07:51 )  WBC Count : 6.78 K/uL  RBC Count : 4.16 M/uL  Hemoglobin : 10.2 g/dL  Hematocrit : 34.2 %  Platelet Count - Automated : 426 K/uL  Mean Cell Volume : 82.2 fl  Mean Cell Hemoglobin : 24.5 pg  Mean Cell Hemoglobin Concentration : 29.8 gm/dL  Auto Neutrophil # : x  Auto Lymphocyte # : x  Auto Monocyte # : x  Auto Eosinophil # : x  Auto Basophil # : x  Auto Neutrophil % : x  Auto Lymphocyte % : x  Auto Monocyte % : x  Auto Eosinophil % : x  Auto Basophil % : x    Pregnancy test (serum hcg for any female under 56, must be resulted day before OR): [ ] Negative Result  [ ] Positive Result  [X] N/A : male or female>55 y/o    COVID swab (in past 48hrs): Pending     CXR: Clear   EKG: WNL   ECHO if available: < from: TTE Echo Complete w/o Contrast w/ Doppler (12.20.22 @ 10:46) >    CONCLUSIONS:     1. Small left ventricular size.   2. Normal left ventricular systolic function.   3. No obvious wallmotion abnormalities (mid inferolateral wall not   fully visualized).   4. Probably normal right ventricular systolic function.   5. No significant valvular disease.   6. Pulmonary artery systolic pressure is 18 mmHg.   7. Trivial pericardial effusion.   8. No prior echo is available for comparison.    < end of copied text >    Medical Clearances: Primary team     Heparin drip: No               If yes --> Needs to be held 2 hours prior to angiogram, order placed: []yes   []no, primary team aware []yes   []no   []n/a    Contrast allergy: [X] yes   [] no  If yes --> premedication ordered [X]y []n - PRIMARY TEAM NOTIFIED     Implanted Devices (pacemaker, drug pump...etc):  []YES   [X] NO                  If yes --> EPS consulted to interrogate device: [ ] YES  [ ] NO                            If yes -->  EPS called to let them know patient going for procedure: [ ] device needs to be turned off                                                                                                                                                 [ ] magnet needs to be placed for surgery                                                                                                                                                [ ] nothing to do per EP, may proceed with cautery use in OR                                  Assessment:  63 y/o female with h/o HTN, migraines, seizure disorder, anxiety/depression, CVA 2018 admitted with acute/subacute infarcts L MICAH territory as well as proximal L ICA high grade stenosis vs occlusion. Started on DAPT. Pre-op for angiogram with possible left carotid stent placement (1/3/23).     Plan:  - Consent obtained and in chart, all risks, benefits and alternatives discussed including risk of bleeding at access site or in the brain that can cause stroke like symptoms, infection, acute kidney injury.  - NPO after MN  - IVF once NPO   - Please order P2Y12 and platelet response aspirin   - Please obtain an active T & S   - Please obtain PT/PTT/INR   - Please pre-medicate for contrast allergy - discussed over the phone with primary team  - Please obtain covid swab     D/W Dr. Morrison

## 2023-01-03 ENCOUNTER — TRANSCRIPTION ENCOUNTER (OUTPATIENT)
Age: 63
End: 2023-01-03

## 2023-01-03 LAB
ALBUMIN SERPL ELPH-MCNC: 4.3 G/DL — SIGNIFICANT CHANGE UP (ref 3.3–5)
ALP SERPL-CCNC: 123 U/L — HIGH (ref 40–120)
ALT FLD-CCNC: 25 U/L — SIGNIFICANT CHANGE UP (ref 10–45)
ANION GAP SERPL CALC-SCNC: 10 MMOL/L — SIGNIFICANT CHANGE UP (ref 5–17)
ANION GAP SERPL CALC-SCNC: 12 MMOL/L — SIGNIFICANT CHANGE UP (ref 5–17)
APTT BLD: 29.4 SEC — SIGNIFICANT CHANGE UP (ref 27.5–35.5)
APTT BLD: >200 SEC — CRITICAL HIGH (ref 27.5–35.5)
AST SERPL-CCNC: 18 U/L — SIGNIFICANT CHANGE UP (ref 10–40)
BILIRUB SERPL-MCNC: 0.4 MG/DL — SIGNIFICANT CHANGE UP (ref 0.2–1.2)
BLD GP AB SCN SERPL QL: NEGATIVE — SIGNIFICANT CHANGE UP
BUN SERPL-MCNC: 13 MG/DL — SIGNIFICANT CHANGE UP (ref 7–23)
BUN SERPL-MCNC: 14 MG/DL — SIGNIFICANT CHANGE UP (ref 7–23)
CALCIUM SERPL-MCNC: 10.5 MG/DL — SIGNIFICANT CHANGE UP (ref 8.4–10.5)
CALCIUM SERPL-MCNC: 9.2 MG/DL — SIGNIFICANT CHANGE UP (ref 8.4–10.5)
CHLORIDE SERPL-SCNC: 105 MMOL/L — SIGNIFICANT CHANGE UP (ref 96–108)
CHLORIDE SERPL-SCNC: 107 MMOL/L — SIGNIFICANT CHANGE UP (ref 96–108)
CO2 SERPL-SCNC: 22 MMOL/L — SIGNIFICANT CHANGE UP (ref 22–31)
CO2 SERPL-SCNC: 22 MMOL/L — SIGNIFICANT CHANGE UP (ref 22–31)
CREAT SERPL-MCNC: 0.58 MG/DL — SIGNIFICANT CHANGE UP (ref 0.5–1.3)
CREAT SERPL-MCNC: 0.65 MG/DL — SIGNIFICANT CHANGE UP (ref 0.5–1.3)
EGFR: 101 ML/MIN/1.73M2 — SIGNIFICANT CHANGE UP
EGFR: 104 ML/MIN/1.73M2 — SIGNIFICANT CHANGE UP
GLUCOSE SERPL-MCNC: 132 MG/DL — HIGH (ref 70–99)
GLUCOSE SERPL-MCNC: 147 MG/DL — HIGH (ref 70–99)
HCT VFR BLD CALC: 30.1 % — LOW (ref 34.5–45)
HCT VFR BLD CALC: 37.5 % — SIGNIFICANT CHANGE UP (ref 34.5–45)
HGB BLD-MCNC: 11.2 G/DL — LOW (ref 11.5–15.5)
HGB BLD-MCNC: 9.2 G/DL — LOW (ref 11.5–15.5)
INR BLD: 1.03 — SIGNIFICANT CHANGE UP (ref 0.88–1.16)
INR BLD: 1.17 — HIGH (ref 0.88–1.16)
ISTAT ACTK (ACTIVATED CLOTTING TIME KAOLIN): 131 SEC — SIGNIFICANT CHANGE UP (ref 74–137)
ISTAT ACTK (ACTIVATED CLOTTING TIME KAOLIN): 215 SEC — HIGH (ref 74–137)
ISTAT ACTK (ACTIVATED CLOTTING TIME KAOLIN): 221 SEC — HIGH (ref 74–137)
ISTAT ACTK (ACTIVATED CLOTTING TIME KAOLIN): 221 SEC — HIGH (ref 74–137)
MAGNESIUM SERPL-MCNC: 1.7 MG/DL — SIGNIFICANT CHANGE UP (ref 1.6–2.6)
MCHC RBC-ENTMCNC: 24.3 PG — LOW (ref 27–34)
MCHC RBC-ENTMCNC: 24.6 PG — LOW (ref 27–34)
MCHC RBC-ENTMCNC: 29.9 GM/DL — LOW (ref 32–36)
MCHC RBC-ENTMCNC: 30.6 GM/DL — LOW (ref 32–36)
MCV RBC AUTO: 80.5 FL — SIGNIFICANT CHANGE UP (ref 80–100)
MCV RBC AUTO: 81.3 FL — SIGNIFICANT CHANGE UP (ref 80–100)
NRBC # BLD: 0 /100 WBCS — SIGNIFICANT CHANGE UP (ref 0–0)
NRBC # BLD: 0 /100 WBCS — SIGNIFICANT CHANGE UP (ref 0–0)
PA ADP PRP-ACNC: 26 PRU — LOW (ref 194–418)
PHOSPHATE SERPL-MCNC: 7.7 MG/DL — HIGH (ref 2.5–4.5)
PLATELET # BLD AUTO: 419 K/UL — HIGH (ref 150–400)
PLATELET # BLD AUTO: 521 K/UL — HIGH (ref 150–400)
POTASSIUM SERPL-MCNC: 3.8 MMOL/L — SIGNIFICANT CHANGE UP (ref 3.5–5.3)
POTASSIUM SERPL-MCNC: 3.9 MMOL/L — SIGNIFICANT CHANGE UP (ref 3.5–5.3)
POTASSIUM SERPL-SCNC: 3.8 MMOL/L — SIGNIFICANT CHANGE UP (ref 3.5–5.3)
POTASSIUM SERPL-SCNC: 3.9 MMOL/L — SIGNIFICANT CHANGE UP (ref 3.5–5.3)
PROT SERPL-MCNC: 7.6 G/DL — SIGNIFICANT CHANGE UP (ref 6–8.3)
PROTHROM AB SERPL-ACNC: 12.3 SEC — SIGNIFICANT CHANGE UP (ref 10.5–13.4)
PROTHROM AB SERPL-ACNC: 14 SEC — HIGH (ref 10.5–13.4)
RBC # BLD: 3.74 M/UL — LOW (ref 3.8–5.2)
RBC # BLD: 4.61 M/UL — SIGNIFICANT CHANGE UP (ref 3.8–5.2)
RBC # FLD: 25.7 % — HIGH (ref 10.3–14.5)
RBC # FLD: 25.7 % — HIGH (ref 10.3–14.5)
RH IG SCN BLD-IMP: POSITIVE — SIGNIFICANT CHANGE UP
SODIUM SERPL-SCNC: 139 MMOL/L — SIGNIFICANT CHANGE UP (ref 135–145)
SODIUM SERPL-SCNC: 139 MMOL/L — SIGNIFICANT CHANGE UP (ref 135–145)
WBC # BLD: 5.81 K/UL — SIGNIFICANT CHANGE UP (ref 3.8–10.5)
WBC # BLD: 6.43 K/UL — SIGNIFICANT CHANGE UP (ref 3.8–10.5)
WBC # FLD AUTO: 5.81 K/UL — SIGNIFICANT CHANGE UP (ref 3.8–10.5)
WBC # FLD AUTO: 6.43 K/UL — SIGNIFICANT CHANGE UP (ref 3.8–10.5)

## 2023-01-03 PROCEDURE — 99232 SBSQ HOSP IP/OBS MODERATE 35: CPT

## 2023-01-03 PROCEDURE — 99291 CRITICAL CARE FIRST HOUR: CPT

## 2023-01-03 PROCEDURE — 99233 SBSQ HOSP IP/OBS HIGH 50: CPT

## 2023-01-03 RX ORDER — ACETAMINOPHEN 500 MG
750 TABLET ORAL ONCE
Refills: 0 | Status: COMPLETED | OUTPATIENT
Start: 2023-01-03 | End: 2023-01-03

## 2023-01-03 RX ORDER — MAGNESIUM SULFATE 500 MG/ML
1 VIAL (ML) INJECTION ONCE
Refills: 0 | Status: COMPLETED | OUTPATIENT
Start: 2023-01-03 | End: 2023-01-03

## 2023-01-03 RX ORDER — OXYCODONE HYDROCHLORIDE 5 MG/1
5 TABLET ORAL EVERY 4 HOURS
Refills: 0 | Status: DISCONTINUED | OUTPATIENT
Start: 2023-01-03 | End: 2023-01-06

## 2023-01-03 RX ADMIN — Medication 1 MILLIGRAM(S): at 09:41

## 2023-01-03 RX ADMIN — Medication 1 MILLIGRAM(S): at 12:23

## 2023-01-03 RX ADMIN — Medication 32 MILLIGRAM(S): at 10:38

## 2023-01-03 RX ADMIN — Medication 50 MILLIGRAM(S): at 13:53

## 2023-01-03 RX ADMIN — OXYCODONE HYDROCHLORIDE 5 MILLIGRAM(S): 5 TABLET ORAL at 23:47

## 2023-01-03 RX ADMIN — ATORVASTATIN CALCIUM 80 MILLIGRAM(S): 80 TABLET, FILM COATED ORAL at 22:17

## 2023-01-03 RX ADMIN — SENNA PLUS 2 TABLET(S): 8.6 TABLET ORAL at 22:18

## 2023-01-03 RX ADMIN — OXYCODONE HYDROCHLORIDE 5 MILLIGRAM(S): 5 TABLET ORAL at 22:54

## 2023-01-03 RX ADMIN — Medication 300 MILLIGRAM(S): at 10:09

## 2023-01-03 RX ADMIN — Medication 5 MILLIGRAM(S): at 22:18

## 2023-01-03 RX ADMIN — Medication 650 MILLIGRAM(S): at 21:05

## 2023-01-03 RX ADMIN — Medication 50 MILLIGRAM(S): at 11:12

## 2023-01-03 RX ADMIN — Medication 650 MILLIGRAM(S): at 22:21

## 2023-01-03 RX ADMIN — Medication 81 MILLIGRAM(S): at 11:50

## 2023-01-03 RX ADMIN — Medication 1 TABLET(S): at 12:23

## 2023-01-03 RX ADMIN — PANTOPRAZOLE SODIUM 40 MILLIGRAM(S): 20 TABLET, DELAYED RELEASE ORAL at 07:07

## 2023-01-03 RX ADMIN — CLOPIDOGREL BISULFATE 75 MILLIGRAM(S): 75 TABLET, FILM COATED ORAL at 11:50

## 2023-01-03 RX ADMIN — Medication 750 MILLIGRAM(S): at 12:20

## 2023-01-03 RX ADMIN — LACTULOSE 10 GRAM(S): 10 SOLUTION ORAL at 07:04

## 2023-01-03 NOTE — PROGRESS NOTE ADULT - SUBJECTIVE AND OBJECTIVE BOX
INTERVAL HISTORY: HPI:  62F w/ PMH HTN, migraine, seizure d/o, depression-anxiety, per patient multiiple CTA in September w/ complaints of fall. Patient states that fall occurred while home, when syncopized, with LOC with head trauma. Pt reports no alcohol use, never smoker, received HHA 4 hours daily (every day). Has two adult children (one of whome is  and one living parent). Patient is a poor historian, does not recall exact events leading to fall. Patient also notes the need to ambulate via wheelchair in home. States to have been admitted to acute rehab s/p CVA in fall of . States to be "addicted to valium" utilizing 1 10mg tablet once every other day. States health aide is not with her all the time. Patient notes no continued need for antihypertensives (on multiple previously) as they caused dizziness. Notes that dizziness has not resolved and notes room spinning with changes in head position.     Initial vital signs: T: 98 F, HR: 93, BP: 110/71, R: 16, SpO2: 100% on RA    Labs: significant for WBC 12.91, Hgb 8, MCV 69, Plt 567, aPTT 25.1, K 3 s/p 40 meq PO in ED, Cr 0.67, remainder of CMP WNL, CECILIA <10,     CXR: No acute pulmonary pathology. Dextroscholiosis noted.     CT C-spine: Ankylosis of the C4 and C5 vertebrae is noted. There is moderate multilevel degenerative disc disease. No acute osseous abnormality of the cervical spine.    CTH: No acute intracranial hemorrhage or calvarial fracture. Subacute/chronic bilateral MICAH territory infarcts.    CT T-spine: No acute fracture. Patchy bilateral pulmonary nodular opacities, likely infectious/inflammatory. Moderate hiatal hernia.    EKG:     Medications: tylenol 750mg PO x1, KCl 40meq x1    Consults: none  (20 Dec 2022 04:52)      MEDICATIONS  (STANDING):  aspirin  chewable 81 milliGRAM(s) Oral daily  atorvastatin 80 milliGRAM(s) Oral at bedtime  clopidogrel Tablet 75 milliGRAM(s) Oral daily  ferrous    sulfate 325 milliGRAM(s) Oral <User Schedule>  folic acid 1 milliGRAM(s) Oral daily  lactulose Syrup 10 Gram(s) Oral every 24 hours  melatonin 5 milliGRAM(s) Oral at bedtime  mirtazapine 7.5 milliGRAM(s) Oral at bedtime  multivitamin 1 Tablet(s) Oral daily  pantoprazole    Tablet 40 milliGRAM(s) Oral before breakfast  polyethylene glycol 3350 17 Gram(s) Oral two times a day  senna 2 Tablet(s) Oral at bedtime  traZODone 50 milliGRAM(s) Oral every 24 hours  traZODone 100 milliGRAM(s) Oral at bedtime    MEDICATIONS  (PRN):  acetaminophen     Tablet .. 650 milliGRAM(s) Oral every 6 hours PRN Mild Pain (1 - 3)  hydrOXYzine hydrochloride 25 milliGRAM(s) Oral three times a day PRN Anxiety  LORazepam     Tablet 1 milliGRAM(s) Oral every 8 hours PRN Anxiety  oxyCODONE    IR 5 milliGRAM(s) Oral every 4 hours PRN Moderate Pain (4 - 6)      Drug Dosing Weight  Height (cm): 160 (2023 15:15)  Weight (kg): 49.9 (2023 15:15)  BMI (kg/m2): 19.5 (2023 15:15)  BSA (m2): 1.5 (2023 15:15)    PAST MEDICAL & SURGICAL HISTORY:  Seizures      Migraine      Hip pain, left      Depression      Anxiety      Eating disorder      Bipolar illness      PTSD (post-traumatic stress disorder)      S/P  section          REVIEW OF SYSTEMS: [ ] Unable to Assess due to neurologic exam   [ ] All ROS addressed below are non-contributory, except:  Neuro: [ ] Headache [ ] Back pain [ ] Numbness [ ] Weakness [ ] Ataxia [ ] Dizziness [ ] Aphasia [ ] Dysarthria [ ] Visual disturbance  Resp: [ ] Shortness of breath/dyspnea, [ ] Orthopnea [ ] Cough  CV: [ ] Chest pain [ ] Palpitation [ ] Lightheadedness [ ] Syncope  Renal: [ ] Thirst [ ] Edema  GI: [ ] Nausea [ ] Emesis [ ] Abdominal pain [ ] Constipation [ ] Diarrhea  Hem: [ ] Hematemesis [ ] bright red blood per rectum  ID: [ ] Fever [ ] Chills [ ] Dysuria  ENT: [ ] Rhinorrhea    PHYSICAL EXAM:    General: No Acute Distress     Neurological:aox3, follows commands, RLE 4/5, EF/PF 4+/5, RLE +drift,     Pulmonary: Clear to Auscultation, No Rales, No Rhonchi, No Wheezes     Cardiovascular: S1, S2, Regular Rate and Rhythm     Gastrointestinal: Soft, Nontender, Nondistended     Extremities: No calf tenderness     Incision:     ICU Vital Signs Last 24 Hrs  T(C): 36.4 (2023 19:53), Max: 36.6 (2023 06:55)  T(F): 97.5 (2023 19:53), Max: 97.8 (2023 06:55)  HR: 81 (2023 02:00) (80 - 108)  BP: 102/76 (2023 02:00) (95/57 - 151/85)  BP(mean): 86 (2023 02:00) (71 - 110)  ABP: 109/59 (2023 02:00) (109/55 - 145/70)  ABP(mean): 79 (2023 02:00) (76 - 98)  RR: 28 (2023 02:00) (18 - 33)  SpO2: 96% (2023 02:00) (93% - 99%)    O2 Parameters below as of 2023 02:00  Patient On (Oxygen Delivery Method): room air            I&O's Detail    2023 07:01  -  2023 02:16  --------------------------------------------------------  IN:  Total IN: 0 mL    OUT:    Indwelling Catheter - Urethral (mL): 815 mL  Total OUT: 815 mL    Total NET: -815 mL              LABS:  CBC Full  -  ( 2023 21:57 )  WBC Count : 6.43 K/uL  RBC Count : 3.74 M/uL  Hemoglobin : 9.2 g/dL  Hematocrit : 30.1 %  Platelet Count - Automated : 419 K/uL  Mean Cell Volume : 80.5 fl  Mean Cell Hemoglobin : 24.6 pg  Mean Cell Hemoglobin Concentration : 30.6 gm/dL  Auto Neutrophil # : x  Auto Lymphocyte # : x  Auto Monocyte # : x  Auto Eosinophil # : x  Auto Basophil # : x  Auto Neutrophil % : x  Auto Lymphocyte % : x  Auto Monocyte % : x  Auto Eosinophil % : x  Auto Basophil % : x        139  |  107  |  14  ----------------------------<  132<H>  3.8   |  22  |  0.65    Ca    9.2      2023 21:57  Phos  7.7       Mg     1.7         TPro  7.6  /  Alb  4.3  /  TBili  0.4  /  DBili  x   /  AST  18  /  ALT  25  /  AlkPhos  123<H>      PT/INR - ( 2023 21:57 )   PT: 14.0 sec;   INR: 1.17          PTT - ( 2023 21:57 )  PTT:>200.0 sec      RADIOLOGY & ADDITIONAL STUDIES:

## 2023-01-03 NOTE — PROGRESS NOTE ADULT - SUBJECTIVE AND OBJECTIVE BOX
Physical Medicine and Rehabilitation Progress Note :       Patient is a 62y old  Female who presents with a chief complaint of mechanical fall (2023 10:51)      HPI:  62F w/ PMH HTN, migraine, seizure d/o, depression-anxiety, per patient multiiple CTA in September w/ complaints of fall. Patient states that fall occurred while home, when syncopized, with LOC with head trauma. Pt reports no alcohol use, never smoker, received HHA 4 hours daily (every day). Has two adult children (one of whome is  and one living parent). Patient is a poor historian, does not recall exact events leading to fall. Patient also notes the need to ambulate via wheelchair in home. States to have been admitted to acute rehab s/p CVA in fall of . States to be "addicted to valium" utilizing 1 10mg tablet once every other day. States health aide is not with her all the time. Patient notes no continued need for antihypertensives (on multiple previously) as they caused dizziness. Notes that dizziness has not resolved and notes room spinning with changes in head position.     Initial vital signs: T: 98 F, HR: 93, BP: 110/71, R: 16, SpO2: 100% on RA    Labs: significant for WBC 12.91, Hgb 8, MCV 69, Plt 567, aPTT 25.1, K 3 s/p 40 meq PO in ED, Cr 0.67, remainder of CMP WNL, CECILIA <10,     CXR: No acute pulmonary pathology. Dextroscholiosis noted.     CT C-spine: Ankylosis of the C4 and C5 vertebrae is noted. There is moderate multilevel degenerative disc disease. No acute osseous abnormality of the cervical spine.    CTH: No acute intracranial hemorrhage or calvarial fracture. Subacute/chronic bilateral MICAH territory infarcts.    CT T-spine: No acute fracture. Patchy bilateral pulmonary nodular opacities, likely infectious/inflammatory. Moderate hiatal hernia.    EKG:     Medications: tylenol 750mg PO x1, KCl 40meq x1    Consults: none  (20 Dec 2022 04:52)                            11.2   5.81  )-----------( 521      ( 2023 05:30 )             37.5       01-03    139  |  105  |  13  ----------------------------<  147<H>  3.9   |  22  |  0.58    Ca    10.5      2023 05:30    TPro  7.6  /  Alb  4.3  /  TBili  0.4  /  DBili  x   /  AST  18  /  ALT  25  /  AlkPhos  123<H>  -03    Vital Signs Last 24 Hrs  T(C): 36.6 (2023 11:54), Max: 36.8 (2023 19:07)  T(F): 97.8 (2023 11:54), Max: 98.2 (2023 19:07)  HR: 104 (2023 11:54) (99 - 108)  BP: 111/74 (2023 11:54) (107/70 - 111/74)  BP(mean): --  RR: 18 (2023 11:54) (18 - 19)  SpO2: 96% (2023 11:54) (95% - 100%)    Parameters below as of 2023 11:54  Patient On (Oxygen Delivery Method): room air        MEDICATIONS  (STANDING):  aspirin  chewable 81 milliGRAM(s) Oral daily  atorvastatin 80 milliGRAM(s) Oral at bedtime  clopidogrel Tablet 75 milliGRAM(s) Oral daily  ferrous    sulfate 325 milliGRAM(s) Oral <User Schedule>  folic acid 1 milliGRAM(s) Oral daily  heparin   Injectable 5000 Unit(s) SubCutaneous every 8 hours  lactulose Syrup 10 Gram(s) Oral every 24 hours  melatonin 5 milliGRAM(s) Oral at bedtime  mirtazapine 7.5 milliGRAM(s) Oral at bedtime  multivitamin 1 Tablet(s) Oral daily  pantoprazole    Tablet 40 milliGRAM(s) Oral before breakfast  polyethylene glycol 3350 17 Gram(s) Oral two times a day  senna 2 Tablet(s) Oral at bedtime  traZODone 50 milliGRAM(s) Oral every 24 hours  traZODone 100 milliGRAM(s) Oral at bedtime    MEDICATIONS  (PRN):  acetaminophen     Tablet .. 650 milliGRAM(s) Oral every 6 hours PRN Mild Pain (1 - 3)  hydrOXYzine hydrochloride 25 milliGRAM(s) Oral three times a day PRN Anxiety  LORazepam     Tablet 1 milliGRAM(s) Oral every 8 hours PRN Anxiety         Physical / Occupational Therapy Functional Status Assessment :   2023    Cognitive/Neuro/Behavioral  Cognitive/Neuro/Behavioral [WDL Definition: Alert; opens eyes spontaneously; arouses to voice or touch; oriented x 4; follows commands; speech spontaneous, logical; purposeful motor response; behavior appropriate to situation]: WDL except  Level of Consciousness: alert  Mood/Behavior: anxious    Language Assistance  Preferred Language to Address Healthcare Preferred Language to Address Healthcare: English    Therapeutic Interventions      Bed Mobility  Bed Mobility Training Sit-to-Supine: minimum assist (75% patient effort);  verbal cues;  1 person assist  Bed Mobility Training Supine-to-Sit: minimum assist (75% patient effort);  verbal cues;  1 person assist  Bed Mobility Training Limitations: decreased ability to use legs for bridging/pushing;  impaired ability to control trunk for mobility;  decreased strength;  impaired balance;  abnormal muscle tone;  impaired coordination;  impaired postural control    Sit-Stand Transfer Training  Transfer Training Sit-to-Stand Transfer: minimum assist (75% patient effort);  2 person assist;  verbal cues;  rolling walker  Transfer Training Stand-to-Sit Transfer: minimum assist (75% patient effort);  verbal cues;  2 person assist;  rolling walker  Sit-to-Stand Transfer Training Transfer Safety Analysis: decreased balance;  abnormal muscle tone;  decreased strength;  impaired balance;  impaired coordination    Therapeutic Exercise  Therapeutic Exercise Detail: pt dangled at EOB with CGA for trunk control at times. Pt standing tolerance ~15 sec 3 trials with RW difficulty to take steps 2/2 increased tone in R LE.difficulty to assess R LE strength 2/2 increased tone and flexion synergy at times. Pt with mild impairment R UE coordination.       Therapeutic Exercise  Therapeutic Exercise Detail: Pt performed R LE therex; with Max A to perform R knee flexion/extension 2/2 decrease motor control with rigidity and seemingly muscle spasms     Neuromuscular Re-education  Neuromuscular Re-education Detail: Performed R knee flexion/extension x5 with Max A, while standing with 1 person assist additional assist required to assist with R LE manipulation and advancement                 PM&R Impression : as above    Current Disposition Plan Recommendations :    acute rehab placement

## 2023-01-03 NOTE — PROGRESS NOTE ADULT - SUBJECTIVE AND OBJECTIVE BOX
INTERVAL HPI/OVERNIGHT EVENTS: SYED O/N    SUBJECTIVE: Patient seen and examined at bedside.   Pt's mother at bedside  Pt feels well overall. Reports difficulty sometimes controlling her R leg and feeling she has spasms that cause it to be in abduction. She denies any new weakness or numbness. Does reports b/l headaches that are unchanged. No tremors. Does report less than ideal diet and some recent weight loss. But no fever, chills, sweats, chest pain, dyspnea, N/V/Abd pain, dysuria, diarrhea.     Reports seeing psychiatrist Dr. Faria (unknown 1st name) in the [place] ([address] who prescribes her ativan. Usually takes 1-2 per day as needed. Does report smoking within last few months but states she has cut down significantly. Denies EtOH use.     OBJECTIVE:    VITAL SIGNS:  ICU Vital Signs Last 24 Hrs  T(C): 36.6 (03 Jan 2023 11:54), Max: 36.8 (02 Jan 2023 19:07)  T(F): 97.8 (03 Jan 2023 11:54), Max: 98.2 (02 Jan 2023 19:07)  HR: 104 (03 Jan 2023 11:54) (99 - 108)  BP: 111/74 (03 Jan 2023 11:54) (107/70 - 111/74)  BP(mean): --  ABP: --  ABP(mean): --  RR: 18 (03 Jan 2023 11:54) (18 - 19)  SpO2: 96% (03 Jan 2023 11:54) (95% - 100%)    O2 Parameters below as of 03 Jan 2023 11:54  Patient On (Oxygen Delivery Method): room air              CAPILLARY BLOOD GLUCOSE          PHYSICAL EXAM:  GEN: female in NAD on RA  HEENT: NC/AT, MMM  CV: RRR, nml S1S2, no murmurs  PULM: nml effort, CTAB  ABD: Soft, non-distended, NABS, non-tender  NEURO  CN II-XI grossly intact  A/O x3, moving all extremities  RLE - decreased passive ROM. 5/5 in plantar/dorsiflexion b/l. 5/5 throughout LLE, BUE.   No tremors  Sensation intact  PSYCH: Appropriate      MEDICATIONS:  MEDICATIONS  (STANDING):  aspirin  chewable 81 milliGRAM(s) Oral daily  atorvastatin 80 milliGRAM(s) Oral at bedtime  clopidogrel Tablet 75 milliGRAM(s) Oral daily  ferrous    sulfate 325 milliGRAM(s) Oral <User Schedule>  folic acid 1 milliGRAM(s) Oral daily  lactulose Syrup 10 Gram(s) Oral every 24 hours  melatonin 5 milliGRAM(s) Oral at bedtime  mirtazapine 7.5 milliGRAM(s) Oral at bedtime  multivitamin 1 Tablet(s) Oral daily  pantoprazole    Tablet 40 milliGRAM(s) Oral before breakfast  polyethylene glycol 3350 17 Gram(s) Oral two times a day  senna 2 Tablet(s) Oral at bedtime  traZODone 50 milliGRAM(s) Oral every 24 hours  traZODone 100 milliGRAM(s) Oral at bedtime    MEDICATIONS  (PRN):  acetaminophen     Tablet .. 650 milliGRAM(s) Oral every 6 hours PRN Mild Pain (1 - 3)  hydrOXYzine hydrochloride 25 milliGRAM(s) Oral three times a day PRN Anxiety  LORazepam     Tablet 1 milliGRAM(s) Oral every 8 hours PRN Anxiety      ALLERGIES:  Allergies    Compazine (Unknown)  contrast media (iodine-based) (Unknown)  iv contrast (Unknown)  penicillin (Unknown)  penicillins (Other)  Toradol (Unknown)    Intolerances        LABS:                        11.2   5.81  )-----------( 521      ( 03 Jan 2023 05:30 )             37.5     01-03    139  |  105  |  13  ----------------------------<  147<H>  3.9   |  22  |  0.58    Ca    10.5      03 Jan 2023 05:30    TPro  7.6  /  Alb  4.3  /  TBili  0.4  /  DBili  x   /  AST  18  /  ALT  25  /  AlkPhos  123<H>  01-03    PT/INR - ( 03 Jan 2023 05:30 )   PT: 12.3 sec;   INR: 1.03          PTT - ( 03 Jan 2023 05:30 )  PTT:29.4 sec      RADIOLOGY & ADDITIONAL TESTS: Reviewed.

## 2023-01-03 NOTE — PRE-ANESTHESIA EVALUATION ADULT - NSANTHPMHFT_GEN_ALL_CORE
62F c/o recurrent falls, sz d/o, depression, anxiety, p/w recurrent falls, found to have new and old strokes on imaging, as well as severe L ICA stenosis/occlusion and severe MICAH stenosis/occlusion; also w/ HANH (but no bleeding sx) responsive to transfusions (need EGD-[place] outpatient), For stent placement with neurosurgery 1/3

## 2023-01-03 NOTE — PROGRESS NOTE ADULT - SUBJECTIVE AND OBJECTIVE BOX
Patient is a 62y old  Female who presents with a chief complaint of mechanical fall (03 Jan 2023 10:51)      INTERVAL HPI/OVERNIGHT EVENTS:  Patient seen and examined at bedside. No acute events overnight. This morning, pt lying in bed, in NAD. Reporting diffuse headache, gave IV tylenol and magnesium sulfate. Otherwise no acute medical complaints. Neurologic exam unchanged from yesterday. Plan for angiogram & possible ICA stent w/ NSGY today.    Denies fever, chills, dizziness, lightheadedness, CP, SOB, palpitations, abdominal pain, n/v, changes in bowel/urinary habits, numbness, or tingling. 12pt ROS otherwise negative.      FAMILY HISTORY:  No pertinent family history in first degree relatives        VITAL SIGNS:  T(C): 36.6 (01-03-23 @ 06:55), Max: 36.8 (01-02-23 @ 19:07)  HR: 108 (01-03-23 @ 06:55) (87 - 108)  BP: 109/60 (01-03-23 @ 06:55) (107/70 - 116/78)  RR: 18 (01-03-23 @ 06:55) (18 - 19)  SpO2: 95% (01-03-23 @ 06:55) (95% - 100%)  Wt(kg): --Vital Signs Last 24 Hrs  T(C): 36.6 (03 Jan 2023 06:55), Max: 36.8 (02 Jan 2023 19:07)  T(F): 97.8 (03 Jan 2023 06:55), Max: 98.2 (02 Jan 2023 19:07)  HR: 108 (03 Jan 2023 06:55) (87 - 108)  BP: 109/60 (03 Jan 2023 06:55) (107/70 - 116/78)  BP(mean): --  RR: 18 (03 Jan 2023 06:55) (18 - 19)  SpO2: 95% (03 Jan 2023 06:55) (95% - 100%)    Parameters below as of 03 Jan 2023 06:55  Patient On (Oxygen Delivery Method): room air      Compazine (Unknown)  contrast media (iodine-based) (Unknown)  iv contrast (Unknown)  penicillin (Unknown)  penicillins (Other)  Toradol (Unknown)      PHYSICAL EXAM:  General: No acute distress, awake and alert  Eyes: Anicteric sclerae, moist conjunctivae, see below for CNs  Neck: trachea midline, FROM, supple, no thyromegaly or lymphadenopathy  Cardiovascular: Regular rate and rhythm, no murmurs, rubs, or gallops. No carotid bruits.   Pulmonary: Anterior breath sounds clear bilaterally, no crackles or wheezing. No use of accessory muscles  GI: Abdomen soft, non-distended, non-tender  Extremities: Radial and DP pulses +2, no edema    Neurologic:  -Mental status: Awake, alert, oriented to person, place, and time. Speech is fluent with intact naming, repetition, and comprehension, no dysarthria. Follows commands. Attention/concentration intact. Forgetful.  -Cranial nerves:   II: Visual fields are full to confrontation.  III, IV, VI: Extraocular movements are intact without nystagmus. Pupils equally round and reactive to light.  V:  Facial sensation V1-V3 equal and intact   VII: Face is symmetric with normal eye closure and smile  VIII: Hearing is bilaterally intact   XII: Tongue protrudes midline  Motor: Normal bulk and tone. RUE/RLE 4/5, no drift noted. Unable to voluntarily extend right leg. LUE/ LLE : 5/5.   Sensation: Intact to light touch bilaterally. No neglect or extinction on double simultaneous testing.  Coordination: No dysmetria on finger-to-nose out of proportion to weakness.   Gait: Deferred.    Consultant(s) Notes Reviewed:  [x ] YES  [ ] NO  Care Discussed with Consultants/Other Providers [ x] YES  [ ] NO    LABS:                        11.2   5.81  )-----------( 521      ( 03 Jan 2023 05:30 )             37.5     01-03    139  |  105  |  13  ----------------------------<  147<H>  3.9   |  22  |  0.58    Ca    10.5      03 Jan 2023 05:30    TPro  7.6  /  Alb  4.3  /  TBili  0.4  /  DBili  x   /  AST  18  /  ALT  25  /  AlkPhos  123<H>  01-03      PT/INR - ( 03 Jan 2023 05:30 )   PT: 12.3 sec;   INR: 1.03          PTT - ( 03 Jan 2023 05:30 )  PTT:29.4 sec  CAPILLARY BLOOD GLUCOSE                              I & O Summary:      Microbiology:        RADIOLOGY, EKG AND ADDITIONAL TESTS: Reviewed.      RADIOLOGY & ADDITIONAL TESTS:    Imaging Personally Reviewed:  [ ] YES  [ ] NO  acetaminophen     Tablet .. 650 milliGRAM(s) Oral every 6 hours PRN  aspirin  chewable 81 milliGRAM(s) Oral daily  atorvastatin 80 milliGRAM(s) Oral at bedtime  clopidogrel Tablet 75 milliGRAM(s) Oral daily  ferrous    sulfate 325 milliGRAM(s) Oral <User Schedule>  folic acid 1 milliGRAM(s) Oral daily  heparin   Injectable 5000 Unit(s) SubCutaneous every 8 hours  hydrOXYzine hydrochloride 25 milliGRAM(s) Oral three times a day PRN  lactulose Syrup 10 Gram(s) Oral every 24 hours  LORazepam     Tablet 1 milliGRAM(s) Oral every 8 hours PRN  magnesium sulfate  IVPB 1 Gram(s) IV Intermittent once  melatonin 5 milliGRAM(s) Oral at bedtime  mirtazapine 7.5 milliGRAM(s) Oral at bedtime  multivitamin 1 Tablet(s) Oral daily  pantoprazole    Tablet 40 milliGRAM(s) Oral before breakfast  polyethylene glycol 3350 17 Gram(s) Oral two times a day  senna 2 Tablet(s) Oral at bedtime  traZODone 50 milliGRAM(s) Oral every 24 hours  traZODone 100 milliGRAM(s) Oral at bedtime      HEALTH ISSUES - PROBLEM Dx:  CVA (cerebrovascular accident)    Anxiety and depression    HLD (hyperlipidemia)    Iron deficiency anemia    Preop examination    Prediabetes    Insomnia    Hepatitis C antibody test positive    Contrast media allergy

## 2023-01-03 NOTE — CHART NOTE - NSCHARTNOTEFT_GEN_A_CORE
Admitting Diagnosis:   Patient is a 62y old  Female who presents with a chief complaint of mechanical fall (2023 14:51)      PAST MEDICAL & SURGICAL HISTORY:  Seizures      Migraine      Hip pain, left      Depression      Anxiety      Eating disorder      Bipolar illness      PTSD (post-traumatic stress disorder)      S/P  section          Current Nutrition Order: NPO (Regular diet)    PO Intake: Good (%) [  ]  Fair (50-75%) [   ] Poor (<25%) [   ] -N/A NPO    GI Issues: No N/V/D, bowel regimen in place, required enema    Pain: No pain noted    Skin Integrity: Yariel 17, no pressure ulcers    Labs:       139  |  105  |  13  ----------------------------<  147<H>  3.9   |  22  |  0.58    Ca    10.5      2023 05:30    TPro  7.6  /  Alb  4.3  /  TBili  0.4  /  DBili  x   /  AST  18  /  ALT  25  /  AlkPhos  123<H>      CAPILLARY BLOOD GLUCOSE          Medications:  MEDICATIONS  (STANDING):  aspirin  chewable 81 milliGRAM(s) Oral daily  atorvastatin 80 milliGRAM(s) Oral at bedtime  clopidogrel Tablet 75 milliGRAM(s) Oral daily  ferrous    sulfate 325 milliGRAM(s) Oral <User Schedule>  folic acid 1 milliGRAM(s) Oral daily  lactulose Syrup 10 Gram(s) Oral every 24 hours  melatonin 5 milliGRAM(s) Oral at bedtime  mirtazapine 7.5 milliGRAM(s) Oral at bedtime  multivitamin 1 Tablet(s) Oral daily  pantoprazole    Tablet 40 milliGRAM(s) Oral before breakfast  polyethylene glycol 3350 17 Gram(s) Oral two times a day  senna 2 Tablet(s) Oral at bedtime  traZODone 50 milliGRAM(s) Oral every 24 hours  traZODone 100 milliGRAM(s) Oral at bedtime    MEDICATIONS  (PRN):  acetaminophen     Tablet .. 650 milliGRAM(s) Oral every 6 hours PRN Mild Pain (1 - 3)  hydrOXYzine hydrochloride 25 milliGRAM(s) Oral three times a day PRN Anxiety  LORazepam     Tablet 1 milliGRAM(s) Oral every 8 hours PRN Anxiety      Admission Anthropometrics:  Height for BMI (CENTIMETERS)	160 Centimeter(s)  Weight for BMI (lbs)	110 lb  Weight for BMI (kg)	49.9 kg  Body Mass Index	19.4      Estimated energy needs:   Calorie needs: 25-30kcal/kg (1247-1497kcal)  Protein needs: 1.1-1.3g/kg (55-65g)  Fluid needs: 25-30ml/kg (1247-1497ml)  Actual body weight used to calculate needs due to pt's current body weight within % ideal body weight; adjusted for s/p surgery    Subjective:   62F c/o recurrent falls, sz d/o, depression, anxiety, p/w recurrent falls, found to have new and old strokes on imaging, as well as severe L ICA stenosis/occlusion and severe MICAH stenosis/occlusion; also w/ HANH (but no bleeding sx) responsive to transfusions (need EGD-[place] outpatient), For stent placement with neurosurgery 1/3    Pt off floor for procedure. Continues on regular diet, previously with fair-good PO intake. Will continue to follow intake, adjust nutrition plan PRN.     Previous Nutrition Diagnosis:  No active nutrition diagnosis    New PES:  Increased nutrient needs (protein) related to clinical course, carotid artery stent placement as evidenced by increased metabolic demand for protein    Goal: Consistently meet >75% EER    Recommendations:  -Continue current diet  -Encourage good PO intake   -Honor food preferences as able  -Monitor chemistry, GI fxn, and skin integrity    Risk Level: High [   ] Moderate [ X ] Low [   ]

## 2023-01-03 NOTE — PROGRESS NOTE ADULT - ASSESSMENT
Assessment: 62F hx HTN, seizure, depression/anxiety, CVA admit for acute/subacute infarcts in L MICAH Territory and L parieto temporal region with mass effect without herniation now s/p balloon angioplasty & stenting of L ICA     NEURO:  CVA s/p angioplasty & stent   -c/w asa 81mg, plavix 75mg, atorvastatin   -neuro check q1  -pain management w/ tylenol & oxycodone  -PT/OT evaluation  -stroke following     PSYCH:  anxiety/depression   -c/w ativan 1mg q8 PRN, trazodone 50/100, mirtazapine  BH following  - lacks capacity to make medical decisions.     PULMONARY:  saturating well on RA,   -continue to monitor on pulse o2   -incentive spirometry 10q/hr when awake     CARDIOVASCULAR:  monitor on telemetry   vitals q1  sbp goal 100-160    GASTROENTEROLOGY:  bedside speech & swallow if pass can start advancing diet as tolerated.   ensure BMs w/ Miralax & senna  GI ppx : Protonix 40mg qd  Daily stool count, LBM prior to arrival    RENAL/:  -check BMP qd  -strict i/o's ; c/w Galvan d/c in AM   -Na goal 135-145    HEME/ONC:  DVT ppx: will hold chemical dvt ppx in setting of recent operation.  b/l SCDs    INFECTIOUS:   Monitor for fevers   post operative antibiotic w/ ancef       Patient is critically ill, requiring critical care services.     I have personally and independently provided [ 35  ] minutes of critical care services.  This excludes any time spent on separate procedures or teaching.

## 2023-01-03 NOTE — PROGRESS NOTE ADULT - ASSESSMENT
per Neurology    62 y o F w/ PMH HTN (not on any meds), migraines, seizure disorder, depression/anxiety, prior stroke hx in 2018 presented to [facility] ER for frequent falls to [facility] ER found to have acute/ sub acute infarcts in L MIC[place] and L parieto temporal region with mass effect without herniation i/s/o B/L A2 HGS/occlusion and Prox L ICA HGS vs occlusion. Started on DAPT and high intensity statin after Hgb stable. Currently stepped down to 7Wo (under Dr. Bland) for further management that includes MRA neck w/ contrast to further characterize ICA for any surgical intervention.       Problem/Plan - 1:  ·  Problem: CVA (cerebrovascular accident).   ·  Plan: MRA H/N: Acute/ Subacute infarction L MICAH Territory and L parieto temporal region w/local mass effect. B/L A2 segment HGS/ Occlusion and L Proximal ICA HGS Vs Occlusion.  - HCT Results: Subacute/ chronic B/L MICAH territory infarcts.  - B/l carotid ultrasound significant for 50-69% stenosis mid LEFT internal carotid artery per NASCET peak systolic velocity measurements due to noncalcified atheromatous plaque.  - MRA neck w/ contrast showed focal severe stenosis of the proximal L ICA.    Plan:  - continue aspirin 81mg daily  - continue plavix 75mg (started 12/27 H/H stable)  - continue atorvastatin 80mg daily  - Stroke education  - f/u NSGY recs: plan for angiogram and stent.    Problem/Plan - 2:  ·  Problem: Anxiety and depression.   ·  Plan: Self-reported history of chronic anxiety, insomnia, new reported depression, benzodiazepine use disorder, ?alcohol use disorder (per prior chart), ?opiate use disorder in remission, impaired ability to logically reason. Demonstrates very poor insight into the reason for her ongoing hospitalization, with no understanding of diagnosis of new stroke, recommended additional testing and possible interventions, and no appreciation of the reported significant risks of leaving the hospital against medical advice. Additionally, pt remains highly focused on receiving benzodiazepines for vague anxiety sx and insomnia, with poor insight into her benzodiazepine use disorder (with illicit in addition to prescribed use as outpt).   - per psych, no acute safety concerns that would warrant inpatient level psychiatric care.  - pt currently LACKS capacity to leave the hospital AMA or refuse any acutely indicated testing or treatment.  - mirtazapine 7.5mg po QHS PRN for insomnia   - additionally offering trazodone for insomnia and anxiety  - hydroxyzine PRN for acute anxiety - pt cites adverse reaction to gabapentin  - is prescribed 4mg ativan outpatient, started patient on ativan 0.1mg TID PRN on 12/25, still reporting on-going anxiety and insomnia, increased to 1mg TID PRN on 12/26.    Problem/Plan - 3:  ·  Problem: Iron deficiency anemia.   ·  Plan: Hgb stable 10.5, s/p 1u pRBC. Also s/p 3 days iron sucrose. Denies history of heavy bleeding, poor wound healing, easy bruising, hemarthrosis.  - c/w ferrous sulfate 325mg M/W/F  - active T&S, transfuse for Hb<7  - outpatient colonoscopy with GI   - outpatient f/up with heme/onc (Dr. Maribeth Reddy) 1-2 weeks from discharge  - appreciate medicine team recs.    Problem/Plan - 4:  ·  Problem: HTN (hypertension).   ·  Plan: Not on any home BP meds per pt  - BP in the 100's today, no focal neuro changes  - TTE : No wall motion abnormalities/ No valvular Dz.  - EKG Results: Isolated V5 AYANA upon admission, with no reciprocal changes, no c/o CP, no c/f ischemia no c/o cp/sob.    Problem/Plan - 5:  ·  Problem: HLD (hyperlipidemia).   ·  Plan:   - c/w statin 80mg qhs.    Problem/Plan - 6:  ·  Problem: Nutrition, metabolism, and development symptoms.   ·  Plan: F: tolerating PO, no IVF  E: replete K<4, Mg<2  N: regular  VTE Prophylaxis: hep SC   GI: pantoprazole  D: 7Wo (under Dr. Bland).   86.1

## 2023-01-03 NOTE — CHART NOTE - NSCHARTNOTEFT_GEN_A_CORE
Rx Written	Rx Dispensed	Drug	Quantity	Days Supply	Prescriber Name	Prescriber Nancie #	Payment Method	Dispenser  10/26/2022	11/02/2022	lorazepam 2 mg tablet	60	30	Alie Byers	[id number]	Insurance	[facility]  09/15/2022	09/15/2022	zolpidem tartrate 10 mg tablet	30	30	Isabela Munoz NP	[id number]	Insurance	[facility]  09/15/2022	09/15/2022	diazepam 10 mg tablet	60	30	Isabela Munoz NP	[id number]	Insurance	[facility]  07/19/2022	07/19/2022	diazepam 10 mg tablet	60	30	Isabela Munoz NP	[id number]	Insurance	[facility]  07/19/2022	07/19/2022	zolpidem tartrate 10 mg tablet	30	30	Isabela Munoz NP	[id number]	Insurance	[facility]    No other records in neighboring [facility]

## 2023-01-03 NOTE — PROGRESS NOTE ADULT - ASSESSMENT
62F c/o recurrent falls, sz d/o, depression, anxiety, p/w recurrent falls, found to have new and old strokes on imaging, as well as severe L ICA stenosis/occlusion and severe MICAH stenosis/occlusion; also w/ HANH (but no bleeding sx) responsive to transfusions (need EGD-[place] outpatient), For stent placement with neurosurgery 1/3    #Recurrent CVA, L ICA and MICAH stenosis. acute/subacute L MICAH territory infarction and L parieto-temporal region   - A1C. 5.8. . On Atorvastatin   - on DAPT - asa + plavix. Atorva 80  #L ICA stenosis - severe focal stenosis in PROXIMAL region  #Falls at home - recommended for acute rehab  #Anxiety and Depression; h/o benzo abuse - psychiatry following. Does not appear in acute withdrawal at this time   - per psych - pt lacks capacity to leave AMA at this time   - Trazodone 50/100   - Ativan 1 q8h, hydroxyzine 25 TID  #Depression – Remeron 7.5 HS  #HANH – 3%. Suspect possibly combination of poor diet vs occult bleeding. Noted no evidence of blood loss inpatient w hgb responsive to transfusions. scope outpatient (EGD/[place]). Hgb stable 11.2 from 10.2  #HCV Ab +; neg PCR  #Prediabetes – 5.8. F/U as outpatient  #HLD – 126    Recommend  Pt for stent placement today by NSG  F/U BH/Psych recs    DISPO: Acute rehab - will need auth renewed. For stent placement w NSG today.

## 2023-01-03 NOTE — PROGRESS NOTE ADULT - ASSESSMENT
62F w/ PMH HTN (not on any meds), migraines, seizure disorder, depression/anxiety, prior stroke hx in 2018 presented to [facility] ER for frequent falls to [facility] ER found to have acute/ sub acute infarcts in L [place] and L parieto temporal region with mass effect without herniation i/s/o B/L A2 HGS/occlusion and Prox L ICA HGS vs occlusion. Started on DAPT and high intensity statin after Hgb stable. Currently stepped down to 7Wo (under Dr. Bland) for further management that includes MRA neck w/ contrast to further characterize ICA for any surgical intervention.

## 2023-01-03 NOTE — BRIEF OPERATIVE NOTE - OPERATION/FINDINGS
Patient was brought to the angio suite, placed on x-ray table, placed on standard monitor by anesthesiologist. B/l groins were prepped and draped in usual sterile fashion.   8 [place] short sheath was used in the right common femoral artery for access. A diagnostic angiogram was performed under general anesthesia care. B/l ICAs and left vertebral artery were injected and studied.     There is severe left ICA stenosis.   Left ICA angioplasty followed by stent placement across the left ICA stenosis. There is good wall apposition of stent with improvement of left ICA stenosis.     Patient was heparinized based on therapeutic ACT levels.     Full report to follow  Patient tolerated the procedure well and at baseline neurological condition.   Right groin vascular access site was closed with angioseal and applied manual compression.   There is no hematoma.   Patient was transferred to PACU.     Above discussed with Dr. Morrison Patient was brought to the angio suite, placed on x-ray table, placed on standard monitor by anesthesiologist. B/l groins were prepped and draped in usual sterile fashion.   8 [place] short sheath was used in the right common femoral artery for access. A diagnostic angiogram was performed under general anesthesia care. B/l ICAs and left vertebral artery were injected and studied.     There is severe left ICA stenosis.   Left ICA balloon angioplasty followed by stent placement across the left ICA stenosis. There is good wall apposition of stent with improvement of left ICA stenosis.     Patient was heparinized based on therapeutic ACT levels.     Full report to follow  Patient tolerated the procedure well and at baseline neurological condition.   Right groin vascular access site was closed with angioseal and applied manual compression.   There is no hematoma.   Patient was transferred to PACU.     Above discussed with Dr. Morrison

## 2023-01-03 NOTE — PROGRESS NOTE ADULT - SUBJECTIVE AND OBJECTIVE BOX
NEUROSURGERY POST OP NOTE:    POD# 0 S/P    S:       T(C): 36.4 (01-03-23 @ 19:53), Max: 36.6 (01-03-23 @ 06:55)  HR: 85 (01-03-23 @ 20:53) (82 - 108)  BP: 151/85 (01-03-23 @ 20:53) (109/60 - 151/85)  RR: 18 (01-03-23 @ 20:53) (18 - 33)  SpO2: 97% (01-03-23 @ 20:53) (93% - 99%)        acetaminophen     Tablet .. 650 milliGRAM(s) Oral every 6 hours PRN  aspirin  chewable 81 milliGRAM(s) Oral daily  atorvastatin 80 milliGRAM(s) Oral at bedtime  clopidogrel Tablet 75 milliGRAM(s) Oral daily  ferrous    sulfate 325 milliGRAM(s) Oral <User Schedule>  folic acid 1 milliGRAM(s) Oral daily  hydrOXYzine hydrochloride 25 milliGRAM(s) Oral three times a day PRN  lactulose Syrup 10 Gram(s) Oral every 24 hours  LORazepam     Tablet 1 milliGRAM(s) Oral every 8 hours PRN  melatonin 5 milliGRAM(s) Oral at bedtime  mirtazapine 7.5 milliGRAM(s) Oral at bedtime  multivitamin 1 Tablet(s) Oral daily  pantoprazole    Tablet 40 milliGRAM(s) Oral before breakfast  polyethylene glycol 3350 17 Gram(s) Oral two times a day  senna 2 Tablet(s) Oral at bedtime  traZODone 50 milliGRAM(s) Oral every 24 hours  traZODone 100 milliGRAM(s) Oral at bedtime      RADIOLOGY:     Exam:    WOUND/DRAINS:    DEVICES:       Assessment: 62yFemale      Plan:      Assessment:  Present when checked    []  GCS  E   V  M     Heart Failure: []Acute, [] acute on chronic , []chronic  Heart Failure:  [] Diastolic (HFpEF), [] Systolic (HFrEF), []Combined (HFpEF and HFrEF), [] RHF, [] Pulm HTN, [] Other    [] ALVIN, [] ATN, [] AIN, [] other  [] CKD1, [] CKD2, [] CKD 3, [] CKD 4, [] CKD 5, []ESRD    Encephalopathy: [] Metabolic, [] Hepatic, [] toxic, [] Neurological, [] Other    Abnormal Nurtitional Status: [] malnurtition (see nutrition note), [ ]underweight: BMI < 19, [] morbid obesity: BMI >40, [] Cachexia    [] Sepsis  [] hypovolemic shock,[] cardiogenic shock, [] hemorrhagic shock, [] neuogenic shock  [] Acute Respiratory Failure  []Cerebral edema, [] Brain compression/ herniation,   [] Functional quadriplegia  [] Acute blood loss anemia       NEUROSURGERY POST OP NOTE:    POD# 0 S/P Left ICA balloon angioplasty followed by stent placement across left ICA stenosis    S: Patient is laying comfortably. Reports mild pain that is being well managed.       T(C): 36.4 (01-03-23 @ 19:53), Max: 36.6 (01-03-23 @ 06:55)  HR: 85 (01-03-23 @ 20:53) (82 - 108)  BP: 151/85 (01-03-23 @ 20:53) (109/60 - 151/85)  RR: 18 (01-03-23 @ 20:53) (18 - 33)  SpO2: 97% (01-03-23 @ 20:53) (93% - 99%)        acetaminophen     Tablet .. 650 milliGRAM(s) Oral every 6 hours PRN  aspirin  chewable 81 milliGRAM(s) Oral daily  atorvastatin 80 milliGRAM(s) Oral at bedtime  clopidogrel Tablet 75 milliGRAM(s) Oral daily  ferrous    sulfate 325 milliGRAM(s) Oral <User Schedule>  folic acid 1 milliGRAM(s) Oral daily  hydrOXYzine hydrochloride 25 milliGRAM(s) Oral three times a day PRN  lactulose Syrup 10 Gram(s) Oral every 24 hours  LORazepam     Tablet 1 milliGRAM(s) Oral every 8 hours PRN  melatonin 5 milliGRAM(s) Oral at bedtime  mirtazapine 7.5 milliGRAM(s) Oral at bedtime  multivitamin 1 Tablet(s) Oral daily  pantoprazole    Tablet 40 milliGRAM(s) Oral before breakfast  polyethylene glycol 3350 17 Gram(s) Oral two times a day  senna 2 Tablet(s) Oral at bedtime  traZODone 50 milliGRAM(s) Oral every 24 hours  traZODone 100 milliGRAM(s) Oral at bedtime      RADIOLOGY: none     Exam:    PHYSICAL EXAM:  Constitutional: awake, alert, laying in bed, NAD.   Respiratory: non-labored breathing. Normal chest rise.   Cardiovascular: Regular rate and rhythm  Gastrointestinal:  Soft, nontender, nondistended.  .  Vascular: Extremities warm, no ulcers, no discoloration of skin.   Neurological: Gen: AA&O x 3, conversant, appropriate.      CN II-XII grossly intact. Pupils 3mm equal and reactive.     Motor: GENTILE x 4, RUE/LUE/LLE 5/5, RLE 4/5 proximally, DF/PF 4+/5. +RLE drift.     Sens: Sensation intact to light touch throughout.    Extremities: distal pulses 2+ x 4 DPPT symmetric throughout.     No pronator drift, no dysmetria.  Wound/incision: R groin site c/d/i, no hematoma. Gauze and tegaderm in place.     DEVICES:  none       Assessment: 62F w/ PMH HTN (not on any meds), migraines, seizure disorder, depression/anxiety, prior stroke hx in 2018 presented to [facility] ER for frequent falls to [facility] ER found to have acute/ sub acute infarcts in L [place] and L parieto temporal region with mass effect without herniation. Found to have Left ICA stenosis. Now s/p L ICA balloon angioplasty with stent 1/4/23.     Plan:  - neuro/vital checks q1 hr  - lay flat for 6 hrs, then can raise head of bed 30 degrees   - SBP<160  - Monitor right groin site. Remove gauze/tegaderm POD1  - Galvan in place, remove in AM   - A- line   - advance diet as tolerated  - pain control: oxy 5mg PRN  - continue Asprin 81mg and Plavix 75mg daily   - continue atorvastatin 80mg daily   - continue Mirtazapine 7.5mg daily, Trazadone 100mg daily, Atarax 25mg q8 hrs PRN for anxiety/depression   - continue iron supplement/folic acid/multivitamin   - PPI   - bowel regimen     Assessment:  Present when checked    []  GCS  E   V  M     Heart Failure: []Acute, [] acute on chronic , []chronic  Heart Failure:  [] Diastolic (HFpEF), [] Systolic (HFrEF), []Combined (HFpEF and HFrEF), [] RHF, [] Pulm HTN, [] Other    [] ALVIN, [] ATN, [] AIN, [] other  [] CKD1, [] CKD2, [] CKD 3, [] CKD 4, [] CKD 5, []ESRD    Encephalopathy: [] Metabolic, [] Hepatic, [] toxic, [] Neurological, [] Other    Abnormal Nurtitional Status: [] malnurtition (see nutrition note), [ ]underweight: BMI < 19, [] morbid obesity: BMI >40, [] Cachexia    [] Sepsis  [] hypovolemic shock,[] cardiogenic shock, [] hemorrhagic shock, [] neuogenic shock  [] Acute Respiratory Failure  []Cerebral edema, [] Brain compression/ herniation,   [] Functional quadriplegia  [] Acute blood loss anemia

## 2023-01-04 LAB
ANION GAP SERPL CALC-SCNC: 10 MMOL/L — SIGNIFICANT CHANGE UP (ref 5–17)
BUN SERPL-MCNC: 13 MG/DL — SIGNIFICANT CHANGE UP (ref 7–23)
CALCIUM SERPL-MCNC: 9.2 MG/DL — SIGNIFICANT CHANGE UP (ref 8.4–10.5)
CHLORIDE SERPL-SCNC: 107 MMOL/L — SIGNIFICANT CHANGE UP (ref 96–108)
CO2 SERPL-SCNC: 22 MMOL/L — SIGNIFICANT CHANGE UP (ref 22–31)
CREAT SERPL-MCNC: 0.61 MG/DL — SIGNIFICANT CHANGE UP (ref 0.5–1.3)
EGFR: 102 ML/MIN/1.73M2 — SIGNIFICANT CHANGE UP
GLUCOSE SERPL-MCNC: 128 MG/DL — HIGH (ref 70–99)
HCT VFR BLD CALC: 27.8 % — LOW (ref 34.5–45)
HGB BLD-MCNC: 8.6 G/DL — LOW (ref 11.5–15.5)
MAGNESIUM SERPL-MCNC: 1.7 MG/DL — SIGNIFICANT CHANGE UP (ref 1.6–2.6)
MCHC RBC-ENTMCNC: 24.7 PG — LOW (ref 27–34)
MCHC RBC-ENTMCNC: 30.9 GM/DL — LOW (ref 32–36)
MCV RBC AUTO: 79.9 FL — LOW (ref 80–100)
NRBC # BLD: 0 /100 WBCS — SIGNIFICANT CHANGE UP (ref 0–0)
PHOSPHATE SERPL-MCNC: 5.1 MG/DL — HIGH (ref 2.5–4.5)
PLATELET # BLD AUTO: 400 K/UL — SIGNIFICANT CHANGE UP (ref 150–400)
POTASSIUM SERPL-MCNC: 3.9 MMOL/L — SIGNIFICANT CHANGE UP (ref 3.5–5.3)
POTASSIUM SERPL-SCNC: 3.9 MMOL/L — SIGNIFICANT CHANGE UP (ref 3.5–5.3)
RBC # BLD: 3.48 M/UL — LOW (ref 3.8–5.2)
RBC # FLD: 25.5 % — HIGH (ref 10.3–14.5)
SODIUM SERPL-SCNC: 139 MMOL/L — SIGNIFICANT CHANGE UP (ref 135–145)
WBC # BLD: 10.04 K/UL — SIGNIFICANT CHANGE UP (ref 3.8–10.5)
WBC # FLD AUTO: 10.04 K/UL — SIGNIFICANT CHANGE UP (ref 3.8–10.5)

## 2023-01-04 PROCEDURE — 99233 SBSQ HOSP IP/OBS HIGH 50: CPT

## 2023-01-04 RX ORDER — HYDROMORPHONE HYDROCHLORIDE 2 MG/ML
0.5 INJECTION INTRAMUSCULAR; INTRAVENOUS; SUBCUTANEOUS ONCE
Refills: 0 | Status: DISCONTINUED | OUTPATIENT
Start: 2023-01-04 | End: 2023-01-04

## 2023-01-04 RX ORDER — MAGNESIUM SULFATE 500 MG/ML
2 VIAL (ML) INJECTION ONCE
Refills: 0 | Status: COMPLETED | OUTPATIENT
Start: 2023-01-04 | End: 2023-01-04

## 2023-01-04 RX ORDER — OXYCODONE HYDROCHLORIDE 5 MG/1
10 TABLET ORAL EVERY 4 HOURS
Refills: 0 | Status: DISCONTINUED | OUTPATIENT
Start: 2023-01-04 | End: 2023-01-06

## 2023-01-04 RX ORDER — POTASSIUM CHLORIDE 20 MEQ
20 PACKET (EA) ORAL ONCE
Refills: 0 | Status: COMPLETED | OUTPATIENT
Start: 2023-01-04 | End: 2023-01-04

## 2023-01-04 RX ORDER — ENOXAPARIN SODIUM 100 MG/ML
40 INJECTION SUBCUTANEOUS EVERY 24 HOURS
Refills: 0 | Status: DISCONTINUED | OUTPATIENT
Start: 2023-01-04 | End: 2023-01-11

## 2023-01-04 RX ADMIN — Medication 50 MILLIGRAM(S): at 14:24

## 2023-01-04 RX ADMIN — OXYCODONE HYDROCHLORIDE 10 MILLIGRAM(S): 5 TABLET ORAL at 22:27

## 2023-01-04 RX ADMIN — PANTOPRAZOLE SODIUM 40 MILLIGRAM(S): 20 TABLET, DELAYED RELEASE ORAL at 07:02

## 2023-01-04 RX ADMIN — Medication 325 MILLIGRAM(S): at 19:05

## 2023-01-04 RX ADMIN — POLYETHYLENE GLYCOL 3350 17 GRAM(S): 17 POWDER, FOR SOLUTION ORAL at 19:06

## 2023-01-04 RX ADMIN — OXYCODONE HYDROCHLORIDE 10 MILLIGRAM(S): 5 TABLET ORAL at 08:51

## 2023-01-04 RX ADMIN — ATORVASTATIN CALCIUM 80 MILLIGRAM(S): 80 TABLET, FILM COATED ORAL at 23:12

## 2023-01-04 RX ADMIN — Medication 5 MILLIGRAM(S): at 23:12

## 2023-01-04 RX ADMIN — Medication 1 MILLIGRAM(S): at 12:01

## 2023-01-04 RX ADMIN — Medication 1 TABLET(S): at 14:24

## 2023-01-04 RX ADMIN — OXYCODONE HYDROCHLORIDE 10 MILLIGRAM(S): 5 TABLET ORAL at 09:30

## 2023-01-04 RX ADMIN — OXYCODONE HYDROCHLORIDE 5 MILLIGRAM(S): 5 TABLET ORAL at 19:09

## 2023-01-04 RX ADMIN — HYDROMORPHONE HYDROCHLORIDE 0.5 MILLIGRAM(S): 2 INJECTION INTRAMUSCULAR; INTRAVENOUS; SUBCUTANEOUS at 04:14

## 2023-01-04 RX ADMIN — CLOPIDOGREL BISULFATE 75 MILLIGRAM(S): 75 TABLET, FILM COATED ORAL at 12:01

## 2023-01-04 RX ADMIN — Medication 81 MILLIGRAM(S): at 12:01

## 2023-01-04 RX ADMIN — Medication 100 MILLIGRAM(S): at 23:12

## 2023-01-04 RX ADMIN — Medication 20 MILLIEQUIVALENT(S): at 07:20

## 2023-01-04 RX ADMIN — Medication 1 MILLIGRAM(S): at 05:30

## 2023-01-04 RX ADMIN — OXYCODONE HYDROCHLORIDE 5 MILLIGRAM(S): 5 TABLET ORAL at 19:43

## 2023-01-04 RX ADMIN — OXYCODONE HYDROCHLORIDE 10 MILLIGRAM(S): 5 TABLET ORAL at 21:04

## 2023-01-04 RX ADMIN — HYDROMORPHONE HYDROCHLORIDE 0.5 MILLIGRAM(S): 2 INJECTION INTRAMUSCULAR; INTRAVENOUS; SUBCUTANEOUS at 02:51

## 2023-01-04 RX ADMIN — Medication 25 GRAM(S): at 07:20

## 2023-01-04 RX ADMIN — SENNA PLUS 2 TABLET(S): 8.6 TABLET ORAL at 23:12

## 2023-01-04 NOTE — PROGRESS NOTE ADULT - ASSESSMENT
62F w/ PMH HTN (not on any meds), migraines, seizure disorder, depression/anxiety, prior stroke hx in 2018 presented to [facility] ER for frequent falls to [facility] ER found to have acute/ sub acute infarcts in L [place] and L parieto temporal region with mass effect without herniation i/s/o B/L A2 HGS/occlusion and Prox L ICA HGS vs occlusion. Started on DAPT and high intensity statin after Hgb stable. Currently stepped down to 7Wo (under Dr. Bland) for further management that includes MRA neck w/ contrast to further characterize ICA for any surgical intervention. S/p angiogram and ICA stent w. NSGY, stepped down from NSICU to RMF.

## 2023-01-04 NOTE — PROGRESS NOTE ADULT - SUBJECTIVE AND OBJECTIVE BOX
INTERVAL HPI/OVERNIGHT EVENTS:  Patient seen and examined at bedside. S/p angiogram and ICA stent w/ NSGY, brief stay in NSICU for post-procedure monitoring, now stepped down to RMF.  Pt lying in bed, in NAD. Reporting diffuse headache. No other complaints at this time. Denies any N/V/D, chest pain, palpitations, shortness of breath, dysuria, or weakness.    VITAL SIGNS:  T(F): 98.5 (01-04-23 @ 16:23)  HR: 73 (01-04-23 @ 16:23)  BP: 103/68 (01-04-23 @ 16:23)  RR: 18 (01-04-23 @ 16:23)  SpO2: 94% (01-04-23 @ 16:23)  Wt(kg): --      01-03-23 @ 07:01  -  01-04-23 @ 07:00  --------------------------------------------------------  IN: 25 mL / OUT: 1665 mL / NET: -1640 mL    01-04-23 @ 07:01  -  01-04-23 @ 17:12  --------------------------------------------------------  IN: 25 mL / OUT: 150 mL / NET: -125 mL        PHYSICAL EXAM:    General: No acute distress, awake and alert  Eyes: Anicteric sclerae, moist conjunctivae, see below for CNs  Neck: trachea midline, FROM, supple, no thyromegaly or lymphadenopathy  Cardiovascular: Regular rate and rhythm, no murmurs, rubs, or gallops. No carotid bruits.   Pulmonary: Anterior breath sounds clear bilaterally, no crackles or wheezing. No use of accessory muscles  GI: Abdomen soft, non-distended, non-tender  Extremities: Radial and DP pulses +2, no edema    MEDICATIONS  (STANDING):  aspirin  chewable 81 milliGRAM(s) Oral daily  atorvastatin 80 milliGRAM(s) Oral at bedtime  clopidogrel Tablet 75 milliGRAM(s) Oral daily  enoxaparin Injectable 40 milliGRAM(s) SubCutaneous every 24 hours  ferrous    sulfate 325 milliGRAM(s) Oral <User Schedule>  folic acid 1 milliGRAM(s) Oral daily  lactulose Syrup 10 Gram(s) Oral every 24 hours  melatonin 5 milliGRAM(s) Oral at bedtime  mirtazapine 7.5 milliGRAM(s) Oral at bedtime  multivitamin 1 Tablet(s) Oral daily  pantoprazole    Tablet 40 milliGRAM(s) Oral before breakfast  polyethylene glycol 3350 17 Gram(s) Oral two times a day  senna 2 Tablet(s) Oral at bedtime  traZODone 50 milliGRAM(s) Oral every 24 hours  traZODone 100 milliGRAM(s) Oral at bedtime    MEDICATIONS  (PRN):  acetaminophen     Tablet .. 650 milliGRAM(s) Oral every 6 hours PRN Mild Pain (1 - 3)  hydrOXYzine hydrochloride 25 milliGRAM(s) Oral three times a day PRN Anxiety  LORazepam     Tablet 1 milliGRAM(s) Oral every 8 hours PRN Anxiety  oxyCODONE    IR 10 milliGRAM(s) Oral every 4 hours PRN Severe Pain (7 - 10)  oxyCODONE    IR 5 milliGRAM(s) Oral every 4 hours PRN Moderate Pain (4 - 6)      Allergies    Compazine (Unknown)  contrast media (iodine-based) (Unknown)  iv contrast (Unknown)  penicillin (Unknown)  penicillins (Other)  Toradol (Unknown)    Intolerances        LABS:                        8.6    10.04 )-----------( 400      ( 04 Jan 2023 05:46 )             27.8     01-04    139  |  107  |  13  ----------------------------<  128<H>  3.9   |  22  |  0.61    Ca    9.2      04 Jan 2023 05:46  Phos  5.1     01-04  Mg     1.7     01-04    TPro  7.6  /  Alb  4.3  /  TBili  0.4  /  DBili  x   /  AST  18  /  ALT  25  /  AlkPhos  123<H>  01-03    PT/INR - ( 03 Jan 2023 21:57 )   PT: 14.0 sec;   INR: 1.17          PTT - ( 03 Jan 2023 21:57 )  PTT:>200.0 sec      RADIOLOGY & ADDITIONAL TESTS:  Reviewed

## 2023-01-04 NOTE — PROGRESS NOTE ADULT - SUBJECTIVE AND OBJECTIVE BOX
S/Overnight events:  Patient resting comfortably in PACU. Admits to right groin site pain.     Hospital Course:   2022: KCl repleted x2. Blood cultures obtained in ED. BP meds (Losartan 50mg, Clonidine 0.1mg QD) held. TTE done, no wall motion abnormalities.   Per Stroke team recs, MRA head and neck non-contrast ordered. Pt put on ASA 81 and Atorvastatin 80mg daily.   o/n requseted ambien. gave  melatonin 5mg. Pain of R knee, physical exam benieng, asked for narcotics, a strong one like dilaudid. per behavioral health note offered mirtazapine to help with sleep, she accepted. See chart note.  2022:  ml@ 20 hrs, iron  mg x5 doses,   ON:gave ofirmev for back pain. pt requesting ambien. gave melatonin, pt refused, continues to request ativan and ambien. Gave mirtazapine 7.5mg.    O/N patient was agitated, anxious and in pain threating to AMA, concern for withdrawl- given 0.5mg of klonipin, fluids restarted due to hypoperfusion concerns. Refused mirtazapine "has an allergy" Patient threatened to AMA multiple times, at one point ripped out her IV and refused to get another one. heme 6.9 patient refused to replacce iv  :  o/n: gave ofirmev for 11/10 headache, no new focal deficits, c/o blurry vision which she states she has always had R>L, treatening to leave AMA, took mirtazapine later, refused tele leads, refused AM labs  : Patient refusing AM labs and requesting to leave AMA. Requesting benzos. Refusing tele leads.  o/n: refusing SQ heparin, requesting klonopin, educated pt on why we are avoiding benzos, after recieving PM meds still requesting klonopin for sleep, agreed to take zyprexa 2.5mg po. pt continuing to request klonopin, endorses that she cannot sleep and is having severe anxiety, trembling, states she was not able to sleep last night as well, c/f acute anxiety, already given atarax, given ativan .5mg po to help ease anxiety and assist in sleep (see chart note)  : Patient refusing labs. Started ativan 0.5 TID prn. Changed to regular diet as per patient's request  o/n: refused SQ heparin  : potassium interacts with hydroxyzine, started ferrous sulfate, increased ativan to 1mg TID PRN, heme and GI - outpt f/u  o/n: IV tyelnol for pain   : Pt reporting anaphylaxis w/ contrast CTA d/c'd. Carotid dopplers show 50-69% L ICA stenosis. Started plavix 75mg.   o/n refused tele  : Touched base with psych, will see on . Still w/ safety watch at bedside. Stepped down to 7Wo under Dr. Bland.   o/n: SYED  : got auth for acute rehab, needs PT/OT and COVID swab tomorrow. Had MRA nec w/ contrast   o/n: Patient has continued anxiety w 1mg ativan, takes 4mg at home, gave additional 1mg ativan. Patient asked for IV ativan, explained that this would not be possible.  : Pt continued to ask for increase in ativan dosage from 1 mg to 2 mg, refused non-benzo anti-anxiety meds including Atarax & Seroquel, psych reconsulted. NSGY consulted for severe L ICA stenosis seen on MRA w/ contrast   o/n: Patient requested to see psychiatrist, however was reminded that she had been seeing a psychiatrist, otherwise patient continued to be worked up. Eventually tired herself out.   : in pm asked for more ativan- gave seroquel 25 x1, scheduled seroquel 50 mg qhs. With team, decided to defer the benzo taper per psych and continue ativan prns as previously established  : Continued to request additional ativan, gave seroquel 25 mg x1 with PM ativan 1 mg PO PRN dose. Gave suppository for constipation x3 days   on: SYED  : started trazodone 50 qam, 100 qhs. dc'd seroquel. Plan for stent with contrast study desensitization (solumedrol and diphenhydramine)  o/n: refused heparin ppx.   1/3: Gave IV tylenol for headache. POD0 s/p Left ICA balloon angioplasty followed by stent placement across left ICA stenosis.  1/4: POD1 s/p Left iCA balloon angioplasty/stent placement. Neuro stable. SYED overnight.       Vital Signs Last 24 Hrs  T(C): 36.4 (2023 19:53), Max: 36.6 (2023 06:55)  T(F): 97.5 (2023 19:53), Max: 97.8 (2023 06:55)  HR: 79 (2023 03:00) (79 - 108)  BP: 98/57 (2023 03:00) (95/57 - 151/85)  BP(mean): 71 (2023 03:00) (71 - 110)  RR: 26 (2023 03:00) (18 - 33)  SpO2: 93% (2023 03:00) (93% - 99%)    Parameters below as of 2023 03:00  Patient On (Oxygen Delivery Method): room air        I&O's Detail    2023 07:01  -  2023 03:27  --------------------------------------------------------  IN:  Total IN: 0 mL    OUT:    Indwelling Catheter - Urethral (mL): 815 mL  Total OUT: 815 mL    Total NET: -815 mL        I&O's Summary    2023 07:01  -  2023 03:27  --------------------------------------------------------  IN: 0 mL / OUT: 815 mL / NET: -815 mL    PHYSICAL EXAM:  Constitutional: awake, alert, laying in bed, NAD.   Respiratory: non-labored breathing. Normal chest rise.   Cardiovascular: Regular rate and rhythm  Gastrointestinal:  Soft, nontender, nondistended.  .  Vascular: Extremities warm, no ulcers, no discoloration of skin.   Neurological: Gen: AA&O x 3, conversant, appropriate.      CN II-XII grossly intact. Pupils 3mm equal and reactive.     Motor: GENTILE x 4, RUE/LUE/LLE 5/5, RLE 4/5 proximally, DF/PF 4+/5. +RLE drift.     Sens: Sensation intact to light touch throughout.    Extremities: distal pulses 2+ x 4 DPPT symmetric throughout.     No pronator drift, no dysmetria.  Wound/incision: R groin site c/d/i, no hematoma. Gauze and tegaderm in place.     TUBES/LINES:  [] CVC  [] A-line  [] Lumbar Drain  [] Ventriculostomy  [] Other  [x] mcclelland     DIET:  [] NPO  [x] Mechanical  [] Tube feeds    LABS:                        9.2    6.43  )-----------( 419      ( 2023 21:57 )             30.1     01-03    139  |  107  |  14  ----------------------------<  132<H>  3.8   |  22  |  0.65    Ca    9.2      2023 21:57  Phos  7.7     01-03  Mg     1.7     -03    TPro  7.6  /  Alb  4.3  /  TBili  0.4  /  DBili  x   /  AST  18  /  ALT  25  /  AlkPhos  123<H>  01-03    PT/INR - ( 2023 21:57 )   PT: 14.0 sec;   INR: 1.17          PTT - ( 2023 21:57 )  PTT:>200.0 sec        CAPILLARY BLOOD GLUCOSE          Drug Levels: [] N/A    CSF Analysis: [] N/A      Allergies    Compazine (Unknown)  contrast media (iodine-based) (Unknown)  iv contrast (Unknown)  penicillin (Unknown)  penicillins (Other)  Toradol (Unknown)    Intolerances      MEDICATIONS:  Antibiotics:    Neuro:  acetaminophen     Tablet .. 650 milliGRAM(s) Oral every 6 hours PRN  hydrOXYzine hydrochloride 25 milliGRAM(s) Oral three times a day PRN  LORazepam     Tablet 1 milliGRAM(s) Oral every 8 hours PRN  melatonin 5 milliGRAM(s) Oral at bedtime  mirtazapine 7.5 milliGRAM(s) Oral at bedtime  oxyCODONE    IR 5 milliGRAM(s) Oral every 4 hours PRN  traZODone 50 milliGRAM(s) Oral every 24 hours  traZODone 100 milliGRAM(s) Oral at bedtime    Anticoagulation:  aspirin  chewable 81 milliGRAM(s) Oral daily  clopidogrel Tablet 75 milliGRAM(s) Oral daily    OTHER:  atorvastatin 80 milliGRAM(s) Oral at bedtime  lactulose Syrup 10 Gram(s) Oral every 24 hours  pantoprazole    Tablet 40 milliGRAM(s) Oral before breakfast  polyethylene glycol 3350 17 Gram(s) Oral two times a day  senna 2 Tablet(s) Oral at bedtime    IVF:  ferrous    sulfate 325 milliGRAM(s) Oral <User Schedule>  folic acid 1 milliGRAM(s) Oral daily  multivitamin 1 Tablet(s) Oral daily    CULTURES: none     RADIOLOGY & ADDITIONAL TESTS: none     Assessment: 62F w/ PMH HTN (not on any meds), migraines, seizure disorder, depression/anxiety, prior stroke hx in 2018 presented to [facility] ER for frequent falls to [facility] ER found to have acute/ sub acute infarcts in L [place] and L parieto temporal region with mass effect without herniation. Found to have Left ICA stenosis. Now s/p L ICA balloon angioplasty with stent 23.     FREQUENT FALLS    No pertinent family history in first degree relatives    Handoff    MEWS Score    Seizures    Migraine    Hip pain, left    Depression    Anxiety    Eating disorder    Bipolar illness    PTSD (post-traumatic stress disorder)    Left carotid stenosis    Left carotid stenosis    Neurocognitive disorder    Frequent falls    History of seizures    Status post CVA    Migraine    Anxiety    Syncope    Microcytic anemia    Leukocytosis    HTN (hypertension)    Goals of care, counseling/discussion    Severe benzodiazepine use disorder    Depression, unspecified    DVT prophylaxis    Prophylactic measure    Insomnia    Neurocognitive disorder    CVA (cerebrovascular accident)    Anxiety and depression    Anemia, unspecified type    Falls frequently    Positive hepatitis C antibody test    Prediabetes    Anxiety and depression    HTN (hypertension)    HLD (hyperlipidemia)    Iron deficiency anemia    Nutrition, metabolism, and development symptoms    Preop examination    Prediabetes    Insomnia    Hepatitis C antibody test positive    Contrast media allergy    Insertion of intravenous catheter with ultrasound guidance    Angiogram, cerebral, with stent insertion    Delivery by elective caesarean section    S/P  section    FALL    Delirium    Alcohol abuse    Nutrition, metabolism, and development symptoms    90+    Room Service Assist    Syncope    [email or web address]    Plan:  - neuro/vital checks q1 hr  - lay flat for 6 hrs, then can raise head of bed 30 degrees   - SBP<160  - Monitor right groin site. Remove gauze/tegaderm POD1  - Mcclelland in place, remove in AM   - advance diet as tolerated  - pain control: oxy 5mg PRN   - continue Asprin 81mg and Plavix 75mg daily   - continue atorvastatin 80mg daily   - continue Mirtazapine 7.5mg daily, Trazadone 100mg daily, Atarax 25mg q8 hrs PRN for anxiety/depression   - continue iron supplement/folic acid/multivitamin   - PPI   - bowel regimen     Assessment:  Present when checked    []  GCS  E   V  M     Heart Failure: []Acute, [] acute on chronic , []chronic  Heart Failure:  [] Diastolic (HFpEF), [] Systolic (HFrEF), []Combined (HFpEF and HFrEF), [] RHF, [] Pulm HTN, [] Other    [] ALVIN, [] ATN, [] AIN, [] other  [] CKD1, [] CKD2, [] CKD 3, [] CKD 4, [] CKD 5, []ESRD    Encephalopathy: [] Metabolic, [] Hepatic, [] toxic, [] Neurological, [] Other    Abnormal Nurtitional Status: [] malnurtition (see nutrition note), [ ]underweight: BMI < 19, [] morbid obesity: BMI >40, [] Cachexia    [] Sepsis  [] hypovolemic shock,[] cardiogenic shock, [] hemorrhagic shock, [] neuogenic shock  [] Acute Respiratory Failure  []Cerebral edema, [] Brain compression/ herniation,   [] Functional quadriplegia  [] Acute blood loss anemia

## 2023-01-04 NOTE — PROGRESS NOTE ADULT - PROBLEM SELECTOR PLAN 1
MRA H/N: Acute/ Subacute infarction L [place] and L parieto temporal region w/local mass effect. B/L A2 segment HGS/ Occlusion and L Proximal ICA HGS Vs Occlusion. HCT Results: Subacute/ chronic B/L [place] infarcts. B/l carotid ultrasound significant for 50-69% stenosis mid LEFT internal carotid artery per NASCET peak systolic velocity measurements due to noncalcified atheromatous plaque. MRA neck w/ contrast showed focal severe stenosis of the proximal L ICA.    S/p angiogram and ICA stent w/ NSGY    Plan:  - f/u NSGY recs:   - Galvan in place, remove in AM   - advance diet as tolerated  - pain control: oxy 5mg PRN   - continue Asprin 81mg and Plavix 75mg daily   - continue atorvastatin 80mg daily   - continue Mirtazapine 7.5mg daily, Trazadone 100mg daily, Atarax 25mg q8 hrs PRN for anxiety/depression   - continue iron supplement/folic acid/multivitamin   - PPI   - bowel regimen

## 2023-01-04 NOTE — PROGRESS NOTE ADULT - SUBJECTIVE AND OBJECTIVE BOX
***********************************************  ADULT NSICU PROGRESS NOTE  WALT DUBOSE [id number] [facility] 10LA 08  ***********************************************    24H INTERVAL EVENTS:    ROS: negative except per mentioned above in 24h interval events.      HOSPITAL COURSE CARRIED FORWARD:    VITALS:    ICU Vital Signs Last 24 Hrs  T(C): 36.4 (03 Jan 2023 19:53), Max: 36.6 (03 Jan 2023 11:54)  T(F): 97.5 (03 Jan 2023 19:53), Max: 97.8 (03 Jan 2023 11:54)  HR: 77 (04 Jan 2023 07:00) (62 - 104)  BP: 110/57 (04 Jan 2023 07:00) (95/57 - 151/85)  BP(mean): 77 (04 Jan 2023 07:00) (71 - 110)  ABP: 110/58 (04 Jan 2023 07:00) (101/49 - 145/70)  ABP(mean): 79 (04 Jan 2023 07:00) (70 - 98)  RR: 22 (04 Jan 2023 07:00) (18 - 33)  SpO2: 93% (04 Jan 2023 07:00) (93% - 99%)    O2 Parameters below as of 04 Jan 2023 07:00  Patient On (Oxygen Delivery Method): room air                I&O's Summary    03 Jan 2023 07:01  -  04 Jan 2023 07:00  --------------------------------------------------------  IN: 0 mL / OUT: 1415 mL / NET: -1415 mL        EXAM:     [place] Coma Scale:     General: normocephalic, atraumatic, laying in bed, in no distress  Neuro     MS: A/Ox3, cooperative, normal attention, no neglect, comprehension intact, speech with preserved fluency, naming, and repetition    CN: PERRL, VF FTC, EOMI and no ptosis bilaterally, sensation intact to crude touch V1-V3, face symmetric, hearing grossly intact    Mot: bulk normal, tone normal, power 5/5 in bilateral upper and lower proximal extremities    Sens: intact to crude touch in bilateral upper and lower extremities    Reflexes: deferred    Coord: no dysmetria or ataxia on finger to nose/heel to shin, respectively, no focal bradykinetic movements    Gait: deferred  Chest: nonlabored respirations, no adventitious lung sounds bilaterally, heart regular rate/rhythm, present S1/S2, no murmurs or rubs  Abdomen: nondistended, soft and nontender without peritoneal signs, normoactive bowel sounds  Extremities: no clubbing, well-perfused, no edema    LABORATORY DATA:                            8.6    10.04 )-----------( 400      ( 04 Jan 2023 05:46 )             27.8     01-04    139  |  107  |  13  ----------------------------<  128<H>  3.9   |  22  |  0.61    Ca    9.2      04 Jan 2023 05:46  Phos  5.1     01-04  Mg     1.7     01-04    TPro  7.6  /  Alb  4.3  /  TBili  0.4  /  DBili  x   /  AST  18  /  ALT  25  /  AlkPhos  123<H>  01-03    LIVER FUNCTIONS - ( 03 Jan 2023 05:30 )  Alb: 4.3 g/dL / Pro: 7.6 g/dL / ALK PHOS: 123 U/L / ALT: 25 U/L / AST: 18 U/L / GGT: x           PT/INR - ( 03 Jan 2023 21:57 )   PT: 14.0 sec;   INR: 1.17          PTT - ( 03 Jan 2023 21:57 )  PTT:>200.0 sec    IMAGING DATA:    CARDIOLOGY DATA:    MICROBIOLOGY DATA:        MEDICATIONS  (STANDING):  aspirin  chewable 81 milliGRAM(s) Oral daily  atorvastatin 80 milliGRAM(s) Oral at bedtime  clopidogrel Tablet 75 milliGRAM(s) Oral daily  ferrous    sulfate 325 milliGRAM(s) Oral <User Schedule>  folic acid 1 milliGRAM(s) Oral daily  lactulose Syrup 10 Gram(s) Oral every 24 hours  melatonin 5 milliGRAM(s) Oral at bedtime  mirtazapine 7.5 milliGRAM(s) Oral at bedtime  multivitamin 1 Tablet(s) Oral daily  pantoprazole    Tablet 40 milliGRAM(s) Oral before breakfast  polyethylene glycol 3350 17 Gram(s) Oral two times a day  senna 2 Tablet(s) Oral at bedtime  traZODone 50 milliGRAM(s) Oral every 24 hours  traZODone 100 milliGRAM(s) Oral at bedtime    MEDICATIONS  (PRN):  acetaminophen     Tablet .. 650 milliGRAM(s) Oral every 6 hours PRN Mild Pain (1 - 3)  hydrOXYzine hydrochloride 25 milliGRAM(s) Oral three times a day PRN Anxiety  LORazepam     Tablet 1 milliGRAM(s) Oral every 8 hours PRN Anxiety  oxyCODONE    IR 5 milliGRAM(s) Oral every 4 hours PRN Moderate Pain (4 - 6)      ***********************************************  ASSESSMENT AND PLAN  ***********************************************    This is a case of ***    NEURO  - Admit NSICU, Q1h neuro checks / Q1h vital signs    PULM  - SpO2 goal > 92%, supplemental O2 and pulm toileting as needed    CARDIO  - BP goal:     GI  - Diet:   - Stress ulcer prophylaxis:     /RENAL  - Monitor UOP/volume status, BUN/SCr    HEME  - Maintain Hb > 7.0, PLT > ***    ID  - Monitor for infectious s/s, fever curve, leukocytosis    ENDO    01-04-23 @ 07:43       ***********************************************  ADULT NSICU PROGRESS NOTE  WALT DUBOSE [id number] [facility] 10LA 08  ***********************************************    24H INTERVAL EVENTS: s/p L. ICA POBA w/ stent placement.  No acute overnight events.  Patient currently complains of pain "all over."      ROS: negative except per mentioned above in 24h interval events.      VITALS:    ICU Vital Signs Last 24 Hrs  T(C): 36.4 (03 Jan 2023 19:53), Max: 36.6 (03 Jan 2023 11:54)  T(F): 97.5 (03 Jan 2023 19:53), Max: 97.8 (03 Jan 2023 11:54)  HR: 77 (04 Jan 2023 07:00) (62 - 104)  BP: 110/57 (04 Jan 2023 07:00) (95/57 - 151/85)  BP(mean): 77 (04 Jan 2023 07:00) (71 - 110)  ABP: 110/58 (04 Jan 2023 07:00) (101/49 - 145/70)  ABP(mean): 79 (04 Jan 2023 07:00) (70 - 98)  RR: 22 (04 Jan 2023 07:00) (18 - 33)  SpO2: 93% (04 Jan 2023 07:00) (93% - 99%)    O2 Parameters below as of 04 Jan 2023 07:00  Patient On (Oxygen Delivery Method): room air                I&O's Summary    03 Jan 2023 07:01  -  04 Jan 2023 07:00  --------------------------------------------------------  IN: 0 mL / OUT: 1415 mL / NET: -1415 mL        EXAM:     [place] Coma Scale: 15    General: normocephalic, atraumatic, laying in bed, in no distress  Neuro     MS: A/Ox3, cooperative, normal attention, no neglect, comprehension intact, speech with preserved fluency, naming, and repetition    CN: PERRL, VF FTC, EOMI and no ptosis bilaterally, sensation intact to crude touch V1-V3, face symmetric, hearing grossly intact    Mot: bulk normal, tone normal, power 5/5 in bilateral upper extremities, RLE 2/5 proximally, LLE 5/5 proximally  Chest: nonlabored respirations, no adventitious lung sounds bilaterally, heart regular rate/rhythm, present S1/S2, no murmurs or rubs  Abdomen: nondistended, soft and nontender without peritoneal signs, normoactive bowel sounds  Extremities: no clubbing, well-perfused, no edema    LABORATORY DATA:                            8.6    10.04 )-----------( 400      ( 04 Jan 2023 05:46 )             27.8     01-04    139  |  107  |  13  ----------------------------<  128<H>  3.9   |  22  |  0.61    Ca    9.2      04 Jan 2023 05:46  Phos  5.1     01-04  Mg     1.7     01-04    TPro  7.6  /  Alb  4.3  /  TBili  0.4  /  DBili  x   /  AST  18  /  ALT  25  /  AlkPhos  123<H>  01-03    LIVER FUNCTIONS - ( 03 Jan 2023 05:30 )  Alb: 4.3 g/dL / Pro: 7.6 g/dL / ALK PHOS: 123 U/L / ALT: 25 U/L / AST: 18 U/L / GGT: x           PT/INR - ( 03 Jan 2023 21:57 )   PT: 14.0 sec;   INR: 1.17          PTT - ( 03 Jan 2023 21:57 )  PTT:>200.0 sec    IMAGING DATA:    CARDIOLOGY DATA:    MICROBIOLOGY DATA:        MEDICATIONS  (STANDING):  aspirin  chewable 81 milliGRAM(s) Oral daily  atorvastatin 80 milliGRAM(s) Oral at bedtime  clopidogrel Tablet 75 milliGRAM(s) Oral daily  ferrous    sulfate 325 milliGRAM(s) Oral <User Schedule>  folic acid 1 milliGRAM(s) Oral daily  lactulose Syrup 10 Gram(s) Oral every 24 hours  melatonin 5 milliGRAM(s) Oral at bedtime  mirtazapine 7.5 milliGRAM(s) Oral at bedtime  multivitamin 1 Tablet(s) Oral daily  pantoprazole    Tablet 40 milliGRAM(s) Oral before breakfast  polyethylene glycol 3350 17 Gram(s) Oral two times a day  senna 2 Tablet(s) Oral at bedtime  traZODone 50 milliGRAM(s) Oral every 24 hours  traZODone 100 milliGRAM(s) Oral at bedtime    MEDICATIONS  (PRN):  acetaminophen     Tablet .. 650 milliGRAM(s) Oral every 6 hours PRN Mild Pain (1 - 3)  hydrOXYzine hydrochloride 25 milliGRAM(s) Oral three times a day PRN Anxiety  LORazepam     Tablet 1 milliGRAM(s) Oral every 8 hours PRN Anxiety  oxyCODONE    IR 5 milliGRAM(s) Oral every 4 hours PRN Moderate Pain (4 - 6)      ***********************************************  ASSESSMENT AND PLAN  ***********************************************    #S/p POB w/ stent placement to LICA, POD#1  #L. MICAH territory, L. parietotemporal strokes  #L. ICA/MICAH stenosis  #History of anxiety/depression  #Iron deficiency anemia  #HCV Ab(+)  #Pre-DM    NEURO - stepdown to stroke, q4/q4, ASA/plavix, lipitor, trazodone, atarax, pain control, PT/OT  PULM - on room air, goal > 92%, supplement as needed  CARDIO - NORMOTENSION is goal, no current active issues  GI - ADAT, bowel regimen, stool count  /RENAL - goal euvolemia/eunatremia  HEME - SCDs/SQL, PLT goal > 10,000/20,000 if pyrexia  ID - monitor for s/s infection, fever curve  ENDO - RASSI as needed, SGlu goal < 200    ICU stepdown Checklist:    [X] hemodynamically stable – VS WNL and stable x 24hours, UO adequate  [X] if  previously on HDA - off pressors x 24h with stable neuro exam   [X] no new symptoms x 24h (i.e. new fever, new-onset nausea/vomiting)  [X] stable labs: (i.e. WBC not rising, sodium not dropping)  [X] patient not at high risk for aspiration, if high risk then:                  [ ] should have definitive plans for trach/PEG (alternative option is to discharge from ICU to facilty)                  [ ] stepdown to bed close to nurse’s station  [X] low suctioning requirements (i.e. q4h or less)  [X] sign-off from primary RN*  [X] drains do not require ICU level of care  [X] if patient previously agitated or with behavioral issues – controlled  [X] pain controlled    01-04-23 @ 07:43

## 2023-01-05 LAB
ALBUMIN SERPL ELPH-MCNC: 3.5 G/DL — SIGNIFICANT CHANGE UP (ref 3.3–5)
ALP SERPL-CCNC: 85 U/L — SIGNIFICANT CHANGE UP (ref 40–120)
ALT FLD-CCNC: 15 U/L — SIGNIFICANT CHANGE UP (ref 10–45)
ANION GAP SERPL CALC-SCNC: 10 MMOL/L — SIGNIFICANT CHANGE UP (ref 5–17)
AST SERPL-CCNC: 13 U/L — SIGNIFICANT CHANGE UP (ref 10–40)
BASOPHILS # BLD AUTO: 0.07 K/UL — SIGNIFICANT CHANGE UP (ref 0–0.2)
BASOPHILS NFR BLD AUTO: 0.8 % — SIGNIFICANT CHANGE UP (ref 0–2)
BILIRUB SERPL-MCNC: <0.2 MG/DL — SIGNIFICANT CHANGE UP (ref 0.2–1.2)
BUN SERPL-MCNC: 17 MG/DL — SIGNIFICANT CHANGE UP (ref 7–23)
CALCIUM SERPL-MCNC: 9.2 MG/DL — SIGNIFICANT CHANGE UP (ref 8.4–10.5)
CHLORIDE SERPL-SCNC: 107 MMOL/L — SIGNIFICANT CHANGE UP (ref 96–108)
CO2 SERPL-SCNC: 22 MMOL/L — SIGNIFICANT CHANGE UP (ref 22–31)
CREAT SERPL-MCNC: 0.82 MG/DL — SIGNIFICANT CHANGE UP (ref 0.5–1.3)
EGFR: 82 ML/MIN/1.73M2 — SIGNIFICANT CHANGE UP
EOSINOPHIL # BLD AUTO: 0.09 K/UL — SIGNIFICANT CHANGE UP (ref 0–0.5)
EOSINOPHIL NFR BLD AUTO: 1.1 % — SIGNIFICANT CHANGE UP (ref 0–6)
GLUCOSE SERPL-MCNC: 97 MG/DL — SIGNIFICANT CHANGE UP (ref 70–99)
HCT VFR BLD CALC: 31.3 % — LOW (ref 34.5–45)
HGB BLD-MCNC: 9 G/DL — LOW (ref 11.5–15.5)
IMM GRANULOCYTES NFR BLD AUTO: 0.2 % — SIGNIFICANT CHANGE UP (ref 0–0.9)
LYMPHOCYTES # BLD AUTO: 2.84 K/UL — SIGNIFICANT CHANGE UP (ref 1–3.3)
LYMPHOCYTES # BLD AUTO: 33.7 % — SIGNIFICANT CHANGE UP (ref 13–44)
MAGNESIUM SERPL-MCNC: 1.8 MG/DL — SIGNIFICANT CHANGE UP (ref 1.6–2.6)
MCHC RBC-ENTMCNC: 24.6 PG — LOW (ref 27–34)
MCHC RBC-ENTMCNC: 28.8 GM/DL — LOW (ref 32–36)
MCV RBC AUTO: 85.5 FL — SIGNIFICANT CHANGE UP (ref 80–100)
MONOCYTES # BLD AUTO: 0.6 K/UL — SIGNIFICANT CHANGE UP (ref 0–0.9)
MONOCYTES NFR BLD AUTO: 7.1 % — SIGNIFICANT CHANGE UP (ref 2–14)
NEUTROPHILS # BLD AUTO: 4.81 K/UL — SIGNIFICANT CHANGE UP (ref 1.8–7.4)
NEUTROPHILS NFR BLD AUTO: 57.1 % — SIGNIFICANT CHANGE UP (ref 43–77)
NRBC # BLD: 0 /100 WBCS — SIGNIFICANT CHANGE UP (ref 0–0)
PHOSPHATE SERPL-MCNC: 4 MG/DL — SIGNIFICANT CHANGE UP (ref 2.5–4.5)
PLATELET # BLD AUTO: 357 K/UL — SIGNIFICANT CHANGE UP (ref 150–400)
POTASSIUM SERPL-MCNC: 4.1 MMOL/L — SIGNIFICANT CHANGE UP (ref 3.5–5.3)
POTASSIUM SERPL-SCNC: 4.1 MMOL/L — SIGNIFICANT CHANGE UP (ref 3.5–5.3)
PROT SERPL-MCNC: 6 G/DL — SIGNIFICANT CHANGE UP (ref 6–8.3)
RBC # BLD: 3.66 M/UL — LOW (ref 3.8–5.2)
RBC # FLD: 25.7 % — HIGH (ref 10.3–14.5)
SARS-COV-2 RNA SPEC QL NAA+PROBE: SIGNIFICANT CHANGE UP
SODIUM SERPL-SCNC: 139 MMOL/L — SIGNIFICANT CHANGE UP (ref 135–145)
WBC # BLD: 8.43 K/UL — SIGNIFICANT CHANGE UP (ref 3.8–10.5)
WBC # FLD AUTO: 8.43 K/UL — SIGNIFICANT CHANGE UP (ref 3.8–10.5)

## 2023-01-05 PROCEDURE — 99232 SBSQ HOSP IP/OBS MODERATE 35: CPT

## 2023-01-05 RX ORDER — PANTOPRAZOLE SODIUM 20 MG/1
1 TABLET, DELAYED RELEASE ORAL
Qty: 0 | Refills: 0 | DISCHARGE
Start: 2023-01-05

## 2023-01-05 RX ORDER — TRAZODONE HCL 50 MG
1 TABLET ORAL
Qty: 0 | Refills: 0 | DISCHARGE
Start: 2023-01-05

## 2023-01-05 RX ORDER — CLOPIDOGREL BISULFATE 75 MG/1
1 TABLET, FILM COATED ORAL
Qty: 0 | Refills: 0 | DISCHARGE
Start: 2023-01-05

## 2023-01-05 RX ORDER — POLYETHYLENE GLYCOL 3350 17 G/17G
17 POWDER, FOR SOLUTION ORAL
Qty: 0 | Refills: 0 | DISCHARGE
Start: 2023-01-05

## 2023-01-05 RX ORDER — ASPIRIN/CALCIUM CARB/MAGNESIUM 324 MG
1 TABLET ORAL
Qty: 0 | Refills: 0 | DISCHARGE
Start: 2023-01-05

## 2023-01-05 RX ORDER — ATORVASTATIN CALCIUM 80 MG/1
1 TABLET, FILM COATED ORAL
Qty: 0 | Refills: 0 | DISCHARGE
Start: 2023-01-05

## 2023-01-05 RX ORDER — MIRTAZAPINE 45 MG/1
1 TABLET, ORALLY DISINTEGRATING ORAL
Qty: 0 | Refills: 0 | DISCHARGE
Start: 2023-01-05

## 2023-01-05 RX ORDER — DIAZEPAM 5 MG
1 TABLET ORAL
Qty: 0 | Refills: 0 | DISCHARGE

## 2023-01-05 RX ORDER — SENNA PLUS 8.6 MG/1
2 TABLET ORAL
Qty: 0 | Refills: 0 | DISCHARGE
Start: 2023-01-05

## 2023-01-05 RX ORDER — OXCARBAZEPINE 300 MG/1
1 TABLET, FILM COATED ORAL
Qty: 0 | Refills: 0 | DISCHARGE

## 2023-01-05 RX ADMIN — Medication 100 MILLIGRAM(S): at 21:28

## 2023-01-05 RX ADMIN — OXYCODONE HYDROCHLORIDE 10 MILLIGRAM(S): 5 TABLET ORAL at 21:28

## 2023-01-05 RX ADMIN — Medication 5 MILLIGRAM(S): at 21:30

## 2023-01-05 RX ADMIN — Medication 1 TABLET(S): at 11:42

## 2023-01-05 RX ADMIN — Medication 1 MILLIGRAM(S): at 11:44

## 2023-01-05 RX ADMIN — OXYCODONE HYDROCHLORIDE 10 MILLIGRAM(S): 5 TABLET ORAL at 22:28

## 2023-01-05 RX ADMIN — PANTOPRAZOLE SODIUM 40 MILLIGRAM(S): 20 TABLET, DELAYED RELEASE ORAL at 06:20

## 2023-01-05 RX ADMIN — ATORVASTATIN CALCIUM 80 MILLIGRAM(S): 80 TABLET, FILM COATED ORAL at 21:30

## 2023-01-05 RX ADMIN — OXYCODONE HYDROCHLORIDE 10 MILLIGRAM(S): 5 TABLET ORAL at 07:19

## 2023-01-05 RX ADMIN — OXYCODONE HYDROCHLORIDE 10 MILLIGRAM(S): 5 TABLET ORAL at 15:43

## 2023-01-05 RX ADMIN — Medication 1 MILLIGRAM(S): at 00:58

## 2023-01-05 RX ADMIN — OXYCODONE HYDROCHLORIDE 10 MILLIGRAM(S): 5 TABLET ORAL at 06:20

## 2023-01-05 RX ADMIN — CLOPIDOGREL BISULFATE 75 MILLIGRAM(S): 75 TABLET, FILM COATED ORAL at 11:43

## 2023-01-05 RX ADMIN — Medication 81 MILLIGRAM(S): at 11:43

## 2023-01-05 RX ADMIN — ENOXAPARIN SODIUM 40 MILLIGRAM(S): 100 INJECTION SUBCUTANEOUS at 21:30

## 2023-01-05 RX ADMIN — MIRTAZAPINE 7.5 MILLIGRAM(S): 45 TABLET, ORALLY DISINTEGRATING ORAL at 21:27

## 2023-01-05 NOTE — PROGRESS NOTE ADULT - SUBJECTIVE AND OBJECTIVE BOX
INTERVAL HPI/OVERNIGHT EVENTS:  Patient was seen and examined at bedside. As overnight team, patient requesting IV dilaudid throughout the night. Patient resting comfortably this AM. Continues to complain of headaches and pain at incision site in groin. Requesting higher dose of her ativan and increase in her pain regimen. Patient denies: fever, chills, lightheadedness, weakness, CP, palpitations, SOB, cough, N/V. ROS otherwise negative.    VITAL SIGNS:  T(F): 97.9 (01-05-23 @ 06:47)  HR: 74 (01-05-23 @ 06:47)  BP: 98/60 (01-05-23 @ 06:47)  RR: 18 (01-05-23 @ 06:47)  SpO2: 95% (01-05-23 @ 06:47)  Wt(kg): --      01-04-23 @ 07:01  -  01-05-23 @ 07:00  --------------------------------------------------------  IN: 25 mL / OUT: 150 mL / NET: -125 mL        PHYSICAL EXAM:    General: No acute distress, awake and alert  Eyes: Anicteric sclerae, moist conjunctivae, see below for CNs  Neck: trachea midline, FROM, supple, no thyromegaly or lymphadenopathy  Cardiovascular: Regular rate and rhythm, no murmurs, rubs, or gallops. No carotid bruits.   Pulmonary: Anterior breath sounds clear bilaterally, no crackles or wheezing. No use of accessory muscles  GI: Abdomen soft, non-distended, non-tender  Extremities: Radial and DP pulses +2, no edema    MEDICATIONS  (STANDING):  aspirin  chewable 81 milliGRAM(s) Oral daily  atorvastatin 80 milliGRAM(s) Oral at bedtime  clopidogrel Tablet 75 milliGRAM(s) Oral daily  enoxaparin Injectable 40 milliGRAM(s) SubCutaneous every 24 hours  ferrous    sulfate 325 milliGRAM(s) Oral <User Schedule>  folic acid 1 milliGRAM(s) Oral daily  lactulose Syrup 10 Gram(s) Oral every 24 hours  melatonin 5 milliGRAM(s) Oral at bedtime  mirtazapine 7.5 milliGRAM(s) Oral at bedtime  multivitamin 1 Tablet(s) Oral daily  pantoprazole    Tablet 40 milliGRAM(s) Oral before breakfast  polyethylene glycol 3350 17 Gram(s) Oral two times a day  senna 2 Tablet(s) Oral at bedtime  traZODone 50 milliGRAM(s) Oral every 24 hours  traZODone 100 milliGRAM(s) Oral at bedtime    MEDICATIONS  (PRN):  acetaminophen     Tablet .. 650 milliGRAM(s) Oral every 6 hours PRN Mild Pain (1 - 3)  hydrOXYzine hydrochloride 25 milliGRAM(s) Oral three times a day PRN Anxiety  LORazepam     Tablet 1 milliGRAM(s) Oral every 8 hours PRN Anxiety  oxyCODONE    IR 10 milliGRAM(s) Oral every 4 hours PRN Severe Pain (7 - 10)  oxyCODONE    IR 5 milliGRAM(s) Oral every 4 hours PRN Moderate Pain (4 - 6)      Allergies    Compazine (Unknown)  contrast media (iodine-based) (Unknown)  iv contrast (Unknown)  penicillin (Unknown)  penicillins (Other)  Toradol (Unknown)    Intolerances        LABS:                        8.6    10.04 )-----------( 400      ( 04 Jan 2023 05:46 )             27.8     01-04    139  |  107  |  13  ----------------------------<  128<H>  3.9   |  22  |  0.61    Ca    9.2      04 Jan 2023 05:46  Phos  5.1     01-04  Mg     1.7     01-04      PT/INR - ( 03 Jan 2023 21:57 )   PT: 14.0 sec;   INR: 1.17          PTT - ( 03 Jan 2023 21:57 )  PTT:>200.0 sec      RADIOLOGY & ADDITIONAL TESTS:  Reviewed

## 2023-01-05 NOTE — PROGRESS NOTE ADULT - SUBJECTIVE AND OBJECTIVE BOX
INTERVAL HPI/OVERNIGHT EVENTS: SYED O/N    SUBJECTIVE: Patient seen and examined at bedside.   Pt reports having some pain at R groin catheterization site. Received PRN oxycodone 10mg 3x over last 24h and 5mg once. Reports eagerness to get OOB and mobilize. No new numbness, weakness, fever, chills, sweats, chest pain, dyspnea. Eating wo N/V/Abd pain. Reports flatus but no BM over last 2 days. Voiding wo issues.     Does report oxycodone made her feel very hyper. Discussed extensively w patient that pain in groin site should continue to improve with accompanying decreased need for PRN pain medication    OBJECTIVE:    VITAL SIGNS:  ICU Vital Signs Last 24 Hrs  T(C): 36.6 (05 Jan 2023 06:47), Max: 37.3 (04 Jan 2023 21:01)  T(F): 97.9 (05 Jan 2023 06:47), Max: 99.1 (04 Jan 2023 21:01)  HR: 74 (05 Jan 2023 06:47) (73 - 124)  BP: 98/60 (05 Jan 2023 06:47) (98/60 - 103/68)  BP(mean): 70 (04 Jan 2023 14:52) (70 - 70)  ABP: --  ABP(mean): --  RR: 18 (05 Jan 2023 06:47) (18 - 18)  SpO2: 95% (05 Jan 2023 06:47) (94% - 97%)    O2 Parameters below as of 05 Jan 2023 06:47  Patient On (Oxygen Delivery Method): room air              01-04 @ 07:01  -  01-05 @ 07:00  --------------------------------------------------------  IN: 25 mL / OUT: 150 mL / NET: -125 mL      CAPILLARY BLOOD GLUCOSE          PHYSICAL EXAM:  GEN: female in NAD on RA  HEENT: NC/AT, MMM  CV: RRR, nml S1S2, no murmurs  PULM: nml effort, CTAB  ABD: Soft, non-distended, NABS, non-tender  NEURO  CN II-XI grossly intact  A/O x3, moving all extremities  RLE - decreased passive ROM. 5/5 in plantar/dorsiflexion b/l. 5/5 throughout LLE, BUE.   No tremors  Sensation intact  SKIN: Groin dressing c/d/i. No erythema, or swelling noted.   PSYCH: Appropriate      MEDICATIONS:  MEDICATIONS  (STANDING):  aspirin  chewable 81 milliGRAM(s) Oral daily  atorvastatin 80 milliGRAM(s) Oral at bedtime  clopidogrel Tablet 75 milliGRAM(s) Oral daily  enoxaparin Injectable 40 milliGRAM(s) SubCutaneous every 24 hours  ferrous    sulfate 325 milliGRAM(s) Oral <User Schedule>  folic acid 1 milliGRAM(s) Oral daily  lactulose Syrup 10 Gram(s) Oral every 24 hours  melatonin 5 milliGRAM(s) Oral at bedtime  mirtazapine 7.5 milliGRAM(s) Oral at bedtime  multivitamin 1 Tablet(s) Oral daily  pantoprazole    Tablet 40 milliGRAM(s) Oral before breakfast  polyethylene glycol 3350 17 Gram(s) Oral two times a day  senna 2 Tablet(s) Oral at bedtime  traZODone 50 milliGRAM(s) Oral every 24 hours  traZODone 100 milliGRAM(s) Oral at bedtime    MEDICATIONS  (PRN):  acetaminophen     Tablet .. 650 milliGRAM(s) Oral every 6 hours PRN Mild Pain (1 - 3)  hydrOXYzine hydrochloride 25 milliGRAM(s) Oral three times a day PRN Anxiety  LORazepam     Tablet 1 milliGRAM(s) Oral every 8 hours PRN Anxiety  oxyCODONE    IR 10 milliGRAM(s) Oral every 4 hours PRN Severe Pain (7 - 10)  oxyCODONE    IR 5 milliGRAM(s) Oral every 4 hours PRN Moderate Pain (4 - 6)      ALLERGIES:  Allergies    Compazine (Unknown)  contrast media (iodine-based) (Unknown)  iv contrast (Unknown)  penicillin (Unknown)  penicillins (Other)  Toradol (Unknown)    Intolerances        LABS:                        9.0    8.43  )-----------( 357      ( 05 Jan 2023 08:31 )             31.3     01-05    139  |  107  |  17  ----------------------------<  97  4.1   |  22  |  0.82    Ca    9.2      05 Jan 2023 08:31  Phos  4.0     01-05  Mg     1.8     01-05    TPro  6.0  /  Alb  3.5  /  TBili  <0.2  /  DBili  x   /  AST  13  /  ALT  15  /  AlkPhos  85  01-05    PT/INR - ( 03 Jan 2023 21:57 )   PT: 14.0 sec;   INR: 1.17          PTT - ( 03 Jan 2023 21:57 )  PTT:>200.0 sec      RADIOLOGY & ADDITIONAL TESTS: Reviewed.

## 2023-01-05 NOTE — PROGRESS NOTE ADULT - ASSESSMENT
62F w/ PMH HTN (not on any meds), migraines, seizure disorder, depression/anxiety, prior stroke hx in 2018 presented to [facility] ER for frequent falls to [facility] ER found to have acute/ sub acute infarcts in L MICAH Territory and L parieto temporal region with mass effect without herniation i/s/o B/L A2 HGS/occlusion and Prox L ICA HGS vs occlusion. Started on DAPT and high intensity statin after Hgb stable. Currently stepped down to 7Wo (under Dr. Bland) for further management that includes MRA neck w/ contrast to further characterize ICA for any surgical intervention. S/p angiogram and ICA stent w. NSGY, stepped down from NSICU to RMF.

## 2023-01-05 NOTE — PROGRESS NOTE ADULT - PROBLEM SELECTOR PLAN 3
Hgb stable 10.5, s/p 1u pRBC. Also s/p 3 days iron sucrose. Denies history of heavy bleeding, poor wound healing, easy bruising, hemarthrosis.  - c/w ferrous sulfate 325mg M/W/F  - active T&S, transfuse for Hb<7  - outpatient colonoscopy with GI   - outpatient f/up with heme/onc (Dr. Maribteh Reddy) 1-2 weeks from discharge  - appreciate medicine team recs

## 2023-01-05 NOTE — PROGRESS NOTE ADULT - ASSESSMENT
62F c/o recurrent falls, sz d/o, depression, anxiety, p/w recurrent falls, found to have new and old strokes on imaging, as well as severe L ICA stenosis/occlusion and severe MICAH stenosis/occlusion; also w/ HANH (but no bleeding sx) responsive to transfusions (need EGD-Mallory outpatient), s/p L ICA balloon angioplasty w stenting of L ICA stenosis 1/3 w Dr. Morrison, recommended for acute rehab.    #Recurrent CVA, L ICA and MICAH stenosis. acute/subacute L MICAH territory infarction and L parieto-temporal region   - A1C. 5.8. . On Atorvastatin   - on DAPT - asa + plavix. Atorva 80  #L ICA stenosis - severe focal stenosis in PROXIMAL region   - s/p angioplasty and stenting   #Falls at home - recommended for acute rehab  #Anxiety and Depression; h/o benzo abuse - psychiatry following. Does not appear in acute withdrawal at this time   - per psych - pt lacks capacity to leave AMA at this time   - Trazodone 50/100   - Ativan 1 q8h, hydroxyzine 25 TID  #Depression – Remeron 7.5 HS  #HANH – hgb stable 9 from 8.6 today. TSat 3%. Suspect possibly combination of poor diet vs occult bleeding. Noted no evidence of blood loss inpatient w hgb responsive to transfusions. scope outpatient (EGD/Mallory). Hgb stable 11.2 from 10.2  #HCV Ab +; neg PCR  #Prediabetes – 5.8. F/U as outpatient  #HLD – 126    Recommend  Overall, pain appears to be controlled. Discussed extensively w patient - would change frequency of oxycodone 5mg/10mg PRN from q4h to q6h. Discussed w pt objective of pain control is to ensure she is functional.   Continue mobilization.   F/U BH/Psych recs.     DISPO: Acute rehab - auth  thus renewing today.

## 2023-01-06 PROCEDURE — 99232 SBSQ HOSP IP/OBS MODERATE 35: CPT

## 2023-01-06 PROCEDURE — 99233 SBSQ HOSP IP/OBS HIGH 50: CPT

## 2023-01-06 RX ORDER — OXYCODONE HYDROCHLORIDE 5 MG/1
7.5 TABLET ORAL EVERY 4 HOURS
Refills: 0 | Status: DISCONTINUED | OUTPATIENT
Start: 2023-01-06 | End: 2023-01-07

## 2023-01-06 RX ORDER — OXYCODONE HYDROCHLORIDE 5 MG/1
2.5 TABLET ORAL EVERY 4 HOURS
Refills: 0 | Status: DISCONTINUED | OUTPATIENT
Start: 2023-01-06 | End: 2023-01-11

## 2023-01-06 RX ADMIN — OXYCODONE HYDROCHLORIDE 10 MILLIGRAM(S): 5 TABLET ORAL at 02:14

## 2023-01-06 RX ADMIN — MIRTAZAPINE 7.5 MILLIGRAM(S): 45 TABLET, ORALLY DISINTEGRATING ORAL at 22:11

## 2023-01-06 RX ADMIN — OXYCODONE HYDROCHLORIDE 7.5 MILLIGRAM(S): 5 TABLET ORAL at 11:46

## 2023-01-06 RX ADMIN — Medication 1 MILLIGRAM(S): at 11:45

## 2023-01-06 RX ADMIN — Medication 81 MILLIGRAM(S): at 11:45

## 2023-01-06 RX ADMIN — Medication 1 TABLET(S): at 11:45

## 2023-01-06 RX ADMIN — Medication 1 MILLIGRAM(S): at 16:06

## 2023-01-06 RX ADMIN — ENOXAPARIN SODIUM 40 MILLIGRAM(S): 100 INJECTION SUBCUTANEOUS at 22:11

## 2023-01-06 RX ADMIN — Medication 100 MILLIGRAM(S): at 22:11

## 2023-01-06 RX ADMIN — CLOPIDOGREL BISULFATE 75 MILLIGRAM(S): 75 TABLET, FILM COATED ORAL at 11:45

## 2023-01-06 RX ADMIN — ATORVASTATIN CALCIUM 80 MILLIGRAM(S): 80 TABLET, FILM COATED ORAL at 22:10

## 2023-01-06 RX ADMIN — Medication 5 MILLIGRAM(S): at 22:11

## 2023-01-06 RX ADMIN — PANTOPRAZOLE SODIUM 40 MILLIGRAM(S): 20 TABLET, DELAYED RELEASE ORAL at 06:53

## 2023-01-06 RX ADMIN — OXYCODONE HYDROCHLORIDE 7.5 MILLIGRAM(S): 5 TABLET ORAL at 22:11

## 2023-01-06 RX ADMIN — Medication 650 MILLIGRAM(S): at 10:00

## 2023-01-06 RX ADMIN — OXYCODONE HYDROCHLORIDE 10 MILLIGRAM(S): 5 TABLET ORAL at 03:14

## 2023-01-06 RX ADMIN — Medication 650 MILLIGRAM(S): at 09:04

## 2023-01-06 RX ADMIN — OXYCODONE HYDROCHLORIDE 7.5 MILLIGRAM(S): 5 TABLET ORAL at 23:11

## 2023-01-06 RX ADMIN — OXYCODONE HYDROCHLORIDE 7.5 MILLIGRAM(S): 5 TABLET ORAL at 12:40

## 2023-01-06 NOTE — PROGRESS NOTE ADULT - SUBJECTIVE AND OBJECTIVE BOX
INTERVAL HPI/OVERNIGHT EVENTS: SYED O/N    SUBJECTIVE: Patient seen and examined at bedside.   Pt reports pain in R groin is improving. Needed 10mg oxycodone x3 over last 24h. No LH/Dizziness, fever, chest pain, dyspnea. Eating wo issues. No BM over last 3 days - pt prefers to use commode instead of bedpan. No nausea, abd pain.  Explained to pt importance of mobilizing w PT toward goal of acute rehab.    OBJECTIVE:    VITAL SIGNS:  ICU Vital Signs Last 24 Hrs  T(C): 36.6 (06 Jan 2023 05:35), Max: 37.2 (05 Jan 2023 20:38)  T(F): 97.8 (06 Jan 2023 05:35), Max: 98.9 (05 Jan 2023 20:38)  HR: 90 (06 Jan 2023 05:35) (85 - 90)  BP: 112/71 (06 Jan 2023 05:35) (101/62 - 112/71)  BP(mean): --  ABP: --  ABP(mean): --  RR: 18 (06 Jan 2023 05:35) (17 - 18)  SpO2: 96% (06 Jan 2023 05:35) (95% - 96%)    O2 Parameters below as of 06 Jan 2023 05:35  Patient On (Oxygen Delivery Method): room air              01-05 @ 07:01 - 01-06 @ 07:00  --------------------------------------------------------  IN: 0 mL / OUT: 450 mL / NET: -450 mL    01-06 @ 07:01  -  01-06 @ 14:03  --------------------------------------------------------  IN: 0 mL / OUT: 400 mL / NET: -400 mL      CAPILLARY BLOOD GLUCOSE          PHYSICAL EXAM:  GEN: female in NAD on RA  HEENT: NC/AT, MMM  CV: RRR, nml S1S2, no murmurs  PULM: nml effort, CTAB  ABD: Soft, non-distended, NABS, non-tender  NEURO  CN II-XI grossly intact  A/O x3, moving all extremities  RLE - decreased passive ROM. 5/5 in plantar/dorsiflexion b/l. 5/5 throughout LLE, BUE.   No tremors  Sensation intact  SKIN: Groin dressing c/d/i. No erythema, or swelling noted.   PSYCH: Appropriate      MEDICATIONS:  MEDICATIONS  (STANDING):  aspirin  chewable 81 milliGRAM(s) Oral daily  atorvastatin 80 milliGRAM(s) Oral at bedtime  clopidogrel Tablet 75 milliGRAM(s) Oral daily  enoxaparin Injectable 40 milliGRAM(s) SubCutaneous every 24 hours  ferrous    sulfate 325 milliGRAM(s) Oral <User Schedule>  folic acid 1 milliGRAM(s) Oral daily  lactulose Syrup 10 Gram(s) Oral every 24 hours  melatonin 5 milliGRAM(s) Oral at bedtime  mirtazapine 7.5 milliGRAM(s) Oral at bedtime  multivitamin 1 Tablet(s) Oral daily  pantoprazole    Tablet 40 milliGRAM(s) Oral before breakfast  polyethylene glycol 3350 17 Gram(s) Oral two times a day  senna 2 Tablet(s) Oral at bedtime  traZODone 50 milliGRAM(s) Oral every 24 hours  traZODone 100 milliGRAM(s) Oral at bedtime    MEDICATIONS  (PRN):  acetaminophen     Tablet .. 650 milliGRAM(s) Oral every 6 hours PRN Mild Pain (1 - 3)  hydrOXYzine hydrochloride 25 milliGRAM(s) Oral three times a day PRN Anxiety  LORazepam     Tablet 1 milliGRAM(s) Oral every 8 hours PRN Anxiety  oxyCODONE    IR 2.5 milliGRAM(s) Oral every 4 hours PRN Moderate Pain (4 - 6)  oxyCODONE    IR 7.5 milliGRAM(s) Oral every 4 hours PRN Severe Pain (7 - 10)      ALLERGIES:  Allergies    Compazine (Unknown)  contrast media (iodine-based) (Unknown)  iv contrast (Unknown)  penicillin (Unknown)  penicillins (Other)  Toradol (Unknown)    Intolerances        LABS:                        9.0    8.43  )-----------( 357      ( 05 Jan 2023 08:31 )             31.3     01-05    139  |  107  |  17  ----------------------------<  97  4.1   |  22  |  0.82    Ca    9.2      05 Jan 2023 08:31  Phos  4.0     01-05  Mg     1.8     01-05    TPro  6.0  /  Alb  3.5  /  TBili  <0.2  /  DBili  x   /  AST  13  /  ALT  15  /  AlkPhos  85  01-05          RADIOLOGY & ADDITIONAL TESTS: Reviewed.

## 2023-01-06 NOTE — PROGRESS NOTE ADULT - PROBLEM SELECTOR PLAN 6
F: tolerating PO, no IVF  E: replete K<4, Mg<2  N: regular  VTE Prophylaxis: hep SC   GI: pantoprazole  D: 7Ur (under Dr. Bland)

## 2023-01-06 NOTE — PROGRESS NOTE ADULT - ASSESSMENT
62F c/o recurrent falls, sz d/o, depression, anxiety, p/w recurrent falls, found to have new and old strokes on imaging, as well as severe L ICA stenosis/occlusion and severe MICAH stenosis/occlusion; also w/ HANH (but no bleeding sx) responsive to transfusions (need EGD-[place] outpatient), s/p L ICA balloon angioplasty w stenting of L ICA stenosis 1/3 w Dr. Morrison, recommended for acute rehab.    #Recurrent CVA, L ICA and MICAH stenosis. acute/subacute L MICAH territory infarction and L parieto-temporal region   - A1C. 5.8. . On Atorvastatin   - on DAPT - asa + plavix. Atorva 80  #L ICA stenosis - severe focal stenosis in PROXIMAL region   - s/p angioplasty and stenting   #Falls at home - recommended for acute rehab  #Anxiety and Depression; h/o benzo abuse - psychiatry following. Does not appear in acute withdrawal at this time   - per psych - pt lacks capacity to leave AMA at this time   - Trazodone 50/100   - Ativan 1 q8h, hydroxyzine 25 TID  #Depression – Remeron 7.5 HS  #HANH – No labs today. hgb stable 9 from 8.6 today. TSat 3%. Suspect possibly combination of poor diet vs occult bleeding. Noted no evidence of blood loss inpatient w hgb responsive to transfusions. scope outpatient (EGD/[place]). Hgb stable 11.2 from 10.2  #HCV Ab +; neg PCR  #Prediabetes – 5.8. F/U as outpatient  #HLD – 126    Recommend  Overall, pain appears to be controlled.   Discussed w patient. Plan is to decrease oxycodone PRN from 10/5mg to 7.5/2.5mg. Would aim to decrease by 2.5mg daily. Discussed w pt objective of pain control is to ensure she is functional  Continue mobilization.   F/U BH/Psych recs.     DISPO: Acute rehab - auth  thus renewing .

## 2023-01-06 NOTE — PROGRESS NOTE ADULT - ASSESSMENT
62F w/ PMH HTN (not on any meds), migraines, seizure disorder, depression/anxiety, prior stroke hx in 2018 presented to [facility] ER for frequent falls to [facility] ER found to have acute/ sub acute infarcts in L MICAH Territory and L parieto temporal region with mass effect without herniation i/s/o B/L A2 HGS/occlusion and Prox L ICA HGS vs occlusion. Started on DAPT and high intensity statin after Hgb stable. Currently stepped down to 7Wo (under Dr. Bland) for further management that includes MRA neck w/ contrast to further characterize ICA for any surgical intervention. S/p angiogram and ICA stent w. NSGY, stepped down from NSICU to RMF. Pending auth for TASNEEM.

## 2023-01-06 NOTE — PROGRESS NOTE ADULT - ASSESSMENT
per Neurology    62F w/ PMH HTN (not on any meds), migraines, seizure disorder, depression/anxiety, prior stroke hx in 2018 presented to [facility] ER for frequent falls to [facility] ER found to have acute/ sub acute infarcts in L MICAH Territory and L parieto temporal region with mass effect without herniation i/s/o B/L A2 HGS/occlusion and Prox L ICA HGS vs occlusion. Started on DAPT and high intensity statin after Hgb stable. Currently stepped down to 7Wo (under Dr. Bland) for further management that includes MRA neck w/ contrast to further characterize ICA for any surgical intervention. S/p angiogram and ICA stent w. NSGY, stepped down from NSICU to F. Pending auth for TASNEEM.    Problem/Plan - 1:  ·  Problem: CVA (cerebrovascular accident).   ·  Plan: MRA H/N: Acute/ Subacute infarction L MICAH Territory and L parieto temporal region w/local mass effect. B/L A2 segment HGS/ Occlusion and L Proximal ICA HGS Vs Occlusion. HCT Results: Subacute/ chronic B/L MICAH territory infarcts. B/l carotid ultrasound significant for 50-69% stenosis mid LEFT internal carotid artery per NASCET peak systolic velocity measurements due to noncalcified atheromatous plaque. MRA neck w/ contrast showed focal severe stenosis of the proximal L ICA.    S/p angiogram and ICA stent w/ NSGY    Plan:  - f/u NSGY recs:   - Galvan in place, remove in AM   - advance diet as tolerated  - pain control: oxy 5mg PRN   - continue Asprin 81mg and Plavix 75mg daily   - continue atorvastatin 80mg daily   - continue Mirtazapine 7.5mg daily, Trazadone 100mg daily, Atarax 25mg q8 hrs PRN for anxiety/depression   - continue iron supplement/folic acid/multivitamin   - PPI   - bowel regimen.    Problem/Plan - 2:  ·  Problem: Anxiety and depression.   ·  Plan: Self-reported history of chronic anxiety, insomnia, new reported depression, benzodiazepine use disorder, ?alcohol use disorder (per prior chart), ?opiate use disorder in remission, impaired ability to logically reason. Demonstrates very poor insight into the reason for her ongoing hospitalization, with no understanding of diagnosis of new stroke, recommended additional testing and possible interventions, and no appreciation of the reported significant risks of leaving the hospital against medical advice. Additionally, pt remains highly focused on receiving benzodiazepines for vague anxiety sx and insomnia, with poor insight into her benzodiazepine use disorder (with illicit in addition to prescribed use as outpt).   - per psych, no acute safety concerns that would warrant inpatient level psychiatric care.  - pt currently LACKS capacity to leave the hospital AMA or refuse any acutely indicated testing or treatment.  - mirtazapine 7.5mg po QHS PRN for insomnia   - additionally offering trazodone for insomnia and anxiety  - hydroxyzine PRN for acute anxiety - pt cites adverse reaction to gabapentin  - is prescribed 4mg ativan outpatient, started patient on ativan 0.1mg TID PRN on 12/25, still reporting on-going anxiety and insomnia, increased to 1mg TID PRN on 12/26.    Problem/Plan - 3:  ·  Problem: Iron deficiency anemia.   ·  Plan: Hgb stable 10.5, s/p 1u pRBC. Also s/p 3 days iron sucrose. Denies history of heavy bleeding, poor wound healing, easy bruising, hemarthrosis.  - c/w ferrous sulfate 325mg M/W/F  - active T&S, transfuse for Hb<7  - outpatient colonoscopy with GI   - outpatient f/up with heme/onc (Dr. Maribeth Reddy) 1-2 weeks from discharge  - appreciate medicine team recs.    Problem/Plan - 4:  ·  Problem: HTN (hypertension).   ·  Plan: Not on any home BP meds per pt  - BP in the 100's today, no focal neuro changes  - TTE : No wall motion abnormalities/ No valvular Dz.  - EKG Results: Isolated V5 AYANA upon admission, with no reciprocal changes, no c/o CP, no c/f ischemia no c/o cp/sob.    Problem/Plan - 5:  ·  Problem: HLD (hyperlipidemia).   ·  Plan:   - c/w statin 80mg qhs.    Problem/Plan - 6:  ·  Problem: Nutrition, metabolism, and development symptoms.   ·  Plan: F: tolerating PO, no IVF  E: replete K<4, Mg<2  N: regular  VTE Prophylaxis: hep SC   GI: pantoprazole  D: [facility] (under Dr. Bland).

## 2023-01-06 NOTE — PROGRESS NOTE ADULT - SUBJECTIVE AND OBJECTIVE BOX
INTERVAL HPI/OVERNIGHT EVENTS:  Patient was seen and examined at bedside. As overnight team, no overnight events. Patient resting comfortably this AM. C/o mild pain, but reporting improvement in neurological symptoms. Patient denies: fever, chills, lightheadedness, weakness, CP, palpitations, SOB, cough, N/V. ROS otherwise negative.    VITAL SIGNS:  T(F): 97.8 (01-06-23 @ 05:35)  HR: 90 (01-06-23 @ 05:35)  BP: 112/71 (01-06-23 @ 05:35)  RR: 18 (01-06-23 @ 05:35)  SpO2: 96% (01-06-23 @ 05:35)  Wt(kg): --      01-05-23 @ 07:01  -  01-06-23 @ 07:00  --------------------------------------------------------  IN: 0 mL / OUT: 450 mL / NET: -450 mL    01-06-23 @ 07:01  -  01-06-23 @ 10:53  --------------------------------------------------------  IN: 0 mL / OUT: 400 mL / NET: -400 mL        PHYSICAL EXAM:    General: No acute distress, awake and alert  Eyes: Anicteric sclerae, moist conjunctivae, see below for CNs  Neck: trachea midline, FROM, supple, no thyromegaly or lymphadenopathy  Cardiovascular: Regular rate and rhythm, no murmurs, rubs, or gallops. No carotid bruits.   Pulmonary: Anterior breath sounds clear bilaterally, no crackles or wheezing. No use of accessory muscles  GI: Abdomen soft, non-distended, non-tender  Extremities: Radial and DP pulses +2, no edema  Neuro: AAO x3, moving all extremities, mildly reduced ROM in the RLE. Muscle strength intact in all other extremities. Sensation intact throughout.    MEDICATIONS  (STANDING):  aspirin  chewable 81 milliGRAM(s) Oral daily  atorvastatin 80 milliGRAM(s) Oral at bedtime  clopidogrel Tablet 75 milliGRAM(s) Oral daily  enoxaparin Injectable 40 milliGRAM(s) SubCutaneous every 24 hours  ferrous    sulfate 325 milliGRAM(s) Oral <User Schedule>  folic acid 1 milliGRAM(s) Oral daily  lactulose Syrup 10 Gram(s) Oral every 24 hours  melatonin 5 milliGRAM(s) Oral at bedtime  mirtazapine 7.5 milliGRAM(s) Oral at bedtime  multivitamin 1 Tablet(s) Oral daily  pantoprazole    Tablet 40 milliGRAM(s) Oral before breakfast  polyethylene glycol 3350 17 Gram(s) Oral two times a day  senna 2 Tablet(s) Oral at bedtime  traZODone 50 milliGRAM(s) Oral every 24 hours  traZODone 100 milliGRAM(s) Oral at bedtime    MEDICATIONS  (PRN):  acetaminophen     Tablet .. 650 milliGRAM(s) Oral every 6 hours PRN Mild Pain (1 - 3)  hydrOXYzine hydrochloride 25 milliGRAM(s) Oral three times a day PRN Anxiety  LORazepam     Tablet 1 milliGRAM(s) Oral every 8 hours PRN Anxiety  oxyCODONE    IR 2.5 milliGRAM(s) Oral every 4 hours PRN Moderate Pain (4 - 6)  oxyCODONE    IR 7.5 milliGRAM(s) Oral every 4 hours PRN Severe Pain (7 - 10)      Allergies    Compazine (Unknown)  contrast media (iodine-based) (Unknown)  iv contrast (Unknown)  penicillin (Unknown)  penicillins (Other)  Toradol (Unknown)    Intolerances        LABS:                        9.0    8.43  )-----------( 357      ( 05 Jan 2023 08:31 )             31.3     01-05    139  |  107  |  17  ----------------------------<  97  4.1   |  22  |  0.82    Ca    9.2      05 Jan 2023 08:31  Phos  4.0     01-05  Mg     1.8     01-05    TPro  6.0  /  Alb  3.5  /  TBili  <0.2  /  DBili  x   /  AST  13  /  ALT  15  /  AlkPhos  85  01-05          RADIOLOGY & ADDITIONAL TESTS:  Reviewed

## 2023-01-06 NOTE — PROGRESS NOTE ADULT - SUBJECTIVE AND OBJECTIVE BOX
Physical Medicine and Rehabilitation Progress Note :       Patient is a 62y old  Female who presents with a chief complaint of mechanical fall (2023 10:52)      HPI:  62F w/ PMH HTN, migraine, seizure d/o, depression-anxiety, per patient multiiple CTA in September w/ complaints of fall. Patient states that fall occurred while home, when syncopized, with LOC with head trauma. Pt reports no alcohol use, never smoker, received HHA 4 hours daily (every day). Has two adult children (one of whome is  and one living parent). Patient is a poor historian, does not recall exact events leading to fall. Patient also notes the need to ambulate via wheelchair in home. States to have been admitted to acute rehab s/p CVA in fall of . States to be "addicted to valium" utilizing 1 10mg tablet once every other day. States health aide is not with her all the time. Patient notes no continued need for antihypertensives (on multiple previously) as they caused dizziness. Notes that dizziness has not resolved and notes room spinning with changes in head position.     Initial vital signs: T: 98 F, HR: 93, BP: 110/71, R: 16, SpO2: 100% on RA    Labs: significant for WBC 12.91, Hgb 8, MCV 69, Plt 567, aPTT 25.1, K 3 s/p 40 meq PO in ED, Cr 0.67, remainder of CMP WNL, CECILIA <10,     CXR: No acute pulmonary pathology. Dextroscholiosis noted.     CT C-spine: Ankylosis of the C4 and C5 vertebrae is noted. There is moderate multilevel degenerative disc disease. No acute osseous abnormality of the cervical spine.    CTH: No acute intracranial hemorrhage or calvarial fracture. Subacute/chronic bilateral MICAH territory infarcts.    CT T-spine: No acute fracture. Patchy bilateral pulmonary nodular opacities, likely infectious/inflammatory. Moderate hiatal hernia.    EKG:     Medications: tylenol 750mg PO x1, KCl 40meq x1    Consults: none  (20 Dec 2022 04:52)                            9.0    8.43  )-----------( 357      ( 2023 08:31 )             31.3       01-05    139  |  107  |  17  ----------------------------<  97  4.1   |  22  |  0.82    Ca    9.2      2023 08:31  Phos  4.0     -05  Mg     1.8     -05    TPro  6.0  /  Alb  3.5  /  TBili  <0.2  /  DBili  x   /  AST  13  /  ALT  15  /  AlkPhos  85  -    Vital Signs Last 24 Hrs  T(C): 36.6 (2023 05:35), Max: 37.2 (2023 20:38)  T(F): 97.8 (2023 05:35), Max: 98.9 (2023 20:38)  HR: 90 (2023 05:35) (85 - 90)  BP: 112/71 (2023 05:35) (101/62 - 112/71)  BP(mean): --  RR: 18 (2023 05:35) (17 - 18)  SpO2: 96% (2023 05:35) (95% - 96%)    Parameters below as of 2023 05:35  Patient On (Oxygen Delivery Method): room air        MEDICATIONS  (STANDING):  aspirin  chewable 81 milliGRAM(s) Oral daily  atorvastatin 80 milliGRAM(s) Oral at bedtime  clopidogrel Tablet 75 milliGRAM(s) Oral daily  enoxaparin Injectable 40 milliGRAM(s) SubCutaneous every 24 hours  ferrous    sulfate 325 milliGRAM(s) Oral <User Schedule>  folic acid 1 milliGRAM(s) Oral daily  lactulose Syrup 10 Gram(s) Oral every 24 hours  melatonin 5 milliGRAM(s) Oral at bedtime  mirtazapine 7.5 milliGRAM(s) Oral at bedtime  multivitamin 1 Tablet(s) Oral daily  pantoprazole    Tablet 40 milliGRAM(s) Oral before breakfast  polyethylene glycol 3350 17 Gram(s) Oral two times a day  senna 2 Tablet(s) Oral at bedtime  traZODone 50 milliGRAM(s) Oral every 24 hours  traZODone 100 milliGRAM(s) Oral at bedtime    MEDICATIONS  (PRN):  acetaminophen     Tablet .. 650 milliGRAM(s) Oral every 6 hours PRN Mild Pain (1 - 3)  hydrOXYzine hydrochloride 25 milliGRAM(s) Oral three times a day PRN Anxiety  LORazepam     Tablet 1 milliGRAM(s) Oral every 8 hours PRN Anxiety  oxyCODONE    IR 2.5 milliGRAM(s) Oral every 4 hours PRN Moderate Pain (4 - 6)  oxyCODONE    IR 7.5 milliGRAM(s) Oral every 4 hours PRN Severe Pain (7 - 10)         Physical Therapy Functional Status Assessment :       AM-PAC Functional Assessment: Basic Mobility  Type of Assessment: Daily assessment  Turning from your back to your side while in a flat bed without using bedrails?: 3 = A little assistance  Moving from lying on your back to sitting on the flat side of a flat bed without using bedrails?: 3 = A little assistance  Moving to and from a bed to a chair (including a wheelchair)?: 2 = A lot of assistance  Standing up from a chair using your arms (e.g. wheelchair or bedside chair)?: 2 = A lot of assistance  Walking in hospital room?: 1 = Total assistance  Climbing 3-5 steps with a railing?: 1 = Total assistance  Score: 12   Row Comment: Ask the patient "How much help from another person do you currently need? (If the patient hasn't done an activity recently, how much help from another person do you think he/she needs if he/she tried?)    Cognitive/Neuro      Language Assistance  Preferred Language to Address Healthcare Preferred Language to Address Healthcare: English    Therapeutic Interventions      Bed Mobility  Bed Mobility Training Rolling/Turning: contact guard;  minimum assist (75% patient effort);  1 person assist;  verbal cues;  bed rails  Bed Mobility Training Scooting: minimum assist (75% patient effort);  2 person assist;  verbal cues  Bed Mobility Training Sit-to-Supine: not assessed; patient returned seated in bedside chair  Bed Mobility Training Supine-to-Sit: minimum assist (75% patient effort);  2 person assist;  verbal cues;  bed rails  Bed Mobility Training Limitations: decreased ability to use legs for bridging/pushing;  impaired ability to control trunk for mobility;  decreased flexibility;  decreased ROM;  decreased strength;  impaired balance;  impaired postural control;  Demos R knee extensor-like tone while seated EOB with difficulty flexing R knee.     Bed-Chair Transfer Training  Transfer Training Bed-to-Chair Transfer: maximum assist (25% patient effort);  2 person assist;  verbal cues;  (B) handheld assist  Transfer Training Chair-to-Bed Transfer: not assessed; patient returned seated in bedside chair   Bed-to-Chair Transfer Training Transfer Safety Analysis: decreased balance;  decreased weight-shifting ability;  Demo impaired eccentric control during descend;  decreased flexibility;  decreased ROM;  decreased strength;  impaired balance;  impaired postural control    Sit-Stand Transfer Training  Transfer Training Sit-to-Stand Transfer: moderate assist (50% patient effort);  2 person assist;  verbal cues;  R handheld assist  Transfer Training Stand-to-Sit Transfer: moderate assist (50% patient effort);  2 person assist;  verbal cues;  R handheld assist  Sit-to-Stand Transfer Training Transfer Safety Analysis: decreased balance;  decreased weight-shifting ability;  Performed sit<>stand x4 trials. Demo impaired standing endurance (tolerated standing for ~30 seconds) with impaired eccentric control during descend;  decreased flexibility;  decreased ROM;  decreased strength;  impaired balance;  impaired postural control    Therapeutic Exercise  Therapeutic Exercise Detail: L ankle PF/Df 5/5 strength; R ankle DF 4/5 strength; R ankle PF strength 3+/5 (patient with difficulty completing full ROM); R EHL/FHL 3+/5; R ankle pumps 2 sets x 10 reps; R  squeeze 4/5; R elbow flexion 4/5; R shoulder flexion 4/5; LUE 5/5 strength for all major joints; RUE Finger-to-nose WFL (delayed ruth); LUE finger-to-nose WFL; (B) thumb-finger opposition WFL.             PM&R Impression : as above    Current Disposition Plan Recommendations :    acute rehab placement

## 2023-01-07 PROCEDURE — 99232 SBSQ HOSP IP/OBS MODERATE 35: CPT

## 2023-01-07 PROCEDURE — 93926 LOWER EXTREMITY STUDY: CPT | Mod: 26,RT

## 2023-01-07 PROCEDURE — 70450 CT HEAD/BRAIN W/O DYE: CPT | Mod: 26

## 2023-01-07 PROCEDURE — 99233 SBSQ HOSP IP/OBS HIGH 50: CPT

## 2023-01-07 RX ORDER — OXYCODONE HYDROCHLORIDE 5 MG/1
5 TABLET ORAL EVERY 4 HOURS
Refills: 0 | Status: DISCONTINUED | OUTPATIENT
Start: 2023-01-07 | End: 2023-01-08

## 2023-01-07 RX ADMIN — Medication 0.5 MILLIGRAM(S): at 17:04

## 2023-01-07 RX ADMIN — Medication 50 MILLIGRAM(S): at 09:29

## 2023-01-07 RX ADMIN — Medication 1 MILLIGRAM(S): at 08:10

## 2023-01-07 RX ADMIN — OXYCODONE HYDROCHLORIDE 5 MILLIGRAM(S): 5 TABLET ORAL at 16:08

## 2023-01-07 RX ADMIN — OXYCODONE HYDROCHLORIDE 5 MILLIGRAM(S): 5 TABLET ORAL at 16:45

## 2023-01-07 RX ADMIN — MIRTAZAPINE 7.5 MILLIGRAM(S): 45 TABLET, ORALLY DISINTEGRATING ORAL at 22:06

## 2023-01-07 RX ADMIN — Medication 81 MILLIGRAM(S): at 12:48

## 2023-01-07 RX ADMIN — OXYCODONE HYDROCHLORIDE 7.5 MILLIGRAM(S): 5 TABLET ORAL at 05:24

## 2023-01-07 RX ADMIN — OXYCODONE HYDROCHLORIDE 2.5 MILLIGRAM(S): 5 TABLET ORAL at 23:05

## 2023-01-07 RX ADMIN — Medication 1 TABLET(S): at 12:47

## 2023-01-07 RX ADMIN — ATORVASTATIN CALCIUM 80 MILLIGRAM(S): 80 TABLET, FILM COATED ORAL at 22:06

## 2023-01-07 RX ADMIN — ENOXAPARIN SODIUM 40 MILLIGRAM(S): 100 INJECTION SUBCUTANEOUS at 22:06

## 2023-01-07 RX ADMIN — Medication 1 MILLIGRAM(S): at 12:47

## 2023-01-07 RX ADMIN — Medication 100 MILLIGRAM(S): at 22:06

## 2023-01-07 RX ADMIN — CLOPIDOGREL BISULFATE 75 MILLIGRAM(S): 75 TABLET, FILM COATED ORAL at 12:48

## 2023-01-07 RX ADMIN — OXYCODONE HYDROCHLORIDE 7.5 MILLIGRAM(S): 5 TABLET ORAL at 06:30

## 2023-01-07 RX ADMIN — OXYCODONE HYDROCHLORIDE 2.5 MILLIGRAM(S): 5 TABLET ORAL at 22:05

## 2023-01-07 NOTE — PROGRESS NOTE ADULT - SUBJECTIVE AND OBJECTIVE BOX
INTERVAL HPI/OVERNIGHT EVENTS: SYED o/N    SUBJECTIVE: Patient seen and examined at bedside.   Pt reports R groin pain is minimal - required 3x 7.5mg oxycodone over last 24h. Reports anxiety is controlled at this time. Does report intermittent difficulty controlling her R leg - leading to abduction at times. No fever, chest pain, dsypnea. Eating wo N/V/Abd pain. No dysuria or diarrhea.      OBJECTIVE:    VITAL SIGNS:  ICU Vital Signs Last 24 Hrs  T(C): 36.5 (07 Jan 2023 06:07), Max: 36.8 (06 Jan 2023 14:20)  T(F): 97.7 (07 Jan 2023 06:07), Max: 98.3 (06 Jan 2023 14:20)  HR: 96 (07 Jan 2023 11:30) (88 - 99)  BP: 109/73 (07 Jan 2023 11:30) (107/74 - 120/86)  BP(mean): --  ABP: --  ABP(mean): --  RR: 18 (07 Jan 2023 06:07) (17 - 18)  SpO2: 96% (07 Jan 2023 11:30) (95% - 97%)    O2 Parameters below as of 07 Jan 2023 11:30  Patient On (Oxygen Delivery Method): room air              01-06 @ 07:01  -  01-07 @ 07:00  --------------------------------------------------------  IN: 0 mL / OUT: 1200 mL / NET: -1200 mL      CAPILLARY BLOOD GLUCOSE          PHYSICAL EXAM:  GEN: female in NAD on RA  HEENT: NC/AT, MMM  CV: RRR, nml S1S2, no murmurs  PULM: nml effort, CTAB  ABD: Soft, non-distended, NABS, non-tender  NEURO  CN II-XI grossly intact  A/O x3, moving all extremities  RLE - decreased passive ROM. 5/5 in plantar/dorsiflexion b/l. 5/5 throughout LLE, BUE.   No tremors  Sensation intact  SKIN: Groin dressing c/d/i. No erythema, or swelling noted.   PSYCH: Appropriate    MEDICATIONS:  MEDICATIONS  (STANDING):  aspirin  chewable 81 milliGRAM(s) Oral daily  atorvastatin 80 milliGRAM(s) Oral at bedtime  clopidogrel Tablet 75 milliGRAM(s) Oral daily  enoxaparin Injectable 40 milliGRAM(s) SubCutaneous every 24 hours  ferrous    sulfate 325 milliGRAM(s) Oral <User Schedule>  folic acid 1 milliGRAM(s) Oral daily  lactulose Syrup 10 Gram(s) Oral every 24 hours  melatonin 5 milliGRAM(s) Oral at bedtime  mirtazapine 7.5 milliGRAM(s) Oral at bedtime  multivitamin 1 Tablet(s) Oral daily  pantoprazole    Tablet 40 milliGRAM(s) Oral before breakfast  polyethylene glycol 3350 17 Gram(s) Oral two times a day  senna 2 Tablet(s) Oral at bedtime  traZODone 50 milliGRAM(s) Oral every 24 hours  traZODone 100 milliGRAM(s) Oral at bedtime    MEDICATIONS  (PRN):  acetaminophen     Tablet .. 650 milliGRAM(s) Oral every 6 hours PRN Mild Pain (1 - 3)  hydrOXYzine hydrochloride 25 milliGRAM(s) Oral three times a day PRN Anxiety  LORazepam     Tablet 0.5 milliGRAM(s) Oral every 12 hours PRN Anxiety  oxyCODONE    IR 2.5 milliGRAM(s) Oral every 4 hours PRN Moderate Pain (4 - 6)  oxyCODONE    IR 5 milliGRAM(s) Oral every 4 hours PRN Severe Pain (7 - 10)      ALLERGIES:  Allergies    Compazine (Unknown)  contrast media (iodine-based) (Unknown)  iv contrast (Unknown)  penicillin (Unknown)  penicillins (Other)  Toradol (Unknown)    Intolerances        LABS:                RADIOLOGY & ADDITIONAL TESTS: Reviewed.

## 2023-01-07 NOTE — PROGRESS NOTE ADULT - ASSESSMENT
62F c/o recurrent falls, sz d/o, depression, anxiety, p/w recurrent falls, found to have new and old strokes on imaging, as well as severe L ICA stenosis/occlusion and severe MICAH stenosis/occlusion; also w/ HANH (but no bleeding sx) responsive to transfusions (need EGD-[place] outpatient), s/p L ICA balloon angioplasty w stenting of L ICA stenosis 1/3 w Dr. Morrison, recommended for acute rehab.    #Recurrent CVA, L ICA and MICAH stenosis. acute/subacute L MICAH territory infarction and L parieto-temporal region   - A1C. 5.8. . On Atorvastatin   - on DAPT - asa + plavix. Atorva 80  #L ICA stenosis - severe focal stenosis in PROXIMAL region   - s/p angioplasty and stenting   #Falls at home - recommended for acute rehab  #Anxiety and Depression; h/o benzo abuse - psychiatry following. Does not appear in acute withdrawal at this time   - per psych - pt lacks capacity to leave AMA at this time   - Trazodone 50/100   - Ativan 1 q8h, hydroxyzine 25 TID  #Depression – Remeron 7.5 HS  #HANH – No labs today. hgb stable 9 from 8.6 today. TSat 3%. Suspect possibly combination of poor diet vs occult bleeding. Noted no evidence of blood loss inpatient w hgb responsive to transfusions. scope outpatient (EGD/[facility]). Hgb stable 11.2 from 10.2  #HCV Ab +; neg PCR  #Prediabetes – 5.8. F/U as outpatient  #HLD – 126    Recommend  Overall, pain appears to be controlled.   Discussed w patient. Plan is to decrease oxycodone PRN from 7.5/2.5 to 5/2.5mg. Would aim to decrease by 2.5mg daily. Discussed w pt objective of pain control is to ensure she is functional    Per Psych/BH - recommended to titrate down PRN benzodiazepines. Pt requiring 1mg approx q24h. Discussed extensively w patient risk and benefits of continuing benzodiazpines. Overall, anxiety appears to be controlled. Would consider decreasing ativan to 0.5mg q12h     Continue mobilization.   F/U BH/Psych recs.     DISPO: Acute rehab - auth  thus renewing .

## 2023-01-07 NOTE — PROGRESS NOTE ADULT - ASSESSMENT
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  MRI showed chronic R MICAH stroke as well as acute/subacute on chronic L MICAH and acute/subacute L temporo-pareital strokes in the setting of L ICA severe stenosis/occlusion and MICAH severe stenosis/occlusion s/p stenting. Continue DAPT, statin. Monitor Hgb (will need OP GI eval). Wean Ativan/narcotics as able. Repeat HCT given new R LE complaints, US ot eval groin site.

## 2023-01-07 NOTE — PROGRESS NOTE ADULT - SUBJECTIVE AND OBJECTIVE BOX
No acute events. Patient reports spasms of RLE. She denies significant pain associated.    Apart from spasm of R LE, exam stable. Right facial nasolabial fold flattening, R UE drift. working w PT.

## 2023-01-08 LAB
ANION GAP SERPL CALC-SCNC: 11 MMOL/L — SIGNIFICANT CHANGE UP (ref 5–17)
BASOPHILS # BLD AUTO: 0.06 K/UL — SIGNIFICANT CHANGE UP (ref 0–0.2)
BASOPHILS NFR BLD AUTO: 0.8 % — SIGNIFICANT CHANGE UP (ref 0–2)
BUN SERPL-MCNC: 9 MG/DL — SIGNIFICANT CHANGE UP (ref 7–23)
CALCIUM SERPL-MCNC: 9.2 MG/DL — SIGNIFICANT CHANGE UP (ref 8.4–10.5)
CHLORIDE SERPL-SCNC: 108 MMOL/L — SIGNIFICANT CHANGE UP (ref 96–108)
CO2 SERPL-SCNC: 23 MMOL/L — SIGNIFICANT CHANGE UP (ref 22–31)
CREAT SERPL-MCNC: 0.56 MG/DL — SIGNIFICANT CHANGE UP (ref 0.5–1.3)
EGFR: 104 ML/MIN/1.73M2 — SIGNIFICANT CHANGE UP
EOSINOPHIL # BLD AUTO: 0.27 K/UL — SIGNIFICANT CHANGE UP (ref 0–0.5)
EOSINOPHIL NFR BLD AUTO: 3.6 % — SIGNIFICANT CHANGE UP (ref 0–6)
GLUCOSE SERPL-MCNC: 107 MG/DL — HIGH (ref 70–99)
HCT VFR BLD CALC: 31.6 % — LOW (ref 34.5–45)
HGB BLD-MCNC: 9.6 G/DL — LOW (ref 11.5–15.5)
IMM GRANULOCYTES NFR BLD AUTO: 0.1 % — SIGNIFICANT CHANGE UP (ref 0–0.9)
LYMPHOCYTES # BLD AUTO: 2.05 K/UL — SIGNIFICANT CHANGE UP (ref 1–3.3)
LYMPHOCYTES # BLD AUTO: 27.3 % — SIGNIFICANT CHANGE UP (ref 13–44)
MAGNESIUM SERPL-MCNC: 1.8 MG/DL — SIGNIFICANT CHANGE UP (ref 1.6–2.6)
MCHC RBC-ENTMCNC: 25.3 PG — LOW (ref 27–34)
MCHC RBC-ENTMCNC: 30.4 GM/DL — LOW (ref 32–36)
MCV RBC AUTO: 83.2 FL — SIGNIFICANT CHANGE UP (ref 80–100)
MONOCYTES # BLD AUTO: 0.52 K/UL — SIGNIFICANT CHANGE UP (ref 0–0.9)
MONOCYTES NFR BLD AUTO: 6.9 % — SIGNIFICANT CHANGE UP (ref 2–14)
NEUTROPHILS # BLD AUTO: 4.6 K/UL — SIGNIFICANT CHANGE UP (ref 1.8–7.4)
NEUTROPHILS NFR BLD AUTO: 61.3 % — SIGNIFICANT CHANGE UP (ref 43–77)
NRBC # BLD: 0 /100 WBCS — SIGNIFICANT CHANGE UP (ref 0–0)
PHOSPHATE SERPL-MCNC: 3.9 MG/DL — SIGNIFICANT CHANGE UP (ref 2.5–4.5)
PLATELET # BLD AUTO: 345 K/UL — SIGNIFICANT CHANGE UP (ref 150–400)
POTASSIUM SERPL-MCNC: 3.9 MMOL/L — SIGNIFICANT CHANGE UP (ref 3.5–5.3)
POTASSIUM SERPL-SCNC: 3.9 MMOL/L — SIGNIFICANT CHANGE UP (ref 3.5–5.3)
RBC # BLD: 3.8 M/UL — SIGNIFICANT CHANGE UP (ref 3.8–5.2)
RBC # FLD: 24.8 % — HIGH (ref 10.3–14.5)
SODIUM SERPL-SCNC: 142 MMOL/L — SIGNIFICANT CHANGE UP (ref 135–145)
WBC # BLD: 7.51 K/UL — SIGNIFICANT CHANGE UP (ref 3.8–10.5)
WBC # FLD AUTO: 7.51 K/UL — SIGNIFICANT CHANGE UP (ref 3.8–10.5)

## 2023-01-08 PROCEDURE — 99232 SBSQ HOSP IP/OBS MODERATE 35: CPT

## 2023-01-08 PROCEDURE — 99233 SBSQ HOSP IP/OBS HIGH 50: CPT

## 2023-01-08 RX ORDER — BACLOFEN 100 %
5 POWDER (GRAM) MISCELLANEOUS EVERY 12 HOURS
Refills: 0 | Status: DISCONTINUED | OUTPATIENT
Start: 2023-01-08 | End: 2023-01-08

## 2023-01-08 RX ORDER — BACLOFEN 100 %
5 POWDER (GRAM) MISCELLANEOUS EVERY 12 HOURS
Refills: 0 | Status: DISCONTINUED | OUTPATIENT
Start: 2023-01-08 | End: 2023-01-09

## 2023-01-08 RX ADMIN — Medication 50 MILLIGRAM(S): at 09:32

## 2023-01-08 RX ADMIN — Medication 1 TABLET(S): at 12:14

## 2023-01-08 RX ADMIN — OXYCODONE HYDROCHLORIDE 2.5 MILLIGRAM(S): 5 TABLET ORAL at 13:26

## 2023-01-08 RX ADMIN — Medication 0.5 MILLIGRAM(S): at 13:54

## 2023-01-08 RX ADMIN — Medication 1 MILLIGRAM(S): at 12:13

## 2023-01-08 RX ADMIN — MIRTAZAPINE 7.5 MILLIGRAM(S): 45 TABLET, ORALLY DISINTEGRATING ORAL at 22:09

## 2023-01-08 RX ADMIN — CLOPIDOGREL BISULFATE 75 MILLIGRAM(S): 75 TABLET, FILM COATED ORAL at 12:12

## 2023-01-08 RX ADMIN — Medication 81 MILLIGRAM(S): at 12:12

## 2023-01-08 RX ADMIN — Medication 5 MILLIGRAM(S): at 16:04

## 2023-01-08 RX ADMIN — ENOXAPARIN SODIUM 40 MILLIGRAM(S): 100 INJECTION SUBCUTANEOUS at 22:09

## 2023-01-08 RX ADMIN — OXYCODONE HYDROCHLORIDE 2.5 MILLIGRAM(S): 5 TABLET ORAL at 12:17

## 2023-01-08 RX ADMIN — ATORVASTATIN CALCIUM 80 MILLIGRAM(S): 80 TABLET, FILM COATED ORAL at 22:10

## 2023-01-08 RX ADMIN — Medication 5 MILLIGRAM(S): at 22:10

## 2023-01-08 RX ADMIN — Medication 100 MILLIGRAM(S): at 22:10

## 2023-01-08 NOTE — PROGRESS NOTE ADULT - ASSESSMENT
62F c/o recurrent falls, sz d/o, depression, anxiety, p/w recurrent falls, found to have new and old strokes on imaging, as well as severe L ICA stenosis/occlusion and severe MICAH stenosis/occlusion; also w/ HANH (but no bleeding sx) responsive to transfusions (need EGD-[facility] outpatient), s/p L ICA balloon angioplasty w stenting of L ICA stenosis 1/3 w Dr. Morrison, recommended for acute rehab.    #Recurrent CVA, L ICA and MICAH stenosis. acute/subacute L MICAH territory infarction and L parieto-temporal region   - A1C. 5.8. . On Atorvastatin   - on DAPT - asa + plavix. Atorva 80  #L ICA stenosis - severe focal stenosis in PROXIMAL region   - s/p angioplasty and stenting   #Falls at home - recommended for acute rehab  #Anxiety and Depression; h/o benzo abuse - psychiatry following. Does not appear in acute withdrawal at this time   - per psych - pt lacks capacity to leave AMA at this time   - Trazodone 50/100   - Ativan 1 q8h, hydroxyzine 25 TID  #Depression – Remeron 7.5 HS  #HANH – 9.6 from 9. asymptomatic. TSat 3%. Suspect possibly combination of poor diet vs occult bleeding. Noted no evidence of blood loss inpatient w hgb responsive to transfusions. scope outpatient (EGD/[facility]). Hgb stable 11.2 from 10.2  #HCV Ab +; neg PCR  #Prediabetes – 5.8. F/U as outpatient  #HLD – 126    Recommend  Overall, pain appears to be controlled.   Discussed with patient - would decrease oxycodone PRN to 2.5mg q6h for mild-moderate pain.  Can keep ativan at 0.5mg q12h PRN for anxiety - can aim to titrate tomorrow.    -- Discussed w pt objective of pain control and anxiety control h     Continue mobilization.   F/U BH/Psych recs.     DISPO: Acute rehab - auth  thus renewing .

## 2023-01-08 NOTE — PROGRESS NOTE ADULT - SUBJECTIVE AND OBJECTIVE BOX
INTERVAL HPI/OVERNIGHT EVENTS:  Patient was seen and examined at bedside. As per overnight team, no o/n events, patient resting comfortably. No complaints at this time. Patient denies: fever, chills, lightheadedness, weakness, CP, palpitations, SOB, cough, N/V. ROS otherwise negative.    VITAL SIGNS:  T(F): 98.4 (01-08-23 @ 12:30)  HR: 90 (01-08-23 @ 12:30)  BP: 112/76 (01-08-23 @ 12:30)  RR: 18 (01-08-23 @ 12:30)  SpO2: 95% (01-08-23 @ 12:30)  Wt(kg): --        PHYSICAL EXAM:    General: No acute distress, awake and alert  Eyes: Anicteric sclerae, moist conjunctivae, see below for CNs  Neck: trachea midline, FROM, supple, no thyromegaly or lymphadenopathy  Cardiovascular: Regular rate and rhythm, no murmurs, rubs, or gallops. No carotid bruits.   Pulmonary: Anterior breath sounds clear bilaterally, no crackles or wheezing. No use of accessory muscles  GI: Abdomen soft, non-distended, non-tender  Extremities: Radial and DP pulses +2, no edema  Neuro: AAO x3, moving all extremities, mildly reduced ROM in the RLE. Muscle strength intact in all other extremities. Sensation intact throughout.    MEDICATIONS  (STANDING):  aspirin  chewable 81 milliGRAM(s) Oral daily  atorvastatin 80 milliGRAM(s) Oral at bedtime  clopidogrel Tablet 75 milliGRAM(s) Oral daily  enoxaparin Injectable 40 milliGRAM(s) SubCutaneous every 24 hours  ferrous    sulfate 325 milliGRAM(s) Oral <User Schedule>  folic acid 1 milliGRAM(s) Oral daily  lactulose Syrup 10 Gram(s) Oral every 24 hours  melatonin 5 milliGRAM(s) Oral at bedtime  mirtazapine 7.5 milliGRAM(s) Oral at bedtime  multivitamin 1 Tablet(s) Oral daily  pantoprazole    Tablet 40 milliGRAM(s) Oral before breakfast  polyethylene glycol 3350 17 Gram(s) Oral two times a day  senna 2 Tablet(s) Oral at bedtime  traZODone 50 milliGRAM(s) Oral every 24 hours  traZODone 100 milliGRAM(s) Oral at bedtime    MEDICATIONS  (PRN):  acetaminophen     Tablet .. 650 milliGRAM(s) Oral every 6 hours PRN Mild Pain (1 - 3)  hydrOXYzine hydrochloride 25 milliGRAM(s) Oral three times a day PRN Anxiety  LORazepam     Tablet 0.5 milliGRAM(s) Oral every 12 hours PRN Anxiety  oxyCODONE    IR 2.5 milliGRAM(s) Oral every 4 hours PRN Moderate Pain (4 - 6)      Allergies    Compazine (Unknown)  contrast media (iodine-based) (Unknown)  iv contrast (Unknown)  penicillin (Unknown)  penicillins (Other)  Toradol (Unknown)    Intolerances        LABS:                        9.6    7.51  )-----------( 345      ( 08 Jan 2023 07:00 )             31.6     01-08    142  |  108  |  9   ----------------------------<  107<H>  3.9   |  23  |  0.56    Ca    9.2      08 Jan 2023 07:00  Phos  3.9     01-08  Mg     1.8     01-08            RADIOLOGY & ADDITIONAL TESTS:  Reviewed

## 2023-01-08 NOTE — PROGRESS NOTE ADULT - ASSESSMENT
62F w/ PMH HTN (not on any meds), migraines, seizure disorder, depression/anxiety, prior stroke hx in 2018 presented to [facility] for frequent falls to [facility] found to have acute/ sub acute infarcts in L MICAH Territory and L parieto temporal region with mass effect without herniation i/s/o B/L A2 HGS/occlusion and Prox L ICA HGS vs occlusion. Started on DAPT and high intensity statin after Hgb stable. Currently stepped down to 7Wo (under Dr. Bland) for further management that includes MRA neck w/ contrast to further characterize ICA for any surgical intervention. S/p angiogram and ICA stent w. NSGY, stepped down from NSICU to RMF. Pending auth for TASNEEM.

## 2023-01-08 NOTE — PROGRESS NOTE ADULT - PROBLEM SELECTOR PLAN 1
MRA H/N: Acute/ Subacute infarction L MICAH Territory and L parieto temporal region w/local mass effect. B/L A2 segment HGS/ Occlusion and L Proximal ICA HGS Vs Occlusion. HCT Results: Subacute/ chronic B/L MICAH territory infarcts. B/l carotid ultrasound significant for 50-69% stenosis mid LEFT internal carotid artery per NASCET peak systolic velocity measurements due to noncalcified atheromatous plaque. MRA neck w/ contrast showed focal severe stenosis of the proximal L ICA.    S/p angiogram and ICA stent w/ NSGY. Repeat CTH on 1/7 w/ evolution of previously noted infarcts. No new acute pathology    Plan:  - f/u NSGY recs:   - advance diet as tolerated  - pain control: oxy 2.5 mg prn for moderate pain  - continue Asprin 81mg and Plavix 75mg daily   - continue atorvastatin 80mg daily   - continue Mirtazapine 7.5mg daily, Trazadone 100mg daily, Atarax 25mg q8 hrs PRN for anxiety/depression   - continue iron supplement/folic acid/multivitamin   - PPI   - bowel regimen

## 2023-01-08 NOTE — PROGRESS NOTE ADULT - SUBJECTIVE AND OBJECTIVE BOX
INTERVAL HPI/OVERNIGHT EVENTS: SYED o/N    SUBJECTIVE: Patient seen and examined at bedside.   Pt reports spasms intermittently in R leg w numbness and difficulty controlling her R leg (stating it abducts intermittently). noted CT head and [facility] doppler of RLE yesterday. Feels groin pain is continuing to improve. Pt reports anxiety is about the same. Still no BM over last 3 days. Passing flatus. No N/V/Abd pain. Eating wo issues. No fever, headache, vision changes, chest pain, dyspnea.    Needed 5mg and 2.5mg of oxycodone over last 24h  Needed 1mg and 0.5mg of ativan over last 24h     OBJECTIVE:    VITAL SIGNS:  ICU Vital Signs Last 24 Hrs  T(C): 36.9 (08 Jan 2023 12:30), Max: 37.2 (07 Jan 2023 20:35)  T(F): 98.4 (08 Jan 2023 12:30), Max: 99 (07 Jan 2023 20:35)  HR: 90 (08 Jan 2023 12:30) (83 - 97)  BP: 112/76 (08 Jan 2023 12:30) (107/69 - 112/76)  BP(mean): --  ABP: --  ABP(mean): --  RR: 18 (08 Jan 2023 12:30) (17 - 18)  SpO2: 95% (08 Jan 2023 12:30) (95% - 96%)    O2 Parameters below as of 08 Jan 2023 12:30  Patient On (Oxygen Delivery Method): room air              CAPILLARY BLOOD GLUCOSE          PHYSICAL EXAM:  GEN: female in NAD on RA  HEENT: NC/AT, MMM  CV: RRR, nml S1S2, no murmurs  PULM: nml effort, CTAB  ABD: Soft, non-distended, NABS, non-tender  NEURO  CN II-XI grossly intact  A/O x3, moving all extremities  RLE - decreased passive ROM. 5/5 in plantar/dorsiflexion b/l. 5/5 throughout LLE, BUE.   No tremors  Sensation intact  SKIN: Groin dressing c/d/i. No erythema, or swelling noted.   PSYCH: Appropriate      MEDICATIONS:  MEDICATIONS  (STANDING):  aspirin  chewable 81 milliGRAM(s) Oral daily  atorvastatin 80 milliGRAM(s) Oral at bedtime  clopidogrel Tablet 75 milliGRAM(s) Oral daily  enoxaparin Injectable 40 milliGRAM(s) SubCutaneous every 24 hours  ferrous    sulfate 325 milliGRAM(s) Oral <User Schedule>  folic acid 1 milliGRAM(s) Oral daily  lactulose Syrup 10 Gram(s) Oral every 24 hours  melatonin 5 milliGRAM(s) Oral at bedtime  mirtazapine 7.5 milliGRAM(s) Oral at bedtime  multivitamin 1 Tablet(s) Oral daily  pantoprazole    Tablet 40 milliGRAM(s) Oral before breakfast  polyethylene glycol 3350 17 Gram(s) Oral two times a day  senna 2 Tablet(s) Oral at bedtime  traZODone 50 milliGRAM(s) Oral every 24 hours  traZODone 100 milliGRAM(s) Oral at bedtime    MEDICATIONS  (PRN):  acetaminophen     Tablet .. 650 milliGRAM(s) Oral every 6 hours PRN Mild Pain (1 - 3)  hydrOXYzine hydrochloride 25 milliGRAM(s) Oral three times a day PRN Anxiety  LORazepam     Tablet 0.5 milliGRAM(s) Oral every 12 hours PRN Anxiety  oxyCODONE    IR 2.5 milliGRAM(s) Oral every 4 hours PRN Moderate Pain (4 - 6)      ALLERGIES:  Allergies    Compazine (Unknown)  contrast media (iodine-based) (Unknown)  iv contrast (Unknown)  penicillin (Unknown)  penicillins (Other)  Toradol (Unknown)    Intolerances        LABS:                        9.6    7.51  )-----------( 345      ( 08 Jan 2023 07:00 )             31.6     01-08    142  |  108  |  9   ----------------------------<  107<H>  3.9   |  23  |  0.56    Ca    9.2      08 Jan 2023 07:00  Phos  3.9     01-08  Mg     1.8     01-08            RADIOLOGY & ADDITIONAL TESTS: Reviewed.

## 2023-01-08 NOTE — PROGRESS NOTE ADULT - NS ATTEND AMEND GEN_ALL_CORE FT
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  MRI showed chronic R MICAH stroke as well as acute/subacute on chronic L MICAH and acute/subacute L parietotempraol strokes in the setting of L ICA severe stenosis/occlusion and MICAH severe stenosis/occlusion.  The patient is unable to give details regarding her prior stroke history.  Appreciate neurosurgery input- plan for CTA head/neck, NOVA. Given mechanism is likely related to large vessel disease, will defer KAY. Given prior stroke at young age, likely will obtain hypercoag studies. Restart Aspirin, statin. Ideally, patient would also be on Plavix, but given down-trending hemoglobin, will hold for now. We may need consider GI scopes if no source of anemia is found and she is unable to tolerate antiplatlet.  Continue statin (). Will avoid narcotics, benzos.
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  CT head showed findings concerning for bilateral MICAH subacute/chronic infarcts.  The patient is unable to give details regarding her prior stroke history.  Recommend MRA head/neck with brains sequences to better evaluate chronicity and also potential mechanism so we can ensure she is on optimal secondary stroke prevention.  Pending results, further stroke eval may be warranted. Okay to hold aspirin in the setting of downtrending hemoglobin, but ideally this would be started in the future.  Continue statin ().
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  MRI showed chronic R MICAH stroke as well as acute/subacute on chronic L MICAH and acute/subacute L temporoparietal strokes in the setting of L ICA severe stenosis/occlusion and MICAH severe stenosis/occlusion s/p stenting. Continue DAPT, statin. Monitor Hgb (will need OP GI eval). Wean Ativan/narcotics as able. Repeat HCT stable. Plan to start Baclofen for LE spasms
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  MRI showed chronic R MICAH stroke as well as acute/subacute on chronic L MICAH and acute/subacute L temporo-pareital strokes in the setting of L ICA severe stenosis/occlusion and MICAH severe stenosis/occlusion s/p stenting. Continue DAPT, statin. Monitor Hgb (will need OP GI eval). Awaiting placement.

## 2023-01-09 LAB — SARS-COV-2 RNA SPEC QL NAA+PROBE: NEGATIVE — SIGNIFICANT CHANGE UP

## 2023-01-09 PROCEDURE — 99233 SBSQ HOSP IP/OBS HIGH 50: CPT

## 2023-01-09 PROCEDURE — 99232 SBSQ HOSP IP/OBS MODERATE 35: CPT

## 2023-01-09 RX ORDER — LANOLIN ALCOHOL/MO/W.PET/CERES
5 CREAM (GRAM) TOPICAL ONCE
Refills: 0 | Status: COMPLETED | OUTPATIENT
Start: 2023-01-09 | End: 2023-01-09

## 2023-01-09 RX ORDER — BACLOFEN 100 %
5 POWDER (GRAM) MISCELLANEOUS EVERY 8 HOURS
Refills: 0 | Status: DISCONTINUED | OUTPATIENT
Start: 2023-01-09 | End: 2023-01-11

## 2023-01-09 RX ADMIN — Medication 1 TABLET(S): at 11:03

## 2023-01-09 RX ADMIN — Medication 81 MILLIGRAM(S): at 11:04

## 2023-01-09 RX ADMIN — Medication 0.5 MILLIGRAM(S): at 13:38

## 2023-01-09 RX ADMIN — CLOPIDOGREL BISULFATE 75 MILLIGRAM(S): 75 TABLET, FILM COATED ORAL at 11:04

## 2023-01-09 RX ADMIN — Medication 100 MILLIGRAM(S): at 21:33

## 2023-01-09 RX ADMIN — ATORVASTATIN CALCIUM 80 MILLIGRAM(S): 80 TABLET, FILM COATED ORAL at 21:34

## 2023-01-09 RX ADMIN — Medication 5 MILLIGRAM(S): at 02:40

## 2023-01-09 RX ADMIN — MIRTAZAPINE 7.5 MILLIGRAM(S): 45 TABLET, ORALLY DISINTEGRATING ORAL at 21:35

## 2023-01-09 RX ADMIN — ENOXAPARIN SODIUM 40 MILLIGRAM(S): 100 INJECTION SUBCUTANEOUS at 21:35

## 2023-01-09 RX ADMIN — Medication 5 MILLIGRAM(S): at 10:53

## 2023-01-09 RX ADMIN — Medication 5 MILLIGRAM(S): at 19:16

## 2023-01-09 RX ADMIN — Medication 5 MILLIGRAM(S): at 21:34

## 2023-01-09 RX ADMIN — Medication 1 CAPSULE(S): at 22:33

## 2023-01-09 RX ADMIN — Medication 1 MILLIGRAM(S): at 11:04

## 2023-01-09 RX ADMIN — Medication 650 MILLIGRAM(S): at 18:42

## 2023-01-09 RX ADMIN — Medication 1 CAPSULE(S): at 21:33

## 2023-01-09 NOTE — PROGRESS NOTE ADULT - ATTENDING COMMENTS
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  MRI showed chronic R MICAH stroke as well as acute/subacute on chronic L MICAH and acute/subacute L temporoparietal strokes in the setting of L ICA severe stenosis/occlusion and MICAH severe stenosis/occlusion s/p stenting. Continue DAPT, statin. Monitor Hgb (will need OP GI eval). Wean Ativan/narcotics as able. Repeat HCT stable. Uptitrate Baclofen for LE spasms.

## 2023-01-09 NOTE — CHART NOTE - NSCHARTNOTESELECT_GEN_ALL_CORE
ISTOP/Event Note
Event Note
Event Note
Follow Up/Nutrition Services
ISTOP
Event Note
Event Note
Nutrition Follow Up/Nutrition Services

## 2023-01-09 NOTE — PROGRESS NOTE ADULT - PROBLEM SELECTOR PLAN 4
Not on any home BP meds per pt  - BP in the 100's today, no focal neuro changes  - TTE : No wall motion abnormalities/ No valvular Dz.  - EKG Results: Isolated V5 AYNAA upon admission, with no reciprocal changes, no c/o CP, no c/f ischemia no c/o cp/sob.

## 2023-01-09 NOTE — PROGRESS NOTE ADULT - ASSESSMENT
62F w/ PMH HTN (not on any meds), migraines, seizure disorder, depression/anxiety, prior stroke hx in 2018 presented to [facility] for frequent falls to [facility] found to have acute/ sub acute infarcts in L MICAH Territory and L parieto temporal region with mass effect without herniation i/s/o B/L A2 HGS/occlusion and Prox L ICA HGS vs occlusion. Started on DAPT and high intensity statin after Hgb stable. Currently stepped down to 7Wo (under Dr. Bland) for further management that includes MRA neck w/ contrast to further characterize ICA for any surgical intervention. S/p angiogram and ICA stent w. NSGY, stepped down from NSICU to RMF. Pending auth for TASNEEM.

## 2023-01-09 NOTE — PROGRESS NOTE ADULT - TIME BILLING
review of patient information including recent vital signs, labs, imaging, and notes; assessing, examining patient; updating patient/family; discussion and coordination of care with multidisciplinary team.
coordination of care  d/c planning  d/w Stroke ACP
Review of chart information, interpretation of data, evaluation of patient, coordination of care
Review of chart information, interpretation of data, evaluation of patient, coordination of care
reviewing pertinent data, performing physical examination, formulating plan and counseling the patient.
review of patient information including recent vital signs, labs, imaging, and notes; assessing, examining patient; updating patient/family; discussion and coordination of care with multidisciplinary team.
review of patient information including recent vital signs, labs, imaging, and notes; assessing, examining patient; updating patient/family; discussion and coordination of care with multidisciplinary team.
coordination of care  d/c planning  d/w Stroke ACP and Medicine residents
coordination of care  d/c planning  d/w Stroke ACP
coordination of care  d/w Stroke team
Review of chart information, interpretation of data, evaluation of patient, coordination of care
Review of chart information, interpretation of data, evaluation of patient, coordination of care.
coordination of care  d/c planning  d/w Medicine residents
review of patient information including recent vital signs, labs, imaging, and notes; assessing, examining patient; updating patient/family; discussion and coordination of care with multidisciplinary team.

## 2023-01-09 NOTE — PROGRESS NOTE ADULT - PROBLEM SELECTOR PLAN 1
MRA H/N: Acute/ Subacute infarction L MICAH Territory and L parieto temporal region w/local mass effect. B/L A2 segment HGS/ Occlusion and L Proximal ICA HGS Vs Occlusion. HCT Results: Subacute/ chronic B/L MICAH territory infarcts. B/l carotid ultrasound significant for 50-69% stenosis mid LEFT internal carotid artery per NASCET peak systolic velocity measurements due to noncalcified atheromatous plaque. MRA neck w/ contrast showed focal severe stenosis of the proximal L ICA.    S/p angiogram and ICA stent w/ NSGY. Repeat CTH on 1/7 w/ evolution of previously noted infarcts. No new acute pathology    Plan:  - f/u NSGY recs:   - advance diet as tolerated  - pain control: oxy 2.5 mg prn for moderate pain, baclofen 5 mg BID  - continue Asprin 81mg and Plavix 75mg daily   - continue atorvastatin 80mg daily   - continue Mirtazapine 7.5mg daily, Trazadone 100mg daily, Atarax 25mg q8 hrs PRN for anxiety/depression   - continue iron supplement/folic acid/multivitamin   - PPI   - bowel regimen

## 2023-01-09 NOTE — CHART NOTE - NSCHARTNOTEFT_GEN_A_CORE
Admitting Diagnosis:   Patient is a 62y old  Female who presents with a chief complaint of mechanical fall (2023 14:57)      PAST MEDICAL & SURGICAL HISTORY:  Seizures      Migraine      Hip pain, left      Depression      Anxiety      Eating disorder      Bipolar illness      PTSD (post-traumatic stress disorder)      S/P  section          Current Nutrition Order:  Regular diet     PO Intake: Good (%) [   ]  Fair (50-75%) [   ] Poor (<25%) [   ]- ~40% intake of tray    GI Issues: No N/V/C/D reported at this time. BM     Pain: Endorsed a headache, PCA aware and let RN know  LE tingling- ordered for baclofen    Skin Integrity: Yariel 15, no edema  R. groin surgical wound     Labs:       142  |  108  |  9   ----------------------------<  107<H>  3.9   |  23  |  0.56    Ca    9.2      2023 07:00  Phos  3.9     -08  Mg     1.8     -      CAPILLARY BLOOD GLUCOSE          Medications:  MEDICATIONS  (STANDING):  aspirin  chewable 81 milliGRAM(s) Oral daily  atorvastatin 80 milliGRAM(s) Oral at bedtime  baclofen 5 milliGRAM(s) Oral every 8 hours  clopidogrel Tablet 75 milliGRAM(s) Oral daily  enoxaparin Injectable 40 milliGRAM(s) SubCutaneous every 24 hours  ferrous    sulfate 325 milliGRAM(s) Oral <User Schedule>  folic acid 1 milliGRAM(s) Oral daily  lactulose Syrup 10 Gram(s) Oral every 24 hours  melatonin 5 milliGRAM(s) Oral at bedtime  mirtazapine 7.5 milliGRAM(s) Oral at bedtime  multivitamin 1 Tablet(s) Oral daily  pantoprazole    Tablet 40 milliGRAM(s) Oral before breakfast  polyethylene glycol 3350 17 Gram(s) Oral two times a day  senna 2 Tablet(s) Oral at bedtime  traZODone 50 milliGRAM(s) Oral every 24 hours  traZODone 100 milliGRAM(s) Oral at bedtime    MEDICATIONS  (PRN):  acetaminophen     Tablet .. 650 milliGRAM(s) Oral every 6 hours PRN Mild Pain (1 - 3)  bisacodyl Suppository 10 milliGRAM(s) Rectal daily PRN Constipation  hydrOXYzine hydrochloride 25 milliGRAM(s) Oral three times a day PRN Anxiety  LORazepam     Tablet 0.5 milliGRAM(s) Oral every 12 hours PRN Anxiety  oxyCODONE    IR 2.5 milliGRAM(s) Oral every 4 hours PRN Moderate Pain (4 - 6)      Admission Anthropometrics:  Height for BMI (CENTIMETERS)	160 Centimeter(s)  Weight for BMI (lbs)	110 lb  Weight for BMI (kg)	49.9 kg  Body Mass Index	19.4    Estimated energy needs:   Calorie needs: 25-30kcal/kg (1247-1497kcal)  Protein needs: 1.1-1.3g/kg (55-65g)  Fluid needs: 25-30ml/kg (1247-1497ml)  Actual body weight used to calculate needs due to pt's current body weight within % ideal body weight; adjusted for s/p surgery    Subjective:   62F c/o recurrent falls, sz d/o, depression, anxiety, p/w recurrent falls, found to have new and old strokes on imaging, as well as severe L ICA stenosis/occlusion and severe MICAH stenosis/occlusion; also w/ HANH (but no bleeding sx) responsive to transfusions (need EGD-[place] outpatient). S/p angiogram and ICA stent placement on 1/3. CTH  showing evolution of previously known infarct. Pt seen in room, awake, alert, agitated and frustrated about not being able to leave yet. Observed 100% intake of mashed potatoes and vegetable on tray, no chicken. Discussed purpose/importance of protein intake. She reported not liking most of the food. No N/V/C/D reported at this time. Did admit to a headache- PCA aware and letting RN know at time of visit. Afebrile. Pending rehab placement. Will continue to follow per RD protocol.     Previous Nutrition Diagnosis:  No active nutrition diagnosis    New PES:  Increased nutrient needs (protein) related to clinical course, carotid artery stent placement as evidenced by increased metabolic demand for protein    Goal: Consistently meet >75% EER    Recommendations:  -Continue current diet  -Encourage good PO intake   -Honor food preferences as able  -Monitor chemistry, GI fxn, and skin integrity  -Pain and bowel regimens per team discretion    Risk Level: High [   ] Moderate [ X ] Low [   ].

## 2023-01-09 NOTE — PROGRESS NOTE ADULT - SUBJECTIVE AND OBJECTIVE BOX
INTERVAL HPI/OVERNIGHT EVENTS:  Patient was seen and examined at bedside. As per overnight team, patient unable to sleep, given melatonin.  Patient resting comfortably this AM. No complaints at this time. Patient denies: fever, chills, lightheadedness, weakness, CP, palpitations, SOB, cough, N/V. ROS otherwise negative.    VITAL SIGNS:  T(F): 98.4 (01-09-23 @ 05:02)  HR: 84 (01-09-23 @ 05:02)  BP: 112/73 (01-09-23 @ 05:02)  RR: 18 (01-09-23 @ 05:02)  SpO2: 95% (01-09-23 @ 05:02)  Wt(kg): --        PHYSICAL EXAM:    General: No acute distress, awake and alert  Eyes: Anicteric sclerae, moist conjunctivae, see below for CNs  Neck: trachea midline, FROM, supple, no thyromegaly or lymphadenopathy  Cardiovascular: Regular rate and rhythm, no murmurs, rubs, or gallops. No carotid bruits.   Pulmonary: Anterior breath sounds clear bilaterally, no crackles or wheezing. No use of accessory muscles  GI: Abdomen soft, non-distended, non-tender  Extremities: Radial and DP pulses +2, no edema  Neuro: AAO x3, moving all extremities, mildly reduced ROM in the RLE. Muscle strength intact in all other extremities. Sensation intact throughout.    MEDICATIONS  (STANDING):  aspirin  chewable 81 milliGRAM(s) Oral daily  atorvastatin 80 milliGRAM(s) Oral at bedtime  clopidogrel Tablet 75 milliGRAM(s) Oral daily  enoxaparin Injectable 40 milliGRAM(s) SubCutaneous every 24 hours  ferrous    sulfate 325 milliGRAM(s) Oral <User Schedule>  folic acid 1 milliGRAM(s) Oral daily  lactulose Syrup 10 Gram(s) Oral every 24 hours  melatonin 5 milliGRAM(s) Oral at bedtime  mirtazapine 7.5 milliGRAM(s) Oral at bedtime  multivitamin 1 Tablet(s) Oral daily  pantoprazole    Tablet 40 milliGRAM(s) Oral before breakfast  polyethylene glycol 3350 17 Gram(s) Oral two times a day  senna 2 Tablet(s) Oral at bedtime  traZODone 50 milliGRAM(s) Oral every 24 hours  traZODone 100 milliGRAM(s) Oral at bedtime    MEDICATIONS  (PRN):  acetaminophen     Tablet .. 650 milliGRAM(s) Oral every 6 hours PRN Mild Pain (1 - 3)  baclofen 5 milliGRAM(s) Oral every 12 hours PRN Musculoskeletal Pain  bisacodyl Suppository 10 milliGRAM(s) Rectal daily PRN Constipation  hydrOXYzine hydrochloride 25 milliGRAM(s) Oral three times a day PRN Anxiety  LORazepam     Tablet 0.5 milliGRAM(s) Oral every 12 hours PRN Anxiety  oxyCODONE    IR 2.5 milliGRAM(s) Oral every 4 hours PRN Moderate Pain (4 - 6)      Allergies    Compazine (Unknown)  contrast media (iodine-based) (Unknown)  iv contrast (Unknown)  penicillin (Unknown)  penicillins (Other)  Toradol (Unknown)    Intolerances        LABS:                        9.6    7.51  )-----------( 345      ( 08 Jan 2023 07:00 )             31.6     01-08    142  |  108  |  9   ----------------------------<  107<H>  3.9   |  23  |  0.56    Ca    9.2      08 Jan 2023 07:00  Phos  3.9     01-08  Mg     1.8     01-08            RADIOLOGY & ADDITIONAL TESTS:  Reviewed

## 2023-01-09 NOTE — PROGRESS NOTE ADULT - ASSESSMENT
62F c/o recurrent falls, sz d/o, depression, anxiety, p/w recurrent falls, found to have new and old strokes on imaging, as well as severe L ICA stenosis/occlusion and severe MICAH stenosis/occlusion; also w/ HANH (but no bleeding sx) responsive to transfusions (need EGD-[facility] outpatient), s/p L ICA balloon angioplasty w stenting of L ICA stenosis 1/3 w Dr. Morrison, recommended for acute rehab.    #Recurrent CVA, L ICA and MICAH stenosis. acute/subacute L MICAH territory infarction and L parieto-temporal region   - A1C. 5.8. . On Atorvastatin   - on DAPT - asa + plavix. Atorva 80  #L ICA stenosis - severe focal stenosis in PROXIMAL region   - s/p angioplasty and stenting   #Falls at home - recommended for acute rehab  #Anxiety and Depression; h/o benzo abuse - psychiatry following. Does not appear in acute withdrawal at this time   - per psych - pt lacks capacity to leave AMA at this time   - Trazodone 50/100   - Ativan 1 q8h, hydroxyzine 25 TID  #Depression – Remeron 7.5 HS  #HANH – No labs today. 9.6 from 9. asymptomatic. TSat 3%. Suspect possibly combination of poor diet vs occult bleeding. Noted no evidence of blood loss inpatient w hgb responsive to transfusions. scope outpatient (EGD/[facility]). Hgb stable 11.2 from 10.2  #HCV Ab +; neg PCR  #Prediabetes – 5.8. F/U as outpatient  #HLD – 126    Recommend  Overall, pain appears to be controlled.   Agree w Baclofen PRN for L leg spasms.  Would decrease oxycodone 2.5mg to q12h PRN - pt receiving approx 1 dose /24h  Can keep ativan 0.5mg q12h PRN for anxiety at this time.    Continue mobilization.   F/U BH/Psych recs.     DISPO: Acute rehab - auth  thus renewing .

## 2023-01-09 NOTE — PROGRESS NOTE ADULT - SUBJECTIVE AND OBJECTIVE BOX
INTERVAL HPI/OVERNIGHT EVENTS: SYED O/N    SUBJECTIVE: Patient seen and examined at bedside.   Pt reports spasms slightly improved w receiving baclofen last night. Has not asked/received this morning and reports spasms remain in R leg.   Also reports anxiety and has not yet received 0.5mg ativan yet  Pt received 2.5mg oxycodone over last 24h - reports no groin pain.  No headache, chest pain, dyspnea, N/V/Abd pain      OBJECTIVE:    VITAL SIGNS:  ICU Vital Signs Last 24 Hrs  T(C): 37.3 (09 Jan 2023 11:49), Max: 37.3 (09 Jan 2023 11:49)  T(F): 99.1 (09 Jan 2023 11:49), Max: 99.1 (09 Jan 2023 11:49)  HR: 86 (09 Jan 2023 11:49) (70 - 86)  BP: 115/74 (09 Jan 2023 11:49) (112/73 - 121/67)  BP(mean): --  ABP: --  ABP(mean): --  RR: 16 (09 Jan 2023 11:49) (16 - 18)  SpO2: 97% (09 Jan 2023 11:49) (95% - 97%)    O2 Parameters below as of 09 Jan 2023 11:49  Patient On (Oxygen Delivery Method): room air              CAPILLARY BLOOD GLUCOSE          PHYSICAL EXAM:  GEN: female in NAD on RA  HEENT: NC/AT, MMM  CV: RRR, nml S1S2, no murmurs  PULM: nml effort, CTAB  ABD: Soft, non-distended, NABS, non-tender  NEURO  CN II-XI grossly intact  A/O x3, moving all extremities  RLE - decreased passive ROM. 5/5 in plantar/dorsiflexion b/l. 5/5 throughout LLE, BUE.   No tremors  Sensation intact  SKIN: Groin dressing c/d/i. No erythema, or swelling noted.   PSYCH: Appropriate    MEDICATIONS:  MEDICATIONS  (STANDING):  aspirin  chewable 81 milliGRAM(s) Oral daily  atorvastatin 80 milliGRAM(s) Oral at bedtime  clopidogrel Tablet 75 milliGRAM(s) Oral daily  enoxaparin Injectable 40 milliGRAM(s) SubCutaneous every 24 hours  ferrous    sulfate 325 milliGRAM(s) Oral <User Schedule>  folic acid 1 milliGRAM(s) Oral daily  lactulose Syrup 10 Gram(s) Oral every 24 hours  melatonin 5 milliGRAM(s) Oral at bedtime  mirtazapine 7.5 milliGRAM(s) Oral at bedtime  multivitamin 1 Tablet(s) Oral daily  pantoprazole    Tablet 40 milliGRAM(s) Oral before breakfast  polyethylene glycol 3350 17 Gram(s) Oral two times a day  senna 2 Tablet(s) Oral at bedtime  traZODone 50 milliGRAM(s) Oral every 24 hours  traZODone 100 milliGRAM(s) Oral at bedtime    MEDICATIONS  (PRN):  acetaminophen     Tablet .. 650 milliGRAM(s) Oral every 6 hours PRN Mild Pain (1 - 3)  baclofen 5 milliGRAM(s) Oral every 12 hours PRN Musculoskeletal Pain  bisacodyl Suppository 10 milliGRAM(s) Rectal daily PRN Constipation  hydrOXYzine hydrochloride 25 milliGRAM(s) Oral three times a day PRN Anxiety  LORazepam     Tablet 0.5 milliGRAM(s) Oral every 12 hours PRN Anxiety  oxyCODONE    IR 2.5 milliGRAM(s) Oral every 4 hours PRN Moderate Pain (4 - 6)      ALLERGIES:  Allergies    Compazine (Unknown)  contrast media (iodine-based) (Unknown)  iv contrast (Unknown)  penicillin (Unknown)  penicillins (Other)  Toradol (Unknown)    Intolerances        LABS:                        9.6    7.51  )-----------( 345      ( 08 Jan 2023 07:00 )             31.6     01-08    142  |  108  |  9   ----------------------------<  107<H>  3.9   |  23  |  0.56    Ca    9.2      08 Jan 2023 07:00  Phos  3.9     01-08  Mg     1.8     01-08            RADIOLOGY & ADDITIONAL TESTS: Reviewed.

## 2023-01-10 ENCOUNTER — TRANSCRIPTION ENCOUNTER (OUTPATIENT)
Age: 63
End: 2023-01-10

## 2023-01-10 PROCEDURE — 99239 HOSP IP/OBS DSCHRG MGMT >30: CPT

## 2023-01-10 PROCEDURE — 99232 SBSQ HOSP IP/OBS MODERATE 35: CPT

## 2023-01-10 RX ORDER — LOSARTAN POTASSIUM 100 MG/1
1 TABLET, FILM COATED ORAL
Qty: 0 | Refills: 0 | DISCHARGE

## 2023-01-10 RX ORDER — BACLOFEN 100 %
1 POWDER (GRAM) MISCELLANEOUS
Qty: 0 | Refills: 0 | DISCHARGE
Start: 2023-01-10

## 2023-01-10 RX ADMIN — OXYCODONE HYDROCHLORIDE 2.5 MILLIGRAM(S): 5 TABLET ORAL at 19:15

## 2023-01-10 RX ADMIN — PANTOPRAZOLE SODIUM 40 MILLIGRAM(S): 20 TABLET, DELAYED RELEASE ORAL at 06:19

## 2023-01-10 RX ADMIN — MIRTAZAPINE 7.5 MILLIGRAM(S): 45 TABLET, ORALLY DISINTEGRATING ORAL at 22:30

## 2023-01-10 RX ADMIN — CLOPIDOGREL BISULFATE 75 MILLIGRAM(S): 75 TABLET, FILM COATED ORAL at 12:30

## 2023-01-10 RX ADMIN — Medication 0.5 MILLIGRAM(S): at 09:56

## 2023-01-10 RX ADMIN — OXYCODONE HYDROCHLORIDE 2.5 MILLIGRAM(S): 5 TABLET ORAL at 18:21

## 2023-01-10 RX ADMIN — Medication 100 MILLIGRAM(S): at 22:30

## 2023-01-10 RX ADMIN — Medication 5 MILLIGRAM(S): at 22:30

## 2023-01-10 RX ADMIN — Medication 5 MILLIGRAM(S): at 03:44

## 2023-01-10 RX ADMIN — OXYCODONE HYDROCHLORIDE 2.5 MILLIGRAM(S): 5 TABLET ORAL at 13:07

## 2023-01-10 RX ADMIN — Medication 1 TABLET(S): at 12:30

## 2023-01-10 RX ADMIN — OXYCODONE HYDROCHLORIDE 2.5 MILLIGRAM(S): 5 TABLET ORAL at 12:34

## 2023-01-10 RX ADMIN — Medication 1 MILLIGRAM(S): at 12:30

## 2023-01-10 RX ADMIN — ENOXAPARIN SODIUM 40 MILLIGRAM(S): 100 INJECTION SUBCUTANEOUS at 22:29

## 2023-01-10 RX ADMIN — Medication 81 MILLIGRAM(S): at 12:30

## 2023-01-10 RX ADMIN — Medication 5 MILLIGRAM(S): at 12:25

## 2023-01-10 RX ADMIN — Medication 650 MILLIGRAM(S): at 18:21

## 2023-01-10 RX ADMIN — Medication 650 MILLIGRAM(S): at 19:15

## 2023-01-10 RX ADMIN — ATORVASTATIN CALCIUM 80 MILLIGRAM(S): 80 TABLET, FILM COATED ORAL at 22:30

## 2023-01-10 RX ADMIN — Medication 5 MILLIGRAM(S): at 19:34

## 2023-01-10 RX ADMIN — Medication 650 MILLIGRAM(S): at 10:40

## 2023-01-10 RX ADMIN — Medication 650 MILLIGRAM(S): at 09:56

## 2023-01-10 NOTE — PROGRESS NOTE ADULT - ASSESSMENT
62F c/o recurrent falls, sz d/o, depression, anxiety, p/w recurrent falls, found to have new and old strokes on imaging, as well as severe L ICA stenosis/occlusion and severe MICAH stenosis/occlusion; also w/ HANH (but no bleeding sx) responsive to transfusions (need EGD-[facility] outpatient), s/p L ICA balloon angioplasty w stenting of L ICA stenosis 1/3 w Dr. Morrison, recommended for acute rehab.    #Recurrent CVA, L ICA and MICAH stenosis. acute/subacute L MICAH territory infarction and L parieto-temporal region   - A1C. 5.8. . On Atorvastatin   - on DAPT - asa + plavix. Atorva 80  #L ICA stenosis - severe focal stenosis in PROXIMAL region   - s/p angioplasty and stenting   #Falls at home - recommended for acute rehab  #Anxiety and Depression; h/o benzo abuse - psychiatry following. Does not appear in acute withdrawal at this time   - per psych - pt lacks capacity to leave AMA at this time   - Trazodone 50/100   - Ativan 0.5 q12h, hydroxyzine 25 TID  #Depression – Remeron 7.5 HS  #HANH – No labs today. 9.6 from 9. asymptomatic. TSat 3%. Suspect possibly combination of poor diet vs occult bleeding. Noted no evidence of blood loss inpatient w hgb responsive to transfusions. scope outpatient (EGD/[facility]). Hgb stable 11.2 from 10.2  #HCV Ab +; neg PCR  #Prediabetes – 5.8. F/U as outpatient  #HLD – 126    Recommend  Overall, R groin pain is resolved. Would dc PRN oxycodone at this time  Agree w Baclofen q8h for L leg and foot spasms  Can keep ativan 0.5mg q12h PRN for anxiety at this time.    Continue mobilization.   F/U BH/Psych recs.     DISPO: Acute rehab - auth  thus renewing .

## 2023-01-10 NOTE — DISCHARGE NOTE NURSING/CASE MANAGEMENT/SOCIAL WORK - PATIENT PORTAL LINK FT
You can access the FollowMyHealth Patient Portal offered by [facility] by registering at the following website: [email or web address]. By joining [facility]’s FollowMyHealth portal, you will also be able to view your health information using other applications (apps) compatible with our system.

## 2023-01-10 NOTE — PROGRESS NOTE ADULT - ASSESSMENT
per Neurology    62 y o F w/ PMH HTN (not on any meds), migraines, seizure disorder, depression/anxiety, prior stroke hx in 2018 presented to [facility] ER for frequent falls to [facility] ER found to have acute/ sub acute infarcts in L MICAH Territory and L parieto temporal region with mass effect without herniation i/s/o B/L A2 HGS/occlusion and Prox L ICA HGS vs occlusion. Started on DAPT and high intensity statin after Hgb stable. Currently stepped down to 7[facility] (under Dr. Bland) for further management that includes MRA neck w/ contrast to further characterize ICA for any surgical intervention. S/p angiogram and ICA stent w. NSGY, stepped down from NSU to [facility]. Pending auth for TASNEEM.    Problem/Plan - 1:  ·  Problem: CVA (cerebrovascular accident).   ·  Plan: MRA H/N: Acute/ Subacute infarction L MICAH Territory and L parieto temporal region w/local mass effect. B/L A2 segment HGS/ Occlusion and L Proximal ICA HGS Vs Occlusion. HCT Results: Subacute/ chronic B/L MICAH territory infarcts. B/l carotid ultrasound significant for 50-69% stenosis mid LEFT internal carotid artery per NASCET peak systolic velocity measurements due to noncalcified atheromatous plaque. MRA neck w/ contrast showed focal severe stenosis of the proximal L ICA.    S/p angiogram and ICA stent w/ NSGY. Repeat CTH on 1/7 w/ evolution of previously noted infarcts. No new acute pathology    Plan:  - f/u NSGY recs:   - advance diet as tolerated  - pain control: oxy 2.5 mg prn for moderate pain, baclofen 5 mg BID  - continue Asprin 81mg and Plavix 75mg daily   - continue atorvastatin 80mg daily   - continue Mirtazapine 7.5mg daily, Trazadone 100mg daily, Atarax 25mg q8 hrs PRN for anxiety/depression   - continue iron supplement/folic acid/multivitamin   - PPI   - bowel regimen.    Problem/Plan - 2:  ·  Problem: Anxiety and depression.   ·  Plan: Self-reported history of chronic anxiety, insomnia, new reported depression, benzodiazepine use disorder, ?alcohol use disorder (per prior chart), ?opiate use disorder in remission, impaired ability to logically reason. Demonstrates very poor insight into the reason for her ongoing hospitalization, with no understanding of diagnosis of new stroke, recommended additional testing and possible interventions, and no appreciation of the reported significant risks of leaving the hospital against medical advice. Additionally, pt remains highly focused on receiving benzodiazepines for vague anxiety sx and insomnia, with poor insight into her benzodiazepine use disorder (with illicit in addition to prescribed use as outpt).   - per psych, no acute safety concerns that would warrant inpatient level psychiatric care.  - pt currently LACKS capacity to leave the hospital AMA or refuse any acutely indicated testing or treatment.  - mirtazapine 7.5mg po QHS PRN for insomnia   - additionally offering trazodone for insomnia and anxiety  - hydroxyzine PRN for acute anxiety - pt cites adverse reaction to gabapentin  - is prescribed 4mg ativan outpatient, started patient on ativan 0.1mg TID PRN on 12/25, still reporting on-going anxiety and insomnia, increased to 1mg TID PRN on 12/26.    Problem/Plan - 3:  ·  Problem: Iron deficiency anemia.   ·  Plan: Hgb stable 10.5, s/p 1u pRBC. Also s/p 3 days iron sucrose. Denies history of heavy bleeding, poor wound healing, easy bruising, hemarthrosis.  - c/w ferrous sulfate 325mg M/W/F  - active T&S, transfuse for Hb<7  - outpatient colonoscopy with GI   - outpatient f/up with heme/onc (Dr. Maribeth Reddy) 1-2 weeks from discharge  - appreciate medicine team recs.    Problem/Plan - 4:  ·  Problem: HTN (hypertension).   ·  Plan: Not on any home BP meds per pt  - BP in the 100's today, no focal neuro changes  - TTE : No wall motion abnormalities/ No valvular Dz.  - EKG Results: Isolated V5 AYANA upon admission, with no reciprocal changes, no c/o CP, no c/f ischemia no c/o cp/sob.    Problem/Plan - 5:  ·  Problem: HLD (hyperlipidemia).   ·  Plan:   - c/w statin 80mg qhs.    Problem/Plan - 6:  ·  Problem: Nutrition, metabolism, and development symptoms.   ·  Plan: F: tolerating PO, no IVF  E: replete K<4, Mg<2  N: regular  VTE Prophylaxis: hep SC   GI: pantoprazole  D: [facility] (under Dr. Bland).

## 2023-01-10 NOTE — PROGRESS NOTE ADULT - ASSESSMENT
62F w/ PMH HTN (not on any meds), migraines, seizure disorder, depression/anxiety, prior stroke hx in 2018 presented to [facility] ER for frequent falls to [facility] ER found to have acute/ sub acute infarcts in L MICAH Territory and L parieto temporal region with mass effect without herniation i/s/o B/L A2 HGS/occlusion and Prox L ICA HGS vs occlusion. Started on DAPT and high intensity statin after Hgb stable. Currently stepped down to 7Wo (under Dr. Bland) for further management that includes MRA neck w/ contrast to further characterize ICA for any surgical intervention. S/p angiogram and ICA stent w. NSGY, stepped down from NSICU to [facility]. Pending auth for TASNEEM.

## 2023-01-10 NOTE — DISCHARGE NOTE NURSING/CASE MANAGEMENT/SOCIAL WORK - NSDCPEFALRISK_GEN_ALL_CORE
For information on Fall & Injury Prevention, visit: [email or web address] OR  [email or web address] OR  https://www.cdc.gov/steadi/patient.html

## 2023-01-10 NOTE — DISCHARGE NOTE NURSING/CASE MANAGEMENT/SOCIAL WORK - NSDCFUADDAPPT_GEN_ALL_CORE_FT
Please bring your Insurance card, Photo ID and Discharge paperwork to the following appointment:    (1) Please follow up your Neurology Provider, Dr. Bonifacio Bland at [address] [place] [address], 8th [place] [address] on 05/08/2023 at 4:00pm.    Please note that the office of Dr. Bland will contact you for an earlier appointment once available.    Appointment was scheduled by Ms. SHASHA Healy, Referral Coordinator.

## 2023-01-10 NOTE — PROGRESS NOTE ADULT - SUBJECTIVE AND OBJECTIVE BOX
INTERVAL HPI/OVERNIGHT EVENTS: SYED O/N    SUBJECTIVE: Patient seen and examined at bedside.   Pt seated in chair w b/l legs elevated.  Reports continual spasms in R leg - calf leading to R foot dorsiflexion. Also reports numbness/tingling over distal calf and foot area. She is able to move toes.   No fever, chills, sweats, chest pain, dyspnea, N/V/abd pain. Last BM yesterday. Voiding wo issues.     OBJECTIVE:    VITAL SIGNS:  ICU Vital Signs Last 24 Hrs  T(C): 36.8 (10 Canelo 2023 05:25), Max: 37.4 (09 Jan 2023 21:23)  T(F): 98.2 (10 Canelo 2023 05:25), Max: 99.4 (09 Jan 2023 21:23)  HR: 75 (10 Canelo 2023 05:25) (75 - 86)  BP: 125/79 (10 Canelo 2023 05:25) (110/72 - 125/79)  BP(mean): --  ABP: --  ABP(mean): --  RR: 16 (10 Canelo 2023 05:25) (16 - 17)  SpO2: 97% (10 Canelo 2023 05:25) (97% - 98%)    O2 Parameters below as of 10 Canelo 2023 05:25  Patient On (Oxygen Delivery Method): room air              01-09 @ 07:01  -  01-10 @ 07:00  --------------------------------------------------------  IN: 0 mL / OUT: 250 mL / NET: -250 mL      CAPILLARY BLOOD GLUCOSE          PHYSICAL EXAM:  GEN: female in NAD on RA  HEENT: NC/AT, MMM  CV: RRR, nml S1S2, no murmurs  PULM: nml effort, CTAB  ABD: Soft, non-distended, NABS, non-tender  NEURO  CN II-XI grossly intact  A/O x3, moving all extremities  RLE - decreased passive ROM. 4+/5 in R toe movement. Minimal control of R dorsi/plantarflexion. Otherwise 5/5 in RUE, L side.   No tremors  Sensation intact  SKIN: Groin dressing c/d/i. No erythema, or swelling noted.   PSYCH: Appropriate    MEDICATIONS:  MEDICATIONS  (STANDING):  aspirin  chewable 81 milliGRAM(s) Oral daily  atorvastatin 80 milliGRAM(s) Oral at bedtime  baclofen 5 milliGRAM(s) Oral every 8 hours  clopidogrel Tablet 75 milliGRAM(s) Oral daily  enoxaparin Injectable 40 milliGRAM(s) SubCutaneous every 24 hours  ferrous    sulfate 325 milliGRAM(s) Oral <User Schedule>  folic acid 1 milliGRAM(s) Oral daily  lactulose Syrup 10 Gram(s) Oral every 24 hours  melatonin 5 milliGRAM(s) Oral at bedtime  mirtazapine 7.5 milliGRAM(s) Oral at bedtime  multivitamin 1 Tablet(s) Oral daily  pantoprazole    Tablet 40 milliGRAM(s) Oral before breakfast  polyethylene glycol 3350 17 Gram(s) Oral two times a day  senna 2 Tablet(s) Oral at bedtime  traZODone 50 milliGRAM(s) Oral every 24 hours  traZODone 100 milliGRAM(s) Oral at bedtime    MEDICATIONS  (PRN):  acetaminophen     Tablet .. 650 milliGRAM(s) Oral every 6 hours PRN Mild Pain (1 - 3)  bisacodyl Suppository 10 milliGRAM(s) Rectal daily PRN Constipation  hydrOXYzine hydrochloride 25 milliGRAM(s) Oral three times a day PRN Anxiety  LORazepam     Tablet 0.5 milliGRAM(s) Oral every 12 hours PRN Anxiety  oxyCODONE    IR 2.5 milliGRAM(s) Oral every 4 hours PRN Moderate Pain (4 - 6)      ALLERGIES:  Allergies    Compazine (Unknown)  contrast media (iodine-based) (Unknown)  iv contrast (Unknown)  penicillin (Unknown)  penicillins (Other)  Toradol (Unknown)    Intolerances        LABS:                RADIOLOGY & ADDITIONAL TESTS: Reviewed.

## 2023-01-10 NOTE — PROGRESS NOTE ADULT - SUBJECTIVE AND OBJECTIVE BOX
Physical Medicine and Rehabilitation Progress Note :       Patient is a 62y old  Female who presents with a chief complaint of mechanical fall (10 Canelo 2023 11:37)      HPI:  62F w/ PMH HTN, migraine, seizure d/o, depression-anxiety, per patient multiiple CTA in September w/ complaints of fall. Patient states that fall occurred while home, when syncopized, with LOC with head trauma. Pt reports no alcohol use, never smoker, received HHA 4 hours daily (every day). Has two adult children (one of whome is  and one living parent). Patient is a poor historian, does not recall exact events leading to fall. Patient also notes the need to ambulate via wheelchair in home. States to have been admitted to acute rehab s/p CVA in fall of . States to be "addicted to valium" utilizing 1 10mg tablet once every other day. States health aide is not with her all the time. Patient notes no continued need for antihypertensives (on multiple previously) as they caused dizziness. Notes that dizziness has not resolved and notes room spinning with changes in head position.     Initial vital signs: T: 98 F, HR: 93, BP: 110/71, R: 16, SpO2: 100% on RA    Labs: significant for WBC 12.91, Hgb 8, MCV 69, Plt 567, aPTT 25.1, K 3 s/p 40 meq PO in ED, Cr 0.67, remainder of CMP WNL, CECILIA <10,     CXR: No acute pulmonary pathology. Dextroscholiosis noted.     CT C-spine: Ankylosis of the C4 and C5 vertebrae is noted. There is moderate multilevel degenerative disc disease. No acute osseous abnormality of the cervical spine.    CTH: No acute intracranial hemorrhage or calvarial fracture. Subacute/chronic bilateral MICAH territory infarcts.    CT T-spine: No acute fracture. Patchy bilateral pulmonary nodular opacities, likely infectious/inflammatory. Moderate hiatal hernia.    EKG:     Medications: tylenol 750mg PO x1, KCl 40meq x1    Consults: none  (20 Dec 2022 04:52)                Vital Signs Last 24 Hrs  T(C): 36.8 (10 Canelo 2023 05:25), Max: 37.4 (2023 21:23)  T(F): 98.2 (10 Canelo 2023 05:25), Max: 99.4 (2023 21:23)  HR: 75 (10 Canelo 2023 05:25) (75 - 82)  BP: 125/79 (10 Canelo 2023 05:25) (110/72 - 125/79)  BP(mean): --  RR: 16 (10 Canelo 2023 05:25) (16 - 17)  SpO2: 97% (10 Canelo 2023 05:25) (97% - 98%)    Parameters below as of 10 Canelo 2023 05:25  Patient On (Oxygen Delivery Method): room air        MEDICATIONS  (STANDING):  aspirin  chewable 81 milliGRAM(s) Oral daily  atorvastatin 80 milliGRAM(s) Oral at bedtime  baclofen 5 milliGRAM(s) Oral every 8 hours  clopidogrel Tablet 75 milliGRAM(s) Oral daily  enoxaparin Injectable 40 milliGRAM(s) SubCutaneous every 24 hours  ferrous    sulfate 325 milliGRAM(s) Oral <User Schedule>  folic acid 1 milliGRAM(s) Oral daily  lactulose Syrup 10 Gram(s) Oral every 24 hours  melatonin 5 milliGRAM(s) Oral at bedtime  mirtazapine 7.5 milliGRAM(s) Oral at bedtime  multivitamin 1 Tablet(s) Oral daily  pantoprazole    Tablet 40 milliGRAM(s) Oral before breakfast  polyethylene glycol 3350 17 Gram(s) Oral two times a day  senna 2 Tablet(s) Oral at bedtime  traZODone 50 milliGRAM(s) Oral every 24 hours  traZODone 100 milliGRAM(s) Oral at bedtime    MEDICATIONS  (PRN):  acetaminophen     Tablet .. 650 milliGRAM(s) Oral every 6 hours PRN Mild Pain (1 - 3)  bisacodyl Suppository 10 milliGRAM(s) Rectal daily PRN Constipation  hydrOXYzine hydrochloride 25 milliGRAM(s) Oral three times a day PRN Anxiety  LORazepam     Tablet 0.5 milliGRAM(s) Oral every 12 hours PRN Anxiety  oxyCODONE    IR 2.5 milliGRAM(s) Oral every 4 hours PRN Moderate Pain (4 - 6)         Physical / Occupational Therapy Functional Status Assessment :   2023    Safety      AM-[facility] Functional Assessment: Basic Mobility  Type of Assessment: Daily assessment  Turning from your back to your side while in a flat bed without using bedrails?: 3 = A little assistance  Moving from lying on your back to sitting on the flat side of a flat bed without using bedrails?: 3 = A little assistance  Moving to and from a bed to a chair (including a wheelchair)?: 2 = A lot of assistance  Standing up from a chair using your arms (e.g. wheelchair or bedside chair)?: 2 = A lot of assistance  Walking in hospital room?: 2 = A lot of assistance  Climbing 3-5 steps with a railing?: 2-calculated by average   Score: 14   Row Comment: Ask the patient "How much help from another person do you currently need? (If the patient hasn't done an activity recently, how much help from another person do you think he/she needs if he/she tried?)    Cognitive/Neuro      Cognitive/Neuro/Behavioral  Cognitive/Neuro/Behavioral [WDL Definition: Alert; opens eyes spontaneously; arouses to voice or touch; oriented x 4; follows commands; speech spontaneous, logical; purposeful motor response; behavior appropriate to situation]: WDL    Language Assistance  Preferred Language to Address Healthcare Preferred Language to Address Healthcare: English    Therapeutic Interventions      Bed Mobility  Bed Mobility Training Sit-to-Supine: moderate assist (50% patient effort);  verbal cues;  2 person assist  Bed Mobility Training Supine-to-Sit: minimum assist (75% patient effort);  verbal cues;  2 person assist  Bed Mobility Training Limitations: decreased strength;  impaired balance;  impaired motor control;  decreased ability to use arms for pushing/pulling;  decreased ability to use legs for bridging/pushing;  abnormal muscle tone;  decreased ROM    Sit-Stand Transfer Training  Transfer Training Sit-to-Stand Transfer: minimum assist (75% patient effort);  verbal cues;  2 person assist;  with max A to maintain right LE Extension;  full weight-bearing  Transfer Training Stand-to-Sit Transfer: minimum assist (75% patient effort);  verbal cues;  2 person assist;  full weight-bearing  Sit-to-Stand Transfer Training Transfer Safety Analysis: decreased balance;  decreased weight-shifting ability;  impaired balance;  impaired motor control;  decreased ROM;  abnormal muscle tone    Gait Training  Gait Training: maximum assist (25% patient effort);  verbal cues;  2 person assist;  full weight-bearing   1 side step  Gait Analysis: decreased strength;  impaired motor control;  decreased ROM;  abnormal muscle tone  Gait Number of Times:: x 1    Therapeutic Exercise  Therapeutic Exercise Detail: sit/stand x 5             AM-[facility] Functional Assessment: Daily Activity  Type of Assessment: Daily assessment  Putting on and taking off regular lower body clothing?: 2 = A lot of assistance  Bathing (including washing, rinsing, drying)?: 3 = A little assistance  Toileting, which includes using toilet, bedpan or urinal?: 3 = A little assistance  Putting on and taking off regular upper body clothing?: 3 = A little assistance  Take care of personal grooming such as brushing teeth?: 4 = No assist / stand by assistance  Eating meals?: 4 = No assist / stand by assistance  Score: 19   Row Comment: Ask the patient "How much help from another person do you currently need? (If the patient hasn't done an activity recently, how much help from another person do you think he/she needs if he/she tried?)    Cognitive/Neuro      Cognitive/Neuro/Behavioral  Cognitive/Neuro/Behavioral [WDL Definition: Alert; opens eyes spontaneously; arouses to voice or touch; oriented x 4; follows commands; speech spontaneous, logical; purposeful motor response; behavior appropriate to situation]: WDL    Language Assistance  Preferred Language to Address Healthcare Preferred Language to Address Healthcare: English    Therapeutic Interventions      Bed Mobility  Bed Mobility Training Sit-to-Supine: minimum assist (75% patient effort);  verbal cues;  2 person assist  Bed Mobility Training Supine-to-Sit: minimum assist (75% patient effort);  1 person assist;  verbal cues  Bed Mobility Training Limitations: impaired balance;  decreased strength;  decreased ROM    Sit-Stand Transfer Training  Transfer Training Sit-to-Stand Transfer: minimum assist (75% patient effort);  1 person assist;  verbal cues;  with manual assist to mantain R hip extension and R knee in neutral               PM&R Impression : as above    Current Disposition Plan Recommendations :    acute rehab placement

## 2023-01-10 NOTE — PROGRESS NOTE ADULT - PROVIDER SPECIALTY LIST ADULT
NSICU
Neurology
Rehab Medicine
Hospitalist
Internal Medicine
Neurology
Neurosurgery
Heme/Onc
Hospitalist
Internal Medicine
Internal Medicine
Neurology
Rehab Medicine
Hospitalist
Neurology
Neurology
Neurosurgery
Neurosurgery
Rehab Medicine
Hospitalist
Internal Medicine
NSICU
Neurology
Hospitalist
Internal Medicine
Internal Medicine
Neurology
Hospitalist
Neurology
Internal Medicine

## 2023-01-10 NOTE — PROGRESS NOTE ADULT - REASON FOR ADMISSION
mechanical fall

## 2023-01-10 NOTE — DISCHARGE NOTE NURSING/CASE MANAGEMENT/SOCIAL WORK - NSTRANSFERBELONGINGSDISPO_GEN_A_NUR
with patient Alert and oriented to person, place, time/situation. normal mood and affect. no apparent risk to self or others.

## 2023-01-10 NOTE — PROGRESS NOTE ADULT - SUBJECTIVE AND OBJECTIVE BOX
INTERVAL HPI/OVERNIGHT EVENTS:  Patient was seen and examined at bedside. As per night team, , patient resting comfortably. C/o. Patient denies: fever, chills, dizziness, weakness, HA, changes in vision, CP, palpitations, SOB, cough, N/V, dysuria, changes in bowel movements, LE edema.     VITAL SIGNS:  T(F): 98.2 (01-10-23 @ 05:25)  HR: 75 (01-10-23 @ 05:25)  BP: 125/79 (01-10-23 @ 05:25)  RR: 16 (01-10-23 @ 05:25)  SpO2: 97% (01-10-23 @ 05:25)  Wt(kg): --      01-09-23 @ 07:01  -  01-10-23 @ 07:00  --------------------------------------------------------  IN: 0 mL / OUT: 250 mL / NET: -250 mL        PHYSICAL EXAM:  Constitutional: NAD  HEENT: PERRL, EOMI, sclera non-icteric, neck supple, trachea midline, no masses, no JVD, MMM  Respiratory: CTA b/l, good air entry b/l, no wheezing, no rhonchi, no rales, without accessory muscle use and no intercostal retractions  Cardiovascular: RRR, normal S1S2, no M/R/G  Gastrointestinal: soft, NTND, no masses palpable, BS normal  Extremities: Warm, well perfused, pulses equal bilateral upper and lower extremities, no edema, no clubbing  Neurological: AAOx3, CN Grossly intact  Skin: Normal temperature, warm, dry    MEDICATIONS  (STANDING):  aspirin  chewable 81 milliGRAM(s) Oral daily  atorvastatin 80 milliGRAM(s) Oral at bedtime  baclofen 5 milliGRAM(s) Oral every 8 hours  clopidogrel Tablet 75 milliGRAM(s) Oral daily  enoxaparin Injectable 40 milliGRAM(s) SubCutaneous every 24 hours  ferrous    sulfate 325 milliGRAM(s) Oral <User Schedule>  folic acid 1 milliGRAM(s) Oral daily  lactulose Syrup 10 Gram(s) Oral every 24 hours  melatonin 5 milliGRAM(s) Oral at bedtime  mirtazapine 7.5 milliGRAM(s) Oral at bedtime  multivitamin 1 Tablet(s) Oral daily  pantoprazole    Tablet 40 milliGRAM(s) Oral before breakfast  polyethylene glycol 3350 17 Gram(s) Oral two times a day  senna 2 Tablet(s) Oral at bedtime  traZODone 50 milliGRAM(s) Oral every 24 hours  traZODone 100 milliGRAM(s) Oral at bedtime    MEDICATIONS  (PRN):  acetaminophen     Tablet .. 650 milliGRAM(s) Oral every 6 hours PRN Mild Pain (1 - 3)  bisacodyl Suppository 10 milliGRAM(s) Rectal daily PRN Constipation  hydrOXYzine hydrochloride 25 milliGRAM(s) Oral three times a day PRN Anxiety  LORazepam     Tablet 0.5 milliGRAM(s) Oral every 12 hours PRN Anxiety  oxyCODONE    IR 2.5 milliGRAM(s) Oral every 4 hours PRN Moderate Pain (4 - 6)      Allergies    Compazine (Unknown)  contrast media (iodine-based) (Unknown)  iv contrast (Unknown)  penicillin (Unknown)  penicillins (Other)  Toradol (Unknown)    Intolerances        LABS:                  RADIOLOGY & ADDITIONAL TESTS:  Reviewed

## 2023-01-11 VITALS
HEART RATE: 76 BPM | OXYGEN SATURATION: 98 % | RESPIRATION RATE: 18 BRPM | DIASTOLIC BLOOD PRESSURE: 78 MMHG | TEMPERATURE: 98 F | SYSTOLIC BLOOD PRESSURE: 119 MMHG

## 2023-01-11 PROCEDURE — 83880 ASSAY OF NATRIURETIC PEPTIDE: CPT

## 2023-01-11 PROCEDURE — 72125 CT NECK SPINE W/O DYE: CPT | Mod: MA

## 2023-01-11 PROCEDURE — 85045 AUTOMATED RETICULOCYTE COUNT: CPT

## 2023-01-11 PROCEDURE — 99285 EMERGENCY DEPT VISIT HI MDM: CPT | Mod: 25

## 2023-01-11 PROCEDURE — C1760: CPT

## 2023-01-11 PROCEDURE — 97168 OT RE-EVAL EST PLAN CARE: CPT

## 2023-01-11 PROCEDURE — 82728 ASSAY OF FERRITIN: CPT

## 2023-01-11 PROCEDURE — 70547 MR ANGIOGRAPHY NECK W/O DYE: CPT

## 2023-01-11 PROCEDURE — 85730 THROMBOPLASTIN TIME PARTIAL: CPT

## 2023-01-11 PROCEDURE — 97535 SELF CARE MNGMENT TRAINING: CPT

## 2023-01-11 PROCEDURE — 85025 COMPLETE CBC W/AUTO DIFF WBC: CPT

## 2023-01-11 PROCEDURE — C1884: CPT

## 2023-01-11 PROCEDURE — 70544 MR ANGIOGRAPHY HEAD W/O DYE: CPT

## 2023-01-11 PROCEDURE — 71045 X-RAY EXAM CHEST 1 VIEW: CPT

## 2023-01-11 PROCEDURE — 97116 GAIT TRAINING THERAPY: CPT

## 2023-01-11 PROCEDURE — 93005 ELECTROCARDIOGRAM TRACING: CPT

## 2023-01-11 PROCEDURE — 93306 TTE W/DOPPLER COMPLETE: CPT

## 2023-01-11 PROCEDURE — 80061 LIPID PANEL: CPT

## 2023-01-11 PROCEDURE — 72128 CT CHEST SPINE W/O DYE: CPT | Mod: MA

## 2023-01-11 PROCEDURE — P9016: CPT

## 2023-01-11 PROCEDURE — 73060 X-RAY EXAM OF HUMERUS: CPT

## 2023-01-11 PROCEDURE — 97110 THERAPEUTIC EXERCISES: CPT

## 2023-01-11 PROCEDURE — 83540 ASSAY OF IRON: CPT

## 2023-01-11 PROCEDURE — 83550 IRON BINDING TEST: CPT

## 2023-01-11 PROCEDURE — 86901 BLOOD TYPING SEROLOGIC RH(D): CPT

## 2023-01-11 PROCEDURE — 80053 COMPREHEN METABOLIC PANEL: CPT

## 2023-01-11 PROCEDURE — 86803 HEPATITIS C AB TEST: CPT

## 2023-01-11 PROCEDURE — 70450 CT HEAD/BRAIN W/O DYE: CPT

## 2023-01-11 PROCEDURE — 86923 COMPATIBILITY TEST ELECTRIC: CPT

## 2023-01-11 PROCEDURE — 96374 THER/PROPH/DIAG INJ IV PUSH: CPT

## 2023-01-11 PROCEDURE — C1725: CPT

## 2023-01-11 PROCEDURE — C1894: CPT

## 2023-01-11 PROCEDURE — 84443 ASSAY THYROID STIM HORMONE: CPT

## 2023-01-11 PROCEDURE — 93926 LOWER EXTREMITY STUDY: CPT

## 2023-01-11 PROCEDURE — 73030 X-RAY EXAM OF SHOULDER: CPT

## 2023-01-11 PROCEDURE — 84466 ASSAY OF TRANSFERRIN: CPT

## 2023-01-11 PROCEDURE — 36430 TRANSFUSION BLD/BLD COMPNT: CPT

## 2023-01-11 PROCEDURE — 85027 COMPLETE CBC AUTOMATED: CPT

## 2023-01-11 PROCEDURE — 85576 BLOOD PLATELET AGGREGATION: CPT

## 2023-01-11 PROCEDURE — 80076 HEPATIC FUNCTION PANEL: CPT

## 2023-01-11 PROCEDURE — 73090 X-RAY EXAM OF FOREARM: CPT

## 2023-01-11 PROCEDURE — 81003 URINALYSIS AUTO W/O SCOPE: CPT

## 2023-01-11 PROCEDURE — 80307 DRUG TEST PRSMV CHEM ANLYZR: CPT

## 2023-01-11 PROCEDURE — 83036 HEMOGLOBIN GLYCOSYLATED A1C: CPT

## 2023-01-11 PROCEDURE — 87040 BLOOD CULTURE FOR BACTERIA: CPT

## 2023-01-11 PROCEDURE — 86850 RBC ANTIBODY SCREEN: CPT

## 2023-01-11 PROCEDURE — 84100 ASSAY OF PHOSPHORUS: CPT

## 2023-01-11 PROCEDURE — 70548 MR ANGIOGRAPHY NECK W/DYE: CPT

## 2023-01-11 PROCEDURE — 83010 ASSAY OF HAPTOGLOBIN QUANT: CPT

## 2023-01-11 PROCEDURE — 85347 COAGULATION TIME ACTIVATED: CPT

## 2023-01-11 PROCEDURE — 36415 COLL VENOUS BLD VENIPUNCTURE: CPT

## 2023-01-11 PROCEDURE — C1887: CPT

## 2023-01-11 PROCEDURE — 80048 BASIC METABOLIC PNL TOTAL CA: CPT

## 2023-01-11 PROCEDURE — 93880 EXTRACRANIAL BILAT STUDY: CPT

## 2023-01-11 PROCEDURE — 97164 PT RE-EVAL EST PLAN CARE: CPT

## 2023-01-11 PROCEDURE — 86900 BLOOD TYPING SEROLOGIC ABO: CPT

## 2023-01-11 PROCEDURE — C9399: CPT

## 2023-01-11 PROCEDURE — 99232 SBSQ HOSP IP/OBS MODERATE 35: CPT

## 2023-01-11 PROCEDURE — 97530 THERAPEUTIC ACTIVITIES: CPT

## 2023-01-11 PROCEDURE — U0005: CPT

## 2023-01-11 PROCEDURE — 83735 ASSAY OF MAGNESIUM: CPT

## 2023-01-11 PROCEDURE — 83615 LACTATE (LD) (LDH) ENZYME: CPT

## 2023-01-11 PROCEDURE — 97112 NEUROMUSCULAR REEDUCATION: CPT

## 2023-01-11 PROCEDURE — 73552 X-RAY EXAM OF FEMUR 2/>: CPT

## 2023-01-11 PROCEDURE — 87635 SARS-COV-2 COVID-19 AMP PRB: CPT

## 2023-01-11 PROCEDURE — A9585: CPT

## 2023-01-11 PROCEDURE — 97166 OT EVAL MOD COMPLEX 45 MIN: CPT

## 2023-01-11 PROCEDURE — 87521 HEPATITIS C PROBE&RVRS TRNSC: CPT

## 2023-01-11 PROCEDURE — C1769: CPT

## 2023-01-11 PROCEDURE — 92610 EVALUATE SWALLOWING FUNCTION: CPT

## 2023-01-11 PROCEDURE — 85610 PROTHROMBIN TIME: CPT

## 2023-01-11 PROCEDURE — 73560 X-RAY EXAM OF KNEE 1 OR 2: CPT

## 2023-01-11 PROCEDURE — U0003: CPT

## 2023-01-11 PROCEDURE — 97161 PT EVAL LOW COMPLEX 20 MIN: CPT

## 2023-01-11 RX ADMIN — Medication 5 MILLIGRAM(S): at 09:03

## 2023-01-11 NOTE — CONSULT NOTE ADULT - ASSESSMENT
62F c/o recurrent falls, sz d/o, depression, anxiety, p/w recurrent falls, found to have new and old strokes on imaging, as well as severe L ICA stenosis/occlusion and severe MICAH stenosis/occlusion; also w/ HANH (but no bleeding sx) responsive to transfusions (need EGD-[facility] outpatient), s/p L ICA balloon angioplasty w stenting of L ICA stenosis 1/3 w Dr. Morrison, recommended for acute rehab.    #Recurrent CVA, L ICA and MICAH stenosis. acute/subacute L MICAH territory infarction and L parieto-temporal region   - A1C. 5.8. . On Atorvastatin   - on DAPT - asa + plavix. Atorva 80  #L ICA stenosis - severe focal stenosis in PROXIMAL region   - s/p angioplasty and stenting 1/4  #Falls at home - recommended for acute rehab  #Anxiety and Depression; h/o benzo abuse - psychiatry following. Does not appear in acute withdrawal at this time   - per psych - pt lacks capacity to leave AMA at this time   - Trazodone 50/100   - Ativan 0.5 q12h, hydroxyzine 25 TID  #Depression – Remeron 7.5 HS  #HANH – No labs today. 9.6 from 9. asymptomatic. TSat 3%. Suspect possibly combination of poor diet vs occult bleeding. Noted no evidence of blood loss inpatient w hgb responsive to transfusions. scope outpatient (EGD/[facility]).   #HCV Ab +; neg PCR  #Prediabetes – 5.8. F/U as outpatient  #HLD – 126    Recommend  Patient denies pain. She remains with spasms in right leg.  Continue w Baclofen q8h for L leg and foot spasms  Can keep ativan 0.5mg q12h PRN for anxiety at this time.    Continue mobilization.   F/U BH/Psych recs.     DISPO: Acute rehab

## 2023-01-11 NOTE — CONSULT NOTE ADULT - SUBJECTIVE AND OBJECTIVE BOX
Patient is a 62y old  Female who presents with a chief complaint of mechanical fall (10 Canelo 2023 13:54)    INTERVAL EVENTS:    SUBJECTIVE:  Patient was seen and examined at bedside. Reports cramping in the right leg poorly responding to Baclofen. Denies weakness, reports pins and needles sensation below knee on the right. Denies headache, no N/V, having BMs, no difficulty sleeping. Denies other complaints    Review of systems: No fever, chills, dizziness, HA, Changes in vision, CP, dyspnea, nausea or vomiting, dysuria, changes in bowel movements, LE edema. Rest of 12 point Review of systems negative unless otherwise documented elsewhere in note.     Diet, Regular (01-04-23 @ 09:04) [Active]      MEDICATIONS:  MEDICATIONS  (STANDING):  aspirin  chewable 81 milliGRAM(s) Oral daily  atorvastatin 80 milliGRAM(s) Oral at bedtime  baclofen 5 milliGRAM(s) Oral every 8 hours  clopidogrel Tablet 75 milliGRAM(s) Oral daily  enoxaparin Injectable 40 milliGRAM(s) SubCutaneous every 24 hours  ferrous    sulfate 325 milliGRAM(s) Oral <User Schedule>  folic acid 1 milliGRAM(s) Oral daily  lactulose Syrup 10 Gram(s) Oral every 24 hours  melatonin 5 milliGRAM(s) Oral at bedtime  mirtazapine 7.5 milliGRAM(s) Oral at bedtime  multivitamin 1 Tablet(s) Oral daily  pantoprazole    Tablet 40 milliGRAM(s) Oral before breakfast  polyethylene glycol 3350 17 Gram(s) Oral two times a day  senna 2 Tablet(s) Oral at bedtime  traZODone 50 milliGRAM(s) Oral every 24 hours  traZODone 100 milliGRAM(s) Oral at bedtime    MEDICATIONS  (PRN):  acetaminophen     Tablet .. 650 milliGRAM(s) Oral every 6 hours PRN Mild Pain (1 - 3)  bisacodyl Suppository 10 milliGRAM(s) Rectal daily PRN Constipation  hydrOXYzine hydrochloride 25 milliGRAM(s) Oral three times a day PRN Anxiety  LORazepam     Tablet 0.5 milliGRAM(s) Oral every 12 hours PRN Anxiety  oxyCODONE    IR 2.5 milliGRAM(s) Oral every 4 hours PRN Moderate Pain (4 - 6)      Allergies    Compazine (Unknown)  contrast media (iodine-based) (Unknown)  iv contrast (Unknown)  penicillin (Unknown)  penicillins (Other)  Toradol (Unknown)    Intolerances        OBJECTIVE:  Vital Signs Last 24 Hrs  T(C): 36.4 (11 Jan 2023 05:18), Max: 37 (10 Canelo 2023 14:01)  T(F): 97.5 (11 Jan 2023 05:18), Max: 98.6 (10 Canelo 2023 14:01)  HR: 76 (11 Jan 2023 05:18) (76 - 90)  BP: 119/78 (11 Jan 2023 05:18) (108/69 - 119/78)  BP(mean): --  RR: 18 (11 Jan 2023 05:18) (18 - 18)  SpO2: 98% (11 Jan 2023 05:18) (96% - 98%)    Parameters below as of 11 Jan 2023 05:18  Patient On (Oxygen Delivery Method): room air      I&O's Summary    10 Canelo 2023 07:01  -  11 Jan 2023 07:00  --------------------------------------------------------  IN: 0 mL / OUT: 300 mL / NET: -300 mL        PHYSICAL EXAM:  General:  HEENT:  Lungs:  Heart:  Abdomen:  Extremities:      LABS:              CAPILLARY BLOOD GLUCOSE            MICRODATA:      RADIOLOGY/OTHER STUDIES:   Patient is a 62y old  Female who presents with a chief complaint of mechanical fall (10 Canelo 2023 13:54)    INTERVAL EVENTS:    SUBJECTIVE:  Patient was seen and examined at bedside. Reports cramping in the right leg poorly responding to Baclofen. Denies weakness, reports pins and needles sensation below knee on the right. Denies headache, no N/V, having BMs, reports difficulty sleeping. Denies other complaints    Review of systems: No fever, chills, dizziness, HA, Changes in vision, CP, dyspnea, nausea or vomiting, dysuria, changes in bowel movements, LE edema. Rest of 12 point Review of systems negative unless otherwise documented elsewhere in note.     Diet, Regular (01-04-23 @ 09:04) [Active]      MEDICATIONS:  MEDICATIONS  (STANDING):  aspirin  chewable 81 milliGRAM(s) Oral daily  atorvastatin 80 milliGRAM(s) Oral at bedtime  baclofen 5 milliGRAM(s) Oral every 8 hours  clopidogrel Tablet 75 milliGRAM(s) Oral daily  enoxaparin Injectable 40 milliGRAM(s) SubCutaneous every 24 hours  ferrous    sulfate 325 milliGRAM(s) Oral <User Schedule>  folic acid 1 milliGRAM(s) Oral daily  lactulose Syrup 10 Gram(s) Oral every 24 hours  melatonin 5 milliGRAM(s) Oral at bedtime  mirtazapine 7.5 milliGRAM(s) Oral at bedtime  multivitamin 1 Tablet(s) Oral daily  pantoprazole    Tablet 40 milliGRAM(s) Oral before breakfast  polyethylene glycol 3350 17 Gram(s) Oral two times a day  senna 2 Tablet(s) Oral at bedtime  traZODone 50 milliGRAM(s) Oral every 24 hours  traZODone 100 milliGRAM(s) Oral at bedtime    MEDICATIONS  (PRN):  acetaminophen     Tablet .. 650 milliGRAM(s) Oral every 6 hours PRN Mild Pain (1 - 3)  bisacodyl Suppository 10 milliGRAM(s) Rectal daily PRN Constipation  hydrOXYzine hydrochloride 25 milliGRAM(s) Oral three times a day PRN Anxiety  LORazepam     Tablet 0.5 milliGRAM(s) Oral every 12 hours PRN Anxiety  oxyCODONE    IR 2.5 milliGRAM(s) Oral every 4 hours PRN Moderate Pain (4 - 6)      Allergies    Compazine (Unknown)  contrast media (iodine-based) (Unknown)  iv contrast (Unknown)  penicillin (Unknown)  penicillins (Other)  Toradol (Unknown)    Intolerances        OBJECTIVE:  Vital Signs Last 24 Hrs  T(C): 36.4 (11 Jan 2023 05:18), Max: 37 (10 Canelo 2023 14:01)  T(F): 97.5 (11 Jan 2023 05:18), Max: 98.6 (10 Canelo 2023 14:01)  HR: 76 (11 Jan 2023 05:18) (76 - 90)  BP: 119/78 (11 Jan 2023 05:18) (108/69 - 119/78)  BP(mean): --  RR: 18 (11 Jan 2023 05:18) (18 - 18)  SpO2: 98% (11 Jan 2023 05:18) (96% - 98%)    Parameters below as of 11 Jan 2023 05:18  Patient On (Oxygen Delivery Method): room air      I&O's Summary    10 Canelo 2023 07:01  -  11 Jan 2023 07:00  --------------------------------------------------------  IN: 0 mL / OUT: 300 mL / NET: -300 mL        PHYSICAL EXAM:  General: AOX3, NAD, lying in bed, speaking in full sentences, no labored breathing on RA  HEENT: AT/NC, no facial asymmetry  Lungs: poor inspiration, no crackles, no wheezes  Heart: RRR  Abdomen: soft, non-tender, + BS  Extremities: warm, no edema, no calf tenderness, denies decreased sensation, no focal deficit       LABS:              CAPILLARY BLOOD GLUCOSE            MICRODATA:      RADIOLOGY/OTHER STUDIES:

## 2023-01-11 NOTE — CONSULT NOTE ADULT - CONSULT REQUESTED DATE/TIME
11-Jan-2023 09:52
22-Dec-2022 12:08
20-Dec-2022 16:05
23-Dec-2022 05:05
23-Dec-2022 13:09
23-Dec-2022 16:31

## 2023-02-12 ENCOUNTER — EMERGENCY (EMERGENCY)
Facility: HOSPITAL | Age: 63
LOS: 1 days | Discharge: HOME | End: 2023-02-12
Attending: EMERGENCY MEDICINE | Admitting: EMERGENCY MEDICINE
Payer: COMMERCIAL

## 2023-02-12 VITALS
HEART RATE: 81 BPM | HEIGHT: 63 IN | SYSTOLIC BLOOD PRESSURE: 124 MMHG | TEMPERATURE: 98 F | WEIGHT: 125 LBS | RESPIRATION RATE: 16 BRPM | DIASTOLIC BLOOD PRESSURE: 82 MMHG | OXYGEN SATURATION: 99 %

## 2023-02-12 VITALS
DIASTOLIC BLOOD PRESSURE: 67 MMHG | HEART RATE: 79 BPM | TEMPERATURE: 98 F | OXYGEN SATURATION: 99 % | RESPIRATION RATE: 16 BRPM | SYSTOLIC BLOOD PRESSURE: 117 MMHG

## 2023-02-12 LAB
ALBUMIN SERPL ELPH-MCNC: 3.7 G/DL — SIGNIFICANT CHANGE UP (ref 3.3–5)
ALP SERPL-CCNC: 116 U/L — SIGNIFICANT CHANGE UP (ref 40–120)
ALT FLD-CCNC: 18 U/L — SIGNIFICANT CHANGE UP (ref 10–45)
ANION GAP SERPL CALC-SCNC: 10 MMOL/L — SIGNIFICANT CHANGE UP (ref 5–17)
AST SERPL-CCNC: 12 U/L — SIGNIFICANT CHANGE UP (ref 10–40)
BASOPHILS # BLD AUTO: 0.04 K/UL — SIGNIFICANT CHANGE UP (ref 0–0.2)
BASOPHILS NFR BLD AUTO: 0.7 % — SIGNIFICANT CHANGE UP (ref 0–2)
BILIRUB SERPL-MCNC: 0.2 MG/DL — SIGNIFICANT CHANGE UP (ref 0.2–1.2)
BUN SERPL-MCNC: 6 MG/DL — LOW (ref 7–23)
CALCIUM SERPL-MCNC: 9.2 MG/DL — SIGNIFICANT CHANGE UP (ref 8.4–10.5)
CHLORIDE SERPL-SCNC: 110 MMOL/L — HIGH (ref 96–108)
CO2 SERPL-SCNC: 26 MMOL/L — SIGNIFICANT CHANGE UP (ref 22–31)
CREAT SERPL-MCNC: 0.72 MG/DL — SIGNIFICANT CHANGE UP (ref 0.5–1.3)
EGFR: 96 ML/MIN/1.73M2 — SIGNIFICANT CHANGE UP
EOSINOPHIL # BLD AUTO: 0.09 K/UL — SIGNIFICANT CHANGE UP (ref 0–0.5)
EOSINOPHIL NFR BLD AUTO: 1.6 % — SIGNIFICANT CHANGE UP (ref 0–6)
GLUCOSE SERPL-MCNC: 116 MG/DL — HIGH (ref 70–99)
HCT VFR BLD CALC: 32.6 % — LOW (ref 34.5–45)
HGB BLD-MCNC: 10 G/DL — LOW (ref 11.5–15.5)
IMM GRANULOCYTES NFR BLD AUTO: 0.2 % — SIGNIFICANT CHANGE UP (ref 0–0.9)
LYMPHOCYTES # BLD AUTO: 1.53 K/UL — SIGNIFICANT CHANGE UP (ref 1–3.3)
LYMPHOCYTES # BLD AUTO: 27.7 % — SIGNIFICANT CHANGE UP (ref 13–44)
MCHC RBC-ENTMCNC: 26.2 PG — LOW (ref 27–34)
MCHC RBC-ENTMCNC: 30.7 GM/DL — LOW (ref 32–36)
MCV RBC AUTO: 85.3 FL — SIGNIFICANT CHANGE UP (ref 80–100)
MONOCYTES # BLD AUTO: 0.41 K/UL — SIGNIFICANT CHANGE UP (ref 0–0.9)
MONOCYTES NFR BLD AUTO: 7.4 % — SIGNIFICANT CHANGE UP (ref 2–14)
NEUTROPHILS # BLD AUTO: 3.44 K/UL — SIGNIFICANT CHANGE UP (ref 1.8–7.4)
NEUTROPHILS NFR BLD AUTO: 62.4 % — SIGNIFICANT CHANGE UP (ref 43–77)
NRBC # BLD: 0 /100 WBCS — SIGNIFICANT CHANGE UP (ref 0–0)
PLATELET # BLD AUTO: 389 K/UL — SIGNIFICANT CHANGE UP (ref 150–400)
POTASSIUM SERPL-MCNC: 3.3 MMOL/L — LOW (ref 3.5–5.3)
POTASSIUM SERPL-SCNC: 3.3 MMOL/L — LOW (ref 3.5–5.3)
PROT SERPL-MCNC: 6.7 G/DL — SIGNIFICANT CHANGE UP (ref 6–8.3)
RBC # BLD: 3.82 M/UL — SIGNIFICANT CHANGE UP (ref 3.8–5.2)
RBC # FLD: 18.7 % — HIGH (ref 10.3–14.5)
SARS-COV-2 RNA SPEC QL NAA+PROBE: NEGATIVE — SIGNIFICANT CHANGE UP
SODIUM SERPL-SCNC: 146 MMOL/L — HIGH (ref 135–145)
WBC # BLD: 5.52 K/UL — SIGNIFICANT CHANGE UP (ref 3.8–10.5)
WBC # FLD AUTO: 5.52 K/UL — SIGNIFICANT CHANGE UP (ref 3.8–10.5)

## 2023-02-12 PROCEDURE — 99284 EMERGENCY DEPT VISIT MOD MDM: CPT

## 2023-02-12 PROCEDURE — 87635 SARS-COV-2 COVID-19 AMP PRB: CPT

## 2023-02-12 PROCEDURE — 80053 COMPREHEN METABOLIC PANEL: CPT

## 2023-02-12 PROCEDURE — 85025 COMPLETE CBC W/AUTO DIFF WBC: CPT

## 2023-02-12 PROCEDURE — 36415 COLL VENOUS BLD VENIPUNCTURE: CPT

## 2023-02-12 RX ORDER — ACETAMINOPHEN 500 MG
1000 TABLET ORAL ONCE
Refills: 0 | Status: COMPLETED | OUTPATIENT
Start: 2023-02-12 | End: 2023-02-12

## 2023-02-12 RX ADMIN — Medication 1000 MILLIGRAM(S): at 16:13

## 2023-02-12 NOTE — ED ADULT NURSE NOTE - NSIMPLEMENTINTERV_GEN_ALL_ED
Implemented All Fall Risk Interventions:  [place] to call system. Call bell, personal items and telephone within reach. Instruct patient to call for assistance. Room bathroom lighting operational. Non-slip footwear when patient is off stretcher. Physically safe environment: no spills, clutter or unnecessary equipment. Stretcher in lowest position, wheels locked, appropriate side rails in place. Provide visual cue, wrist band, yellow gown, etc. Monitor gait and stability. Monitor for mental status changes and reorient to person, place, and time. Review medications for side effects contributing to fall risk. Reinforce activity limits and safety measures with patient and family.

## 2023-02-12 NOTE — ED PROVIDER NOTE - PATIENT PORTAL LINK FT
You can access the FollowMyHealth Patient Portal offered by [facility] by registering at the following website: [email or web address]. By joining [employer]’s FollowMyHealth portal, you will also be able to view your health information using other applications (apps) compatible with our system.

## 2023-02-12 NOTE — ED ADULT NURSE NOTE - OBJECTIVE STATEMENT
Pt presenting with concerns of being unsafe in her living situation. States she lives alone and is very anxious about falling. Endorsing a fall 2 weeks ago, R eye bruising noted. Denies new fall/ injury/complaints. No CP/palpitations. Denies decreased PO

## 2023-02-12 NOTE — ED ADULT TRIAGE NOTE - CHIEF COMPLAINT QUOTE
Pt presents to ED C/O weakness, tremors and feeling "unsafe at home", S/P fall x 3 weeks ago. Pt has bruising to R eye PTA. Denies new falls/ injuries. States " I've had 8 strokes in the past, I went to [facility] 3 weeks ago when I fell, and I couldn't stay there, they discharged me and said I would have 12 hour a day home care and I only have 4 hrs a day, I'm too nervous at home". Pt presents to ED C/O weakness, tremors and feeling "unsafe at home", S/P fall x 3 weeks ago. Pt has bruising to R eye PTA. Denies new falls/ injuries. States " I've had 8 strokes in the past, I went to [facility] 3 weeks ago when I fell, and I couldn't stay there, they discharged me and said I would have 12 hour a day home care and I only have 4 hrs a day, I'm too nervous at home". Denies alcohol/ drug use.

## 2023-02-12 NOTE — ED PROVIDER NOTE - SKIN [+], MLM
BRUISING
no bleeding gums/no chills/no fever/no loss of consciousness/no numbness/no syncope/no vomiting/no weakness

## 2023-02-12 NOTE — ED PROVIDER NOTE - CLINICAL SUMMARY MEDICAL DECISION MAKING FREE TEXT BOX
Pt presents to ED because concerned about being home by herself  - hx of multiple falls - recently home from rehab.  Pt requesting more services at home.  Seen by CM and ALEJANDRO who state pt lives in home with services and has aid 7 days a week 4 hours a day.  State pt can be discharged back.  Basic blood work shows mild dehydration but pt refusing an IV.  Tolerating po in ED.  CM discussed case with patient's family.  Transport arranged from ED.

## 2023-02-12 NOTE — ED PROVIDER NOTE - OBJECTIVE STATEMENT
Pt is a 63 yo F w/ PMH HTN, migraines, seizure disorder, depression/anxiety, prior stroke in 2018, who presented to ED on 12/20 for frequent falls. CTH done in ED showed subacute/chronic bilateral MICAH territory infarcts. Pt has R sided weakness which she states is her baseline since her prior strokes, and noted worsening weakness for at least 1 year. MRA head/neck also showed acute/subacute infarction in L MICAH territory and L parietal region with mild local mass effect, and bilateral A2 HGS/occlusion and L ICA focal lack of flow-related signal within the proximal L ICA 2/2 HGS vs occlusion. Started on DAPT and high intensity statin after Hgb stable. MRA neck w/ contrast to further characterize ICA for any surgical intervention. S/p angiogram and ICA stent w. NSGY, stepped down from NSICU to [facility].  On [facility], LE numbness and tingling improved on baclofen.  Pt admitted from 12/20-1/11. Pt is a 63 yo F w/ PMH HTN, migraines, seizure disorder, depression/anxiety, prior stroke in 2018, who presented to ED on 12/20 for frequent falls. CTH done in ED showed subacute/chronic bilateral MICAH territory infarcts. Pt has R sided weakness which she states is her baseline since her prior strokes, and noted worsening weakness for at least 1 year. MRA head/neck also showed acute/subacute infarction in L MICAH territory and L parietal region with mild local mass effect, and bilateral A2 HGS/occlusion and L ICA focal lack of flow-related signal within the proximal L ICA 2/2 HGS vs occlusion. Started on DAPT and high intensity statin after Hgb stable. MRA neck w/ contrast to further characterize ICA for any surgical intervention. S/p angiogram and ICA stent w. NSGY, stepped down from NSICU to [facility].  On [facility], LE numbness and tingling improved on baclofen.  Pt admitted from 12/20-1/11.  Pt went to [facility] and returned home approximately 12 days ago as per pt.  Pt is here today because she was feeling anxious being alone at home.  Pt states she has fallen multiple times since being home from rehab - including falling and sustaining bruising to right side of face.  Pt states she was seen at another hospital and had CT which was negative.  Pt requesting SW and CM to see her - requesting more hours for aid at home.  Denies alcohol or drug use.  Denies other complaints at this time.

## 2023-02-12 NOTE — ED PROVIDER NOTE - NSFOLLOWUPINSTRUCTIONS_ED_ALL_ED_FT
Take your regularly prescribed medications.  Stay hydrated and eat well balanced meals.  Return to ED with any worsening symptoms or other concerns.          Fall Prevention for Older Adults    WHAT YOU NEED TO KNOW:    As you age, your muscles weaken and your risk for falls increases. Your risk also increases if you take medicines that make you sleepy or dizzy. You may also be at risk if you have vision or joint problems, have low blood pressure, or are not active.    DISCHARGE INSTRUCTIONS:    Call your local emergency number (911 in the ) or have someone call if:   •You have fallen and are unconscious.      •You have fallen and cannot move part of your body.      Call your doctor if:   •You have fallen and have pain or a headache.      •You have questions or concerns about your condition or care.      Fall prevention tips:   •Stay active. Exercise can help strengthen your muscles and improve your balance. Your healthcare provider may recommend water aerobics, walking, or [employer]. He or she may also recommend physical therapy to improve your coordination. Never start an exercise program without asking your healthcare provider first.  Water Aerobics for Seniors       [employer] for Seniors           •Wear shoes that fit well and have soles that . Wear shoes both inside and outside. Use slippers with good . Avoid shoes with high heels.      •Use assistive devices as directed. Your healthcare provider may suggest that you use a cane or walker to help you keep your balance. You may need to have grab bars put in your bathroom near the toilet or in the shower.      •Stand or sit up slowly. This may help you keep your balance and prevent falls.      •Wear a personal alarm. This is a device that allows you to call 911 if you need help. Ask for more information on personal alarms.      •Manage your medical conditions.  Keep all appointments with your healthcare providers. Visit your eye doctor as directed.      Home safety tips:   Fall Prevention for Seniors       •Add items to prevent falls in the bathroom. Put nonslip strips on your bath or shower floor to prevent you from slipping. Use a bath mat if you do not have carpet in the bathroom. This will prevent you from falling when you step out of the bath or shower. Use a shower seat so you do not need to stand while you shower. Sit on the toilet or a chair in your bathroom to dry yourself and put on clothing. This will prevent you from losing your balance from drying or dressing yourself while you are standing.      •Keep paths clear. Remove books, shoes, and other objects from walkways and stairs. Place cords for telephones and lamps out of the way so that you do not need to walk over them. Tape them down if you cannot move them. Remove small rugs. If you cannot remove a rug, secure it with double-sided tape. This will prevent you from tripping.      •Install bright lights in your home. Use night lights to help light paths to the bathroom or kitchen. Always turn on the light before you start walking.      •Keep items you use often on shelves within reach. Do not use a step stool to help you reach an item.      •Paint or place reflective tape on the edges of your stairs. This will help you see the stairs better.      Follow up with your doctor as directed: Write down your questions so you remember to ask them during your visits.       © Copyright [employer] 2023           back to top                          © Copyright [employer] 2023

## 2023-02-12 NOTE — ED ADULT TRIAGE NOTE - ISOLATION TYPE:
Edilberto    Your lab tests are complete and I have reviewed the results.     - Your lab results look great; everything is normal.  - Keep the GI appointment to evaluate the elevated lipase level.     If you have any questions or concerns, please feel free to call or send a [facility] message.    Sincerely,  George Morel PA-C  
None

## 2023-02-12 NOTE — ED ADULT NURSE NOTE - CHIEF COMPLAINT QUOTE
Pt presents to ED C/O weakness, tremors and feeling "unsafe at home", S/P fall x 3 weeks ago. Pt has bruising to R eye PTA. Denies new falls/ injuries. States " I've had 8 strokes in the past, I went to [facility] 3 weeks ago when I fell, and I couldn't stay there, they discharged me and said I would have 12 hour a day home care and I only have 4 hrs a day, I'm too nervous at home". Denies alcohol/ drug use.

## 2023-02-13 DIAGNOSIS — I10 ESSENTIAL (PRIMARY) HYPERTENSION: ICD-10-CM

## 2023-02-13 DIAGNOSIS — Z79.82 LONG TERM (CURRENT) USE OF ASPIRIN: ICD-10-CM

## 2023-02-13 DIAGNOSIS — E86.0 DEHYDRATION: ICD-10-CM

## 2023-02-13 DIAGNOSIS — Z20.822 CONTACT WITH AND (SUSPECTED) EXPOSURE TO COVID-19: ICD-10-CM

## 2023-02-13 DIAGNOSIS — F17.200 NICOTINE DEPENDENCE, UNSPECIFIED, UNCOMPLICATED: ICD-10-CM

## 2023-02-13 DIAGNOSIS — Z88.0 ALLERGY STATUS TO PENICILLIN: ICD-10-CM

## 2023-02-13 DIAGNOSIS — F41.8 OTHER SPECIFIED ANXIETY DISORDERS: ICD-10-CM
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
## 2023-02-13 DIAGNOSIS — R29.6 REPEATED FALLS: ICD-10-CM
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
## 2023-02-13 DIAGNOSIS — Z88.6 ALLERGY STATUS TO ANALGESIC AGENT: ICD-10-CM

## 2023-02-13 DIAGNOSIS — G40.909 EPILEPSY, UNSPECIFIED, NOT INTRACTABLE, WITHOUT STATUS EPILEPTICUS: ICD-10-CM

## 2023-02-13 DIAGNOSIS — S01.81XA LACERATION WITHOUT FOREIGN BODY OF OTHER PART OF HEAD, INITIAL ENCOUNTER: ICD-10-CM

## 2023-02-13 DIAGNOSIS — Z88.8 ALLERGY STATUS TO OTHER DRUGS, MEDICAMENTS AND BIOLOGICAL SUBSTANCES STATUS: ICD-10-CM

## 2023-02-13 DIAGNOSIS — R53.1 WEAKNESS: ICD-10-CM

## 2023-02-13 DIAGNOSIS — Z79.02 LONG TERM (CURRENT) USE OF ANTITHROMBOTICS/ANTIPLATELETS: ICD-10-CM

## 2023-02-13 DIAGNOSIS — Z86.73 PERSONAL HISTORY OF TRANSIENT ISCHEMIC ATTACK (TIA), AND CEREBRAL INFARCTION WITHOUT RESIDUAL DEFICITS: ICD-10-CM

## 2023-02-13 DIAGNOSIS — G43.909 MIGRAINE, UNSPECIFIED, NOT INTRACTABLE, WITHOUT STATUS MIGRAINOSUS: ICD-10-CM

## 2023-02-13 DIAGNOSIS — Z91.041 RADIOGRAPHIC DYE ALLERGY STATUS: ICD-10-CM

## 2023-03-20 NOTE — DISCHARGE NOTE PROVIDER - ATTENDING ATTESTATION STATEMENT
I have personally seen and examined the patient. I have collaborated with and supervised the Cartilage Graft Text: The defect edges were debeveled with a #15 scalpel blade.  Given the location of the defect, shape of the defect, the fact the defect involved a full thickness cartilage defect a cartilage graft was deemed most appropriate.  An appropriate donor site was identified, cleansed, and anesthetized. The cartilage graft was then harvested and transferred to the recipient site, oriented appropriately and then sutured into place.  The secondary defect was then repaired using a primary closure.

## 2023-05-08 ENCOUNTER — APPOINTMENT (OUTPATIENT)
Dept: NEUROLOGY | Facility: CLINIC | Age: 63
End: 2023-05-08

## 2023-05-27 NOTE — ED ADULT TRIAGE NOTE - HEIGHT IN FEET
5
ATTG: : ct with 3mm stone distal. patient feels better. pain improved. Cr mild increase, no prior baseline. rec close follow up with uro. drink plenty fluids. pain rx sent. reteurn precautions provided.

## 2024-02-01 NOTE — PRE-ANESTHESIA EVALUATION ADULT - NSANTHRISKNONERD_GEN_ALL_CORE
Subjective:      Patient ID: Bertha Hebert is a 49 y.o. female who presents today for:  Chief Complaint   Patient presents with    6 Month Follow-Up     Pt states that she is doing good.  Pt states that she broke her right ankle and was non weight baring for 3 months but has been doing okay since.        HPI 49-year-old right-handed female with a known history of polymyalgia rheumatica.  The patient continues on low-dose prednisone.  Patient is diabetic and therefore we are to keep her on a low-dose of prednisone she is on vitamin D and calcium replacements.  When last seen we were contemplating a gastric bypass.  Patient broke her ankle and nonweight bearing.  Patient weight is 305 pounds when she was here 9 pounds last.  Last CRP was 28.6 and that is where she remained with a sed rate of 58    Patient's ankle dislocation was secondary to missing a step.  Overall patient is much better she is not any significant pain even with the lowest dose of prednisone.  She is doing somewhat better with her hemoglobin A1c as well.    Patient also has benign patient vertigo well-controlled right now with Antivert.    Past Medical History:   Diagnosis Date    Abnormal Pap smear of cervix     Acute midline thoracic back pain 09/18/2018    B12 deficiency     Family history of premature CAD 09/04/2013    Fatty liver     Gastroesophageal reflux disease 01/06/2021    Gastroparesis     HPV (human papilloma virus) anogenital infection     Hyperlipidemia     Hypertension     Irritable bowel syndrome with diarrhea 04/26/2021    Liver hemangioma 2009/2015    Lumbar radiculopathy 07/07/2021    Obesity 03/11/2013    BMI 43.42    Orthostatic hypotension     Ovarian cyst     PMR (polymyalgia rheumatica) (HCC)     Tobacco abuse 09/03/2015    Tremor     Uncontrolled diabetes mellitus with complications     Unspecified sleep apnea      Past Surgical History:   Procedure Laterality Date    ANKLE FRACTURE SURGERY Right 9/15/2023    Right open 
No risk alerts present

## 2024-07-26 NOTE — H&P ADULT - ASSESSMENT
[Cooperative] : cooperative [Euthymic] : euthymic [Full] : full [Clear] : clear [Linear/Goal Directed] : linear/goal directed [Average] : average [WNL] : within normal limits 62F w/ PMH HTN, migraine, seizure d/o, depression-anxiety, per patient multiiple CTA in September w/ complaints of fall.

## 2024-08-28 NOTE — OCCUPATIONAL THERAPY INITIAL EVALUATION ADULT - ASR WT BEARING STATUS EVAL
"Daily Note     Today's date: 2024  Patient name: Eileen Matias  : 1956  MRN: [id number]  Referring provider: Rick Lazo DO  Dx:   Encounter Diagnosis     ICD-10-CM    1. Lymphedema of right arm  I89.0       2. Closed nondisplaced spiral fracture of shaft of right humerus with routine healing, subsequent encounter  S42.344D       3. Lymphedema of left arm  I89.0           Start Time: 1015          Subjective: pt reports no new major complaints upon arrival.       Objective: See treatment diary below      Assessment: Tolerated treatment well. Patient would benefit from continued PT. Swelling in BL UE is maintaining. Continued to work on shoulder strengthening focusing on avoiding compensation w/ winging.       Plan: Continue per plan of care.      FOTO: Goal: Lymph - 57 Shoulder - 49; Eval: Lymph - 5 Shoulder - 4;3/13: Lymph - 42; : Shoulder - 47;4/10 Lymph - 48; : shoulder - 42; : Lymph - 69 Shoulder - 48; : lymph - 85 Shoulder - 56; : Lymph - 98 Shoulder - 56      EPOC:   10/16    Shoulder EPOC: 10/16 8/26 8/28     RA Lymph DATE:         Shoulder:         Manual       MLD [facility]     Manual Wrapping       PROM                       Exercise Diary    THEREX       Pt ed  FOTO; updated measurements             Wall slides 2x10  Incline 2x10 Incline 2x10   SL ER 0# 25x  0# 25x 0# 30x   Standing shoulder flexion/scaption Over 90* 2x10 0#  2x10 2x10                 pulleys 2/2  2/2 2   Ball roll up wall 2# 12x      Supine ER cane 5\" 20x  5\" 20x    Shoulder isometrics       SL flexion       SL abd 1# 25x  1# 25x 1# 30x   Supine shoulder flex 1# 25x  1# 25x 1# 30x   IR stretch w/ strap       NEURO RE-ED       Supine HA       Supine BL ER       Cone reaches Cone reaches 1 min flex/scap      Supine punches 1# 25x  1# 25x 1# 30x    ball roll up wall        rows/pulldowns BTB x20  BTB x20 BTB x30                                                            "   Modalities           CP                                                                         no weight-bearing restrictions

## 2025-03-23 NOTE — PROGRESS NOTE ADULT - PROBLEM SELECTOR PLAN 7
-due to volvulus and adhesions  -s/p DELROY   --per surgery  -NG removed 3/21  -monitor tolerance of diet  -encourage ambulation     cont. melatonin and Remeron PRN

## 2025-04-01 NOTE — ED PROVIDER NOTE - CPE EDP [facility] NORM
GI Discharge Instructions Endoscopy      4/1/2025    During your exam, the physician:    Took a biopsy of  Stomach, Small Intestine, and Colon    DIET INSTRUCTIONS:  Resume your regular diet    PRESCRIPTIONS/MEDICATIONS  No new prescriptions given today    A RESPONSIBLE ADULT MUST ACCOMPANY YOU AND DRIVE YOU HOME    You had the following procedure(s) today:   Colonoscopy and Upper Endoscopy      Avoid aspirin for 5 days.  Avoid anti-inflammatory drugs, (Nuprin, Ibuprofen, Motrin, Advil, etc.) for 5 days.     Following sedation, your judgement, perception and coordination are impaired for a minimum of       24 hours.      Therefore:  Do not drive.  Do not return to work today.  Do not operate appliances or machinery that require quick reaction time  Do not sign legal documents or be involved in work decisions  Do not smoke or drink alcoholic beverages for 24 hours  Plan to spend a few hours resting before resuming your normal routine    Please call your physician in the event that you experience any of the following or proceed to  the nearest hospital in the event of an emergency:     For Upper Endoscopy  Difficulty swallowing or breathing  Neck swelling  Excessive pain, you may have mild chest pain or discomfort which should pass in 1-2 hours with the passage of air.  Nausea or vomiting  Abdominal distention  Fever  Mild throat soreness may follow this procedure.  Warm salt-water gargle or lozenges may relieve your discomfort    For Colonoscopy / Sigmoidoscopy  Severe abdominal distention or pain. Some mild distention and/or cramping are normal after these procedures but should pass within an hour or two with the passage of air.  Rectal bleeding more than blood streaking on the toilet tissue  Nausea or vomiting  Fever    If you have any questions or concerns, contact Dr. Ruiz office [place] [phone]    ADDITIONAL INSTRUCTIONS: Await pathology, further recommendations pending results      normal...